# Patient Record
Sex: MALE | Race: WHITE | Employment: OTHER | URBAN - METROPOLITAN AREA
[De-identification: names, ages, dates, MRNs, and addresses within clinical notes are randomized per-mention and may not be internally consistent; named-entity substitution may affect disease eponyms.]

---

## 2017-01-09 ENCOUNTER — ANESTHESIA (OUTPATIENT)
Dept: SURGERY | Age: 82
DRG: 470 | End: 2017-01-09
Payer: MEDICARE

## 2017-01-09 ENCOUNTER — ANESTHESIA EVENT (OUTPATIENT)
Dept: SURGERY | Age: 82
DRG: 470 | End: 2017-01-09
Payer: MEDICARE

## 2017-01-09 PROBLEM — M17.9 DJD (DEGENERATIVE JOINT DISEASE) OF KNEE: Status: ACTIVE | Noted: 2017-01-09

## 2017-01-09 PROCEDURE — 77030008684 HC TU ET CUF COVD -B: Performed by: ANESTHESIOLOGY

## 2017-01-09 PROCEDURE — 74011250636 HC RX REV CODE- 250/636

## 2017-01-09 PROCEDURE — 77030020061 HC IV BLD WRMR ADMIN SET 3M -B: Performed by: ANESTHESIOLOGY

## 2017-01-09 PROCEDURE — 77030019908 HC STETH ESOPH SIMS -A: Performed by: ANESTHESIOLOGY

## 2017-01-09 PROCEDURE — 74011000250 HC RX REV CODE- 250

## 2017-01-09 PROCEDURE — 74011250636 HC RX REV CODE- 250/636: Performed by: ORTHOPAEDIC SURGERY

## 2017-01-09 PROCEDURE — 74011250636 HC RX REV CODE- 250/636: Performed by: ANESTHESIOLOGY

## 2017-01-09 RX ORDER — ROCURONIUM BROMIDE 10 MG/ML
INJECTION, SOLUTION INTRAVENOUS AS NEEDED
Status: DISCONTINUED | OUTPATIENT
Start: 2017-01-09 | End: 2017-01-09 | Stop reason: HOSPADM

## 2017-01-09 RX ORDER — NEOSTIGMINE METHYLSULFATE 1 MG/ML
INJECTION INTRAVENOUS AS NEEDED
Status: DISCONTINUED | OUTPATIENT
Start: 2017-01-09 | End: 2017-01-09 | Stop reason: HOSPADM

## 2017-01-09 RX ORDER — MIDAZOLAM HYDROCHLORIDE 1 MG/ML
INJECTION, SOLUTION INTRAMUSCULAR; INTRAVENOUS AS NEEDED
Status: DISCONTINUED | OUTPATIENT
Start: 2017-01-09 | End: 2017-01-09 | Stop reason: HOSPADM

## 2017-01-09 RX ORDER — PHENYLEPHRINE HCL IN 0.9% NACL 0.4MG/10ML
SYRINGE (ML) INTRAVENOUS AS NEEDED
Status: DISCONTINUED | OUTPATIENT
Start: 2017-01-09 | End: 2017-01-09 | Stop reason: HOSPADM

## 2017-01-09 RX ORDER — SUCCINYLCHOLINE CHLORIDE 20 MG/ML
INJECTION INTRAMUSCULAR; INTRAVENOUS AS NEEDED
Status: DISCONTINUED | OUTPATIENT
Start: 2017-01-09 | End: 2017-01-09 | Stop reason: HOSPADM

## 2017-01-09 RX ORDER — GLYCOPYRROLATE 0.2 MG/ML
INJECTION INTRAMUSCULAR; INTRAVENOUS AS NEEDED
Status: DISCONTINUED | OUTPATIENT
Start: 2017-01-09 | End: 2017-01-09 | Stop reason: HOSPADM

## 2017-01-09 RX ORDER — LIDOCAINE HYDROCHLORIDE 20 MG/ML
INJECTION, SOLUTION EPIDURAL; INFILTRATION; INTRACAUDAL; PERINEURAL AS NEEDED
Status: DISCONTINUED | OUTPATIENT
Start: 2017-01-09 | End: 2017-01-09 | Stop reason: HOSPADM

## 2017-01-09 RX ORDER — PROPOFOL 10 MG/ML
INJECTION, EMULSION INTRAVENOUS AS NEEDED
Status: DISCONTINUED | OUTPATIENT
Start: 2017-01-09 | End: 2017-01-09 | Stop reason: HOSPADM

## 2017-01-09 RX ORDER — ONDANSETRON 2 MG/ML
INJECTION INTRAMUSCULAR; INTRAVENOUS AS NEEDED
Status: DISCONTINUED | OUTPATIENT
Start: 2017-01-09 | End: 2017-01-09 | Stop reason: HOSPADM

## 2017-01-09 RX ORDER — FENTANYL CITRATE 50 UG/ML
INJECTION, SOLUTION INTRAMUSCULAR; INTRAVENOUS AS NEEDED
Status: DISCONTINUED | OUTPATIENT
Start: 2017-01-09 | End: 2017-01-09 | Stop reason: HOSPADM

## 2017-01-09 RX ORDER — DEXAMETHASONE SODIUM PHOSPHATE 4 MG/ML
INJECTION, SOLUTION INTRA-ARTICULAR; INTRALESIONAL; INTRAMUSCULAR; INTRAVENOUS; SOFT TISSUE AS NEEDED
Status: DISCONTINUED | OUTPATIENT
Start: 2017-01-09 | End: 2017-01-09 | Stop reason: HOSPADM

## 2017-01-09 RX ADMIN — LIDOCAINE HYDROCHLORIDE 50 MG: 20 INJECTION, SOLUTION EPIDURAL; INFILTRATION; INTRACAUDAL; PERINEURAL at 10:47

## 2017-01-09 RX ADMIN — SODIUM CHLORIDE, SODIUM LACTATE, POTASSIUM CHLORIDE, AND CALCIUM CHLORIDE: 600; 310; 30; 20 INJECTION, SOLUTION INTRAVENOUS at 11:02

## 2017-01-09 RX ADMIN — MIDAZOLAM HYDROCHLORIDE 2 MG: 1 INJECTION, SOLUTION INTRAMUSCULAR; INTRAVENOUS at 10:41

## 2017-01-09 RX ADMIN — ROCURONIUM BROMIDE 5 MG: 10 INJECTION, SOLUTION INTRAVENOUS at 10:47

## 2017-01-09 RX ADMIN — FENTANYL CITRATE 100 MCG: 50 INJECTION, SOLUTION INTRAMUSCULAR; INTRAVENOUS at 10:47

## 2017-01-09 RX ADMIN — CEFAZOLIN 2 G: 10 INJECTION, POWDER, FOR SOLUTION INTRAVENOUS; PARENTERAL at 10:43

## 2017-01-09 RX ADMIN — SUCCINYLCHOLINE CHLORIDE 140 MG: 20 INJECTION INTRAMUSCULAR; INTRAVENOUS at 10:47

## 2017-01-09 RX ADMIN — Medication 100 MCG: at 10:54

## 2017-01-09 RX ADMIN — DEXAMETHASONE SODIUM PHOSPHATE 8 MG: 4 INJECTION, SOLUTION INTRA-ARTICULAR; INTRALESIONAL; INTRAMUSCULAR; INTRAVENOUS; SOFT TISSUE at 11:06

## 2017-01-09 RX ADMIN — ROCURONIUM BROMIDE 30 MG: 10 INJECTION, SOLUTION INTRAVENOUS at 10:55

## 2017-01-09 RX ADMIN — NEOSTIGMINE METHYLSULFATE 2 MG: 1 INJECTION INTRAVENOUS at 11:37

## 2017-01-09 RX ADMIN — ONDANSETRON 4 MG: 2 INJECTION INTRAMUSCULAR; INTRAVENOUS at 11:34

## 2017-01-09 RX ADMIN — GLYCOPYRROLATE 0.4 MG: 0.2 INJECTION INTRAMUSCULAR; INTRAVENOUS at 11:37

## 2017-01-09 RX ADMIN — PROPOFOL 140 MG: 10 INJECTION, EMULSION INTRAVENOUS at 10:47

## 2017-01-09 NOTE — ANESTHESIA PREPROCEDURE EVALUATION
Anesthetic History   No history of anesthetic complications            Review of Systems / Medical History  Patient summary reviewed, nursing notes reviewed and pertinent labs reviewed    Pulmonary  Within defined limits                 Neuro/Psych   Within defined limits           Cardiovascular    Hypertension        Dysrhythmias : atrial fibrillation and atrial flutter      Exercise tolerance: >4 METS     GI/Hepatic/Renal  Within defined limits              Endo/Other        Obesity and arthritis     Other Findings   Comments: DJD  Enlarged prostate          Physical Exam    Airway  Mallampati: I    Neck ROM: normal range of motion   Mouth opening: Normal     Cardiovascular  Regular rate and rhythm,  S1 and S2 normal,  no murmur, click, rub, or gallop             Dental  No notable dental hx       Pulmonary  Breath sounds clear to auscultation               Abdominal  GI exam deferred       Other Findings            Anesthetic Plan    ASA: 3  Anesthesia type: general          Induction: Intravenous  Anesthetic plan and risks discussed with: Patient

## 2017-01-09 NOTE — ANESTHESIA POSTPROCEDURE EVALUATION
Post-Anesthesia Evaluation and Assessment    Patient: Keyshawn Bailey MRN: [id number]  SSN: [id number]    YOB: 1935  Age: 80 y.o. Sex: male       Cardiovascular Function/Vital Signs  Visit Vitals    /62    Pulse 72    Temp 36.9 °C (98.4 °F)    Resp 18    Wt 96.1 kg (211 lb 13.8 oz)    SpO2 95%    BMI 32.21 kg/m2       Patient is status post general anesthesia for Procedure(s):  RIGHT UNICONDYLAR KNEE ARTHROPLASTY. Nausea/Vomiting: None    Postoperative hydration reviewed and adequate. Pain:  Pain Scale 1: Numeric (0 - 10) (01/09/17 0757)  Pain Intensity 1: 0 (01/09/17 0757)   Managed    Neurological Status:   Neuro (WDL): Within Defined Limits (01/09/17 0801)  Neuro  Neurologic State: Alert;Eyes open spontaneously (01/09/17 0801)  Orientation Level: Oriented X4 (01/09/17 0801)  Cognition: Follows commands (01/09/17 0801)  Speech: Clear (01/09/17 0801)  LUE Motor Response: Purposeful (01/09/17 0801)  LLE Motor Response: Purposeful (01/09/17 0801)  RUE Motor Response: Purposeful (01/09/17 0801)  RLE Motor Response: Purposeful (01/09/17 0801)   At baseline    Mental Status and Level of Consciousness: Arousable    Pulmonary Status:   O2 Device: Nasal cannula (01/09/17 1208)   Adequate oxygenation and airway patent    Complications related to anesthesia: None    Post-anesthesia assessment completed.  No concerns    Signed By: Evy Cid MD     January 9, 2017

## 2017-02-06 ENCOUNTER — OFFICE VISIT (OUTPATIENT)
Dept: INTERNAL MEDICINE CLINIC | Age: 82
End: 2017-02-06

## 2017-02-06 VITALS
RESPIRATION RATE: 18 BRPM | WEIGHT: 214.6 LBS | SYSTOLIC BLOOD PRESSURE: 131 MMHG | DIASTOLIC BLOOD PRESSURE: 72 MMHG | HEART RATE: 74 BPM | OXYGEN SATURATION: 96 % | BODY MASS INDEX: 32.52 KG/M2 | TEMPERATURE: 97.4 F | HEIGHT: 68 IN

## 2017-02-06 DIAGNOSIS — E78.00 PURE HYPERCHOLESTEROLEMIA: ICD-10-CM

## 2017-02-06 DIAGNOSIS — N40.1 BENIGN NON-NODULAR PROSTATIC HYPERPLASIA WITH LOWER URINARY TRACT SYMPTOMS: ICD-10-CM

## 2017-02-06 DIAGNOSIS — R73.09 ELEVATED GLUCOSE: ICD-10-CM

## 2017-02-06 DIAGNOSIS — R73.03 PRE-DIABETES: ICD-10-CM

## 2017-02-06 DIAGNOSIS — H42 GLAUCOMA OF BOTH EYES ASSOCIATED WITH UNDERLYING DISEASE: ICD-10-CM

## 2017-02-06 DIAGNOSIS — I48.0 PAROXYSMAL ATRIAL FIBRILLATION (HCC): ICD-10-CM

## 2017-02-06 DIAGNOSIS — R60.0 LOCALIZED EDEMA: Primary | ICD-10-CM

## 2017-02-06 DIAGNOSIS — I10 ESSENTIAL HYPERTENSION: ICD-10-CM

## 2017-02-06 RX ORDER — TRAMADOL HYDROCHLORIDE 50 MG/1
TABLET ORAL
Refills: 0 | COMMUNITY
Start: 2017-01-24 | End: 2017-02-27

## 2017-02-06 RX ORDER — CEPHALEXIN 500 MG/1
CAPSULE ORAL
Refills: 0 | COMMUNITY
Start: 2017-01-31 | End: 2017-02-17 | Stop reason: ALTCHOICE

## 2017-02-06 RX ORDER — FUROSEMIDE 20 MG/1
TABLET ORAL
Refills: 0 | COMMUNITY
Start: 2017-01-24 | End: 2017-02-06 | Stop reason: ALTCHOICE

## 2017-02-06 RX ORDER — FUROSEMIDE 40 MG/1
40 TABLET ORAL DAILY
Qty: 30 TAB | Refills: 1 | Status: SHIPPED | OUTPATIENT
Start: 2017-02-06 | End: 2017-03-06 | Stop reason: SDUPTHER

## 2017-02-06 NOTE — PROGRESS NOTES
Chief Complaint   Patient presents with   [facility] Establish Care    Leg Swelling     right leg and both feet  x1 month     1. Have you been to the ER, urgent care clinic since your last visit? Hospitalized since your last visit? No    2. Have you seen or consulted any other health care providers outside of the [address] [place] [address] since your last visit? Include any pap smears or colon screening. Yes When: Jan 2016 and Feb 2016 [facility] , Swelling in the legs.

## 2017-02-06 NOTE — MR AVS SNAPSHOT
Visit Information Date & Time Provider Department Dept. Phone Encounter #  
 2/6/2017  1:15 PM Evelyn French, [address] [phone] [id number] Follow-up Instructions Return in about 4 weeks (around 3/6/2017). Upcoming Health Maintenance Date Due DTaP/Tdap/Td series (1 - Tdap) 10/10/1958 ZOSTER VACCINE AGE 60> 10/10/1997 GLAUCOMA SCREENING Q2Y 10/10/2002 Pneumococcal 65+ Low/Medium Risk (1 of 2 - PCV13) 10/10/2002 MEDICARE YEARLY EXAM 10/10/2002 Allergies as of 2/6/2017  Review Complete On: 2/6/2017 By: Melvina Santana Severity Noted Reaction Type Reactions Oxycodone Low 01/09/2017    Other (comments) Patient states, \"It made me feel unwell. \" Current Immunizations  Never Reviewed Name Date Influenza Vaccine 10/1/2016 Not reviewed this visit You Were Diagnosed With   
  
 Codes Comments Localized edema    -  Primary ICD-10-CM: R60.0 ICD-9-CM: 782.3 Essential hypertension     ICD-10-CM: I10 
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 
ICD-9-CM: 365.89 Benign non-nodular prostatic hyperplasia with lower urinary tract symptoms     ICD-10-CM: N40.1 ICD-9-CM: 600.91 Vitals BP Pulse Temp Resp Height(growth percentile) Weight(growth percentile) 131/72 (BP 1 Location: Left arm, BP Patient Position: Sitting) 74 97.4 °F (36.3 °C) (Oral) 18 5' 8\" (1.727 m) 214 lb 9.6 oz (97.3 kg) SpO2 BMI Smoking Status 96% 32.63 kg/m2 Never Smoker BMI and BSA Data Body Mass Index Body Surface Area  
 32.63 kg/m 2 2.16 m 2 Preferred Pharmacy Pharmacy Name Phone [phone] [facility] [phone] Your Updated Medication List  
  
   
 This list is accurate as of: 2/6/17  2:02 PM.  Always use your most recent med list.  
  
  
  
  
 ALPHAGAN P 0.1 % ophthalmic solution Generic drug:  brimonidine Administer 1 Drop to both eyes daily. Both eyes PM and lt eye in am also  
  
 amLODIPine 2.5 mg tablet Commonly known as:  Wana Jessica Take 2.5 mg by mouth daily. cephALEXin 500 mg capsule Commonly known as:  David Ty TAKE 1 CAPSULE BY MOUTH 3 TIMES DAILY UNTIL GONE. doxazosin 4 mg tablet Commonly known as:  CARDURA Take 4 mg by mouth daily. ELIQUIS 5 mg tablet Generic drug:  apixaban Take 5 mg by mouth two (2) times a day. famotidine 20 mg tablet Commonly known as:  PEPCID Take 1 Tab by mouth two (2) times a day for 30 days. furosemide 40 mg tablet Commonly known as:  LASIX Take 1 Tab by mouth daily. lisinopril 20 mg tablet Commonly known as:  Melanie Haworth Take 1 Tab by mouth daily. May resume medication when b/p greater than 120/80. metoprolol tartrate 25 mg tablet Commonly known as:  LOPRESSOR Take 25 mg by mouth two (2) times a day. traMADol 50 mg tablet Commonly known as:  ULTRAM  
TAKE 1 TABLET BY MOUTH EVERY 6 HOURS AS NEEDED FOR PAIN Prescriptions Sent to Pharmacy Refills  
 furosemide (LASIX) 40 mg tablet 1 Sig: Take 1 Tab by mouth daily. Class: Normal  
 Pharmacy: [address] #: [phone] Route: Oral  
  
We Performed the Following REFERRAL TO CARDIOLOGY [ENT79 Custom] REFERRAL TO OPHTHALMOLOGY [REF57 Custom] Comments:  
 glaucoma REFERRAL TO UROLOGY [YNQ347 Custom] Follow-up Instructions Return in about 4 weeks (around 3/6/2017). Referral Information Referral ID Referred By Referred To  
  
 [id number] Deon García [address] Phone: [phone] Fax: [phone] Visits Status Start Date End Date 1 New Request 2/6/17 2/6/18 If your referral has a status of pending review or denied, additional information will be sent to support the outcome of this decision. Referral ID Referred By Referred To  
 [id number] [address] [facility], [address] Urology [address] [place], [address] [facility] Visits Status Start Date End Date 1 New Request 2/6/17 2/6/18 If your referral has a status of pending review or denied, additional information will be sent to support the outcome of this decision. Referral ID Referred By Referred To  
 [id number] YULIANA Chavis [place] [facility] [address] [facility], [facility] Visits Status Start Date End Date 1 New Request 2/6/17 2/6/18 If your referral has a status of pending review or denied, additional information will be sent to support the outcome of this decision. Patient Instructions Leg and Ankle Edema: Care Instructions Your Care Instructions Swelling in the legs, ankles, and feet is called edema. It is common after you sit or stand for a while. Long plane flights or car rides often cause swelling in the legs and feet. You may also have swelling if you have to stand for long periods of time at your job. Problems with the veins in the legs (varicose veins) and changes in hormones can also cause swelling. Sometimes the swelling in the ankles and feet is caused by a more serious problem, such as heart failure, infection, blood clots, or liver or kidney disease. Follow-up care is a key part of your treatment and safety. Be sure to make and go to all appointments, and call your doctor if you are having problems. Its also a good idea to know your test results and keep a list of the medicines you take. How can you care for yourself at home? · If your doctor gave you medicine, take it as prescribed. Call your doctor if you think you are having a problem with your medicine. · Whenever you are resting, raise your legs up. Try to keep the swollen area higher than the level of your heart. · Take breaks from standing or sitting in one position. ¨ Walk around to increase the blood flow in your lower legs. ¨ Move your feet and ankles often while you stand, or tighten and relax your leg muscles. · Wear support stockings. Put them on in the morning, before swelling gets worse. · Eat a balanced diet. Lose weight if you need to. · Limit the amount of salt (sodium) in your diet. Salt holds fluid in the body and may increase swelling. When should you call for help? Call 911 anytime you think you may need emergency care. For example, call if: 
· You have symptoms of a blood clot in your lung (called a pulmonary embolism). These may include: 
¨ Sudden chest pain. ¨ Trouble breathing. ¨ Coughing up blood. Call your doctor now or seek immediate medical care if: 
· You have signs of a blood clot, such as: 
¨ Pain in your calf, back of the knee, thigh, or groin. ¨ Redness and swelling in your leg or groin. · You have symptoms of infection, such as: 
¨ Increased pain, swelling, warmth, or redness. ¨ Red streaks or pus. ¨ A fever. Watch closely for changes in your health, and be sure to contact your doctor if: 
· Your swelling is getting worse. · You have new or worsening pain in your legs. · You do not get better as expected. Where can you learn more? Go to http://cammie-derik.info/. Enter L769 in the search box to learn more about \"Leg and Ankle Edema: Care Instructions. \" Current as of: May 27, 2016 Content Version: 11.1 © 4794-9575 [employer].  Care instructions adapted under license by [employer] (which disclaims liability or warranty for this information). If you have questions about a medical condition or this instruction, always ask your healthcare professional. [address] any warranty or liability for your use of this information. 
 
-----------lasix- furosemide ( fluid pill) increased to 40mg daily. Introducing [facility] SERVICES! Chito Burger introduces GuidePal patient portal. Now you can access parts of your medical record, email your doctor's office, and request medication refills online. 1. In your internet browser, go to [email or web address] 2. Click on the First Time User? Click Here link in the Sign In box. You will see the New Member Sign Up page. 3. Enter your GuidePal Access Code exactly as it appears below. You will not need to use this code after youve completed the sign-up process. If you do not sign up before the expiration date, you must request a new code. · GuidePal Access Code: [email or web address] Expires: 3/15/2017 10:31 AM 
 
4. Enter the last four digits of your Social Security Number (xxxx) and Date of Birth (mm/dd/yyyy) as indicated and click Submit. You will be taken to the next sign-up page. 5. Create a GuidePal ID. This will be your GuidePal login ID and cannot be changed, so think of one that is secure and easy to remember. 6. Create a GuidePal password. You can change your password at any time. 7. Enter your Password Reset Question and Answer. This can be used at a later time if you forget your password. 8. Enter your e-mail address. You will receive e-mail notification when new information is available in [address] 9. Click Sign Up. You can now view and download portions of your medical record. 10. Click the Download Summary menu link to download a portable copy of your medical information. If you have questions, please visit the Frequently Asked Questions section of the GuidePal website.  Remember, GuidePal is NOT to be used for urgent needs. For medical emergencies, dial 911. Now available from your iPhone and Android! Please provide this summary of care documentation to your next provider. Your primary care clinician is listed as ANGELA Whalen. If you have any questions after today's visit, please call [phone].

## 2017-02-06 NOTE — PATIENT INSTRUCTIONS
Leg and Ankle Edema: Care Instructions  Your Care Instructions  Swelling in the legs, ankles, and feet is called edema. It is common after you sit or stand for a while. Long plane flights or car rides often cause swelling in the legs and feet. You may also have swelling if you have to stand for long periods of time at your job. Problems with the veins in the legs (varicose veins) and changes in hormones can also cause swelling. Sometimes the swelling in the ankles and feet is caused by a more serious problem, such as heart failure, infection, blood clots, or liver or kidney disease. Follow-up care is a key part of your treatment and safety. Be sure to make and go to all appointments, and call your doctor if you are having problems. Its also a good idea to know your test results and keep a list of the medicines you take. How can you care for yourself at home? · If your doctor gave you medicine, take it as prescribed. Call your doctor if you think you are having a problem with your medicine. · Whenever you are resting, raise your legs up. Try to keep the swollen area higher than the level of your heart. · Take breaks from standing or sitting in one position. ¨ Walk around to increase the blood flow in your lower legs. ¨ Move your feet and ankles often while you stand, or tighten and relax your leg muscles. · Wear support stockings. Put them on in the morning, before swelling gets worse. · Eat a balanced diet. Lose weight if you need to. · Limit the amount of salt (sodium) in your diet. Salt holds fluid in the body and may increase swelling. When should you call for help? Call 911 anytime you think you may need emergency care. For example, call if:  · You have symptoms of a blood clot in your lung (called a pulmonary embolism). These may include:  ¨ Sudden chest pain. ¨ Trouble breathing. ¨ Coughing up blood.   Call your doctor now or seek immediate medical care if:  · You have signs of a blood clot, such as:  ¨ Pain in your calf, back of the knee, thigh, or groin. ¨ Redness and swelling in your leg or groin. · You have symptoms of infection, such as:  ¨ Increased pain, swelling, warmth, or redness. ¨ Red streaks or pus. ¨ A fever. Watch closely for changes in your health, and be sure to contact your doctor if:  · Your swelling is getting worse. · You have new or worsening pain in your legs. · You do not get better as expected. Where can you learn more? Go to http://cammie-derik.info/. Enter S787 in the search box to learn more about \"Leg and Ankle Edema: Care Instructions. \"  Current as of: May 27, 2016  Content Version: 11.1  © 1337-9491 [employer]. Care instructions adapted under license by [employer] (which disclaims liability or warranty for this information). If you have questions about a medical condition or this instruction, always ask your healthcare professional. [place] [address] any warranty or liability for your use of this information.    -----------lasix- furosemide ( fluid pill) increased to 40mg daily.

## 2017-02-06 NOTE — PROGRESS NOTES
Mr. Harris Both is a new patient who is here to establish care. Establish Care and Leg Swelling (right leg and both feet  x1 month)     RIght knee arthroplasty done on Jan 10th     Recently moved from [place] and here to establish care. Patient had right knee arthroplasty done on Jan 10th and since then has experienced right right leg swelling and feet swelling for 1 month. He was recently seen by his  cardiologist in [place] and had TTE, doppler and a CAT scan done results are pending. Also had lab work done at same time   Patient is taking lasix 20mg daily prescribed by ortho MD due to swelling noted. Reports dyspnea with lying flat after sx which has improved. Also had PND  which has improved, orthopnea 1 pillow. Reports increased sitting and not as mobile since sx  On apixan daily   Patient has follow up with ortho tomorrow    Hx of atrial fibrillation since 2015 on metoprolol and apixaban. Had TTE on last Friday. Evaluated by Clyde Shields in [place] - needs cardiologist here to follow care. Hx of colonoscopy: when patient was 78 and was normal   Reports PNA shot cannot recall when    Glaucoma: uses brimonidine and requesting referral to local eye MD     Large prostate: followed by urologist and requesting to have a urologist  here.  No hx of biopsy     Review of systems:  Constitutional: negative for fever, chills, weight loss, night sweats   Eyes : negative for vision changes, eye pain and discharge  Nose and Throat: negative for tinnitus, sore throat   Cardiovascular: negative for chest pain, palpitations and shortness of breath  Respiratory: negative for shortness of breath, cough and wheezing   Gastroinstestinal: negative for abdominal pain, nausea, vomiting, diarrhea, constipation, and blood in the stool  Musculoskeletal: negative for back ache and joint ache   Genitourinary: negative for dysuria, nocturia, polyuria and hematuria   Neurologic: Negative for focal weakness, numbness or incoordination  Skin: negative for rash, pruritus  Hematologic: negative for easy bruising      Past Medical History   Diagnosis Date    Arrhythmia      A. fib. A. Flutter    Arthritis      DJD    Enlarged prostate     Hyperlipidemia     Hypertension     Pre-diabetes      HA1C 6.0 Dec 2016        Past Surgical History   Procedure Laterality Date    Hx shoulder arthroscopy       rt    Hx colonoscopy      Hx tonsillectomy      Hx heent       wisdom teeth extraction x2       Allergies   Allergen Reactions    Oxycodone Other (comments)     Patient states, \"It made me feel unwell. \"       Current Outpatient Prescriptions on File Prior to Visit   Medication Sig Dispense Refill    lisinopril (PRINIVIL, ZESTRIL) 20 mg tablet Take 1 Tab by mouth daily. May resume medication when b/p greater than 120/80.  famotidine (PEPCID) 20 mg tablet Take 1 Tab by mouth two (2) times a day for 30 days. 60 Tab 0    brimonidine (ALPHAGAN P) 0.1 % ophthalmic solution Administer 1 Drop to both eyes daily. Both eyes PM and lt eye in am also      metoprolol tartrate (LOPRESSOR) 25 mg tablet Take 25 mg by mouth two (2) times a day.  doxazosin (CARDURA) 4 mg tablet Take 4 mg by mouth daily.  amLODIPine (NORVASC) 2.5 mg tablet Take 2.5 mg by mouth daily.  apixaban (ELIQUIS) 5 mg tablet Take 5 mg by mouth two (2) times a day. No current facility-administered medications on file prior to visit. No family hx of CAD cannot recall specifics    Social History     Social History    Marital status:      Spouse name: N/A    Number of children: N/A    Years of education: N/A     Occupational History    Not on file.      Social History Main Topics    Smoking status: Never Smoker    Smokeless tobacco: Not on file    Alcohol use No    Drug use: Not on file    Sexual activity: Not on file     Other Topics Concern    Not on file     Social History Narrative       Visit Vitals    /72 (BP 1 Location: Left arm, BP Patient Position: Sitting)    Pulse 74    Temp 97.4 °F (36.3 °C) (Oral)    Resp 18    Ht 5' 8\" (1.727 m)    Wt 214 lb 9.6 oz (97.3 kg)    SpO2 96%    BMI 32.63 kg/m2     General:  Well appearing male no acute distress  HEENT:   PERRL,normal conjunctiva. External ear and canals normal, TMs normal.  Hearing normal to voice. Nose without edema or discharge, normal septum. Lips, teeth, gums normal.  Oropharynx: no erythema, no exudates, no lesions, normal tongue. Neck:  Supple. Thyroid normal size, nontender, without nodules. No carotid bruit. No masses or lymphadenopathy  Respiratory: no respiratory distress,  no wheezing, no rhonchi, no rales. No chest wall tenderness. Cardiovascular:  RRR, normal S1S2, no murmur. Gastrointestinal: normal bowel sounds, soft, nontender, without masses. No hepatosplenomegaly. Extremities +2 pulses, 2+ pitting edema on right leg to thigh level extending from feet - minimal erythema and left leg with edema 1+, normal sensation   Patient is able to bear weight on right leg    Musculoskeletal:  Normal gait. Normal digits and nails. Normal strength and tone, no atrophy, and no abnormal movement. Skin:  No rash, no lesions, no ulcers. Skin warm, normal turgor, without induration or nodules. Neuro:  A and OX4, fluent speech, cranial nerves normal 2-12. Sensation normal to light touch.   Psych:  Normal affect      Lab Results   Component Value Date/Time    WBC 13.8 01/10/2017 04:19 AM    HGB 12.1 01/10/2017 04:19 AM    HCT 36.5 01/10/2017 04:19 AM    PLATELET 909 23/98/1264 04:19 AM    MCV 82.0 01/10/2017 04:19 AM     Lab Results   Component Value Date/Time    Sodium 138 01/10/2017 04:19 AM    Potassium 4.8 01/10/2017 04:19 AM    Chloride 108 01/10/2017 04:19 AM    CO2 19 01/10/2017 04:19 AM    Anion gap 11 01/10/2017 04:19 AM    Glucose 139 01/10/2017 04:19 AM    BUN 23 01/10/2017 04:19 AM    Creatinine 1.25 01/10/2017 04:19 AM BUN/Creatinine ratio 18 01/10/2017 04:19 AM    GFR est AA >60 01/10/2017 04:19 AM    GFR est non-AA 56 01/10/2017 04:19 AM    Calcium 7.9 01/10/2017 04:19 AM       Lab Results   Component Value Date/Time    Hemoglobin A1c 6.0 12/20/2016 01:23 PM             Assessment and Plan:     1. Lower extremity edema: Suspect multifactorial - patient with hx of afib and TTE done at cardiologist and pending. Also had duplex of lower extremity which is pending - I doubt PE and LE DVT patient reports compliance on eliquis BID. I have requested the records for these tests- patient also had lab work including BMP, and BNP and CBC done and hepatic panel   - in the meanwhile I have advised patient to increase lasix to 40mg daily ( patient was on 20mg daily) and to increase leg movement. - furosemide (LASIX) 40 mg tablet; Take 1 Tab by mouth daily. Dispense: 30 Tab; Refill: 1  -has follow up for right knee sx tomorrow - does not appear infected - patient is on keflex. 2. Essential hypertension  -BP at goal  Continue lisinopril, metoprolol and norvasc  - doubt Norvasc causing swelling on very low dose     3. Paroxysmal atrial fibrillation (HCC)  - rate controlled and sinus today - patient is on eliquis and metoprolol  -had recent TTE and I requested records   - REFERRAL TO CARDIOLOGY    4. 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 Benign non-nodular prostatic hyperplasia with lower urinary tract symptoms  - REFERRAL TO UROLOGY  - will defer to urology for PSA     6. Pre-diabetes  - mild   -discuss this with patient next visit did not address today       HM: discuss next visit   Need records of vaccines and last colonoscopy   Discuss pre diabetes next visit   Also needs cholesterol checked     Follow-up Disposition:  Return in about 4 weeks (around 3/6/2017).      Bradley Byrne MD

## 2017-02-09 ENCOUNTER — HOSPITAL ENCOUNTER (OUTPATIENT)
Dept: LAB | Age: 82
Discharge: HOME | End: 2017-02-09
Payer: MEDICARE

## 2017-02-09 PROCEDURE — 83036 HEMOGLOBIN GLYCOSYLATED A1C: CPT

## 2017-02-09 PROCEDURE — 36415 COLL VENOUS BLD VENIPUNCTURE: CPT

## 2017-02-09 PROCEDURE — 84153 ASSAY OF PSA TOTAL: CPT

## 2017-02-09 PROCEDURE — 80076 HEPATIC FUNCTION PANEL: CPT

## 2017-02-09 PROCEDURE — 80061 LIPID PANEL: CPT

## 2017-02-09 PROCEDURE — 80048 BASIC METABOLIC PNL TOTAL CA: CPT

## 2017-02-09 PROCEDURE — 85025 COMPLETE CBC W/AUTO DIFF WBC: CPT

## 2017-02-09 PROCEDURE — 84154 ASSAY OF PSA FREE: CPT

## 2017-02-10 LAB
ALBUMIN SERPL-MCNC: 4.1 G/DL (ref 3.5–4.8)
ALP SERPL-CCNC: 68 IU/L (ref 39–117)
ALT SERPL-CCNC: 8 IU/L (ref 0–44)
AST SERPL-CCNC: 12 IU/L (ref 0–40)
BASOPHILS # BLD AUTO: 0 X10E3/UL (ref 0–0.2)
BASOPHILS NFR BLD AUTO: 1 %
BILIRUB DIRECT SERPL-MCNC: 0.11 MG/DL (ref 0–0.4)
BILIRUB SERPL-MCNC: 0.3 MG/DL (ref 0–1.2)
BUN SERPL-MCNC: 20 MG/DL (ref 8–27)
BUN/CREAT SERPL: 19 (ref 10–22)
CALCIUM SERPL-MCNC: 8.9 MG/DL (ref 8.6–10.2)
CHLORIDE SERPL-SCNC: 101 MMOL/L (ref 96–106)
CHOLEST SERPL-MCNC: 182 MG/DL (ref 100–199)
CO2 SERPL-SCNC: 27 MMOL/L (ref 18–29)
CREAT SERPL-MCNC: 1.04 MG/DL (ref 0.76–1.27)
EOSINOPHIL # BLD AUTO: 0.2 X10E3/UL (ref 0–0.4)
EOSINOPHIL NFR BLD AUTO: 3 %
ERYTHROCYTE [DISTWIDTH] IN BLOOD BY AUTOMATED COUNT: 14.3 % (ref 12.3–15.4)
EST. AVERAGE GLUCOSE BLD GHB EST-MCNC: 123 MG/DL
GLUCOSE SERPL-MCNC: 103 MG/DL (ref 65–99)
HBA1C MFR BLD: 5.9 % (ref 4.8–5.6)
HCT VFR BLD AUTO: 37.2 % (ref 37.5–51)
HDLC SERPL-MCNC: 28 MG/DL
HGB BLD-MCNC: 12.1 G/DL (ref 12.6–17.7)
IMM GRANULOCYTES # BLD: 0 X10E3/UL (ref 0–0.1)
IMM GRANULOCYTES NFR BLD: 0 %
LDLC SERPL CALC-MCNC: 113 MG/DL (ref 0–99)
LYMPHOCYTES # BLD AUTO: 2.2 X10E3/UL (ref 0.7–3.1)
LYMPHOCYTES NFR BLD AUTO: 30 %
MCH RBC QN AUTO: 26.1 PG (ref 26.6–33)
MCHC RBC AUTO-ENTMCNC: 32.5 G/DL (ref 31.5–35.7)
MCV RBC AUTO: 80 FL (ref 79–97)
MONOCYTES # BLD AUTO: 0.6 X10E3/UL (ref 0.1–0.9)
MONOCYTES NFR BLD AUTO: 8 %
NEUTROPHILS # BLD AUTO: 4.3 X10E3/UL (ref 1.4–7)
NEUTROPHILS NFR BLD AUTO: 58 %
PLATELET # BLD AUTO: 184 X10E3/UL (ref 150–379)
POTASSIUM SERPL-SCNC: 4.5 MMOL/L (ref 3.5–5.2)
PROT SERPL-MCNC: 6.2 G/DL (ref 6–8.5)
PSA FREE MFR SERPL: 29.8 %
PSA FREE SERPL-MCNC: 1.25 NG/ML
PSA SERPL-MCNC: 4.2 NG/ML (ref 0–4)
RBC # BLD AUTO: 4.63 X10E6/UL (ref 4.14–5.8)
REFLEX CRITERIA: ABNORMAL
SODIUM SERPL-SCNC: 142 MMOL/L (ref 134–144)
TRIGL SERPL-MCNC: 206 MG/DL (ref 0–149)
VLDLC SERPL CALC-MCNC: 41 MG/DL (ref 5–40)
WBC # BLD AUTO: 7.4 X10E3/UL (ref 3.4–10.8)

## 2017-02-10 NOTE — PROGRESS NOTES
Please notify patient of labs   Kidney function is normal  Blood count is normal   Liver function is normal   Cholesterol is mildly elevated we will discuss this at your follow up visit   PSA - prostate antigen is mildly elevate - I have already given you a referral to see urologist   Prediabetes: stable -we will discuss this at your follow up visit

## 2017-02-17 ENCOUNTER — HOSPITAL ENCOUNTER (OUTPATIENT)
Dept: LAB | Age: 82
Discharge: HOME | End: 2017-02-17
Payer: MEDICARE

## 2017-02-17 ENCOUNTER — OFFICE VISIT (OUTPATIENT)
Dept: INTERNAL MEDICINE CLINIC | Age: 82
End: 2017-02-17

## 2017-02-17 VITALS
OXYGEN SATURATION: 95 % | RESPIRATION RATE: 18 BRPM | SYSTOLIC BLOOD PRESSURE: 126 MMHG | BODY MASS INDEX: 32.34 KG/M2 | TEMPERATURE: 97.9 F | WEIGHT: 213.4 LBS | HEIGHT: 68 IN | HEART RATE: 78 BPM | DIASTOLIC BLOOD PRESSURE: 68 MMHG

## 2017-02-17 DIAGNOSIS — I10 ESSENTIAL HYPERTENSION: ICD-10-CM

## 2017-02-17 DIAGNOSIS — R73.03 PRE-DIABETES: ICD-10-CM

## 2017-02-17 DIAGNOSIS — R60.0 BILATERAL EDEMA OF LOWER EXTREMITY: ICD-10-CM

## 2017-02-17 DIAGNOSIS — R53.82 CHRONIC FATIGUE: Primary | ICD-10-CM

## 2017-02-17 DIAGNOSIS — R06.83 SNORING: ICD-10-CM

## 2017-02-17 PROCEDURE — 36415 COLL VENOUS BLD VENIPUNCTURE: CPT

## 2017-02-17 PROCEDURE — 80048 BASIC METABOLIC PNL TOTAL CA: CPT

## 2017-02-17 RX ORDER — HYDROCODONE BITARTRATE AND ACETAMINOPHEN 5; 325 MG/1; MG/1
TABLET ORAL
COMMUNITY
Start: 2017-01-10 | End: 2017-02-27

## 2017-02-17 NOTE — PROGRESS NOTES
Mr. Alissa Contreras is a 81 yo male with a hx of afib on apixapan, large prostate, and glaucoma presenting to follow up for swelling in legs      Follow-up (leg swelling)     Leg swelling B/L: RIght more then left - patient had RIght knee arthroplasty done on Jan 10th and since procedure has had increased leg swelling. Had duplex that was negative and reports normal TTE done  by his  cardiologist in [place] ( Dr Starlin Opitz) . Pt is doing physical therapy and followed with ortho MD ( ortho [address] [place] [address]) and had follow up x rays which showed no abnormality. Patient was started on lasix 20mg then increased to 40mg. Today the swelling has improved some but still present. Patient denies fever and chills. Has started using compression stocking yesterday. Able to bear weight and walk. On apixan daily   Patient completed keflex per ortho MD   Reviewed labs with patient     Pre diabetes/ mildly elevated cholesterol: discussed lifestyle changes. Review of systems:  Constitutional: negative for fever, chills, weight loss, night sweats - patient feels more tired/ admits to snoring  Eyes : negative for vision changes, eye pain and discharge  Nose and Throat: negative for tinnitus, sore throat   Cardiovascular: negative for chest pain, palpitations and shortness of breath  Respiratory: negative for shortness of breath, cough and wheezing   Gastroinstestinal: negative for abdominal pain, nausea, vomiting, diarrhea, constipation, and blood in the stool  Musculoskeletal: positive for right knee recent surgery   Genitourinary: increased urination with lasix   Neurologic: Negative for focal weakness, numbness or incoordination  Skin: negative for rash, pruritus  Hematologic: negative for easy bruising      Past Medical History   Diagnosis Date    Arrhythmia      A. fib.  A. Flutter    Arthritis      DJD    Enlarged prostate     Hyperlipidemia     Hypertension     Pre-diabetes      HA1C 6.0 Dec 2016        Past Surgical History   Procedure Laterality Date    Hx shoulder arthroscopy       rt    Hx colonoscopy      Hx tonsillectomy      Hx heent       wisdom teeth extraction x2       Allergies   Allergen Reactions    Oxycodone Other (comments)     Patient states, \"It made me feel unwell. \"       Current Outpatient Prescriptions on File Prior to Visit   Medication Sig Dispense Refill    traMADol (ULTRAM) 50 mg tablet TAKE 1 TABLET BY MOUTH EVERY 6 HOURS AS NEEDED FOR PAIN  0    furosemide (LASIX) 40 mg tablet Take 1 Tab by mouth daily. 30 Tab 1    lisinopril (PRINIVIL, ZESTRIL) 20 mg tablet Take 1 Tab by mouth daily. May resume medication when b/p greater than 120/80.  brimonidine (ALPHAGAN P) 0.1 % ophthalmic solution Administer 1 Drop to both eyes daily. Both eyes PM and lt eye in am also      metoprolol tartrate (LOPRESSOR) 25 mg tablet Take 25 mg by mouth two (2) times a day.  doxazosin (CARDURA) 4 mg tablet Take 4 mg by mouth daily.  amLODIPine (NORVASC) 2.5 mg tablet Take 2.5 mg by mouth daily.  apixaban (ELIQUIS) 5 mg tablet Take 5 mg by mouth two (2) times a day. No current facility-administered medications on file prior to visit. No family hx of CAD cannot recall specifics    Social History     Social History    Marital status:      Spouse name: N/A    Number of children: N/A    Years of education: N/A     Occupational History    Not on file.      Social History Main Topics    Smoking status: Never Smoker    Smokeless tobacco: Not on file    Alcohol use No    Drug use: Not on file    Sexual activity: Not on file     Other Topics Concern    Not on file     Social History Narrative       Visit Vitals    /68 (BP 1 Location: Left arm, BP Patient Position: Sitting)    Pulse 78    Temp 97.9 °F (36.6 °C) (Oral)    Resp 18    Ht 5' 8\" (1.727 m)    Wt 213 lb 6.4 oz (96.8 kg)    SpO2 95%    BMI 32.45 kg/m2     General:  Well appearing male no acute distress  HEENT:   PERRL,normal conjunctiva. External ear and canals normal, TMs normal.  Hearing normal to voice. Nose without edema or discharge, normal septum. Lips, teeth, gums normal.  Neck:  Supple. Thyroid normal size, nontender, without nodules. No carotid bruit. No masses or lymphadenopathy  Respiratory: no respiratory distress,  no wheezing, no rhonchi, no rales. No chest wall tenderness. Cardiovascular:  RRR, normal S1S2, no murmur. Gastrointestinal: normal bowel sounds, soft, nontender, without masses. No hepatosplenomegaly. Extremities1+ pitting edema on right leg to thigh level extending from foot, mild warmth, no eryhtema   and left leg with edema trace - improved, normal sensation   Patient is able to bear weight on right leg  Scar of knee sx healing     Musculoskeletal:  Normal gait. Skin:  No rash, no lesions, no ulcers. Skin warm, normal turgor, without induration or nodules. Neuro:  A and OX4, fluent speech, cranial nerves normal 2-12. Psych:  Normal affect      Lab Results   Component Value Date/Time    WBC 7.4 02/09/2017 12:07 PM    HGB 12.1 02/09/2017 12:07 PM    HCT 37.2 02/09/2017 12:07 PM    PLATELET 501 65/83/6184 12:07 PM    MCV 80 02/09/2017 12:07 PM     Lab Results   Component Value Date/Time    Sodium 142 02/09/2017 12:07 PM    Potassium 4.5 02/09/2017 12:07 PM    Chloride 101 02/09/2017 12:07 PM    CO2 27 02/09/2017 12:07 PM    Anion gap 11 01/10/2017 04:19 AM    Glucose 103 02/09/2017 12:07 PM    BUN 20 02/09/2017 12:07 PM    Creatinine 1.04 02/09/2017 12:07 PM    BUN/Creatinine ratio 19 02/09/2017 12:07 PM    GFR est AA 79 02/09/2017 12:07 PM    GFR est non-AA 68 02/09/2017 12:07 PM    Calcium 8.9 02/09/2017 12:07 PM       Lab Results   Component Value Date/Time    Hemoglobin A1c 5.9 02/09/2017 12:07 PM             Assessment and Plan:     1.  Lower extremity edema: Suspect multifactorial - patient with hx of afib and TTE done reported normal ( I have not received records)   - negative duplex for clot ( unlikely as pt is on apixaban)   - I have requested the records for these tests again  - continue lasix for now   - low dose of Norvasc unlikely to cause swelling ( patient previously on this medication)  - advised to walk more   -checking BMP and depending on results will determine if patient needs lasix     2. Essential hypertension  -BP at goal  Continue lisinopril, metoprolol and norvasc  - doubt Norvasc causing swelling on very low dose     3. Paroxysmal atrial fibrillation (HCC)  - rate controlled, sinus rhythm- patient is on eliquis and metoprolol  -had recent TTE and I requested records again  - hs apt with Dr Alexis Valentin    4. 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 Benign non-nodular prostatic hyperplasia with lower urinary tract symptoms  - REFERRAL TO UROLOGY  - will defer to urology for PSA     6. Pre-diabetes- HA1C 5.9  -discussed with patient importance of diet and exercise. Will repeat in 6 months and if no improvement will discuss starting metformin.      7. Snoring: referral for sleep study  Orders Placed This Encounter    METABOLIC PANEL, BASIC    SLEEP MEDICINE REFERRAL     Referral Priority:   Routine     Referral Type:   Consultation     Referral Reason:   Specialty Services Required     Referred to Provider:   Eder Pelaez MD    HYDROcodone-acetaminophen [facility]) 5-325 mg per tablet       HM:  Need records of vaccines and last colonoscopy requested again today    colonoscopy:done in 2009 and hemorrhoids  Up to date on PNA shots (X2) and Tdap   Needs shingles shot - has hx of shingles      Follow-up Disposition:  Return in about 4 weeks     Steven Rosales MD

## 2017-02-17 NOTE — PROGRESS NOTES
Chief Complaint   Patient presents with    Follow-up     leg swelling     1. Have you been to the ER, urgent care clinic since your last visit? Hospitalized since your last visit? No    2. Have you seen or consulted any other health care providers outside of the [address] [place] [address] since your last visit? Include any pap smears or colon screening.  No

## 2017-02-17 NOTE — MR AVS SNAPSHOT
Visit Information Date & Time Provider Department Dept. Phone Encounter #  
 2/17/2017  2:30 PM Pe[facility]a, [address] [phone] [id number] Follow-up Instructions Return in about 4 weeks (around 3/17/2017) for leg swelling . Your Appointments 2/27/2017 10:00 AM  
New Patient with Marielena Moraes MD  
[facility] ([address]) Appt Note: Ref by Dr Nicci Cerda dx afib edema cc holds records [address]  [address] [place] [address]  
[address] [address]  
  
    
 3/6/2017  1:45 PM  
PROCEDURE with Pe[facility]a [address]) Appt Note: 4 week follow up  
 [facility] [address] [place] [address]  
[address] [facility] [address] [place] [address] Upcoming Health Maintenance Date Due DTaP/Tdap/Td series (1 - Tdap) 10/10/1956 ZOSTER VACCINE AGE 60> 10/10/1997 GLAUCOMA SCREENING Q2Y 10/10/2002 Pneumococcal 65+ Low/Medium Risk (1 of 2 - PCV13) 10/10/2002 MEDICARE YEARLY EXAM 10/10/2002 Allergies as of 2/17/2017  Review Complete On: 2/17/2017 By: Deneen Minaya Severity Noted Reaction Type Reactions Oxycodone Low 01/09/2017    Other (comments) Patient states, \"It made me feel unwell. \" Current Immunizations  Never Reviewed Name Date Influenza Vaccine 10/1/2016 Not reviewed this visit You Were Diagnosed With   
  
 Codes Comments Chronic fatigue    -  Primary ICD-10-CM: R53.82 
ICD-9-CM: 780.79 Snoring     ICD-10-CM: R06.83 
ICD-9-CM: 786.09 Essential hypertension     ICD-10-CM: I10 
ICD-9-CM: 401.9 Bilateral edema of lower extremity     ICD-10-CM: R60.0 ICD-9-CM: 782.3 Pre-diabetes     ICD-10-CM: R73.03 
ICD-9-CM: 790.29 Vitals BP Pulse Temp Resp Height(growth percentile) Weight(growth percentile) 126/68 (BP 1 Location: Left arm, BP Patient Position: Sitting) 78 97.9 °F (36.6 °C) (Oral) 18 5' 8\" (1.727 m) 213 lb 6.4 oz (96.8 kg) SpO2 BMI Smoking Status 95% 32.45 kg/m2 Never Smoker BMI and BSA Data Body Mass Index Body Surface Area  
 32.45 kg/m 2 2.16 m 2 Preferred Pharmacy Pharmacy Name Phone [id number] [phone] [facility] [phone] Your Updated Medication List  
  
   
This list is accurate as of: 2/17/17  2:57 PM.  Always use your most recent med list.  
  
  
  
  
 ALPHAGAN P 0.1 % ophthalmic solution Generic drug:  brimonidine Administer 1 Drop to both eyes daily. Both eyes PM and lt eye in am also  
  
 amLODIPine 2.5 mg tablet Commonly known as:  Elspeth Bakes Take 2.5 mg by mouth daily. doxazosin 4 mg tablet Commonly known as:  CARDURA Take 4 mg by mouth daily. ELIQUIS 5 mg tablet Generic drug:  apixaban Take 5 mg by mouth two (2) times a day. furosemide 40 mg tablet Commonly known as:  LASIX Take 1 Tab by mouth daily. HYDROcodone-acetaminophen 5-325 mg per tablet Commonly known as:  NORCO  
  
 lisinopril 20 mg tablet Commonly known as:  Rosa Primmer Take 1 Tab by mouth daily. May resume medication when b/p greater than 120/80. metoprolol tartrate 25 mg tablet Commonly known as:  LOPRESSOR Take 25 mg by mouth two (2) times a day. traMADol 50 mg tablet Commonly known as:  ULTRAM  
TAKE 1 TABLET BY MOUTH EVERY 6 HOURS AS NEEDED FOR PAIN We Performed the Following METABOLIC PANEL, BASIC [19024 CPT(R)] SLEEP MEDICINE REFERRAL [EUF342 Custom] Comments:  
 Snoring, concern for LINDA Follow-up Instructions Return in about 4 weeks (around 3/17/2017) for leg swelling . Referral Information Referral ID Referred By Referred To  
  
 [id number] [facility] Renata Denise, [address] [facility] Precious [place] Phone: [phone] Fax: [phone] Visits Status Start Date End Date 1 New Request 2/17/17 2/17/18 If your referral has a status of pending review or denied, additional information will be sent to support the outcome of this decision. Patient Instructions DASH Diet: Care Instructions Your Care Instructions The DASH diet is an eating plan that can help lower your blood pressure. DASH stands for Dietary Approaches to Stop Hypertension. Hypertension is high blood pressure. The DASH diet focuses on eating foods that are high in calcium, potassium, and magnesium. These nutrients can lower blood pressure. The foods that are highest in these nutrients are fruits, vegetables, low-fat dairy products, nuts, seeds, and legumes. But taking calcium, potassium, and magnesium supplements instead of eating foods that are high in those nutrients does not have the same effect. The DASH diet also includes whole grains, fish, and poultry. The DASH diet is one of several lifestyle changes your doctor may recommend to lower your high blood pressure. Your doctor may also want you to decrease the amount of sodium in your diet. Lowering sodium while following the DASH diet can lower blood pressure even further than just the DASH diet alone. Follow-up care is a key part of your treatment and safety. Be sure to make and go to all appointments, and call your doctor if you are having problems. It's also a good idea to know your test results and keep a list of the medicines you take. How can you care for yourself at home? Following the DASH diet · Eat 4 to 5 servings of fruit each day.  A serving is 1 medium-sized piece of fruit, ½ cup chopped or canned fruit, 1/4 cup dried fruit, or 4 ounces (½ cup) of fruit juice. Choose fruit more often than fruit juice. · Eat 4 to 5 servings of vegetables each day. A serving is 1 cup of lettuce or raw leafy vegetables, ½ cup of chopped or cooked vegetables, or 4 ounces (½ cup) of vegetable juice. Choose vegetables more often than vegetable juice. · Get 2 to 3 servings of low-fat and fat-free dairy each day. A serving is 8 ounces of milk, 1 cup of yogurt, or 1 ½ ounces of cheese. · Eat 6 to 8 servings of grains each day. A serving is 1 slice of bread, 1 ounce of dry cereal, or ½ cup of cooked rice, pasta, or cooked cereal. Try to choose whole-grain products as much as possible. · Limit lean meat, poultry, and fish to 2 servings each day. A serving is 3 ounces, about the size of a deck of cards. · Eat 4 to 5 servings of nuts, seeds, and legumes (cooked dried beans, lentils, and split peas) each week. A serving is 1/3 cup of nuts, 2 tablespoons of seeds, or ½ cup of cooked beans or peas. · Limit fats and oils to 2 to 3 servings each day. A serving is 1 teaspoon of vegetable oil or 2 tablespoons of salad dressing. · Limit sweets and added sugars to 5 servings or less a week. A serving is 1 tablespoon jelly or jam, ½ cup sorbet, or 1 cup of lemonade. · Eat less than 2,300 milligrams (mg) of sodium a day. If you limit your sodium to 1,500 mg a day, you can lower your blood pressure even more. Tips for success · Start small. Do not try to make dramatic changes to your diet all at once. You might feel that you are missing out on your favorite foods and then be more likely to not follow the plan. Make small changes, and stick with them. Once those changes become habit, add a few more changes. · Try some of the following: ¨ Make it a goal to eat a fruit or vegetable at every meal and at snacks. This will make it easy to get the recommended amount of fruits and vegetables each day. ¨ Try yogurt topped with fruit and nuts for a snack or healthy dessert. ¨ Add lettuce, tomato, cucumber, and onion to sandwiches. ¨ Combine a ready-made pizza crust with low-fat mozzarella cheese and lots of vegetable toppings. Try using tomatoes, squash, spinach, broccoli, carrots, cauliflower, and onions. ¨ Have a variety of cut-up vegetables with a low-fat dip as an appetizer instead of chips and dip. ¨ Sprinkle sunflower seeds or chopped almonds over salads. Or try adding chopped walnuts or almonds to cooked vegetables. ¨ Try some vegetarian meals using beans and peas. Add garbanzo or kidney beans to salads. Make burritos and tacos with mashed wagner beans or black beans. Where can you learn more? Go to http://cammie-derik.info/. Enter L519 in the search box to learn more about \"DASH Diet: Care Instructions. \" Current as of: March 23, 2016 Content Version: 11.1 © 9557-5170 [employer]. Care instructions adapted under license by [employer] (which disclaims liability or warranty for this information). If you have questions about a medical condition or this instruction, always ask your healthcare professional. [place] [address] any warranty or liability for your use of this information. Introducing [facility]! [facility] introduces Meiyou patient portal. Now you can access parts of your medical record, email your doctor's office, and request medication refills online. 1. In your internet browser, go to [email or web address] 2. Click on the First Time User? Click Here link in the Sign In box. You will see the New Member Sign Up page. 3. Enter your Meiyou Access Code exactly as it appears below. You will not need to use this code after youve completed the sign-up process. If you do not sign up before the expiration date, you must request a new code. · Meiyou Access Code: [email or web address] Expires: 3/15/2017 10:31 AM 
 
 4. Enter the last four digits of your Social Security Number (xxxx) and Date of Birth (mm/dd/yyyy) as indicated and click Submit. You will be taken to the next sign-up page. 5. Create a CommunityForce ID. This will be your Community[employer] login ID and cannot be changed, so think of one that is secure and easy to remember. 6. Create a Community[employer] password. You can change your password at any time. 7. Enter your Password Reset Question and Answer. This can be used at a later time if you forget your password. 8. Enter your e-mail address. You will receive e-mail notification when new information is available in [address] 9. Click Sign Up. You can now view and download portions of your medical record. 10. Click the Download Summary menu link to download a portable copy of your medical information. If you have questions, please visit the Frequently Asked Questions section of the [employer] website. Remember, CommunityForce is NOT to be used for urgent needs. For medical emergencies, dial 911. Now available from your iPhone and Android! Please provide this summary of care documentation to your next provider. Your primary care clinician is listed as ANGELA BRIDGES [address] [place], [place] [address]. If you have any questions after today's visit, please call [phone].

## 2017-02-17 NOTE — PATIENT INSTRUCTIONS

## 2017-02-18 LAB
BUN SERPL-MCNC: 22 MG/DL (ref 8–27)
BUN/CREAT SERPL: 20 (ref 10–22)
CALCIUM SERPL-MCNC: 9.2 MG/DL (ref 8.6–10.2)
CHLORIDE SERPL-SCNC: 100 MMOL/L (ref 96–106)
CO2 SERPL-SCNC: 24 MMOL/L (ref 18–29)
CREAT SERPL-MCNC: 1.12 MG/DL (ref 0.76–1.27)
GLUCOSE SERPL-MCNC: 140 MG/DL (ref 65–99)
POTASSIUM SERPL-SCNC: 4.2 MMOL/L (ref 3.5–5.2)
SODIUM SERPL-SCNC: 143 MMOL/L (ref 134–144)

## 2017-02-18 NOTE — PROGRESS NOTES
Please notify Mr Laura Patten that his kidney function is stable and he should continue taking lasix 40mg once a day - to work on decreasing the swelling. We will discuss stopping this medication at his follow up appointment - but advise to continue for now to improve swelling in legs.

## 2017-02-20 NOTE — PROGRESS NOTES
Called and spoke to pt let pt know that his kidney function is stable and he should continue taking lasix 40mg once a day - to work on decreasing the swelling. We will discuss stopping this medication at his follow up appointment - but advise to continue for now to improve swelling in legs.

## 2017-02-27 ENCOUNTER — OFFICE VISIT (OUTPATIENT)
Dept: CARDIOLOGY CLINIC | Age: 82
End: 2017-02-27

## 2017-02-27 VITALS
HEART RATE: 61 BPM | WEIGHT: 210.8 LBS | DIASTOLIC BLOOD PRESSURE: 60 MMHG | SYSTOLIC BLOOD PRESSURE: 100 MMHG | OXYGEN SATURATION: 98 % | BODY MASS INDEX: 31.95 KG/M2 | HEIGHT: 68 IN | RESPIRATION RATE: 16 BRPM

## 2017-02-27 DIAGNOSIS — M79.89 RIGHT LEG SWELLING: ICD-10-CM

## 2017-02-27 DIAGNOSIS — R60.0 BILATERAL EDEMA OF LOWER EXTREMITY: ICD-10-CM

## 2017-02-27 DIAGNOSIS — I48.0 PAF (PAROXYSMAL ATRIAL FIBRILLATION) (HCC): Primary | ICD-10-CM

## 2017-02-27 DIAGNOSIS — E78.00 PURE HYPERCHOLESTEROLEMIA: ICD-10-CM

## 2017-02-27 DIAGNOSIS — I10 ESSENTIAL HYPERTENSION: ICD-10-CM

## 2017-02-27 NOTE — PATIENT INSTRUCTIONS
If your leg is not improving within 2 weeks, call us back and we will repeat a venous Duplex doppler study  Take your blood pressure few times a week, if it is <110 consistently then stop the Norvasc. Otherwise continue. See me in 6 months, thanks it was a pleasure to meet you.    Kiana Hawkins MD    [phone]  [facility] [address]   [address] on [address]

## 2017-02-27 NOTE — MR AVS SNAPSHOT
Visit Information Date & Time Provider Department Dept. Phone Encounter #  
 2/27/2017 10:00 AM Singh Perkins MD [facility] [phone] [id number] Your Appointments 3/6/2017  1:45 PM  
PROCEDURE with Ann Colvin MD  
[facility]) Appt Note: 4 week follow up  
 [address] [address]  
[address] [place] [address] [address] [place] [address] Upcoming Health Maintenance Date Due ZOSTER VACCINE AGE 60> 10/10/1997 GLAUCOMA SCREENING Q2Y 10/10/2002 MEDICARE YEARLY EXAM 10/10/2002 DTaP/Tdap/Td series (2 - Td) 12/5/2021 Allergies as of 2/27/2017  Review Complete On: 2/27/2017 By: Singh Perkins MD  
  
 Severity Noted Reaction Type Reactions Oxycodone Low 01/09/2017    Other (comments) Patient states, \"It made me feel unwell. \" Current Immunizations  Reviewed on 2/17/2017 Name Date Influenza Vaccine 10/1/2016 Pneumococcal Conjugate (PCV-13) 12/18/2015 Tdap 12/5/2011 Not reviewed this visit You Were Diagnosed With   
  
 Codes Comments PAF (paroxysmal atrial fibrillation) ([facility]    -  Primary ICD-10-CM: I48.0 ICD-9-CM: 427.31 Essential hypertension     ICD-10-CM: I10 
ICD-9-CM: 401.9 Pure hypercholesterolemia     ICD-10-CM: E78.00 ICD-9-CM: 272.0 Right leg swelling     ICD-10-CM: M79.89 ICD-9-CM: 729.81 Bilateral edema of lower extremity     ICD-10-CM: R60.0 ICD-9-CM: 387. 3 Vitals BP  
  
  
  
  
  
 100/60 (BP 1 Location: Left arm, BP Patient Position: Sitting) Vitals History BMI and BSA Data Body Mass Index Body Surface Area 32.05 kg/m 2 2.14 m 2 Preferred Pharmacy Pharmacy Name Phone [phone] [phone] Your Updated Medication List  
  
   
 This list is accurate as of: 2/27/17 10:21 AM.  Always use your most recent med list.  
  
  
  
  
 ALPHAGAN P 0.1 % ophthalmic solution Generic drug:  brimonidine Administer 1 Drop to both eyes daily. Both eyes PM and lt eye in am also  
  
 amLODIPine 2.5 mg tablet Commonly known as:  Siennajudi Baig Take 2.5 mg by mouth daily. doxazosin 4 mg tablet Commonly known as:  CARDURA Take 4 mg by mouth daily. ELIQUIS 5 mg tablet Generic drug:  apixaban Take 5 mg by mouth two (2) times a day. furosemide 40 mg tablet Commonly known as:  LASIX Take 1 Tab by mouth daily. lisinopril 20 mg tablet Commonly known as:  Aye Shames Take 1 Tab by mouth daily. May resume medication when b/p greater than 120/80. metoprolol tartrate 25 mg tablet Commonly known as:  LOPRESSOR Take 25 mg by mouth two (2) times a day. We Performed the Following AMB POC EKG ROUTINE W/ 12 LEADS, INTER & REP [67889 CPT(R)] Patient Instructions If your leg is not improving within 2 weeks, call us back and we will repeat a venous Duplex doppler study Take your blood pressure few times a week, if it is <110 consistently then stop the Norvasc. Otherwise continue. See me in 6 months, thanks it was a pleasure to meet you. Horace Gitelman, MD 
  [phone] [address] [facility] [address] on Second Floor Introducing [facility]! [employer] introduces [employer]d patient portal. Now you can access parts of your medical record, email your doctor's office, and request medication refills online. 1. In your internet browser, go to [email or web address] 2. Click on the First Time User? Click Here link in the Sign In box. You will see the New Member Sign Up page. 3. Enter your Snipd Access Code exactly as it appears below. You will not need to use this code after youve completed the sign-up process.  If you do not sign up before the expiration date, you must request a new code. · [employer] Thunder Access Code: [email or web address] Expires: 3/15/2017 10:31 AM 
 
4. Enter the last four digits of your Social Security Number (xxxx) and Date of Birth (mm/dd/yyyy) as indicated and click Submit. You will be taken to the next sign-up page. 5. Create a [employer] Thunder ID. This will be your [employer] login ID and cannot be changed, so think of one that is secure and easy to remember. 6. Create a [employer] password. You can change your password at any time. 7. Enter your Password Reset Question and Answer. This can be used at a later time if you forget your password. 8. Enter your e-mail address. You will receive e-mail notification when new information is available in [address] 9. Click Sign Up. You can now view and download portions of your medical record. 10. Click the Download Summary menu link to download a portable copy of your medical information. If you have questions, please visit the Frequently Asked Questions section of the [employer] website. Remember, [employer] is NOT to be used for urgent needs. For medical emergencies, dial 911. Now available from your iPhone and Android! Please provide this summary of care documentation to your next provider. Your primary care clinician is listed as ANGELA BRIDGES [address] [place] [address] Av. If you have any questions after today's visit, please call [phone].

## 2017-02-27 NOTE — PROGRESS NOTES
Brandon Campbell     1935       Magaly Romero MD, [facility]  Date of Visit-2/27/2017   PCP is Stephane Maloney MD   [facility] [place]  [facility]  HPI:  Brandon Campbell is a 80 y.o. male   Subjective:  Mr. Babak Toribio comes to see us today as a new patient referred by Dr. Savage Lerner for cardiac evaluation. He apparently had right knee surgery at [facility] in January. Prior to that the year before in [place] he'd been feeling poorly, weak, went to the emergency room and was found to be in atrial fibrillation, admitted for a few days and has now been on Eliquis and Metoprolol. He says he had a stress test prior to atrial fibrillation a few years ago that was normal.  He's had no chest pain, chest pressure. As part of his workup he got an echo a few weeks ago by Dr. Tarah Duke in [place] and was told it was normal.  He is now moving to [place] and wants to be establish with a cardiologist locally. His blood pressure is a little low at 100, but he says usually it is 130. His main complaint is edema of the right leg, which has been there since the right knee surgery. He had some in both legs that was new, which is decreased now, but he still has persistent swelling and edema at the knee and below the knee without ulceration. He denies chest pain, shortness of breath, PND, orthopnea. He had his renal function followed by his primary care physician and the plans are stop that eventually. He's been continuing on physical therapy and is now on 40 mg of Lasix a day. He is chronically on Lisinopril and Amlodipine for blood pressure. Assessment/Plan:       1. Leg swelling. Would agree this is mainly related to the surgery. This is starting to improve. He had a negative clot. He is on Eliquis already.   Unless this does not improve I don't see a need to repeat the venous doppler, though I told him if it does not he should call me and we can arrange that. 2. Atrial fibrillation, paroxysmal.  Is on appropriate meds with Eliquis for stroke prevention and Metoprolol to try to maintain rhythm, seems to be doing well. EKG shows sinus bradycardia, within normal limits. 3. Hypertension, on Amlodipine, the beta blocker, which is all for the atrial fibrillation, and ACE inhibitor. If his blood pressure stays low he can stop Amlodipine, but he says normally it's at 130. He is going to keep a blood pressure diary and is instructed to stop the Amlodipine if it is consistently below 110.  4. Follow up with me in six months. Patient will call if leg does not improve. Will get records of prior echo from Dr. Dhruv Walker. His primary care has already requested. It was a pleasure to meet this pleasant gentleman. PLAN:   If your leg is not improving within 2 weeks, call us back and we will repeat a venous Duplex doppler study  Take your blood pressure few times a week, if it is <110 consistently then stop the Norvasc. Otherwise continue. See me in 6 months, thanks it was a pleasure to meet you. Impression:   1. PAF (paroxysmal atrial fibrillation) ([facility]    2. Essential hypertension    3. Pure hypercholesterolemia    4. Right leg swelling    5. Bilateral edema of lower extremity       Social and Family History: non smoker, father  at [de-identified] of CAD, had MI at 47  Medical History:  Knee replacement, [facility]. No prior cath, blood transfusions or GI bleeding. Social History:  Current nonsmoker. Rare alcohol. . Likes woodworking, fishing, golfing. Has two children. Family History: Mother  of cancer at 80. Father  of heart attack at [de-identified] with previous coronary artery disease at 47. Brother 80, in good health. Other half brother with Parkinson's disease,  at 80.         Past Medical History:   Diagnosis Date    Arthritis     DJD    Enlarged prostate     Hyperlipidemia     Hypertension  PAF [facility]oxysmal [facility] fibrillation) ([facility])     Pre-diabetes     HA1C 6.0 Dec 2016    Shingles     Review of Systems   Constitutional: Negative for diaphoresis, fever and malaise/fatigue. HENT: Positive for hearing loss and tinnitus. Negative for ear pain and nosebleeds. Eyes: Negative for blurred vision, double vision and pain. Respiratory: Positive for wheezing. Negative for cough, hemoptysis, sputum production, shortness of breath and stridor. Cardiovascular: Positive for leg swelling. Negative for chest pain, palpitations, orthopnea and claudication. Gastrointestinal: Negative for abdominal pain, blood in stool, constipation, diarrhea, heartburn, melena, nausea and vomiting. Genitourinary: Positive for frequency. Negative for dysuria and urgency. Pos + for  Erectile dysfx, prostate d/o   Musculoskeletal: Positive for back pain. Negative for falls, joint pain, myalgias and neck pain. Skin: Negative for rash. Neurological: Positive for dizziness and tremors. Negative for sensory change, seizures, loss of consciousness, weakness and headaches. Endo/Heme/Allergies: Does not bruise/bleed easily. Psychiatric/Behavioral: Negative for depression, hallucinations and memory loss. The patient is not nervous/anxious and does not have insomnia. Exam and Labs:    Visit Vitals    /60 (BP 1 Location: Left arm, BP Patient Position: Sitting)    Pulse 61    Resp 16    Ht 5' 8\" (1.727 m)    Wt 210 lb 12.8 oz (95.6 kg)    SpO2 98%    BMI 32.05 kg/m2      Constitutional:  NAD, comfortable  Head: NC,AT. Eyes: No scleral icterus. Neck:  Neck supple. No JVD present. Throat: moist mucous membranes. Chest: Effort normal & normal respiratory excursion . Neurological: alert, conversant and oriented . Skin: Skin is not cold. No obvious systemic rash noted. Not diaphoretic. No erythema. Psychiatric:  Grossly normal mood and affect.   Behavior appears normal. Extremities:  no clubbing or cyanosis. Abdomen: non distended    Lungs:breath sounds normal. No stridor. distress, wheezes or  Rales. Heart:    normal rate, regular rhythm, normal S1, S2, no murmurs, rubs, clicks or gallops , PMI non displaced. Edema: Edema is 2+ below knee pitting on right and 1 + mid shin, barely pitting on left        Lab Results   Component Value Date/Time    Cholesterol, total 182 02/09/2017 12:07 PM    HDL Cholesterol 28 02/09/2017 12:07 PM    LDL, calculated 113 02/09/2017 12:07 PM    Triglyceride 206 02/09/2017 12:07 PM     Lab Results   Component Value Date/Time    Sodium 143 02/17/2017 03:06 PM    Potassium 4.2 02/17/2017 03:06 PM    Chloride 100 02/17/2017 03:06 PM    CO2 24 02/17/2017 03:06 PM    Anion gap 11 01/10/2017 04:19 AM    Glucose 140 02/17/2017 03:06 PM    BUN 22 02/17/2017 03:06 PM    Creatinine 1.12 02/17/2017 03:06 PM    BUN/Creatinine ratio 20 02/17/2017 03:06 PM    GFR est AA 72 02/17/2017 03:06 PM    GFR est non-AA 62 02/17/2017 03:06 PM    Calcium 9.2 02/17/2017 03:06 PM      Wt Readings from Last 3 Encounters:   02/27/17 210 lb 12.8 oz (95.6 kg)   02/17/17 213 lb 6.4 oz (96.8 kg)   02/06/17 214 lb 9.6 oz (97.3 kg)      BP Readings from Last 3 Encounters:   02/27/17 100/60   02/17/17 126/68   02/06/17 131/72      Current Outpatient Prescriptions   Medication Sig    furosemide (LASIX) 40 mg tablet Take 1 Tab by mouth daily.  lisinopril (PRINIVIL, ZESTRIL) 20 mg tablet Take 1 Tab by mouth daily. May resume medication when b/p greater than 120/80.  brimonidine (ALPHAGAN P) 0.1 % ophthalmic solution Administer 1 Drop to both eyes daily. Both eyes PM and lt eye in am also    metoprolol tartrate (LOPRESSOR) 25 mg tablet Take 25 mg by mouth two (2) times a day.  doxazosin (CARDURA) 4 mg tablet Take 4 mg by mouth daily.  apixaban (ELIQUIS) 5 mg tablet Take 5 mg by mouth two (2) times a day.  amLODIPine (NORVASC) 2.5 mg tablet Take 2.5 mg by mouth daily.      No current facility-administered medications for this visit. Impression see above.

## 2017-03-06 ENCOUNTER — OFFICE VISIT (OUTPATIENT)
Dept: INTERNAL MEDICINE CLINIC | Age: 82
End: 2017-03-06

## 2017-03-06 VITALS
OXYGEN SATURATION: 95 % | DIASTOLIC BLOOD PRESSURE: 65 MMHG | SYSTOLIC BLOOD PRESSURE: 100 MMHG | WEIGHT: 209 LBS | HEIGHT: 68 IN | RESPIRATION RATE: 18 BRPM | BODY MASS INDEX: 31.67 KG/M2 | HEART RATE: 75 BPM | TEMPERATURE: 97.8 F

## 2017-03-06 DIAGNOSIS — I10 ESSENTIAL HYPERTENSION: ICD-10-CM

## 2017-03-06 DIAGNOSIS — L03.115 CELLULITIS OF RIGHT LOWER EXTREMITY: Primary | ICD-10-CM

## 2017-03-06 DIAGNOSIS — R60.0 EDEMA OF RIGHT LOWER EXTREMITY: ICD-10-CM

## 2017-03-06 RX ORDER — HYDROCODONE BITARTRATE AND ACETAMINOPHEN 5; 325 MG/1; MG/1
TABLET ORAL
COMMUNITY
Start: 2017-01-10 | End: 2017-06-09 | Stop reason: ALTCHOICE

## 2017-03-06 RX ORDER — FUROSEMIDE 20 MG/1
TABLET ORAL
Refills: 0 | COMMUNITY
Start: 2017-01-24 | End: 2017-03-06 | Stop reason: DRUGHIGH

## 2017-03-06 RX ORDER — CEPHALEXIN 500 MG/1
CAPSULE ORAL
Refills: 0 | COMMUNITY
Start: 2017-01-31 | End: 2017-04-03 | Stop reason: ALTCHOICE

## 2017-03-06 RX ORDER — TRAMADOL HYDROCHLORIDE 50 MG/1
TABLET ORAL
Refills: 0 | COMMUNITY
Start: 2017-01-24 | End: 2017-04-03 | Stop reason: ALTCHOICE

## 2017-03-06 RX ORDER — CEPHALEXIN 500 MG/1
500 CAPSULE ORAL 4 TIMES DAILY
Qty: 20 CAP | Refills: 0 | Status: SHIPPED | OUTPATIENT
Start: 2017-03-06 | End: 2017-04-03 | Stop reason: ALTCHOICE

## 2017-03-06 NOTE — MR AVS SNAPSHOT
Visit Information Date & Time Provider Department Dept. Phone Encounter #  
 3/6/2017  1:45 PM Luis Clay, [address] [phone] [id number] Follow-up Instructions Return in about 4 weeks (around 4/3/2017) for blood pressure and leg swelling . Your Appointments 8/28/2017 10:20 AM  
ESTABLISHED PATIENT with Smooth Acosta MD  
[facility] Appt Note: 6 month follow up  
 [address]  
[address] [facility] [address] [facility] [address] Upcoming Health Maintenance Date Due  
 GLAUCOMA SCREENING Q2Y 10/10/2002 MEDICARE YEARLY EXAM 10/10/2002 DTaP/Tdap/Td series (2 - Td) 12/5/2021 Allergies as of 3/6/2017  Review Complete On: 3/6/2017 By: Margarito Bone Severity Noted Reaction Type Reactions Oxycodone Low 01/09/2017    Other (comments) Patient states, \"It made me feel unwell. \" Current Immunizations  Reviewed on 2/17/2017 Name Date Influenza Vaccine 10/1/2016 Pneumococcal Conjugate (PCV-13) 12/18/2015 Tdap 12/5/2011 Not reviewed this visit You Were Diagnosed With   
  
 Codes Comments Cellulitis of right lower extremity    -  Primary ICD-10-CM: M05.253 ICD-9-CM: 630. 6 Vitals BP Pulse Temp Resp Height(growth percentile) Weight(growth percentile) 100/65 (BP 1 Location: Left arm, BP Patient Position: Sitting) 75 97.8 °F (36.6 °C) (Oral) 18 5' 8\" (1.727 m) 209 lb (94.8 kg) SpO2 BMI Smoking Status 95% 31.78 kg/m2 Never Smoker BMI and BSA Data Body Mass Index Body Surface Area 31.78 kg/m 2 2.13 m 2 Preferred Pharmacy Pharmacy Name Phone [phone] [phone] Your Updated Medication List  
  
   
 This list is accurate as of: 3/6/17  2:24 PM.  Always use your most recent med list.  
  
  
  
  
 ALPHAGAN P 0.1 % ophthalmic solution Generic drug:  brimonidine Administer 1 Drop to both eyes daily. Both eyes PM and lt eye in am also  
  
 amLODIPine 2.5 mg tablet Commonly known as:  Janine Colon Take 2.5 mg by mouth daily. * cephALEXin 500 mg capsule Commonly known as:  Darcy Ly TAKE 1 CAPSULE BY MOUTH 3 TIMES DAILY UNTIL GONE. * cephALEXin 500 mg capsule Commonly known as:  Darcy Ly Take 1 Cap by mouth four (4) times daily. doxazosin 4 mg tablet Commonly known as:  CARDURA Take 4 mg by mouth daily. ELIQUIS 5 mg tablet Generic drug:  apixaban Take 5 mg by mouth two (2) times a day. HYDROcodone-acetaminophen 5-325 mg per tablet Commonly known as:  NORCO  
  
 lisinopril 20 mg tablet Commonly known as:  Jessie Pinch Take 1 Tab by mouth daily. May resume medication when b/p greater than 120/80. metoprolol tartrate 25 mg tablet Commonly known as:  LOPRESSOR Take 25 mg by mouth two (2) times a day. traMADol 50 mg tablet Commonly known as:  ULTRAM  
TAKE 1 TABLET BY MOUTH EVERY 6 HOURS AS NEEDED FOR PAIN  
  
 * Notice: This list has 2 medication(s) that are the same as other medications prescribed for you. Read the directions carefully, and ask your doctor or other care provider to review them with you. Prescriptions Sent to Pharmacy Refills  
 cephALEXin (KEFLEX) 500 mg capsule 0 Sig: Take 1 Cap by mouth four (4) times daily. Class: Normal  
 Pharmacy: [address] #: [phone] Route: Oral  
  
Follow-up Instructions Return in about 4 weeks (around 4/3/2017) for blood pressure and leg swelling . Patient Instructions Cellulitis: Care Instructions Your Care Instructions Cellulitis is a skin infection. It often occurs after a break in the skin from a scrape, cut, bite, or puncture, or after a rash. The doctor has checked you carefully, but problems can develop later. If you notice any problems or new symptoms, get medical treatment right away. Follow-up care is a key part of your treatment and safety. Be sure to make and go to all appointments, and call your doctor if you are having problems. It's also a good idea to know your test results and keep a list of the medicines you take. How can you care for yourself at home? · Take your antibiotics as directed. Do not stop taking them just because you feel better. You need to take the full course of antibiotics. · Prop up the infected area on pillows to reduce pain and swelling. Try to keep the area above the level of your heart as often as you can. · If your doctor told you how to care for your wound, follow your doctor's instructions. If you did not get instructions, follow this general advice: ¨ Wash the wound with clean water 2 times a day. Don't use hydrogen peroxide or alcohol, which can slow healing. ¨ You may cover the wound with a thin layer of petroleum jelly, such as Vaseline, and a nonstick bandage. ¨ Apply more petroleum jelly and replace the bandage as needed. · Be safe with medicines. Take pain medicines exactly as directed. ¨ If the doctor gave you a prescription medicine for pain, take it as prescribed. ¨ If you are not taking a prescription pain medicine, ask your doctor if you can take an over-the-counter medicine. To prevent cellulitis in the future · Try to prevent cuts, scrapes, or other injuries to your skin. Cellulitis most often occurs where there is a break in the skin. · If you get a scrape, cut, mild burn, or bite, wash the wound with clean water as soon as you can to help avoid infection. Don't use hydrogen peroxide or alcohol, which can slow healing. · If you have swelling in your legs (edema), support stockings and good skin care may help prevent leg sores and cellulitis. · Take care of your feet, especially if you have diabetes or other conditions that increase the risk of infection. Wear shoes and socks. Do not go barefoot. If you have athlete's foot or other skin problems on your feet, talk to your doctor about how to treat them. When should you call for help? Call your doctor now or seek immediate medical care if: 
· You have signs that your infection is getting worse, such as: 
¨ Increased pain, swelling, warmth, or redness. ¨ Red streaks leading from the area. ¨ Pus draining from the area. ¨ A fever. · You get a rash. Watch closely for changes in your health, and be sure to contact your doctor if: 
· You are not getting better after 1 day (24 hours). · You do not get better as expected. Where can you learn more? Go to http://cammieInternational Youth Organizationderik.info/. Jose Antonio Acosta in the search box to learn more about \"Cellulitis: Care Instructions. \" Current as of: February 5, 2016 Content Version: 11.1 © 5976-0139 [employer]. Care instructions adapted under license by [employer] (which disclaims liability or warranty for this information). If you have questions about a medical condition or this instruction, always ask your healthcare professional. [address] any warranty or liability for your use of this information. 
------------------ Lasix was stopped Introducing [facility] & [facility]! [employer] introduces Health eVillages patient portal. Now you can access parts of your medical record, email your doctor's office, and request medication refills online. 1. In your internet browser, go to [email or web address] 2. Click on the First Time User? Click Here link in the Sign In box. You will see the New Member Sign Up page. 3. Enter your [employer] Access Code exactly as it appears below. You will not need to use this code after youve completed the sign-up process. If you do not sign up before the expiration date, you must request a new code. · [employer] Access Code: [email or web address] Expires: 3/15/2017 10:31 AM 
 
4. Enter the last four digits of your Social Security Number (xxxx) and Date of Birth (mm/dd/yyyy) as indicated and click Submit. You will be taken to the next sign-up page. 5. Create a [employer] ID. This will be your [employer] login ID and cannot be changed, so think of one that is secure and easy to remember. 6. Create a [employer] password. You can change your password at any time. 7. Enter your Password Reset Question and Answer. This can be used at a later time if you forget your password. 8. Enter your e-mail address. You will receive e-mail notification when new information is available in [address] [place] [address] Ave. 9. Click Sign Up. You can now view and download portions of your medical record. 10. Click the Download Summary menu link to download a portable copy of your medical information. If you have questions, please visit the Frequently Asked Questions section of the [employer] website. Remember, [employer] is NOT to be used for urgent needs. For medical emergencies, dial 911. Now available from your iPhone and Android! Please provide this summary of care documentation to your next provider. Your primary care clinician is listed as ANGELA BRIDGES [address] [place] [address]. If you have any questions after today's visit, please call [phone].

## 2017-03-06 NOTE — PROGRESS NOTES
Mr. Laina Terrazas is a 81 yo male with a hx of afib on apixapan, large prostate, and glaucoma presenting to follow up for swelling in legs      Follow-up (swelling in legs & feet)     Leg swelling B/L: Right more then left - patient had rIght knee arthroplasty done on Jan 10th and since procedure has had increased leg swelling. Had duplex that was negative and reports normal TTE done  by his  cardiologist in [place] ( Dr Anna Marie French) . Pt is doing physical therapy and followed with ortho MD ( [facility] [place]) and had follow up x rays which showed no abnormality. Patient was started on lasix 20mg then increased to 40mg. Kidney function checked and stable. Today patient presents to clinic to follow and noted to have decreased swelling. Complaining of going to bathroom too frequently on lasix and BP noted to be on low range - denies dizziness. Prior to lasix BP was normal on previous regimen of amlodipine and lisinopril. Patient denies fever and chills.  Walking more  On apixan daily    noted redness on knee close to surgical site since yesterday with mild tenderness / has a fluid blister that popped per patient   Reviewed labs with patient     Reviewed note from cardiologist .      Review of systems:  Constitutional: negative for fever, chills, weight loss, night sweats - patient feels more tired/ admits to snoring  Eyes : negative for vision changes, eye pain and discharge  Nose and Throat: negative for tinnitus, sore throat   Cardiovascular: negative for chest pain, palpitations and shortness of breath  Respiratory: negative for shortness of breath, cough and wheezing   Gastroinstestinal: negative for abdominal pain, nausea, vomiting, diarrhea, constipation, and blood in the stool  Musculoskeletal: positive for right knee pain recent surgery   Genitourinary: increased urination with lasix   Neurologic: Negative for focal weakness, numbness or incoordination  Skin: see HPI - redness on skin knee on right side   Hematologic: negative for easy bruising      Past Medical History:   Diagnosis Date    Arthritis     DJD    Enlarged prostate     Hyperlipidemia     Hypertension     PAF (paroxysmal atrial fibrillation) ([facility] [address]     Pre-diabetes     HA1C 6.0 Dec 2016    Shingles         Past Surgical History:   Procedure Laterality Date    HX COLONOSCOPY  2009    normal except for hemorrhoids    HX HEENT      wisdom teeth extraction x2    HX SHOULDER ARTHROSCOPY      rt    HX TONSILLECTOMY         Allergies   Allergen Reactions    Oxycodone Other (comments)     Patient states, \"It made me feel unwell. \"       Current Outpatient Prescriptions on File Prior to Visit   Medication Sig Dispense Refill    furosemide (LASIX) 40 mg tablet Take 1 Tab by mouth daily. 30 Tab 1    lisinopril (PRINIVIL, ZESTRIL) 20 mg tablet Take 1 Tab by mouth daily. May resume medication when b/p greater than 120/80.  brimonidine (ALPHAGAN P) 0.1 % ophthalmic solution Administer 1 Drop to both eyes daily. Both eyes PM and lt eye in am also      metoprolol tartrate (LOPRESSOR) 25 mg tablet Take 25 mg by mouth two (2) times a day.  doxazosin (CARDURA) 4 mg tablet Take 4 mg by mouth daily.  amLODIPine (NORVASC) 2.5 mg tablet Take 2.5 mg by mouth daily.  apixaban (ELIQUIS) 5 mg tablet Take 5 mg by mouth two (2) times a day. No current facility-administered medications on file prior to visit. No family hx of CAD cannot recall specifics    Social History     Social History    Marital status:      Spouse name: N/A    Number of children: N/A    Years of education: N/A     Occupational History    Not on file.      Social History Main Topics    Smoking status: Never Smoker    Smokeless tobacco: Not on file    Alcohol use No    Drug use: Not on file    Sexual activity: Not on file     Other Topics Concern    Not on file     Social History Narrative       Visit Vitals    /65 (BP 1 Location: Left arm, BP Patient Position: Sitting)    Pulse 75    Temp 97.8 °F (36.6 °C) (Oral)    Resp 18    Ht 5' 8\" (1.727 m)    Wt 209 lb (94.8 kg)    SpO2 95%    BMI 31.78 kg/m2     General:  Well appearing male no acute distress  Respiratory: no respiratory distress,  no wheezing, no rhonchi, no rales. No chest wall tenderness. Cardiovascular:  RRR, normal S1S2, no murmur. Gastrointestinal: normal bowel sounds, soft, nontender, without masses. No hepatosplenomegaly. Extremities1+ pitting edema on right leg to thigh level extending from foot, mild warmth, no eryhtema   and left leg with trace - improved, normal sensation   Patient is able to bear weight on right leg  Scar of knee sx healing   Right knee with circular area with redness and mild tenderness/ no pus and no induration / mild warmth  Musculoskeletal:  Normal gait. Psych:  Normal affect      Lab Results   Component Value Date/Time    WBC 7.4 02/09/2017 12:07 PM    HGB 12.1 02/09/2017 12:07 PM    HCT 37.2 02/09/2017 12:07 PM    PLATELET 763 49/53/8473 12:07 PM    MCV 80 02/09/2017 12:07 PM     Lab Results   Component Value Date/Time    Sodium 143 02/17/2017 03:06 PM    Potassium 4.2 02/17/2017 03:06 PM    Chloride 100 02/17/2017 03:06 PM    CO2 24 02/17/2017 03:06 PM    Anion gap 11 01/10/2017 04:19 AM    Glucose 140 02/17/2017 03:06 PM    BUN 22 02/17/2017 03:06 PM    Creatinine 1.12 02/17/2017 03:06 PM    BUN/Creatinine ratio 20 02/17/2017 03:06 PM    GFR est AA 72 02/17/2017 03:06 PM    GFR est non-AA 62 02/17/2017 03:06 PM    Calcium 9.2 02/17/2017 03:06 PM       Lab Results   Component Value Date/Time    Hemoglobin A1c 5.9 02/09/2017 12:07 PM             Assessment and Plan:     1. Cellulitis of right lower extremity/ mild  - cephALEXin (KEFLEX) 500 mg capsule; Take 1 Cap by mouth four (4) times daily. Dispense: 20 Cap; Refill: 0    2.  Lower extremity edema: IMPROVING extensive work up negative including TTE and duplex of LE ( pt already on apixaban) likely related to knee sx.   - advised to stop lasix this was started after knee sx for swelling of legs - BP is lower    - encouraged to walk    3. Essential hypertension  -BP is lower today and was lower in cardiology visit - [place] top lasix which was started after sx   Continue lisinopril, metoprolol and norvasc  - if BP continues to be low patient advised to stop norvasc as instructed by cardiologist -  - pt advised to check BP at home     4. Paroxysmal atrial fibrillation (HCC)  - rate controlled, sinus rhythm- patient is on eliquis and metoprolol  - followed by Dr Barry Tavera    5. 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 Benign non-nodular prostatic hyperplasia with lower urinary tract symptoms  -  Has REFERRAL TO UROLOGY  - will defer to urology for PSA     7 Pre-diabetes- HA1C 5.9  -discussed with patient importance of diet and exercise. Will repeat in 6 months and if no improvement will discuss starting metformin.      8. Snoring:  Has referral for sleep study      HM:     colonoscopy:done in 2009 and hemorrhoids  Up to date on PNA shots (X2) and Tdap and shingles shot       Follow-up Disposition:  Return in about 4 weeks to follow up on swelling and blood pressure    Kaitlin Parker MD    Reviewed TTE of heart reconrds sent   Normal EF,with mild LVH, grade 1 diastolic dysfunction,   Estimated pulmonary pressure is elevated at 43   Report to be scanned    Patient was advised to have sleeping study already - that could be causing elevated pulmonary pressure

## 2017-03-06 NOTE — PATIENT INSTRUCTIONS

## 2017-03-06 NOTE — PROGRESS NOTES
Chief Complaint   Patient presents with    Follow-up     swelling in legs & feet     1. Have you been to the ER, urgent care clinic since your last visit? Hospitalized since your last visit? No    2. Have you seen or consulted any other health care providers outside of the [facility] since your last visit? Include any pap smears or colon screening.  No

## 2017-03-21 RX ORDER — LISINOPRIL 20 MG/1
20 TABLET ORAL DAILY
Qty: 30 TAB | Refills: 5 | Status: SHIPPED | OUTPATIENT
Start: 2017-03-21 | End: 2017-10-06 | Stop reason: SDUPTHER

## 2017-04-03 ENCOUNTER — OFFICE VISIT (OUTPATIENT)
Dept: INTERNAL MEDICINE CLINIC | Age: 82
End: 2017-04-03

## 2017-04-03 VITALS
BODY MASS INDEX: 33.19 KG/M2 | HEIGHT: 68 IN | HEART RATE: 60 BPM | TEMPERATURE: 97.9 F | WEIGHT: 219 LBS | SYSTOLIC BLOOD PRESSURE: 128 MMHG | OXYGEN SATURATION: 95 % | DIASTOLIC BLOOD PRESSURE: 72 MMHG | RESPIRATION RATE: 18 BRPM

## 2017-04-03 DIAGNOSIS — I10 ESSENTIAL HYPERTENSION: ICD-10-CM

## 2017-04-03 DIAGNOSIS — H11.32 SUBCONJUNCTIVAL HEMORRHAGE, LEFT: Primary | ICD-10-CM

## 2017-04-03 DIAGNOSIS — R60.0 BILATERAL EDEMA OF LOWER EXTREMITY: ICD-10-CM

## 2017-04-03 DIAGNOSIS — R73.03 PRE-DIABETES: ICD-10-CM

## 2017-04-03 RX ORDER — FUROSEMIDE 40 MG/1
40 TABLET ORAL EVERY OTHER DAY
Qty: 15 TAB | Refills: 2 | Status: SHIPPED | OUTPATIENT
Start: 2017-04-03 | End: 2017-06-09 | Stop reason: ALTCHOICE

## 2017-04-03 RX ORDER — FUROSEMIDE 40 MG/1
TABLET ORAL
Refills: 1 | COMMUNITY
Start: 2017-02-06 | End: 2017-04-03 | Stop reason: ALTCHOICE

## 2017-04-03 RX ORDER — FUROSEMIDE 20 MG/1
TABLET ORAL
COMMUNITY
Start: 2017-01-24 | End: 2017-04-03 | Stop reason: ALTCHOICE

## 2017-04-03 NOTE — PROGRESS NOTES
Mr. Peter Perez is a 79 yo male with a hx of afib on apixapan, large prostate, and glaucoma presenting to follow up    Follow-up (cellulitis of right lower extremity) and Eye Problem (x3 days rubbed eye and now has blood in it. )     Cellulitis of right lower extremity: resolved with keflex. Swelling is better but notes B/L swelling in legs persists. Lasix was stopped on march 6th due to dizziness and low blood pressure. Eye problem: eye turned red on Saturday after scratching eye. Denies vision changes, denies double vision denies eye pain, flashes of light and floaters. Continued apixaban and did not note worsening of redness in eye. Has a hx of glaucoma    Shortness of breath: most pronounced when lying down and exerting oneself -\" which is not often\". Had less shortness of breath while on lasix 40mg daily. TTE done recently with normal EF and grade 1 diastolic dysfunction. Denies chest pain. Review of systems:  12 systems reviewed and negative other than  HPI       Past Medical History:   Diagnosis Date    Arthritis     DJD    Enlarged prostate     Hyperlipidemia     Hypertension     PAF (paroxysmal atrial fibrillation) ([facility] [address]     Pre-diabetes     HA1C 6.0 Dec 2016    Shingles         Past Surgical History:   Procedure Laterality Date    HX COLONOSCOPY  2009    normal except for hemorrhoids    HX HEENT      wisdom teeth extraction x2    HX SHOULDER ARTHROSCOPY      rt    HX TONSILLECTOMY         Allergies   Allergen Reactions    Oxycodone Other (comments)     Patient states, \"It made me feel unwell. \"       Current Outpatient Prescriptions on File Prior to Visit   Medication Sig Dispense Refill    lisinopril (PRINIVIL, ZESTRIL) 20 mg tablet Take 1 Tab by mouth daily. May resume medication when b/p greater than 120/80. 30 Tab 5    HYDROcodone-acetaminophen (NORCO) 5-325 mg per tablet       cephALEXin (KEFLEX) 500 mg capsule Take 1 Cap by mouth four (4) times daily.  20 Cap 0    brimonidine (ALPHAGAN P) 0.1 % ophthalmic solution Administer 1 Drop to both eyes daily. Both eyes PM and lt eye in am also      metoprolol tartrate (LOPRESSOR) 25 mg tablet Take 25 mg by mouth two (2) times a day.  doxazosin (CARDURA) 4 mg tablet Take 4 mg by mouth daily.  amLODIPine (NORVASC) 2.5 mg tablet Take 2.5 mg by mouth daily.  apixaban (ELIQUIS) 5 mg tablet Take 5 mg by mouth two (2) times a day. No current facility-administered medications on file prior to visit. No family hx of CAD cannot recall specifics    Social History     Social History    Marital status:      Spouse name: N/A    Number of children: N/A    Years of education: N/A     Occupational History    Not on file. Social History Main Topics    Smoking status: Never Smoker    Smokeless tobacco: Not on file    Alcohol use No    Drug use: Not on file    Sexual activity: Not on file     Other Topics Concern    Not on file     Social History Narrative       Visit Vitals    /72 (BP 1 Location: Right arm, BP Patient Position: Sitting)    Pulse 60    Temp 97.9 °F (36.6 °C)    Resp 18    Ht 5' 8\" (1.727 m)    Wt 219 lb (99.3 kg)    SpO2 95%    BMI 33.3 kg/m2     General:  Well appearing male no acute distress  HEENT: subconjunctival hemorrhage noted on left eye - vision is 20/20 with glasses in both eyes. EOMI. Pupils equal and reactive. Attempte fundoscopic exam but unable to visualize eye fundus. Respiratory: no respiratory distress,  no wheezing, no rhonchi, no rales. No chest wall tenderness. Cardiovascular:  RRR, normal S1S2, no murmur. Gastrointestinal: normal bowel sounds, soft, nontender, without masses. No hepatosplenomegaly. Extremities1+ pitting edema on both legs to the knee  Cellulitis resolved  Musculoskeletal:  Normal gait.    Psych:  Normal affect      Lab Results   Component Value Date/Time    WBC 7.4 02/09/2017 12:07 PM    HGB 12.1 02/09/2017 12:07 PM    HCT 37.2 02/09/2017 12:07 PM    PLATELET 119 51/69/0290 12:07 PM    MCV 80 02/09/2017 12:07 PM     Lab Results   Component Value Date/Time    Sodium 143 02/17/2017 03:06 PM    Potassium 4.2 02/17/2017 03:06 PM    Chloride 100 02/17/2017 03:06 PM    CO2 24 02/17/2017 03:06 PM    Anion gap 11 01/10/2017 04:19 AM    Glucose 140 02/17/2017 03:06 PM    BUN 22 02/17/2017 03:06 PM    Creatinine 1.12 02/17/2017 03:06 PM    BUN/Creatinine ratio 20 02/17/2017 03:06 PM    GFR est AA 72 02/17/2017 03:06 PM    GFR est non-AA 62 02/17/2017 03:06 PM    Calcium 9.2 02/17/2017 03:06 PM       Lab Results   Component Value Date/Time    Hemoglobin A1c 5.9 02/09/2017 12:07 PM           Recent TTE   Normal EF,with mild LVH, grade 1 diastolic dysfunction,   Estimated pulmonary pressure is elevated at 43     Assessment and Plan:       Subconjunctival hemorrhage of left eye: patient is on apixaban. Vision is unchanged/ no pain  - Heather Gunter ( nurse) called and pt will be [facility] at [facility].  - will discuss if apixaban needs to be held - typically would not hold as subconjunctival hemorrhage is benign and improves in 2-4 weeks. On apixaban for stroke prevention     Lower extremity edema:  Initially right more than left after surgery in knee, extensive work up negative including TTE and duplex of LE ( pt already on apixaban) likely related to knee sx. Dyspnea  - today noted worsening on LE after stopping lasix and complains of dyspnea with lying flat - possibly diastolic dysfunction/ pulmonary HTN   - advised to use lasix 40 mg every other day   - sleep study    Essential hypertension: hypotensive on previous visit.  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 Will repeat in July and if no improvement will discuss starting metformin.       Snoring:  Has referral for sleep study- we made appointment for patient today in clinic       HM:     colonoscopy:done in 2009 and hemorrhoids  Up to date on PNA shots (X2) and Tdap and shingles shot       Follow-up Disposition:  Return in about 4 weeks to follow up on swelling and blood pressure    Pauly Shine MD

## 2017-04-03 NOTE — PATIENT INSTRUCTIONS
Subconjunctival Hemorrhage: Care Instructions  Your Care Instructions    Sometimes small blood vessels in the white of the eye can break, causing a red spot or speck. This is called a subconjunctival hemorrhage. The blood vessels may break when you sneeze, cough, vomit, strain, or bend over. Sometimes there is no clear cause. The blood may look alarming, especially if the spot is large. If there is no pain or vision change, there is usually no reason to worry, and the blood slowly will go away on its own in 2 to 3 weeks. Follow-up care is a key part of your treatment and safety. Be sure to make and go to all appointments, and call your doctor if you are having problems. Its also a good idea to know your test results and keep a list of the medicines you take. How can you care for yourself at home? · Watch for changes in your eye. It is normal for the red spot on your eyeball to change color as it heals. Just like a bruise on your skin, it may change from red to brown to purple to yellow. · Do not take aspirin or products that contain aspirin, which can increase bleeding. Use acetaminophen (Tylenol) if you need pain relief for another problem. · Do not take two or more pain medicines at the same time unless the doctor told you to. Many pain medicines have acetaminophen, which is Tylenol. Too much acetaminophen (Tylenol) can be harmful. When should you call for help? Call your doctor now or seek immediate medical care if:  · You see blood over the black part of your eye (pupil). · You have any changes or problems in your vision. · You have any pain in your eye. · You have any discharge from your eye. Watch closely for changes in your health, and be sure to contact your doctor if:  · Your eye color is not steadily returning to normal.  · The blood has not gone away after 2 to 3 weeks. · You develop bruising or bleeding elsewhere, such as the gums or the skin, or you have nosebleeds.   Where can you learn more? Go to http://cammie-derik.info/. Enter I644 in the search box to learn more about \"Subconjunctival Hemorrhage: Care Instructions. \"  Current as of: May 23, 2016  Content Version: 11.2  © 7118-2636 [employer]. Care instructions adapted under license by [employer] (which disclaims liability or warranty for this information).  If you have questions about a medical condition or this instruction, always ask your healthcare professional. [address] any warranty or liability for your use of this information.    ------------------------  medication change is lasix 40mg every other day  NO MORE EATING AFTER 7 PM   Exercise program - daily   Loose 2 lbs per month

## 2017-04-03 NOTE — MR AVS SNAPSHOT
Visit Information Date & Time Provider Department Dept. Phone Encounter #  
 4/3/2017 11:30 AM Emeli Kilpatrick [facility] [address] [phone] [id number] Follow-up Instructions Return in about 3 months (around 7/3/2017) for fasting for labs, HTN, edema, pre diabetes . Your Appointments 8/28/2017 10:20 AM  
ESTABLISHED PATIENT with Lexii Michaels MD  
[facility] Appt Note: 6 month follow up  
 [address]  
[address] [place] [phone] Upcoming Health Maintenance Date Due  
 GLAUCOMA SCREENING Q2Y 10/10/2002 MEDICARE YEARLY EXAM 10/10/2002 DTaP/Tdap/Td series (2 - Td) 12/5/2021 Allergies as of 4/3/2017  Review Complete On: 4/3/2017 By: Vinicio Weaver Severity Noted Reaction Type Reactions Oxycodone Low 01/09/2017    Other (comments) Patient states, \"It made me feel unwell. \" Current Immunizations  Reviewed on 2/17/2017 Name Date Influenza Vaccine 10/1/2016 Pneumococcal Conjugate (PCV-13) 12/18/2015 Tdap 12/5/2011 Not reviewed this visit You Were Diagnosed With   
  
 Codes Comments Subconjunctival hemorrhage, left    -  Primary ICD-10-CM: H11.32 
ICD-9-CM: 372.72 Essential hypertension     ICD-10-CM: I10 
ICD-9-CM: 401.9 Bilateral edema of lower extremity     ICD-10-CM: R60.0 ICD-9-CM: 040. 3 Vitals BP Pulse Temp Resp Height(growth percentile) Weight(growth percentile) 128/72 (BP 1 Location: Right arm, BP Patient Position: Sitting) 60 97.9 °F (36.6 °C) 18 5' 8\" (1.727 m) 219 lb (99.3 kg) SpO2 BMI Smoking Status 95% 33.3 kg/m2 Never Smoker Vitals History BMI and BSA Data Body Mass Index Body Surface Area  
 33.3 kg/m 2 2.18 m 2 Preferred Pharmacy Pharmacy Name Phone [id number] [phone] Your Updated Medication List  
  
   
This list is accurate as of: 4/3/17 12:24 PM.  Always use your most recent med list.  
  
  
  
  
 ALPHAGAN P 0.1 % ophthalmic solution Generic drug:  brimonidine Administer 1 Drop to both eyes daily. Both eyes PM and lt eye in am also  
  
 amLODIPine 2.5 mg tablet Commonly known as:  Shirley Million Take 2.5 mg by mouth daily. doxazosin 4 mg tablet Commonly known as:  CARDURA Take 4 mg by mouth daily. ELIQUIS 5 mg tablet Generic drug:  apixaban Take 5 mg by mouth two (2) times a day. furosemide 40 mg tablet Commonly known as:  LASIX Take 1 Tab by mouth every other day. HYDROcodone-acetaminophen 5-325 mg per tablet Commonly known as:  NORCO  
  
 lisinopril 20 mg tablet Commonly known as:  Dorette Mauro Take 1 Tab by mouth daily. May resume medication when b/p greater than 120/80. metoprolol tartrate 25 mg tablet Commonly known as:  LOPRESSOR Take 25 mg by mouth two (2) times a day. Prescriptions Sent to Pharmacy Refills  
 furosemide (LASIX) 40 mg tablet 2 Sig: Take 1 Tab by mouth every other day. Class: Normal  
 Pharmacy: [address] #: [phone] Route: Oral  
  
Follow-up Instructions Return in about 3 months (around 7/3/2017) for fasting for labs, HTN, edema, pre diabetes . Patient Instructions Subconjunctival Hemorrhage: Care Instructions Your Care Instructions Sometimes small blood vessels in the white of the eye can break, causing a red spot or speck. This is called a subconjunctival hemorrhage. The blood vessels may break when you sneeze, cough, vomit, strain, or bend over. Sometimes there is no clear cause. The blood may look alarming, especially if the spot is large. If there is no pain or vision change, there is usually no reason to worry, and the blood slowly will go away on its own in 2 to 3 weeks. Follow-up care is a key part of your treatment and safety. Be sure to make and go to all appointments, and call your doctor if you are having problems. Its also a good idea to know your test results and keep a list of the medicines you take. How can you care for yourself at home? · Watch for changes in your eye. It is normal for the red spot on your eyeball to change color as it heals. Just like a bruise on your skin, it may change from red to brown to purple to yellow. · Do not take aspirin or products that contain aspirin, which can increase bleeding. Use acetaminophen (Tylenol) if you need pain relief for another problem. · Do not take two or more pain medicines at the same time unless the doctor told you to. Many pain medicines have acetaminophen, which is Tylenol. Too much acetaminophen (Tylenol) can be harmful. When should you call for help? Call your doctor now or seek immediate medical care if: 
· You see blood over the black part of your eye (pupil). · You have any changes or problems in your vision. · You have any pain in your eye. · You have any discharge from your eye. Watch closely for changes in your health, and be sure to contact your doctor if: 
· Your eye color is not steadily returning to normal. 
· The blood has not gone away after 2 to 3 weeks. · You develop bruising or bleeding elsewhere, such as the gums or the skin, or you have nosebleeds. Where can you learn more? Go to http://cammie-derik.info/. Enter E280 in the search box to learn more about \"Subconjunctival Hemorrhage: Care Instructions. \" Current as of: May 23, 2016 Content Version: 11.2 © 9618-6216 [employer].  Care instructions adapted under license by Mariann Whalen (which disclaims liability or warranty for this information). If you have questions about a medical condition or this instruction, always ask your healthcare professional. [address] any warranty or liability for your use of this information. 
 
------------------------ 
medication change is lasix 40mg every other day NO MORE EATING AFTER 7 PM  
Exercise program - daily Loose 2 lbs per month Introducing [facility]! Dear Fariba Martinez: Thank you for requesting a [employer] account. Our records indicate that you already have an active Bi[employer] account. You can access your account anytime at [email or web address] Did you know that you can access your hospital and ER discharge instructions at any time in Bi[employer]? You can also review all of your test results from your hospital stay or ER visit. Additional Information If you have questions, please visit the Frequently Asked Questions section of the Bi[employer] website at [email or web address] Remember, Bi[employer]cle is NOT to be used for urgent needs. For medical emergencies, dial 911. Now available from your iPhone and Android! Please provide this summary of care documentation to your next provider. Your primary care clinician is listed as ANGELA Whalen. If you have any questions after today's visit, please call [phone].

## 2017-05-02 ENCOUNTER — OFFICE VISIT (OUTPATIENT)
Dept: SLEEP MEDICINE | Age: 82
End: 2017-05-02

## 2017-05-02 VITALS
OXYGEN SATURATION: 95 % | HEIGHT: 68 IN | BODY MASS INDEX: 32.28 KG/M2 | WEIGHT: 213 LBS | SYSTOLIC BLOOD PRESSURE: 116 MMHG | HEART RATE: 68 BPM | DIASTOLIC BLOOD PRESSURE: 70 MMHG

## 2017-05-02 DIAGNOSIS — G47.33 OSA (OBSTRUCTIVE SLEEP APNEA): Primary | ICD-10-CM

## 2017-05-02 DIAGNOSIS — E66.9 OBESITY (BMI 30-39.9): ICD-10-CM

## 2017-05-02 RX ORDER — METHYLPREDNISOLONE 4 MG/1
TABLET ORAL
COMMUNITY
Start: 2017-04-28 | End: 2017-05-19 | Stop reason: ALTCHOICE

## 2017-05-02 RX ORDER — CELECOXIB 200 MG/1
CAPSULE ORAL
Refills: 3 | COMMUNITY
Start: 2017-04-17 | End: 2017-06-09 | Stop reason: ALTCHOICE

## 2017-05-02 NOTE — PATIENT INSTRUCTIONS
[address] [facility] [place]. [place], [address]  Tel.  [phone]  Fax. 100 [facility] 60  [place], [address] [facility]  Tel.  [phone]  Fax. [phone] [address]  Tel.  [phone]  Fax. [phone]     Sleep Apnea: After Your Visit  Your Care Instructions  Sleep apnea occurs when you frequently stop breathing for 10 seconds or longer during sleep. It can be mild to severe, based on the number of times per hour that you stop breathing or have slowed breathing. Blocked or narrowed airways in your nose, mouth, or throat can cause sleep apnea. Your airway can become blocked when your throat muscles and tongue relax during sleep. Sleep apnea is common, occurring in 1 out of 20 individuals. Individuals having any of the following characteristics should be evaluated and treated right away due to high risk and detrimental consequences from untreated sleep apnea:  1. Obesity  2. Congestive Heart failure  3. Atrial Fibrillation  4. Uncontrolled Hypertension  5. Type II Diabetes  6. Night-time Arrhythmias  7. Stroke  8. Pulmonary Hypertension  9. High-risk Driving Populations (pilots, truck drivers, etc.)  10. Patients Considering Weight-loss Surgery    How do you know you have sleep apnea? You probably have sleep apnea if you answer 'yes' to 3 or more of the following questions:  S - Have you been told that you Snore? T - Are you often Tired during the day? O - Has anyone Observed you stop breathing while sleeping? P- Do you have (or are being treated for) high blood Pressure? B - Are you obese (Body Mass Index > 35)? A - Is your Age 48years old or older? N - Is your Neck size greater than 16 inches? G - Are you male Gender? A sleep physician can prescribe a breathing device that prevents tissues in the throat from blocking your airway.  Or your doctor may recommend using a dental device (oral breathing device) to help keep your airway open. In some cases, surgery may be needed to remove enlarged tissues in the throat. Follow-up care is a key part of your treatment and safety. Be sure to make and go to all appointments, and call your doctor if you are having problems. It's also a good idea to know your test results and keep a list of the medicines you take. How can you care for yourself at home? · Lose weight, if needed. It may reduce the number of times you stop breathing or have slowed breathing. · Go to bed at the same time every night. · Sleep on your side. It may stop mild apnea. If you tend to roll onto your back, sew a pocket in the back of your pajama top. Put a tennis ball into the pocket, and stitch the pocket shut. This will help keep you from sleeping on your back. · Avoid alcohol and medicines such as sleeping pills and sedatives before bed. · Do not smoke. Smoking can make sleep apnea worse. If you need help quitting, talk to your doctor about stop-smoking programs and medicines. These can increase your chances of quitting for good. · Prop up the head of your bed 4 to 6 inches by putting bricks under the legs of the bed. · Treat breathing problems, such as a stuffy nose, caused by a cold or allergies. · Use a continuous positive airway pressure (CPAP) breathing machine if lifestyle changes do not help your apnea and your doctor recommends it. The machine keeps your airway from closing when you sleep. · If CPAP does not help you, ask your doctor whether you should try other breathing machines. A bilevel positive airway pressure machine has two types of air pressureâone for breathing in and one for breathing out. Another device raises or lowers air pressure as needed while you breathe. · If your nose feels dry or bleeds when using one of these machines, talk with your doctor about increasing moisture in the air. A humidifier may help.   · If your nose is runny or stuffy from using a breathing machine, talk with your doctor about using decongestants or a corticosteroid nasal spray. When should you call for help? Watch closely for changes in your health, and be sure to contact your doctor if:  · You still have sleep apnea even though you have made lifestyle changes. · You are thinking of trying a device such as CPAP. · You are having problems using a CPAP or similar machine. Where can you learn more? Go to [employer]. Enter U343 in the search box to learn more about \"Sleep Apnea: After Your Visit. \"   © 8236-7667 Healthwise, Incorporated. Care instructions adapted under license by [employer] (which disclaims liability or warranty for this information). This care instruction is for use with your licensed healthcare professional. If you have questions about a medical condition or this instruction, always ask your healthcare professional. Katerina [place]oke any warranty or liability for your use of this information. PROPER SLEEP HYGIENE    What to avoid  · Do not have drinks with caffeine, such as coffee or black tea, for 8 hours before bed. · Do not smoke or use other types of tobacco near bedtime. Nicotine is a stimulant and can keep you awake. · Avoid drinking alcohol late in the evening, because it can cause you to wake in the middle of the night. · Do not eat a big meal close to bedtime. If you are hungry, eat a light snack. · Do not drink a lot of water close to bedtime, because the need to urinate may wake you up during the night. · Do not read or watch TV in bed. Use the bed only for sleeping and sexual activity. What to try  · Go to bed at the same time every night, and wake up at the same time every morning. Do not take naps during the day. · Keep your bedroom quiet, dark, and cool. · Get regular exercise, but not within 3 to 4 hours of your bedtime. .  · Sleep on a comfortable pillow and mattress.   · If watching the clock makes you anxious, turn it facing away from you so you cannot see the time. · If you worry when you lie down, start a worry book. Well before bedtime, write down your worries, and then set the book and your concerns aside. · Try meditation or other relaxation techniques before you go to bed. · If you cannot fall asleep, get up and go to another room until you feel sleepy. Do something relaxing. Repeat your bedtime routine before you go to bed again. · Make your house quiet and calm about an hour before bedtime. Turn down the lights, turn off the TV, log off the computer, and turn down the volume on music. This can help you relax after a busy day. Drowsy Driving  The [address] [place] [address] Road Traffic Safety Administration cites drowsiness as a causing factor in more than 777,102 police reported crashes annually, resulting in 76,000 injuries and 1,500 deaths. Other surveys suggest 55% of people polled have driven while drowsy in the past year, 23% had fallen asleep but not crashed, 3% crashed, and 2% had and accident due to drowsy driving. Who is at risk? Young Drivers: One study of drowsy driving accidents states that 55% of the drivers were under 25 years. Of those, 75% were male. Shift Workers and Travelers: People who work overnight or travel across time zones frequently are at higher risk of experiencing Circadian Rhythm Disorders. They are trying to work and function when their body is programed to sleep. Sleep Deprived: Lack of sleep has a serious impact on your ability to pay attention or focus on a task. Consistently getting less than the average of 8 hours your body needs creates partial or cumulative sleep deprivation. Untreated Sleep Disorders: Sleep Apnea, Narcolepsy, R.L.S., and other sleep disorders (untreated) prevent a person from getting enough restful sleep. This leads to excessive daytime sleepiness and increases the risk for drowsy driving accidents by up to 7 times.   Medications / Alcohol: Even over the counter medications can cause drowsiness. Medications that impair a drivers attention should have a warning label. Alcohol naturally makes you sleepy and on its own can cause accidents. Combined with excessive drowsiness its effects are amplified. Signs of Drowsy Driving:   * You don't remember driving the last few miles   * You may drift out of your kieran   * You are unable to focus and your thoughts wander   * You may yawn more often than normal   * You have difficulty keeping your eyes open / nodding off   * Missing traffic signs, speeding, or tailgating  Prevention-   Good sleep hygiene, lifestyle and behavioral choices have the most impact on drowsy driving. There is no substitute for sleep and the average person requires 8 hours nightly. If you find yourself driving drowsy, stop and sleep. Consider the sleep hygiene tips provided during your visit as well. Medication Refill Policy: Refills for all medications require 1 week advance notice. Please have your pharmacy fax a refill request. We are unable to fax, or call in \"controled substance\" medications and you will need to pick these prescriptions up from our office. Trada Activation    Thank you for requesting access to [employer]ada. Please follow the instructions below to securely access and download your online medical record. Trada allows you to send messages to your doctor, view your test results, renew your prescriptions, schedule appointments, and more. How Do I Sign Up? 1. In your internet browser, go to [email or web address]. 2. Click on the First Time User? Click Here link in the Sign In box. You will see the New Member Sign Up page. 3. Enter your Trada Access Code exactly as it appears below. You will not need to use this code after youve completed the sign-up process. If you do not sign up before the expiration date, you must request a new code. Trada Access Code:  Activation code not generated  Current Trada Status: Active (This is the date your [employer] access code will )    4. Enter the last four digits of your Social Security Number (xxxx) and Date of Birth (mm/dd/yyyy) as indicated and click Submit. You will be taken to the next sign-up page. 5. Create a [employer] ID. This will be your [employer] login ID and cannot be changed, so think of one that is secure and easy to remember. 6. Create a [employer] password. You can change your password at any time. 7. Enter your Password Reset Question and Answer. This can be used at a later time if you forget your password. 8. Enter your e-mail address. You will receive e-mail notification when new information is available in [address] 9. Click Sign Up. You can now view and download portions of your medical record. 10. Click the Download Summary menu link to download a portable copy of your medical information. Additional Information    If you have questions, please call [phone]. Remember, [employer] is NOT to be used for urgent needs. For medical emergencies, dial 911. Starting a Weight Loss Plan: After Your Visit  Your Care Instructions  If you are thinking about losing weight, it can be hard to know where to start. Your doctor can help you set up a weight loss plan that best meets your needs. You may want to take a class on nutrition or exercise, or join a weight loss support group. If you have questions about how to make changes to your eating or exercise habits, ask your doctor about seeing a registered dietitian or an exercise specialist.  It can be a big challenge to lose weight. But you do not have to make huge changes at once. Make small changes, and stick with them. When those changes become habit, add a few more changes. If you do not think you are ready to make changes right now, try to pick a date in the future. Make an appointment to see your doctor to discuss whether the time is right for you to start a plan.   Follow-up care is a key part of your treatment and safety. Be sure to make and go to all appointments, and call your doctor if you are having problems. It's also a good idea to know your test results and keep a list of the medicines you take. How can you care for yourself at home? · Set realistic goals. Many people expect to lose much more weight than is likely. A weight loss of 5% to 10% of your body weight may be enough to improve your health. · Get family and friends involved to provide support. Talk to them about why you are trying to lose weight, and ask them to help. They can help by participating in exercise and having meals with you, even if they may be eating something different. · Find what works best for you. If you do not have time or do not like to cook, a program that offers meal replacement bars or shakes may be better for you. Or if you like to prepare meals, finding a plan that includes daily menus and recipes may be best.  · Ask your doctor about other health professionals who can help you achieve your weight loss goals. ¨ A dietitian can help you make healthy changes in your diet. ¨ An exercise specialist or  can help you develop a safe and effective exercise program.  ¨ A counselor or psychiatrist can help you cope with issues such as depression, anxiety, or family problems that can make it hard to focus on weight loss. · Consider joining a support group for people who are trying to lose weight. Your doctor can suggest groups in your area. Where can you learn more? Go to [employer]Coll[employer].be  Enter U357 in the search box to learn more about \"Starting a Weight Loss Plan: After Your Visit. \"   © 7694-6368 Healthwise, Incorporated. Care instructions adapted under license by Cynthia Corey (which disclaims liability or warranty for this information).  This care instruction is for use with your licensed healthcare professional. If you have questions about a medical condition or this instruction, always ask your healthcare professional. [place] [address] any warranty or liability for your use of this information.   Content Version: 5.0.29451; Last Revised: September 1, 2009

## 2017-05-02 NOTE — MR AVS SNAPSHOT
Visit Information Date & Time Provider Department Dept. Phone Encounter #  
 5/2/2017  3:00 PM Lupe Oconnell, Lan [facility] [place] [id number] Follow-up Instructions Return for call with results. Follow-up and Disposition History Your Appointments 5/2/2017  3:00 PM  
New Patient with Lupe Oconnell MD  
[address] [place] ([facility]) Appt Note: NP; referred by Dr. Luisito Ross; snoring [address] [facility], [address] [address] [address] [place] [address]., [address] [address] 5/16/2017 10:00 AM  
Any with TECH_[facility] [address] [place] ([facility]) Appt Note: HSAT - needs education [address] [facility], [address] [address] [address] [place] [address]., [address] [address]  
  
    
 7/6/2017 10:30 AM  
ROUTINE CARE with Maria Luisa Christian MD  
[facility]) Appt Note: 3 month follow up for fasting labs, HTN, edema, pre diabetes/COMPLETE MEDICARE WELLNESS VISIT AT APPT  
 [address] [facility] [address]  
  
   
 [address] [facility] [address] [facility] [address] [facility]  
  
    
 8/28/2017 10:20 AM  
ESTABLISHED PATIENT with Karissa Delgado MD  
[facility] ([facility]) Appt Note: 6 month follow up  
 [address]  
[address] [facility] [address] Upcoming Health Maintenance Date Due  
 GLAUCOMA SCREENING Q2Y 10/10/2002 MEDICARE YEARLY EXAM 10/10/2002 INFLUENZA AGE 9 TO ADULT 8/1/2017 DTaP/Tdap/Td series (2 - Td) 12/5/2021 Allergies as of 5/2/2017  Review Complete On: 5/2/2017 By: Lupe Oconnell MD  
  
 Severity Noted Reaction Type Reactions Oxycodone Low 01/09/2017    Other (comments) Patient states, \"It made me feel unwell. \" Current Immunizations  Reviewed on 2/17/2017 Name Date Influenza Vaccine 10/1/2016 Pneumococcal Conjugate (PCV-13) 12/18/2015 Tdap 12/5/2011 Not reviewed this visit You Were Diagnosed With   
  
 Codes Comments LINDA (obstructive sleep apnea)    -  Primary ICD-10-CM: G47.33 
ICD-9-CM: 327.23 Obesity (BMI 30-39. 9)     ICD-10-CM: E66.9 ICD-9-CM: 278.00 Vitals BP Pulse Height(growth percentile) Weight(growth percentile) SpO2 BMI  
 116/70 68 5' 8\" (1.727 m) 213 lb (96.6 kg) 95% 32.39 kg/m2 Smoking Status Never Smoker Vitals History BMI and BSA Data Body Mass Index Body Surface Area  
 32.39 kg/m 2 2.15 m 2 Preferred Pharmacy Pharmacy Name Phone [id number] [facility] [phone] Your Updated Medication List  
  
   
This list is accurate as of: 5/2/17  2:58 PM.  Always use your most recent med list.  
  
  
  
  
 ALPHAGAN P 0.1 % ophthalmic solution Generic drug:  brimonidine Administer 1 Drop to both eyes daily. Both eyes PM and lt eye in am also  
  
 amLODIPine 2.5 mg tablet Commonly known as:  Erin Luis Take 2.5 mg by mouth daily. celecoxib 200 mg capsule Commonly known as:  CELEBREX  
TAKE 1 TABLET BY MOUTH ONCE OR TWICE DAILY doxazosin 4 mg tablet Commonly known as:  CARDURA Take 4 mg by mouth daily. ELIQUIS 5 mg tablet Generic drug:  apixaban Take 5 mg by mouth two (2) times a day. furosemide 40 mg tablet Commonly known as:  LASIX Take 1 Tab by mouth every other day. HYDROcodone-acetaminophen 5-325 mg per tablet Commonly known as:  NORCO  
  
 lisinopril 20 mg tablet Commonly known as:  Marga Barrier Take 1 Tab by mouth daily. May resume medication when b/p greater than 120/80. methylPREDNISolone 4 mg tablet Commonly known as:  MEDROL DOSEPACK  
  
 metoprolol tartrate 25 mg tablet Commonly known as:  LOPRESSOR Take 25 mg by mouth two (2) times a day. We Performed the Following SLEEP STUDY UNATTENDED, RESPIRATORY EFFORT  [36978 CPT(R)] Follow-up Instructions Return for call with results. Patient Instructions [address] [facility] [place]. [address] [facility], [address] Tel.  [phone] Fax. 100 [facility] [address] [place], 200 [facility] Tel.  [phone] Fax. [phone] [address] [facility] [place] [address] Tel.  [phone] Fax. [phone] Sleep Apnea: After Your Visit Your Care Instructions Sleep apnea occurs when you frequently stop breathing for 10 seconds or longer during sleep. It can be mild to severe, based on the number of times per hour that you stop breathing or have slowed breathing. Blocked or narrowed airways in your nose, mouth, or throat can cause sleep apnea. Your airway can become blocked when your throat muscles and tongue relax during sleep. Sleep apnea is common, occurring in 1 out of 20 individuals. Individuals having any of the following characteristics should be evaluated and treated right away due to high risk and detrimental consequences from untreated sleep apnea: 
1. Obesity 2. Congestive Heart failure 3. Atrial Fibrillation 4. Uncontrolled Hypertension 5. Type II Diabetes 6. Night-time Arrhythmias 7. Stroke 8. Pulmonary Hypertension 9. High-risk Driving Populations (pilots, truck drivers, etc.) 10. Patients Considering Weight-loss Surgery How do you know you have sleep apnea? You probably have sleep apnea if you answer 'yes' to 3 or more of the following questions: S - Have you been told that you Snore? T - Are you often Tired during the day? O - Has anyone Observed you stop breathing while sleeping? P- Do you have (or are being treated for) high blood Pressure? B - Are you obese (Body Mass Index > 35)? A - Is your Age 48years old or older? N - Is your Neck size greater than 16 inches? G - Are you male Gender? A sleep physician can prescribe a breathing device that prevents tissues in the throat from blocking your airway. Or your doctor may recommend using a dental device (oral breathing device) to help keep your airway open. In some cases, surgery may be needed to remove enlarged tissues in the throat. Follow-up care is a key part of your treatment and safety. Be sure to make and go to all appointments, and call your doctor if you are having problems. It's also a good idea to know your test results and keep a list of the medicines you take. How can you care for yourself at home? · Lose weight, if needed. It may reduce the number of times you stop breathing or have slowed breathing. · Go to bed at the same time every night. · Sleep on your side. It may stop mild apnea. If you tend to roll onto your back, sew a pocket in the back of your [employer] top. Put a tennis ball into the pocket, and stitch the pocket shut. This will help keep you from sleeping on your back. · Avoid alcohol and medicines such as sleeping pills and sedatives before bed. · Do not smoke. Smoking can make sleep apnea worse. If you need help quitting, talk to your doctor about stop-smoking programs and medicines. These can increase your chances of quitting for good. · Prop up the head of your bed 4 to 6 inches by putting bricks under the legs of the bed. · Treat breathing problems, such as a stuffy nose, caused by a cold or allergies. · Use a continuous positive airway pressure (CPAP) breathing machine if lifestyle changes do not help your apnea and your doctor recommends it. The machine keeps your airway from closing when you sleep.  
· If CPAP does not help you, ask your doctor whether you should try other breathing machines. A bilevel positive airway pressure machine has two types of air pressureâone for breathing in and one for breathing out. Another device raises or lowers air pressure as needed while you breathe. · If your nose feels dry or bleeds when using one of these machines, talk with your doctor about increasing moisture in the air. A humidifier may help. · If your nose is runny or stuffy from using a breathing machine, talk with your doctor about using decongestants or a corticosteroid nasal spray. When should you call for help? Watch closely for changes in your health, and be sure to contact your doctor if: 
· You still have sleep apnea even though you have made lifestyle changes. · You are thinking of trying a device such as CPAP. · You are having problems using a CPAP or similar machine. Where can you learn more? Go to [employer]. Enter P060 in the search box to learn more about \"Sleep Apnea: After Your Visit. \"  
© 1323-0202 Healthwise, [employer]. Care instructions adapted under license by Tonny Avalos (which disclaims liability or warranty for this information). This care instruction is for use with your licensed healthcare professional. If you have questions about a medical condition or this instruction, always ask your healthcare professional. Katerina Watson any warranty or liability for your use of this information. PROPER SLEEP HYGIENE What to avoid · Do not have drinks with caffeine, such as coffee or black tea, for 8 hours before bed. · Do not smoke or use other types of tobacco near bedtime. Nicotine is a stimulant and can keep you awake. · Avoid drinking alcohol late in the evening, because it can cause you to wake in the middle of the night. · Do not eat a big meal close to bedtime. If you are hungry, eat a light snack.  
· Do not drink a lot of water close to bedtime, because the need to urinate may wake you up during the night. · Do not read or watch TV in bed. Use the bed only for sleeping and sexual activity. What to try · Go to bed at the same time every night, and wake up at the same time every morning. Do not take naps during the day. · Keep your bedroom quiet, dark, and cool. · Get regular exercise, but not within 3 to 4 hours of your bedtime. Lorenso Frankel · Sleep on a comfortable pillow and mattress. · If watching the clock makes you anxious, turn it facing away from you so you cannot see the time. · If you worry when you lie down, start a worry book. Well before bedtime, write down your worries, and then set the book and your concerns aside. · Try meditation or other relaxation techniques before you go to bed. · If you cannot fall asleep, get up and go to another room until you feel sleepy. Do something relaxing. Repeat your bedtime routine before you go to bed again. · Make your house quiet and calm about an hour before bedtime. Turn down the lights, turn off the TV, log off the computer, and turn down the volume on music. This can help you relax after a busy day. Drowsy Driving The [employer] cites drowsiness as a causing factor in more than 277,852 police reported crashes annually, resulting in 76,000 injuries and 1,500 deaths. Other surveys suggest 55% of people polled have driven while drowsy in the past year, 23% had fallen asleep but not crashed, 3% crashed, and 2% had and accident due to drowsy driving. Who is at risk? Young Drivers: One study of drowsy driving accidents states that 55% of the drivers were under 25 years. Of those, 75% were male. Shift Workers and Travelers: People who work overnight or travel across time zones frequently are at higher risk of experiencing Circadian Rhythm Disorders. They are trying to work and function when their body is programed to sleep. Sleep Deprived: Lack of sleep has a serious impact on your ability to pay attention or focus on a task. Consistently getting less than the average of 8 hours your body needs creates partial or cumulative sleep deprivation. Untreated Sleep Disorders: Sleep Apnea, Narcolepsy, R.L.S., and other sleep disorders (untreated) prevent a person from getting enough restful sleep. This leads to excessive daytime sleepiness and increases the risk for drowsy driving accidents by up to 7 times. Medications / Alcohol: Even over the counter medications can cause drowsiness. Medications that impair a drivers attention should have a warning label. Alcohol naturally makes you sleepy and on its own can cause accidents. Combined with excessive drowsiness its effects are amplified. Signs of Drowsy Driving: * You don't remember driving the last few miles * You may drift out of your kieran * You are unable to focus and your thoughts wander * You may yawn more often than normal 
 * You have difficulty keeping your eyes open / nodding off * Missing traffic signs, speeding, or tailgating Prevention-  
Good sleep hygiene, lifestyle and behavioral choices have the most impact on drowsy driving. There is no substitute for sleep and the average person requires 8 hours nightly. If you find yourself driving drowsy, stop and sleep. Consider the sleep hygiene tips provided during your visit as well. Medication Refill Policy: Refills for all medications require 1 week advance notice. Please have your pharmacy fax a refill request. We are unable to fax, or call in \"controled substance\" medications and you will need to pick these prescriptions up from our office. [employer] Activation Thank you for requesting access to [employer]. Please follow the instructions below to securely access and download your online medical record.  [employer] allows you to send messages to your doctor, view your test results, renew your prescriptions, schedule appointments, and more. How Do I Sign Up? 1. In your internet browser, go to [email or web address]. 2. Click on the First Time User? Click Here link in the Sign In box. You will see the New Member Sign Up page. 3. Enter your [employer] Access Code exactly as it appears below. You will not need to use this code after youve completed the sign-up process. If you do not sign up before the expiration date, you must request a new code. MyChart Access Code: Activation code not generated Current [facility] Status: Active (This is the date your My[employer] access code will ) 4. Enter the last four digits of your Social Security Number (xxxx) and Date of Birth (mm/dd/yyyy) as indicated and click Submit. You will be taken to the next sign-up page. 5. Create a [facility] ID. This will be your [facility] login ID and cannot be changed, so think of one that is secure and easy to remember. 6. Create a [facility] password. You can change your password at any time. 7. Enter your Password Reset Question and Answer. This can be used at a later time if you forget your password. 8. Enter your e-mail address. You will receive e-mail notification when new information is available in [address] 9. Click Sign Up. You can now view and download portions of your medical record. 10. Click the Download Summary menu link to download a portable copy of your medical information. Additional Information If you have questions, please call [phone]. Remember, [facility] is NOT to be used for urgent needs. For medical emergencies, dial 911. Starting a Weight Loss Plan: After Your Visit Your Care Instructions If you are thinking about losing weight, it can be hard to know where to start. Your doctor can help you set up a weight loss plan that best meets your needs.  You may want to take a class on nutrition or exercise, or join a weight loss support group. If you have questions about how to make changes to your eating or exercise habits, ask your doctor about seeing a registered dietitian or an exercise specialist. 
It can be a big challenge to lose weight. But you do not have to make huge changes at once. Make small changes, and stick with them. When those changes become habit, add a few more changes. If you do not think you are ready to make changes right now, try to pick a date in the future. Make an appointment to see your doctor to discuss whether the time is right for you to start a plan. Follow-up care is a key part of your treatment and safety. Be sure to make and go to all appointments, and call your doctor if you are having problems. It's also a good idea to know your test results and keep a list of the medicines you take. How can you care for yourself at home? · Set realistic goals. Many people expect to lose much more weight than is likely. A weight loss of 5% to 10% of your body weight may be enough to improve your health. · Get family and friends involved to provide support. Talk to them about why you are trying to lose weight, and ask them to help. They can help by participating in exercise and having meals with you, even if they may be eating something different. · Find what works best for you. If you do not have time or do not like to cook, a program that offers meal replacement bars or shakes may be better for you. Or if you like to prepare meals, finding a plan that includes daily menus and recipes may be best. 
· Ask your doctor about other health professionals who can help you achieve your weight loss goals. ¨ A dietitian can help you make healthy changes in your diet.  
¨ An exercise specialist or  can help you develop a safe and effective exercise program. 
¨ A counselor or psychiatrist can help you cope with issues such as depression, anxiety, or family problems that can make it hard to focus on weight loss. · Consider joining a support group for people who are trying to lose weight. Your doctor can suggest groups in your area. Where can you learn more? Go to [employer].be Enter B700 in the search box to learn more about \"Starting a Weight Loss Plan: After Your Visit. \"  
© 5026-1566 Healthwise, Incorporated. Care instructions adapted under license by Marc Freeman (which disclaims liability or warranty for this information). This care instruction is for use with your licensed healthcare professional. If you have questions about a medical condition or this instruction, always ask your healthcare professional. [address] any warranty or liability for your use of this information. Content Version: 1.0.43079; Last Revised: September 1, 2009 Introducing [facility]! Dear Cecil Cancer: Thank you for requesting a [employer] account. Our records indicate that you already have an active [employer] account. You can access your account anytime at [email or web address] Did you know that you can access your hospital and ER discharge instructions at any time in [employer]? You can also review all of your test results from your hospital stay or ER visit. Additional Information If you have questions, please visit the Frequently Asked Questions section of the [employer] website at [email or web address] Remember, [employer] is NOT to be used for urgent needs. For medical emergencies, dial 911. Now available from your iPhone and Android! Please provide this summary of care documentation to your next provider. Your primary care clinician is listed as ANGELA Whalen. If you have any questions after today's visit, please call [phone].

## 2017-05-02 NOTE — PROGRESS NOTES
[address] [facility] [place]. [place], [address]  Tel.  [phone]  Fax. 100 [facility] 60  [place], [address] [facility]  Tel.  [phone]  Fax. [phone] [address]   Tel.  [phone]  Fax. [phone]         Subjective:      Jayleen Peguero is an 80 y.o. male referred for evaluation for a sleep disorder. He complains of excessive daytime sleepiness associated with awakening in the middle of the night because of restlessness. Symptoms began several years ago, gradually worsening since that time. He usually can fall asleep in 5 minutes. Family or house members note snoring. He denies completely or partially paralyzed while falling asleep or waking up. Jayleen Peguero does wake up frequently at night. He is not bothered by waking up too early and left unable to get back to sleep. He actually sleeps about 7 hours at night and wakes up about 6 times during the night. He   work shifts: Amanda Solares indicates he does not get too little sleep at night. His bedtime is 0000. He awakens at 0800. He does take naps. He takes 5 naps a week lasting 1, Hour(s). He has the following observed behaviors: Loud snoring, Light snoring;  . Other remarks:      [place] Sleepiness Score: 12   which reflect moderate daytime drowsiness. Allergies   Allergen Reactions    Oxycodone Other (comments)     Patient states, \"It made me feel unwell. \"         Current Outpatient Prescriptions:     celecoxib (CELEBREX) 200 mg capsule, TAKE 1 TABLET BY MOUTH ONCE OR TWICE DAILY, Disp: , Rfl: 3    methylPREDNISolone (MEDROL DOSEPACK) 4 mg tablet, , Disp: , Rfl:     lisinopril (PRINIVIL, ZESTRIL) 20 mg tablet, Take 1 Tab by mouth daily. May resume medication when b/p greater than 120/80., Disp: 30 Tab, Rfl: 5    brimonidine (ALPHAGAN P) 0.1 % ophthalmic solution, Administer 1 Drop to both eyes daily.  Both eyes PM and lt eye in am also, Disp: , Rfl:     metoprolol tartrate (LOPRESSOR) 25 mg tablet, Take 25 mg by mouth two (2) times a day., Disp: , Rfl:     doxazosin (CARDURA) 4 mg tablet, Take 4 mg by mouth daily. , Disp: , Rfl:     amLODIPine (NORVASC) 2.5 mg tablet, Take 2.5 mg by mouth daily. , Disp: , Rfl:     apixaban (ELIQUIS) 5 mg tablet, Take 5 mg by mouth two (2) times a day., Disp: , Rfl:     furosemide (LASIX) 40 mg tablet, Take 1 Tab by mouth every other day., Disp: 15 Tab, Rfl: 2    HYDROcodone-acetaminophen (NORCO) 5-325 mg per tablet, , Disp: , Rfl:      He  has a past medical history of Arthritis; Enlarged prostate; Hyperlipidemia; Hypertension; PAF (paroxysmal atrial fibrillation) ([facility] [place] [address] Pre-diabetes; and Shingles. He  has a past surgical history that includes shoulder arthroscopy; colonoscopy (2009); tonsillectomy; and heent. He family history includes Coronary Artery Disease (age of onset: 47) in his father. He  reports that he has never smoked. He does not have any smokeless tobacco history on file. He reports that he does not drink alcohol. Review of Systems:  Constitutional:  No significant weight loss or weight gain. Eyes:  No blurred vision. CVS:  No significant chest pain  Pulm:  No significant shortness of breath  GI:  No significant nausea or vomiting  :  No significant nocturia  Musculoskeletal:  No significant joint pain at night  Skin:  No significant rashes  Neuro:  No significant dizziness   Psych:  No active mood issues    Sleep Review of Systems: notable for no difficulty falling asleep; frequent awakenings at night;  irregular dreaming noted; no nightmares ; no early morning headaches; no memory problems; no concentration issues; no history of any automobile or occupational accidents due to daytime drowsiness.       Objective:     Visit Vitals    /70    Pulse 68    Ht 5' 8\" (1.727 m)    Wt 213 lb (96.6 kg)    SpO2 95%    BMI 32.39 kg/m2         General:   Not in acute distress   Eyes:  Anicteric sclerae, no obvious strabismus   Nose:  No obvious nasal septum deviation    Oropharynx:   Class 3 oropharyngeal outlet, thick tongue base, low-lying soft palate, narrow tonsilo-pharyngeal pilars   Tonsils:   tonsils are unappreciated   Neck:   Neck circ. in \"inches\": 17; midline trachea   Chest/Lungs:  Equal lung expansion, clear on auscultation    CVS:  Normal rate, regular rhythm; no JVD   Skin:  Warm to touch; no obvious rashes   Neuro:  No focal deficits ; no obvious tremor    Psych:  Normal affect,  normal countenance;          Assessment:       ICD-10-CM ICD-9-CM    1. LINDA (obstructive sleep apnea) G47.33 327.23 SLEEP STUDY UNATTENDED, RESPIRATORY EFFORT    2. Obesity (BMI 30-39. 9) E66.9 278.00          Plan:     * The patient currently has a Moderate Risk for having sleep apnea. STOP-BANG score 5.  * HSAT was ordered for initial evaluation. * He was provided information on sleep apnea including coresponding risk factors and the importance of proper treatment. * Counseling was provided regarding proper sleep hygiene and safe driving. * We'll call him with test results. * If positive for sleep apnea, we'll request F/U appointment to discuss treatment options. I have reviewed/discussed the above normal BMI with the patient. I have recommended the following interventions: dietary management education, guidance, and counseling . Jadon Vasquez Thank you for allowing us to participate in your patient's medical care. We'll keep you updated on these investigations.     Minal Marin M.D.  (electronically signed)  Diplomate in Sleep Medicine, [facility]

## 2017-05-04 ENCOUNTER — TELEPHONE (OUTPATIENT)
Dept: INTERNAL MEDICINE CLINIC | Age: 82
End: 2017-05-04

## 2017-05-04 NOTE — TELEPHONE ENCOUNTER
Spoke with patient. States his pulse has been running below 50 all day. Also has a dull headache and feels weak/tired. 5/1/17 /83, P-65  5/2/17 /72, P-68  5/3/17 /79, P-63  5/4/17 /72, P-49    Reviewed with Dr. Scarlett Brown. Advised patient to monitor Pulse and BP. Scheduled appointment for tomorrow morning at 1100 with Dr. Scarlett Brown. Advised patient if pulse is below 50, he experiences any SOB dizziness or other symtoms he is to call our office if during business hours. Otherwise should be evaluated at the ED.  Patient verbalized understanding

## 2017-05-05 ENCOUNTER — OFFICE VISIT (OUTPATIENT)
Dept: INTERNAL MEDICINE CLINIC | Age: 82
End: 2017-05-05

## 2017-05-05 ENCOUNTER — APPOINTMENT (OUTPATIENT)
Dept: GENERAL RADIOLOGY | Age: 82
End: 2017-05-05
Attending: EMERGENCY MEDICINE
Payer: MEDICARE

## 2017-05-05 ENCOUNTER — HOSPITAL ENCOUNTER (EMERGENCY)
Age: 82
Discharge: HOME | End: 2017-05-05
Attending: EMERGENCY MEDICINE
Payer: MEDICARE

## 2017-05-05 VITALS
RESPIRATION RATE: 21 BRPM | OXYGEN SATURATION: 95 % | HEART RATE: 46 BPM | SYSTOLIC BLOOD PRESSURE: 120 MMHG | DIASTOLIC BLOOD PRESSURE: 71 MMHG

## 2017-05-05 VITALS
TEMPERATURE: 98.1 F | DIASTOLIC BLOOD PRESSURE: 69 MMHG | HEIGHT: 68 IN | WEIGHT: 214 LBS | HEART RATE: 45 BPM | RESPIRATION RATE: 18 BRPM | BODY MASS INDEX: 32.43 KG/M2 | OXYGEN SATURATION: 99 % | SYSTOLIC BLOOD PRESSURE: 121 MMHG

## 2017-05-05 DIAGNOSIS — R00.1 BRADYCARDIA: Primary | ICD-10-CM

## 2017-05-05 LAB
ALBUMIN SERPL BCP-MCNC: 3.7 G/DL (ref 3.5–5)
ALBUMIN/GLOB SERPL: 1.2 {RATIO} (ref 1.1–2.2)
ALP SERPL-CCNC: 58 U/L (ref 45–117)
ALT SERPL-CCNC: 30 U/L (ref 12–78)
ANION GAP BLD CALC-SCNC: 5 MMOL/L (ref 5–15)
AST SERPL W P-5'-P-CCNC: 11 U/L (ref 15–37)
ATRIAL RATE: 44 BPM
BASOPHILS # BLD AUTO: 0 K/UL (ref 0–0.1)
BASOPHILS # BLD: 0 % (ref 0–1)
BILIRUB SERPL-MCNC: 0.3 MG/DL (ref 0.2–1)
BNP SERPL-MCNC: 873 PG/ML (ref 0–450)
BUN SERPL-MCNC: 23 MG/DL (ref 6–20)
BUN/CREAT SERPL: 21 (ref 12–20)
CALCIUM SERPL-MCNC: 8.5 MG/DL (ref 8.5–10.1)
CALCULATED P AXIS, ECG09: 32 DEGREES
CALCULATED R AXIS, ECG10: 68 DEGREES
CALCULATED T AXIS, ECG11: 62 DEGREES
CHLORIDE SERPL-SCNC: 104 MMOL/L (ref 97–108)
CO2 SERPL-SCNC: 30 MMOL/L (ref 21–32)
CREAT SERPL-MCNC: 1.12 MG/DL (ref 0.7–1.3)
DIAGNOSIS, 93000: NORMAL
EOSINOPHIL # BLD: 0 K/UL (ref 0–0.4)
EOSINOPHIL NFR BLD: 0 % (ref 0–7)
ERYTHROCYTE [DISTWIDTH] IN BLOOD BY AUTOMATED COUNT: 14.5 % (ref 11.5–14.5)
GLOBULIN SER CALC-MCNC: 3.2 G/DL (ref 2–4)
GLUCOSE SERPL-MCNC: 95 MG/DL (ref 65–100)
HCT VFR BLD AUTO: 40.6 % (ref 36.6–50.3)
HGB BLD-MCNC: 13.3 G/DL (ref 12.1–17)
LYMPHOCYTES # BLD AUTO: 20 % (ref 12–49)
LYMPHOCYTES # BLD: 2.2 K/UL (ref 0.8–3.5)
MCH RBC QN AUTO: 26.1 PG (ref 26–34)
MCHC RBC AUTO-ENTMCNC: 32.8 G/DL (ref 30–36.5)
MCV RBC AUTO: 79.6 FL (ref 80–99)
MONOCYTES # BLD: 0.6 K/UL (ref 0–1)
MONOCYTES NFR BLD AUTO: 5 % (ref 5–13)
NEUTS SEG # BLD: 8.3 K/UL (ref 1.8–8)
NEUTS SEG NFR BLD AUTO: 75 % (ref 32–75)
P-R INTERVAL, ECG05: 158 MS
PLATELET # BLD AUTO: 141 K/UL (ref 150–400)
POTASSIUM SERPL-SCNC: 4.3 MMOL/L (ref 3.5–5.1)
PROT SERPL-MCNC: 6.9 G/DL (ref 6.4–8.2)
Q-T INTERVAL, ECG07: 454 MS
QRS DURATION, ECG06: 90 MS
QTC CALCULATION (BEZET), ECG08: 388 MS
RBC # BLD AUTO: 5.1 M/UL (ref 4.1–5.7)
SODIUM SERPL-SCNC: 139 MMOL/L (ref 136–145)
TROPONIN I SERPL-MCNC: <0.04 NG/ML
VENTRICULAR RATE, ECG03: 44 BPM
WBC # BLD AUTO: 11.2 K/UL (ref 4.1–11.1)

## 2017-05-05 PROCEDURE — 84484 ASSAY OF TROPONIN QUANT: CPT | Performed by: EMERGENCY MEDICINE

## 2017-05-05 PROCEDURE — 93005 ELECTROCARDIOGRAM TRACING: CPT

## 2017-05-05 PROCEDURE — 99284 EMERGENCY DEPT VISIT MOD MDM: CPT

## 2017-05-05 PROCEDURE — 85025 COMPLETE CBC W/AUTO DIFF WBC: CPT | Performed by: EMERGENCY MEDICINE

## 2017-05-05 PROCEDURE — 83880 ASSAY OF NATRIURETIC PEPTIDE: CPT | Performed by: EMERGENCY MEDICINE

## 2017-05-05 PROCEDURE — 71020 XR CHEST PA LAT: CPT

## 2017-05-05 PROCEDURE — 36415 COLL VENOUS BLD VENIPUNCTURE: CPT | Performed by: EMERGENCY MEDICINE

## 2017-05-05 PROCEDURE — 80053 COMPREHEN METABOLIC PANEL: CPT | Performed by: EMERGENCY MEDICINE

## 2017-05-05 RX ORDER — FLUOROURACIL 50 MG/G
CREAM TOPICAL
Refills: 0 | COMMUNITY
Start: 2017-04-26 | End: 2019-03-07

## 2017-05-05 RX ORDER — DICLOFENAC SODIUM 10 MG/G
GEL TOPICAL
Refills: 3 | COMMUNITY
Start: 2017-04-28 | End: 2017-10-06 | Stop reason: ALTCHOICE

## 2017-05-05 RX ORDER — AMLODIPINE BESYLATE 2.5 MG/1
5 TABLET ORAL DAILY
Qty: 30 TAB | Refills: 0 | Status: SHIPPED | OUTPATIENT
Start: 2017-05-05 | End: 2017-06-08 | Stop reason: SDUPTHER

## 2017-05-05 NOTE — ED PROVIDER NOTES
HPI Comments: Kayla Scott is a 80 y.o. male with PMHx significant for HTN, Hyperlipidemia, and Pre-DM, who presents via EMS to ED [facility] ED with cc of sudden onset slowed heart rate and weakness that began yesterday. Pt had checked his BP which was under 50 and was referred to the ED by his PCP. Pt states he is currently taking prednisolone, which he took this morning, and celebrex 200 mg for 3 weeks, which he had stopped taking about a week ago because he felt he \"didn't need it\". Pt denies any nausea, diarrhea, fever, chills, coughing, chest pain, or SOB. Cristina Martinez MD    Social Hx: - smoking; - EtOH; - illicit drug use    There are no other complains, changes, or physical findings at this time. The history is provided by the patient. No  was used. Past Medical History:   Diagnosis Date    Arthritis     DJD    Enlarged prostate     Hyperlipidemia     Hypertension     [facility] fibrillation) ([facility] [address]     Pre-diabetes     HA1C 6.0 Dec 2016    Shingles        Past Surgical History:   Procedure Laterality Date    HX COLONOSCOPY      normal except for hemorrhoids    HX HEENT      wisdom teeth extraction x2    HX KNEE REPLACEMENT      right; 2017    HX SHOULDER ARTHROSCOPY      rt    HX TONSILLECTOMY           Family History:   Problem Relation Age of Onset    Coronary Artery Disease Father 47      at [de-identified] of MI       Social History     Social History    Marital status:      Spouse name: N/A    Number of children: N/A    Years of education: N/A     Occupational History    Not on file. Social History Main Topics    Smoking status: Never Smoker    Smokeless tobacco: Not on file    Alcohol use No    Drug use: Not on file    Sexual activity: Not on file     Other Topics Concern    Not on file     Social History Narrative         ALLERGIES: Oxycodone    Review of Systems   Constitutional: Negative for chills and fever. Respiratory: Negative for cough and shortness of breath. Cardiovascular: Negative for chest pain.        + Slowed heart rate   Gastrointestinal: Negative for diarrhea and nausea. Neurological: Positive for dizziness and weakness. Patient Vitals for the past 12 hrs:   Pulse Resp BP SpO2   05/05/17 1436 (!) 46 21 120/71 95 %         Physical Exam   Constitutional: He is oriented to person, place, and time. He appears well-developed and well-nourished. HENT:   Head: Normocephalic and atraumatic. Mouth/Throat: Oropharynx is clear and moist.   Eyes: Conjunctivae and EOM are normal. Pupils are equal, round, and reactive to light. Right eye exhibits no discharge. Left eye exhibits no discharge. Neck: Normal range of motion. Neck supple. Cardiovascular: Bradycardia present. No murmur heard. Pulmonary/Chest: Effort normal and breath sounds normal. No respiratory distress. He has no wheezes. He has no rales. Abdominal: Soft. Bowel sounds are normal. He exhibits no distension. There is no tenderness. Musculoskeletal: Normal range of motion. He exhibits no edema. Neurological: He is alert and oriented to person, place, and time. No cranial nerve deficit. He exhibits normal muscle tone. Skin: Skin is warm and dry. No rash noted. He is not diaphoretic. Psychiatric: He has a normal mood and affect. Nursing note and vitals reviewed. MDM  Number of Diagnoses or Management Options  Bradycardia:   Diagnosis management comments: DDx: Possible beta blocker overdose. Currently hemodynamically stable without evidence of organ disfunction.        Amount and/or Complexity of Data Reviewed  Clinical lab tests: ordered and reviewed  Tests in the radiology section of CPT®: ordered and reviewed  Tests in the medicine section of CPT®: ordered and reviewed  Review and summarize past medical records: yes  Discuss the patient with other providers: yes (Cardiology)  Independent visualization of images, tracings, or specimens: yes    Patient Progress  Patient progress: stable      Procedures    EKG interpretation: (Preliminary)  12:12 PM  Rhythm: sinus bradycardia; and regular . Rate (approx.): 44 bpm; Axis: normal; GA interval: normal; QRS interval: normal ; ST/T wave: normal    This note is prepared by Shawanda Teixeira, acting as Scribe for Katerin Olvera MD.    PROGRESS NOTE:  2:16 PM  Cardio has been called and recalled 3 times since 1:13pm, still waiting on response from [facility]. CONSULT NOTE:  2:34 PM  Katerin Olvera MD spoke with Juvencio Braswell MD.  Specialty: Cardiology  Discussed patient's hx, disposition, and available diagnostic and imaging results. Reviewed care plans. Consultant agrees with plans as outlined. Pt doesn't need to stay, he should stop the Medrol, follow up with his cardiologist and call on Monday. If he experiences trouble over the weekend, pt should return to ED    LABORATORY TESTS:  Recent Results (from the past 12 hour(s))   EKG, 12 LEAD, INITIAL    Collection Time: 05/05/17 12:12 PM   Result Value Ref Range    Ventricular Rate 44 BPM    Atrial Rate 44 BPM    P-R Interval 158 ms    QRS Duration 90 ms    Q-T Interval 454 ms    QTC Calculation (Bezet) 388 ms    Calculated P Axis 32 degrees    Calculated R Axis 68 degrees    Calculated T Axis 62 degrees    Diagnosis       Marked sinus bradycardia  Abnormal ECG  No previous ECGs available     CBC WITH AUTOMATED DIFF    Collection Time: 05/05/17 12:25 PM   Result Value Ref Range    WBC 11.2 (H) 4.1 - 11.1 K/uL    RBC 5.10 4. 10 - 5.70 M/uL    HGB 13.3 12.1 - 17.0 g/dL    HCT 40.6 36.6 - 50.3 %    MCV 79.6 (L) 80.0 - 99.0 FL    MCH 26.1 26.0 - 34.0 PG    MCHC 32.8 30.0 - 36.5 g/dL    RDW 14.5 11.5 - 14.5 %    PLATELET 841 (L) 890 - 400 K/uL    NEUTROPHILS 75 32 - 75 %    LYMPHOCYTES 20 12 - 49 %    MONOCYTES 5 5 - 13 %    EOSINOPHILS 0 0 - 7 %    BASOPHILS 0 0 - 1 %    ABS. NEUTROPHILS 8.3 (H) 1.8 - 8.0 K/UL    ABS.  LYMPHOCYTES 2.2 0.8 - 3.5 K/UL    ABS. MONOCYTES 0.6 0.0 - 1.0 K/UL    ABS. EOSINOPHILS 0.0 0.0 - 0.4 K/UL    ABS. BASOPHILS 0.0 0.0 - 0.1 K/UL   METABOLIC PANEL, COMPREHENSIVE    Collection Time: 05/05/17 12:25 PM   Result Value Ref Range    Sodium 139 136 - 145 mmol/L    Potassium 4.3 3.5 - 5.1 mmol/L    Chloride 104 97 - 108 mmol/L    CO2 30 21 - 32 mmol/L    Anion gap 5 5 - 15 mmol/L    Glucose 95 65 - 100 mg/dL    BUN 23 (H) 6 - 20 MG/DL    Creatinine 1.12 0.70 - 1.30 MG/DL    BUN/Creatinine ratio 21 (H) 12 - 20      GFR est AA >60 >60 ml/min/1.73m2    GFR est non-AA >60 >60 ml/min/1.73m2    Calcium 8.5 8.5 - 10.1 MG/DL    Bilirubin, total 0.3 0.2 - 1.0 MG/DL    ALT (SGPT) 30 12 - 78 U/L    AST (SGOT) 11 (L) 15 - 37 U/L    Alk. phosphatase 58 45 - 117 U/L    Protein, total 6.9 6.4 - 8.2 g/dL    Albumin 3.7 3.5 - 5.0 g/dL    Globulin 3.2 2.0 - 4.0 g/dL    A-G Ratio 1.2 1.1 - 2.2     TROPONIN I    Collection Time: 05/05/17 12:25 PM   Result Value Ref Range    Troponin-I, Qt. <0.04 <0.05 ng/mL   PRO-BNP    Collection Time: 05/05/17 12:25 PM   Result Value Ref Range    NT pro- (H) 0 - 450 PG/ML       IMAGING RESULTS:    CXR Results  (Last 48 hours)               05/05/17 1256  XR CHEST PA LAT Final result    Impression:  IMPRESSION: Elevation of the left hemidiaphragm. Narrative:  Clinical indication: Preop, diabetes, shingles, hypertension. Frontal and lateral views of the chest obtained. No prior. There is elevation of   the left hemidiaphragm. The heart size is normal. There is no acute infiltrate. MEDICATIONS GIVEN:  Medications - No data to display    IMPRESSION:  1. Bradycardia        PLAN:  1. Current Discharge Medication List      CONTINUE these medications which have CHANGED    Details   amLODIPine (NORVASC) 2.5 mg tablet Take 2 Tabs by mouth daily. Qty: 30 Tab, Refills: 0           2.    Follow-up Information     Follow up With Details Comments Contact Info    [facility] If symptoms worsen [address] [place] [address]  [phone]        Return to ED if worse     DISCHARGE NOTE  2:38 PM  The patient has been re-evaluated and is ready for discharge. Reviewed available results with patient. Counseled patient on diagnosis and care plan. Patient has expressed understanding, and all questions have been answered. Patient agrees with plan and agrees to follow up as recommended, or return to the ED if their symptoms worsen. Discharge instructions have been provided and explained to the patient, along with reasons to return to the ED. ATTESTATION:  This note is prepared by Rosalino Rojas, acting as Scribe for David Finch MD.    David Finch MD: The scribe's documentation has been prepared under my direction and personally reviewed by me in its entirety. I confirm that the note above accurately reflects all work, treatment, procedures, and medical decision making performed by me.

## 2017-05-05 NOTE — DISCHARGE INSTRUCTIONS
Bradycardia: Care Instructions  Your Care Instructions    Bradycardia is a slow heart rate. If your heart beats too slowly, it can't supply your body with enough blood. This can make you weak or dizzy. Or it may make you pass out. Sometimes medicine can cause this problem. If this happens, your doctor may have you adjust one of your medicines. If a medicine is not the problem, your doctor may recommend a pacemaker. It is important to treat bradycardia so that you don't get more serious health problems. Your doctor will want to see you on a routine schedule to make sure that your heartbeat is normal.  Follow-up care is a key part of your treatment and safety. Be sure to make and go to all appointments, and call your doctor if you are having problems. It's also a good idea to know your test results and keep a list of the medicines you take. How can you care for yourself at home? · Take your medicines exactly as prescribed. Call your doctor if you think you are having a problem with your medicine. If your bradycardia is caused by another disease, your doctor will try to treat the disease. If it is caused by heart medicines, he or she will adjust your medicines. · Make lifestyle changes to improve your heart health. ¨ To control your cholesterol, avoid foods with a lot of fat, saturated fat, or sodium. Try to eat more fiber. And if your doctor says it's okay, get some exercise on most days. ¨ Do not smoke. Smoking can make your heart condition worse. If you need help quitting, talk to your doctor about stop-smoking programs and medicines. These can increase your chances of quitting for good. ¨ Limit alcohol to 2 drinks a day for men and 1 drink a day for women. Too much alcohol can cause health problems. Pacemaker  If you have a pacemaker, you will get more specific information about it. Be sure to:  · Check your pulse as your doctor tells you.   · Have your pacemaker checked as often as your doctor recommends. You may be able to do this over the phone or computer. · Avoid strong magnetic or electrical fields. These include wand metal detectors used in airports, MRIs, welding equipment, and generators. · You will be checked several times right after you get your pacemaker and when it is time to have the battery changed. Batteries last for 5 to 15 years. · You can talk on a cell phone. But keep it 6 inches away from your pacemaker. · Microwaves, TVs, radios, and kitchen and bathroom appliances won't harm you. When should you call for help? Call 911 anytime you think you may need emergency care. For example, call if:  · You passed out (lost consciousness). · You have symptoms of a heart attack. These may include:  ¨ Chest pain or pressure, or a strange feeling in the chest.  ¨ Sweating. ¨ Shortness of breath. ¨ Nausea or vomiting. ¨ Pain, pressure, or a strange feeling in the back, neck, jaw, or upper belly or in one or both shoulders or arms. ¨ Lightheadedness or sudden weakness. ¨ A fast or irregular heartbeat. After you call 911, the  may tell you to chew 1 adult-strength or 2 to 4 low-dose aspirin. Wait for an ambulance. Do not try to drive yourself. · You have symptoms of a stroke. These may include:  ¨ Sudden numbness, tingling, weakness, or loss of movement in your face, arm, or leg, especially on only one side of your body. ¨ Sudden vision changes. ¨ Sudden trouble speaking. ¨ Sudden confusion or trouble understanding simple statements. ¨ Sudden problems with walking or balance. ¨ A sudden, severe headache that is different from past headaches. Call your doctor now or seek immediate medical care if:  · You are dizzy or lightheaded, or you feel like you may faint. · You have new or increased shortness of breath. · You had a pacemaker implanted and you have signs of infection, such as:  ¨ Increased pain, swelling, warmth, or redness.   ¨ Red streaks leading from the cut (incision). ¨ Pus draining from the incision. ¨ A fever. Watch closely for changes in your health, and be sure to contact your doctor if you have any problems. Where can you learn more? Go to http://cammie-derik.info/. Enter M919 in the search box to learn more about \"Bradycardia: Care Instructions. \"  Current as of: January 27, 2016  Content Version: 11.2  © 6959-3044 [employer]. Care instructions adapted under license by [employer] (which disclaims liability or warranty for this information). If you have questions about a medical condition or this instruction, always ask your healthcare professional. [address] any warranty or liability for your use of this information.

## 2017-05-05 NOTE — PROGRESS NOTES
CC: Slow Heart Rate (heart rate has been low)      HPI:    He is a 80 y.o. male  With a hx of paroxysmal atrial fibrillation on metoprolol and eliquis who presents for evaluation of bradycardia. Patient noted low heart rate yesterday when monitoring blood pressure - \" less than 50\"  And called advised to come in for appointment. Drove himself to appointment. When getting up to exam table patient became dizzy and had to be held by me and nurse. Reports dizziness. No chest pain or shortness of breath  EKG showed bradycardia heart rate of 43  Patient is on metoprolol and 25mg and norvasc 2.5  BP is normal       Recently completed prednisone for leg swelling of right knee after knee surgery - swelling resolved. ( Negative doppler )     ROS:  Constitutional: negative for fevers, chills, anorexia and weight loss  Eyes:   negative for visual disturbance,  irritation  ENT:   negative for tinnitus,sore throat,nasal congestion,ear pain, sinus pain.    Respiratory:  negative for cough, hemoptysis, dyspnea,wheezing  CV:   negative for chest pain, palpitations, lower extremity edema  GI:   negative for nausea, vomiting, diarrhea, abdominal pain,melena  Genitourinary: negative for frequency, dysuria, hematuria  Musculoskel: negative for myalgias, arthralgias, back pain, muscle weakness, joint pain  Neurological:  negative for headaches, dizziness, focal weakness, numbness  Psych:             Negative for depression and anxiety    Past Medical History:   Diagnosis Date    Arthritis     DJD    Enlarged prostate     Hyperlipidemia     Hypertension     PAF (paroxysmal atrial fibrillation) ([facility] [address]     Pre-diabetes     HA1C 6.0 Dec 2016    Shingles        Current Outpatient Prescriptions on File Prior to Visit   Medication Sig Dispense Refill    celecoxib (CELEBREX) 200 mg capsule TAKE 1 TABLET BY MOUTH ONCE OR TWICE DAILY  3    methylPREDNISolone (MEDROL DOSEPACK) 4 mg tablet       furosemide (LASIX) 40 mg tablet Take 1 Tab by mouth every other day. 15 Tab 2    lisinopril (PRINIVIL, ZESTRIL) 20 mg tablet Take 1 Tab by mouth daily. May resume medication when b/p greater than 120/80. 30 Tab 5    HYDROcodone-acetaminophen (NORCO) 5-325 mg per tablet       brimonidine (ALPHAGAN P) 0.1 % ophthalmic solution Administer 1 Drop to both eyes daily. Both eyes PM and lt eye in am also      metoprolol tartrate (LOPRESSOR) 25 mg tablet Take 25 mg by mouth two (2) times a day.  doxazosin (CARDURA) 4 mg tablet Take 4 mg by mouth daily.  amLODIPine (NORVASC) 2.5 mg tablet Take 2.5 mg by mouth daily.  apixaban (ELIQUIS) 5 mg tablet Take 5 mg by mouth two (2) times a day. No current facility-administered medications on file prior to visit. Past Surgical History:   Procedure Laterality Date    HX COLONOSCOPY      normal except for hemorrhoids    HX HEENT      wisdom teeth extraction x2    HX SHOULDER ARTHROSCOPY      rt    HX TONSILLECTOMY         Family History   Problem Relation Age of Onset    Coronary Artery Disease Father 47      at [de-identified] of MI     Reviewed and no changes     Social History     Social History    Marital status:      Spouse name: N/A    Number of children: N/A    Years of education: N/A     Occupational History    Not on file.      Social History Main Topics    Smoking status: Never Smoker    Smokeless tobacco: Not on file    Alcohol use No    Drug use: Not on file    Sexual activity: Not on file     Other Topics Concern    Not on file     Social History Narrative            Visit Vitals    /69 (BP 1 Location: Right arm, BP Patient Position: Sitting)    Pulse (!) 45    Temp 98.1 °F (36.7 °C) (Oral)    Resp 18    Ht 5' 8\" (1.727 m)    Wt 214 lb (97.1 kg)    SpO2 99%    BMI 32.54 kg/m2       Physical Examination:   General - Well appearing male  HEENT - PERRL, TM no erythema/opacification, normal nasal turbinates, oropharynx no erythema or exudate, MMM  Neck - supple, no bruits, no TMG, no LAD  Pulm - clear to auscultation bilaterally  Cardio -marked bradycardia, normal S1 S2, no murmur gallops or rubs  Abd - soft, nontender, no masses, no HSM  Edema of legs resolved - right knee replacement scar is healing   Extrem - no edema, +2 distal pulses  Psych - normal affect, appropriate mood    Lab Results   Component Value Date/Time    WBC 7.4 02/09/2017 12:07 PM    HGB 12.1 02/09/2017 12:07 PM    HCT 37.2 02/09/2017 12:07 PM    PLATELET 101 50/21/7446 12:07 PM    MCV 80 02/09/2017 12:07 PM     Lab Results   Component Value Date/Time    Sodium 143 02/17/2017 03:06 PM    Potassium 4.2 02/17/2017 03:06 PM    Chloride 100 02/17/2017 03:06 PM    CO2 24 02/17/2017 03:06 PM    Anion gap 11 01/10/2017 04:19 AM    Glucose 140 02/17/2017 03:06 PM    BUN 22 02/17/2017 03:06 PM    Creatinine 1.12 02/17/2017 03:06 PM    BUN/Creatinine ratio 20 02/17/2017 03:06 PM    GFR est AA 72 02/17/2017 03:06 PM    GFR est non-AA 62 02/17/2017 03:06 PM    Calcium 9.2 02/17/2017 03:06 PM     Lab Results   Component Value Date/Time    Cholesterol, total 182 02/09/2017 12:07 PM    HDL Cholesterol 28 02/09/2017 12:07 PM    LDL, calculated 113 02/09/2017 12:07 PM    VLDL, calculated 41 02/09/2017 12:07 PM    Triglyceride 206 02/09/2017 12:07 PM     No results found for: TSH, TSH2, TSH3, TSHP, TSHEXT  Lab Results   Component Value Date/Time    Prostate Specific Ag 4.2 02/09/2017 12:07 PM    % Free PSA 29.8 02/09/2017 12:07 PM     Lab Results   Component Value Date/Time    Hemoglobin A1c 5.9 02/09/2017 12:07 PM     No results found for: RIVER Fall    Lab Results   Component Value Date/Time    ALT (SGPT) 8 02/09/2017 12:07 PM    AST (SGOT) 12 02/09/2017 12:07 PM    Alk.  phosphatase 68 02/09/2017 12:07 PM    Bilirubin, direct 0.11 02/09/2017 12:07 PM    Bilirubin, total 0.3 02/09/2017 12:07 PM           Assessment/Plan:        He is a 80 y.o. male  with a hx of paroxysmal atrial fibrillation on metoprolol and eliquis who presents for evaluation of bradycardia.     1. Symptomatic Bradycardia  - EKG showed marked sinus bradycardia heart rate of 43  - patient felt lightheaded in clinic and had difficulty getting up on exam table - almost fell.   - suspect metoprolol contributing - metoprolol 25mg BID   - ambulance called in to take patient to ER   - cardiologist is Dr Jose Ramon Garcia - will notify him   - AMB POC EKG ROUTINE W/ 12 LEADS, INTER & REP  - advised to stop metoprolol until further instructions       Follow-up Disposition: Not on Lucia Stacy MD

## 2017-05-05 NOTE — PATIENT INSTRUCTIONS
Bradycardia: Care Instructions  Your Care Instructions    Bradycardia is a slow heart rate. If your heart beats too slowly, it can't supply your body with enough blood. This can make you weak or dizzy. Or it may make you pass out. Sometimes medicine can cause this problem. If this happens, your doctor may have you adjust one of your medicines. If a medicine is not the problem, your doctor may recommend a pacemaker. It is important to treat bradycardia so that you don't get more serious health problems. Your doctor will want to see you on a routine schedule to make sure that your heartbeat is normal.  Follow-up care is a key part of your treatment and safety. Be sure to make and go to all appointments, and call your doctor if you are having problems. It's also a good idea to know your test results and keep a list of the medicines you take. How can you care for yourself at home? · Take your medicines exactly as prescribed. Call your doctor if you think you are having a problem with your medicine. If your bradycardia is caused by another disease, your doctor will try to treat the disease. If it is caused by heart medicines, he or she will adjust your medicines. · Make lifestyle changes to improve your heart health. ¨ To control your cholesterol, avoid foods with a lot of fat, saturated fat, or sodium. Try to eat more fiber. And if your doctor says it's okay, get some exercise on most days. ¨ Do not smoke. Smoking can make your heart condition worse. If you need help quitting, talk to your doctor about stop-smoking programs and medicines. These can increase your chances of quitting for good. ¨ Limit alcohol to 2 drinks a day for men and 1 drink a day for women. Too much alcohol can cause health problems. Pacemaker  If you have a pacemaker, you will get more specific information about it. Be sure to:  · Check your pulse as your doctor tells you.   · Have your pacemaker checked as often as your doctor recommends. You may be able to do this over the phone or computer. · Avoid strong magnetic or electrical fields. These include wand metal detectors used in airports, MRIs, welding equipment, and generators. · You will be checked several times right after you get your pacemaker and when it is time to have the battery changed. Batteries last for 5 to 15 years. · You can talk on a cell phone. But keep it 6 inches away from your pacemaker. · Microwaves, TVs, radios, and kitchen and bathroom appliances won't harm you. When should you call for help? Call 911 anytime you think you may need emergency care. For example, call if:  · You passed out (lost consciousness). · You have symptoms of a heart attack. These may include:  ¨ Chest pain or pressure, or a strange feeling in the chest.  ¨ Sweating. ¨ Shortness of breath. ¨ Nausea or vomiting. ¨ Pain, pressure, or a strange feeling in the back, neck, jaw, or upper belly or in one or both shoulders or arms. ¨ Lightheadedness or sudden weakness. ¨ A fast or irregular heartbeat. After you call 911, the  may tell you to chew 1 adult-strength or 2 to 4 low-dose aspirin. Wait for an ambulance. Do not try to drive yourself. · You have symptoms of a stroke. These may include:  ¨ Sudden numbness, tingling, weakness, or loss of movement in your face, arm, or leg, especially on only one side of your body. ¨ Sudden vision changes. ¨ Sudden trouble speaking. ¨ Sudden confusion or trouble understanding simple statements. ¨ Sudden problems with walking or balance. ¨ A sudden, severe headache that is different from past headaches. Call your doctor now or seek immediate medical care if:  · You are dizzy or lightheaded, or you feel like you may faint. · You have new or increased shortness of breath. · You had a pacemaker implanted and you have signs of infection, such as:  ¨ Increased pain, swelling, warmth, or redness.   ¨ Red streaks leading from the cut (incision). ¨ Pus draining from the incision. ¨ A fever. Watch closely for changes in your health, and be sure to contact your doctor if you have any problems. Where can you learn more? Go to http://cammie-derik.info/. Enter U807 in the search box to learn more about \"Bradycardia: Care Instructions. \"  Current as of: January 27, 2016  Content Version: 11.2  © 2831-2211 [employer]. Care instructions adapted under license by [employer] (which disclaims liability or warranty for this information). If you have questions about a medical condition or this instruction, always ask your healthcare professional. [address] any warranty or liability for your use of this information. Do not take metoprolol 25mg   Going to emergecy room due to dizziness with slow heart rate.

## 2017-05-05 NOTE — PROGRESS NOTES
Chief Complaint   Patient presents with    Slow Heart Rate     heart rate has been low     1. Have you been to the ER, urgent care clinic since your last visit? Hospitalized since your last visit? No    2. Have you seen or consulted any other health care providers outside of the [address] [place] [address] since your last visit? Include any pap smears or colon screening.  No

## 2017-05-05 NOTE — MR AVS SNAPSHOT
Visit Information Date & Time Provider Department Dept. Phone Encounter #  
 5/5/2017 11:00 AM Lucy Whitley, [address] [phone] [id number] Follow-up Instructions Return in about 1 week (around 5/12/2017) for pulse check . Your Appointments 5/16/2017 10:00 AM  
Any with [facility] [address] [facility] ([address]) Appt Note: HSAT - needs education [address] [facility] [phone] [address] [facility] [address]  
  
    
 7/6/2017 10:30 AM  
ROUTINE CARE with Maria Luisa Quezada MD  
[facility] [address]) Appt Note: 3 month follow up for fasting labs, HTN, edema, pre diabetes/COMPLETE MEDICARE WELLNESS VISIT AT APPT  
 [facility] [address]  
  
   
 [facility] [address] [facility] [address] [facility]  
  
    
 8/28/2017 10:20 AM  
ESTABLISHED PATIENT with Carolina Mena MD  
[facility] Appt Note: 6 month follow up  
 [address]  
[address] [facility] [id number] Upcoming Health Maintenance Date Due  
 GLAUCOMA SCREENING Q2Y 10/10/2002 MEDICARE YEARLY EXAM 10/10/2002 INFLUENZA AGE 9 TO ADULT 8/1/2017 DTaP/Tdap/Td series (2 - Td) 12/5/2021 Allergies as of 5/5/2017  Review Complete On: 5/5/2017 By: Verona Mendiola Severity Noted Reaction Type Reactions Oxycodone Low 01/09/2017    Other (comments) Patient states, \"It made me feel unwell. \" Current Immunizations  Reviewed on 2/17/2017 Name Date Influenza Vaccine 10/1/2016 Pneumococcal Conjugate (PCV-13) 12/18/2015 Tdap 12/5/2011 Not reviewed this visit You Were Diagnosed With   
  
 Codes Comments Bradycardia    -  Primary ICD-10-CM: R00.1 ICD-9-CM: 427.89 Vitals BP Pulse Temp Resp Height(growth percentile) Weight(growth percentile) 121/69 (BP 1 Location: Right arm, BP Patient Position: Sitting) (!) 45 98.1 °F (36.7 °C) (Oral) 18 5' 8\" (1.727 m) 214 lb (97.1 kg) SpO2 BMI Smoking Status 99% 32.54 kg/m2 Never Smoker BMI and BSA Data Body Mass Index Body Surface Area 32.54 kg/m 2 2.16 m 2 Preferred Pharmacy Pharmacy Name Phone [id number] [facility] [phone] Your Updated Medication List  
  
   
This list is accurate as of: 5/5/17 11:41 AM.  Always use your most recent med list.  
  
  
  
  
 ALPHAGAN P 0.1 % ophthalmic solution Generic drug:  brimonidine Administer 1 Drop to both eyes daily. Both eyes PM and lt eye in am also  
  
 amLODIPine 2.5 mg tablet Commonly known as:  Kayla Arnt Take 2.5 mg by mouth daily. celecoxib 200 mg capsule Commonly known as:  CELEBREX  
TAKE 1 TABLET BY MOUTH ONCE OR TWICE DAILY  
  
 diclofenac 1 % Gel Commonly known as:  VOLTAREN  
APPLY 3-4 TIMES DAILY TO AFFECTED AREAS  
  
 doxazosin 4 mg tablet Commonly known as:  CARDURA Take 4 mg by mouth daily. ELIQUIS 5 mg tablet Generic drug:  apixaban Take 5 mg by mouth two (2) times a day. fluorouracil 5 % chemo cream  
Commonly known as:  EFUDEX  
1 APPLICATION APPLY ON THE SKIN TWICE A DAY APPLY TWICE DAILY FOR 14 DAYS  
  
 furosemide 40 mg tablet Commonly known as:  LASIX Take 1 Tab by mouth every other day. HYDROcodone-acetaminophen 5-325 mg per tablet Commonly known as:  NORCO  
  
 lisinopril 20 mg tablet Commonly known as:  Dorean Peeks Take 1 Tab by mouth daily. May resume medication when b/p greater than 120/80. methylPREDNISolone 4 mg tablet Commonly known as:  Balbina Bee  
  
  
  
  
 We Performed the Following AMB POC EKG ROUTINE W/ 12 LEADS, INTER & REP [21903 CPT(R)] Follow-up Instructions Return in about 1 week (around 5/12/2017) for pulse check . Patient Instructions Bradycardia: Care Instructions Your Care Instructions Bradycardia is a slow heart rate. If your heart beats too slowly, it can't supply your body with enough blood. This can make you weak or dizzy. Or it may make you pass out. Sometimes medicine can cause this problem. If this happens, your doctor may have you adjust one of your medicines. If a medicine is not the problem, your doctor may recommend a pacemaker. It is important to treat bradycardia so that you don't get more serious health problems. Your doctor will want to see you on a routine schedule to make sure that your heartbeat is normal. 
Follow-up care is a key part of your treatment and safety. Be sure to make and go to all appointments, and call your doctor if you are having problems. It's also a good idea to know your test results and keep a list of the medicines you take. How can you care for yourself at home? · Take your medicines exactly as prescribed. Call your doctor if you think you are having a problem with your medicine. If your bradycardia is caused by another disease, your doctor will try to treat the disease. If it is caused by heart medicines, he or she will adjust your medicines. · Make lifestyle changes to improve your heart health. ¨ To control your cholesterol, avoid foods with a lot of fat, saturated fat, or sodium. Try to eat more fiber. And if your doctor says it's okay, get some exercise on most days. ¨ Do not smoke. Smoking can make your heart condition worse. If you need help quitting, talk to your doctor about stop-smoking programs and medicines. These can increase your chances of quitting for good. ¨ Limit alcohol to 2 drinks a day for men and 1 drink a day for women.  Too much alcohol can cause health problems. Pacemaker If you have a pacemaker, you will get more specific information about it. Be sure to: · Check your pulse as your doctor tells you. · Have your pacemaker checked as often as your doctor recommends. You may be able to do this over the phone or computer. · Avoid strong magnetic or electrical fields. These include wand metal detectors used in airports, MRIs, welding equipment, and generators. · You will be checked several times right after you get your pacemaker and when it is time to have the battery changed. Batteries last for 5 to 15 years. · You can talk on a cell phone. But keep it 6 inches away from your pacemaker. · Microwaves, TVs, radios, and kitchen and bathroom appliances won't harm you. When should you call for help? Call 911 anytime you think you may need emergency care. For example, call if: 
· You passed out (lost consciousness). · You have symptoms of a heart attack. These may include: ¨ Chest pain or pressure, or a strange feeling in the chest. 
¨ Sweating. ¨ Shortness of breath. ¨ Nausea or vomiting. ¨ Pain, pressure, or a strange feeling in the back, neck, jaw, or upper belly or in one or both shoulders or arms. ¨ Lightheadedness or sudden weakness. ¨ A fast or irregular heartbeat. After you call 911, the  may tell you to chew 1 adult-strength or 2 to 4 low-dose aspirin. Wait for an ambulance. Do not try to drive yourself. · You have symptoms of a stroke. These may include: 
¨ Sudden numbness, tingling, weakness, or loss of movement in your face, arm, or leg, especially on only one side of your body. ¨ Sudden vision changes. ¨ Sudden trouble speaking. ¨ Sudden confusion or trouble understanding simple statements. ¨ Sudden problems with walking or balance. ¨ A sudden, severe headache that is different from past headaches. Call your doctor now or seek immediate medical care if: · You are dizzy or lightheaded, or you feel like you may faint. · You have new or increased shortness of breath. · You had a pacemaker implanted and you have signs of infection, such as: 
¨ Increased pain, swelling, warmth, or redness. ¨ Red streaks leading from the cut (incision). ¨ Pus draining from the incision. ¨ A fever. Watch closely for changes in your health, and be sure to contact your doctor if you have any problems. Where can you learn more? Go to http://cammie-derik.info/. Enter A397 in the search box to learn more about \"Bradycardia: Care Instructions. \" Current as of: January 27, 2016 Content Version: 11.2 © 6691-6340 [employer]. Care instructions adapted under license by [employer] (which disclaims liability or warranty for this information). If you have questions about a medical condition or this instruction, always ask your healthcare professional. [place] [address] any warranty or liability for your use of this information. Do not take metoprolol 25mg Going to emergecy room due to dizziness with slow heart rate. Introducing [facility]! Dear Irene Uribe: Thank you for requesting a [employer] account. Our records indicate that you already have an active [employer] account. You can access your account anytime at [email or web address] [email or web address] Did you know that you can access your hospital and ER discharge instructions at any time in [employer]? You can also review all of your test results from your hospital stay or ER visit. Additional Information If you have questions, please visit the Frequently Asked Questions section of the [employer] website at [email or web address] [email or web address]/. Remember, [employer] is NOT to be used for urgent needs. For medical emergencies, dial 911. Now available from your iPhone and Android! Please provide this summary of care documentation to your next provider. Your primary care clinician is listed as ANGELA Whalen. If you have any questions after today's visit, please call [phone].

## 2017-05-07 ENCOUNTER — HOSPITAL ENCOUNTER (EMERGENCY)
Age: 82
Discharge: HOME | End: 2017-05-07
Attending: FAMILY MEDICINE

## 2017-05-07 ENCOUNTER — HOSPITAL ENCOUNTER (OUTPATIENT)
Dept: LAB | Age: 82
Discharge: HOME | End: 2017-05-07

## 2017-05-07 ENCOUNTER — TELEPHONE (OUTPATIENT)
Dept: INTERNAL MEDICINE CLINIC | Age: 82
End: 2017-05-07

## 2017-05-07 VITALS
RESPIRATION RATE: 18 BRPM | BODY MASS INDEX: 31.37 KG/M2 | HEART RATE: 95 BPM | WEIGHT: 207 LBS | OXYGEN SATURATION: 98 % | SYSTOLIC BLOOD PRESSURE: 131 MMHG | HEIGHT: 68 IN | DIASTOLIC BLOOD PRESSURE: 69 MMHG | TEMPERATURE: 97.9 F

## 2017-05-07 DIAGNOSIS — N13.8 BPH W URINARY OBS/LUTS: Primary | ICD-10-CM

## 2017-05-07 DIAGNOSIS — N40.1 BPH W URINARY OBS/LUTS: Primary | ICD-10-CM

## 2017-05-07 LAB
BILIRUB UR QL: NEGATIVE
GLUCOSE UR QL STRIP.AUTO: NEGATIVE MG/DL
KETONES UR-MCNC: NEGATIVE MG/DL
LEUKOCYTE ESTERASE UR QL STRIP: NEGATIVE
NITRITE UR QL: NEGATIVE
PH UR: 6 [PH] (ref 5–8)
PROT UR QL: NEGATIVE MG/DL
RBC # UR STRIP: ABNORMAL /UL
SP GR UR: 1.02 (ref 1–1.03)
UROBILINOGEN UR QL: 0.2 EU/DL (ref 0.2–1)

## 2017-05-07 PROCEDURE — 87086 URINE CULTURE/COLONY COUNT: CPT | Performed by: FAMILY MEDICINE

## 2017-05-07 RX ORDER — CIPROFLOXACIN 500 MG/1
500 TABLET ORAL 2 TIMES DAILY
Qty: 28 TAB | Refills: 0 | Status: SHIPPED | OUTPATIENT
Start: 2017-05-07 | End: 2017-05-07

## 2017-05-07 RX ORDER — TAMSULOSIN HYDROCHLORIDE 0.4 MG/1
0.4 CAPSULE ORAL DAILY
Qty: 15 CAP | Refills: 0 | Status: SHIPPED | OUTPATIENT
Start: 2017-05-07 | End: 2017-05-07

## 2017-05-07 RX ORDER — CIPROFLOXACIN 500 MG/1
500 TABLET ORAL 2 TIMES DAILY
Qty: 28 TAB | Refills: 0 | Status: SHIPPED | OUTPATIENT
Start: 2017-05-07 | End: 2017-05-19 | Stop reason: ALTCHOICE

## 2017-05-07 RX ORDER — TAMSULOSIN HYDROCHLORIDE 0.4 MG/1
0.4 CAPSULE ORAL DAILY
Qty: 15 CAP | Refills: 0 | Status: SHIPPED | OUTPATIENT
Start: 2017-05-07 | End: 2017-05-22

## 2017-05-07 NOTE — UC PROVIDER NOTE
Patient is a 80 y.o. male presenting with urinary tract infection. The history is provided by the patient. Bladder Infection    This is a new problem. The current episode started more than 2 days ago. The problem has not changed since onset. The quality of the pain is described as burning. The pain is at a severity of 3/10. The pain is mild. There has been no fever. Associated symptoms include frequency, hesitancy and urgency. Pertinent negatives include no chills, no sweats, no discharge and no hematuria. He has tried nothing for the symptoms. Past medical history comments: h/o BPH and on cardura 4 mg daily . Past Medical History:   Diagnosis Date    Arthritis     DJD    Enlarged prostate     Hyperlipidemia     Hypertension     PAF (paroxysmal atrial fibrillation) ([facility] [address]     Pre-diabetes     HA1C 6.0 Dec 2016    Shingles         Past Surgical History:   Procedure Laterality Date    HX COLONOSCOPY      normal except for hemorrhoids    HX HEENT      wisdom teeth extraction x2    HX KNEE REPLACEMENT      right; 2017    HX SHOULDER ARTHROSCOPY      rt    HX TONSILLECTOMY           Family History   Problem Relation Age of Onset    Coronary Artery Disease Father 47      at [de-identified] of MI        Social History     Social History    Marital status:      Spouse name: N/A    Number of children: N/A    Years of education: N/A     Occupational History    Not on file. Social History Main Topics    Smoking status: Never Smoker    Smokeless tobacco: Not on file    Alcohol use No    Drug use: Not on file    Sexual activity: Not on file     Other Topics Concern    Not on file     Social History Narrative                ALLERGIES: Oxycodone    Review of Systems   Constitutional: Negative for chills. Genitourinary: Positive for frequency, hesitancy and urgency. Negative for hematuria.        Vitals:    17 1640   BP: 131/69   Pulse: 95   Resp: 18   Temp: 97.9 °F (36.6 °C)   SpO2: 98%   Weight: 93.9 kg (207 lb)   Height: 5' 8\" (1.727 m)       Physical Exam   Constitutional: No distress. HENT:   Mouth/Throat: No oropharyngeal exudate. Eyes: No scleral icterus. Abdominal: Soft. Bowel sounds are normal. He exhibits no distension and no mass. There is no tenderness. There is no rebound and no guarding. Genitourinary:   Genitourinary Comments: defered   Skin: No rash noted. Nursing note and vitals reviewed. MDM     Differential Diagnosis; Clinical Impression; Plan:     CLINICAL IMPRESSION:  BPH w urinary obs/LUTS  (primary encounter diagnosis)      DDX    Plan:    UA- no LE- small blood. Cipro 500 mg bid and flomax . 4 mg daily     Amount and/or Complexity of Data Reviewed:   Clinical lab tests:  Ordered and reviewed   Review and summarize past medical records:  Yes  Risk of Significant Complications, Morbidity, and/or Mortality:   Presenting problems: Moderate  Management options:   Moderate  Progress:   Patient progress:  Stable      Procedures

## 2017-05-07 NOTE — TELEPHONE ENCOUNTER
Returned page Sunday 15:00. Pt complained of urinary hesitancy. No hx of utis. Advised to go to urgent care for further evaluation.

## 2017-05-07 NOTE — DISCHARGE INSTRUCTIONS

## 2017-05-08 ENCOUNTER — TELEPHONE (OUTPATIENT)
Dept: INTERNAL MEDICINE CLINIC | Age: 82
End: 2017-05-08

## 2017-05-08 NOTE — TELEPHONE ENCOUNTER
Patient states he needs a call back in reference to his office visit last week & medical concerns. Please call.  Thank you

## 2017-05-08 NOTE — TELEPHONE ENCOUNTER
Spoke with Alvin Caity. He states he was seen at urgent care yesterday for frequent urination. UA showed some blood and culture was sent. Rx given for Cipro and tamsulosin. Advised patient to continue antibiotic and await culture results. Patient also states his HR is doing much better and has been running between 70-90. Advised patient please return call if status changes. He verbalized understanding.

## 2017-05-09 LAB
BACTERIA SPEC CULT: NORMAL
CC UR VC: NORMAL
SERVICE CMNT-IMP: NORMAL

## 2017-05-11 ENCOUNTER — OFFICE VISIT (OUTPATIENT)
Dept: CARDIOLOGY CLINIC | Age: 82
End: 2017-05-11

## 2017-05-11 VITALS
SYSTOLIC BLOOD PRESSURE: 118 MMHG | HEART RATE: 67 BPM | OXYGEN SATURATION: 98 % | WEIGHT: 208.8 LBS | HEIGHT: 68 IN | BODY MASS INDEX: 31.64 KG/M2 | DIASTOLIC BLOOD PRESSURE: 62 MMHG

## 2017-05-11 DIAGNOSIS — I10 ESSENTIAL HYPERTENSION: ICD-10-CM

## 2017-05-11 DIAGNOSIS — R00.1 BRADYCARDIA: ICD-10-CM

## 2017-05-11 DIAGNOSIS — I48.0 PAF (PAROXYSMAL ATRIAL FIBRILLATION) (HCC): Primary | ICD-10-CM

## 2017-05-11 NOTE — PROGRESS NOTES
[facility], a Division of [address] [place] [address] and [facility]. S: Sherwin Curiel is a 80 y.o. male who presents for HTN follow up. Leg swelling s/p right knee replacement in Jan 2017. Ortho did work up for DVT and was negative; put pt on steroids which resolved leg swelling  5/5/17 - went to PCP office for visit and upon taking vitals pulse = 43. At time, pt denied SOB, HA, chest pain/palpitations but did have dizziness in office. Sent to ED. ED stopped metoprolol, increased Norvasc from 2.5mg to 5 mg a day. Dx with UTI 5/7 and given abx. Today pt denies SOB, HA, chest pain/palpitations, dizziness, fatigue. States he \"feels good\" on current med therapy. Denies blood in urine or stool. Taking all medication appropriately    Sherwin Curiel has lost 1 lbs since 5/7/17    Review of Systems:  - Constitutional symptoms: no fevers, chills or fatigue  - Eyes: no blurry vision or double vision  - Cardiovascular: no chest pain or discomfort, elevated heart rate,  palpitations or edema in extremities  - Respiratory: no SOB, difficulty or pain with breathing, wheezes, or cough. - Neurological: headache, vision changes, numbness and tingling or weakness in  extremities. ROS is negative otherwise. Past Medical History:   Diagnosis Date    Arthritis     DJD    Enlarged prostate     Hyperlipidemia     Hypertension     PAF (paroxysmal atrial fibrillation) (HCC)     Pre-diabetes     HA1C 6.0 Dec 2016    Shingles        Current Outpatient Prescriptions   Medication Sig Dispense Refill    ciprofloxacin HCl (CIPRO) 500 mg tablet Take 1 Tab by mouth two (2) times a day for 14 days. 28 Tab 0    diclofenac (VOLTAREN) 1 % gel APPLY 3-4 TIMES DAILY TO AFFECTED AREAS  3    amLODIPine (NORVASC) 2.5 mg tablet Take 2 Tabs by mouth daily.  30 Tab 0    celecoxib (CELEBREX) 200 mg capsule TAKE 1 TABLET BY MOUTH ONCE OR TWICE DAILY  3    lisinopril (PRINIVIL, ZESTRIL) 20 mg tablet Take 1 Tab by mouth daily. May resume medication when b/p greater than 120/80. 30 Tab 5    brimonidine (ALPHAGAN P) 0.1 % ophthalmic solution Administer 1 Drop to both eyes daily. Both eyes PM and lt eye in am also      doxazosin (CARDURA) 4 mg tablet Take 4 mg by mouth daily.  apixaban (ELIQUIS) 5 mg tablet Take 5 mg by mouth two (2) times a day.  tamsulosin (FLOMAX) 0.4 mg capsule Take 1 Cap by mouth daily for 15 days. 15 Cap 0    fluorouracil (EFUDEX) 5 % chemo cream 1 APPLICATION APPLY ON THE SKIN TWICE A DAY APPLY TWICE DAILY FOR 14 DAYS  0    methylPREDNISolone (MEDROL DOSEPACK) 4 mg tablet       furosemide (LASIX) 40 mg tablet Take 1 Tab by mouth every other day. 15 Tab 2    HYDROcodone-acetaminophen (NORCO) 5-325 mg per tablet        Pt is taking all medications as prescribed without any side effects or difficulty. Allergies   Allergen Reactions    Oxycodone Other (comments)     Patient states, \"It made me feel unwell. \"         O: VS:   Visit Vitals    /62 (BP 1 Location: Left arm, BP Patient Position: Sitting)    Pulse 67    Ht 5' 8\" (1.727 m)    Wt 208 lb 12.8 oz (94.7 kg)    SpO2 98%    BMI 31.75 kg/m2     Data Reviewed:   Component      Latest Ref Rng & Units 5/5/2017          12:25 PM   Troponin-I, Qt.      <0.05 ng/mL <0.04       PAIN: No complaints of pain today. GENERAL: Luis Anival is sitting on exam table in no acute distress. EYE: PERRLA. EOMs intact. Sclera anicteric without injection. RESP: Unlabored without SOB. Speaking in full sentences. Breath sounds are symmetrical bilaterally. Clear to auscultation to all fields. No wheezes. No rales or rhonchi. CV: normal rate. Regular rhythm. S1, S2 audible. No murmur noted. No rubs, clicks or gallops noted. NEURO:  awake, alert and oriented to person, place, and time and event. Clear speech. Steady gait. HEME/LYMPH: peripheral pulses palpable 2+ x 4 extremities.   No peripheral edema is noted. SKIN: Skin is warm and dry. Turgor is normal. No petechiae, no purpura, no rash. No cyanosis. No mottling, jaundice or pallor. Dictation on: 05/31/2017  1:15 AM by: Ema Gomez [[id number]]       ICD-10-CM ICD-9-CM    1. PAF (paroxysmal atrial fibrillation) (HCC) I48.0 427.31    2. Essential hypertension I10 401.9    3. Bradycardia R00.1 427.89       Assessment/Plan:    1. Atrial fibrillation, paroxysmal. Is on appropriate meds with Eliquis for stroke prevention. Metoprolol stopped 5/5/17 during ED visit and denies sx of Afib, so will stop metoprolol as he seems to be doing well without it. 2. Hypertension, on Amlodipine, (beta blocker) which was increased to 5mg daily in ED 5/5/17,  for the atrial fibrillation, and ACE inhibitor. Discussed diet and adding exercise to daily activities  3. Follow up with me in four months. Reviewed medications and side effects. Reviewed warning signs of hypertension, stroke and heart attack. Advised on nutrition, physical activity, weight management, tobacco, and alcohol.     Patient verbalized understanding and agreed to plan of care. Follow up in 4 months.

## 2017-05-16 ENCOUNTER — OFFICE VISIT (OUTPATIENT)
Dept: SLEEP MEDICINE | Age: 82
End: 2017-05-16

## 2017-05-16 DIAGNOSIS — E66.9 OBESITY (BMI 30-39.9): ICD-10-CM

## 2017-05-16 DIAGNOSIS — G47.33 OSA (OBSTRUCTIVE SLEEP APNEA): Primary | ICD-10-CM

## 2017-05-16 NOTE — PROGRESS NOTES
[address] [facility] [place]. [place], [address]  Tel.  [phone]  Fax. 100 [facility] 60  [place], [address] [facility]  Tel.  [phone]  Fax. [phone] [address] [address] Precious Camargo   Tel.  [phone]  Fax. [phone]       S>Juan Álvarez is a 80 y.o. male seen today to receive a home sleep testing unit (HST). · Patient was educated on proper hookup and operation of the HST. · Instruction forms and documentation were reviewed and signed. · The patient demonstrated good understanding of the HST.    O>    There were no vitals taken for this visit. A>  1. LINDA (obstructive sleep apnea)    2. Obesity (BMI 30-39. 9)          P>  · General information regarding operations and maintenance of the device was provided. · He was provided information on sleep apnea including coresponding risk factors and the importance of proper treatment. · Follow-up appointment was made to return the HST. He will be contacted once the results have been reviewed. · He was asked to contact our office for any problems regarding his home sleep test study.

## 2017-05-17 ENCOUNTER — HOSPITAL ENCOUNTER (OUTPATIENT)
Dept: SLEEP MEDICINE | Age: 82
Discharge: HOME | End: 2017-05-17
Payer: MEDICARE

## 2017-05-17 PROCEDURE — 95806 SLEEP STUDY UNATT&RESP EFFT: CPT

## 2017-05-18 ENCOUNTER — TELEPHONE (OUTPATIENT)
Dept: INTERNAL MEDICINE CLINIC | Age: 82
End: 2017-05-18

## 2017-05-18 NOTE — TELEPHONE ENCOUNTER
Spoke with patient. States he has rash on both legs that is raised and course. Itchy. No pain, no draingage. Advised to try hydrocortisone cream, benadryl, and I will send to Dr. Vanessa Azul for recommendations.

## 2017-05-19 ENCOUNTER — OFFICE VISIT (OUTPATIENT)
Dept: INTERNAL MEDICINE CLINIC | Age: 82
End: 2017-05-19

## 2017-05-19 VITALS
DIASTOLIC BLOOD PRESSURE: 69 MMHG | HEART RATE: 87 BPM | WEIGHT: 211.4 LBS | RESPIRATION RATE: 18 BRPM | SYSTOLIC BLOOD PRESSURE: 147 MMHG | BODY MASS INDEX: 32.04 KG/M2 | OXYGEN SATURATION: 99 % | TEMPERATURE: 98.1 F | HEIGHT: 68 IN

## 2017-05-19 DIAGNOSIS — L20.84 INTRINSIC ECZEMA: ICD-10-CM

## 2017-05-19 DIAGNOSIS — R31.9 HEMATURIA: Primary | ICD-10-CM

## 2017-05-19 RX ORDER — CEPHALEXIN 500 MG/1
CAPSULE ORAL
Refills: 0 | COMMUNITY
Start: 2017-03-06 | End: 2017-06-09 | Stop reason: ALTCHOICE

## 2017-05-19 RX ORDER — CLOBETASOL PROPIONATE 0.5 MG/G
CREAM TOPICAL 2 TIMES DAILY
Qty: 15 G | Refills: 0 | Status: SHIPPED | OUTPATIENT
Start: 2017-05-19 | End: 2021-10-07

## 2017-05-19 RX ORDER — METOPROLOL TARTRATE 25 MG/1
TABLET, FILM COATED ORAL
COMMUNITY
Start: 2017-04-02 | End: 2017-05-19 | Stop reason: SINTOL

## 2017-05-19 NOTE — MR AVS SNAPSHOT
Visit Information Date & Time Provider Department Dept. Phone Encounter #  
 5/19/2017  2:15 PM Bia Kilpatrick [address] [place] [address],[address] [phone] [id number] Follow-up Instructions Return if symptoms worsen or fail to improve. Your Appointments 7/6/2017 10:30 AM  
ROUTINE CARE with Maria Luisa Osei MD  
[facility] [address] [facility]) Appt Note: 3 month follow up for fasting labs, HTN, edema, pre diabetes/COMPLETE MEDICARE WELLNESS VISIT AT APPT  
 [facility] [address]  
  
   
 [facility] [address]  [address] [facility]  
  
    
 8/28/2017 10:20 AM  
ESTABLISHED PATIENT with Lexii Michaels MD  
[facility] ([facility]) Appt Note: 6 month follow up  
 [address]  
[address] [facility] 7 [address] Upcoming Health Maintenance Date Due  
 GLAUCOMA SCREENING Q2Y 10/10/2002 MEDICARE YEARLY EXAM 10/10/2002 INFLUENZA AGE 9 TO ADULT 8/1/2017 DTaP/Tdap/Td series (2 - Td) 12/5/2021 Allergies as of 5/19/2017  Review Complete On: 5/11/2017 By: Jacob Gamble LPN Severity Noted Reaction Type Reactions Oxycodone Low 01/09/2017    Other (comments) Patient states, \"It made me feel unwell. \" Current Immunizations  Reviewed on 2/17/2017 Name Date Influenza Vaccine 10/1/2016 Pneumococcal Conjugate (PCV-13) 12/18/2015 Tdap 12/5/2011 Not reviewed this visit You Were Diagnosed With   
  
 Codes Comments Hematuria    -  Primary ICD-10-CM: R31.9 ICD-9-CM: 599.70 Intrinsic eczema     ICD-10-CM: L20.84 ICD-9-CM: 691.8 Vitals BP Pulse Temp Resp Height(growth percentile) Weight(growth percentile)  147/69 (BP 1 Location: Right arm, BP Patient Position: Sitting) 87 98.1 °F (36.7 °C) (Oral) 18 5' 8\" (1.727 m) 211 lb 6.4 oz (95.9 kg) SpO2 BMI Smoking Status 99% 32.14 kg/m2 Never Smoker BMI and BSA Data Body Mass Index Body Surface Area  
 32.14 kg/m 2 2.15 m 2 Preferred Pharmacy Pharmacy Name Phone [facility][id number] [facility] [phone] Your Updated Medication List  
  
   
This list is accurate as of: 5/19/17  2:53 PM.  Always use your most recent med list.  
  
  
  
  
 ALPHAGAN P 0.1 % ophthalmic solution Generic drug:  brimonidine Administer 1 Drop to both eyes daily. Both eyes PM and lt eye in am also  
  
 amLODIPine 2.5 mg tablet Commonly known as:  [place] Take 2 Tabs by mouth daily. celecoxib 200 mg capsule Commonly known as:  CELEBREX  
TAKE 1 TABLET BY MOUTH ONCE OR TWICE DAILY  
  
 cephALEXin 500 mg capsule Commonly known as:  Zonia Greener TAKE 1 CAP BY MOUTH FOUR (4) TIMES DAILY. clobetasol 0.05 % topical cream  
Commonly known as:  Axtell Postin Apply  to affected area two (2) times a day. diclofenac 1 % Gel Commonly known as:  VOLTAREN  
APPLY 3-4 TIMES DAILY TO AFFECTED AREAS  
  
 doxazosin 4 mg tablet Commonly known as:  CARDURA Take 4 mg by mouth daily. ELIQUIS 5 mg tablet Generic drug:  apixaban Take 5 mg by mouth two (2) times a day. fluorouracil 5 % chemo cream  
Commonly known as:  EFUDEX  
1 APPLICATION APPLY ON THE SKIN TWICE A DAY APPLY TWICE DAILY FOR 14 DAYS  
  
 furosemide 40 mg tablet Commonly known as:  LASIX Take 1 Tab by mouth every other day. HYDROcodone-acetaminophen 5-325 mg per tablet Commonly known as:  NORCO  
  
 lisinopril 20 mg tablet Commonly known as:  Anuradha Gravel Take 1 Tab by mouth daily. May resume medication when b/p greater than 120/80. tamsulosin 0.4 mg capsule Commonly known as:  FLOMAX Take 1 Cap by mouth daily for 15 days. Prescriptions Sent to Pharmacy Refills  
 clobetasol (TEMOVATE) 0.05 % topical cream 0 Sig: Apply  to affected area two (2) times a day. Class: Normal  
 Pharmacy: [id number] #: [phone] Route: Topical  
  
We Performed the Following URINALYSIS W/ RFLX MICROSCOPIC [47563 CPT(R)] Follow-up Instructions Return if symptoms worsen or fail to improve. Patient Instructions Stop the ciprofloxacin Atopic Dermatitis: Care Instructions Your Care Instructions Atopic dermatitis (also called eczema) is a skin problem that causes intense itching and a red, raised rash. In severe cases, the rash develops clear fluidfilled blisters. The rash is not contagious. People with this condition seem to have very sensitive immune systems that are likely to react to things that cause allergies. The immune system is the body's way of fighting infection. There is no cure for atopic dermatitis, but you may be able to control it with care at home. Follow-up care is a key part of your treatment and safety. Be sure to make and go to all appointments, and call your doctor if you are having problems. It's also a good idea to know your test results and keep a list of the medicines you take. How can you care for yourself at home? · Use moisturizer at least twice a day. · If your doctor prescribes a cream, use it as directed. If your doctor prescribes other medicine, take it exactly as directed. · Wash the affected area with water only. Soap can make dryness and itching worse. Pat dry. · Apply a moisturizer after bathing. Use a cream such as Lubriderm, Moisturel, or Cetaphil that does not irritate the skin or cause a rash. Apply the cream while your skin is still damp after lightly drying with a towel. · Use cold, wet cloths to reduce itching. · Keep cool, and stay out of the sun. · If itching affects your normal activities, an over-the-counter antihistamine, such as diphenhydramine (Benadryl) or loratadine (Claritin) may help. Read and follow all instructions on the label. When should you call for help? Call your doctor now or seek immediate medical care if: 
· Your rash gets worse and you have a fever. · You have new blisters or bruises, or the rash spreads and looks like a sunburn. · You have signs of infection, such as: 
¨ Increased pain, swelling, warmth, or redness. ¨ Red streaks leading from the rash. ¨ Pus draining from the rash. ¨ A fever. · You have crusting or oozing sores. · You have joint aches or body aches along with your rash. Watch closely for changes in your health, and be sure to contact your doctor if: 
· Your rash does not clear up after 2 to 3 weeks of home treatment. · Itching interferes with your sleep or daily activities. Where can you learn more? Go to http://cammie-derik.info/. Enter D861 in the search box to learn more about \"Atopic Dermatitis: Care Instructions. \" Current as of: October 13, 2016 Content Version: 11.2 © 9189-3612 durchblicker.at. Care instructions adapted under license by [employer] (which disclaims liability or warranty for this information). If you have questions about a medical condition or this instruction, always ask your healthcare professional. [place] [address] any warranty or liability for your use of this information. 
------------- Use Eucerin cream twice a day on legs and arms. Use steroid cream BID for 2 weeks if no improvement then call. Stop the ciprofloxacin Introducing [facility]! Dear Kaushal Berry: Thank you for requesting a Step-In account. Our records indicate that you already have an active Step-In account. You can access your account anytime at [email or web address] Did you know that you can access your hospital and ER discharge instructions at any time in [employer]? You can also review all of your test results from your hospital stay or ER visit. Additional Information If you have questions, please visit the Frequently Asked Questions section of the [employer] website at [email or web address] Remember, [employer] is NOT to be used for urgent needs. For medical emergencies, dial 911. Now available from your iPhone and Android! Please provide this summary of care documentation to your next provider. Your primary care clinician is listed as ANGELA Whalen. If you have any questions after today's visit, please call [phone].

## 2017-05-19 NOTE — PROGRESS NOTES
Chief Complaint   Patient presents with    Rash     both legs, ankles, knee     1. Have you been to the ER, urgent care clinic since your last visit? Hospitalized since your last visit? Yes When: 5/7/17 Where: [facility] Reason for visit: Bladder infection    2. Have you seen or consulted any other health care providers outside of the [address] [place] [address] since your last visit? Include any pap smears or colon screening.  No

## 2017-05-19 NOTE — PATIENT INSTRUCTIONS
Stop the ciprofloxacin      Atopic Dermatitis: Care Instructions  Your Care Instructions  Atopic dermatitis (also called eczema) is a skin problem that causes intense itching and a red, raised rash. In severe cases, the rash develops clear fluidfilled blisters. The rash is not contagious. People with this condition seem to have very sensitive immune systems that are likely to react to things that cause allergies. The immune system is the body's way of fighting infection. There is no cure for atopic dermatitis, but you may be able to control it with care at home. Follow-up care is a key part of your treatment and safety. Be sure to make and go to all appointments, and call your doctor if you are having problems. It's also a good idea to know your test results and keep a list of the medicines you take. How can you care for yourself at home? · Use moisturizer at least twice a day. · If your doctor prescribes a cream, use it as directed. If your doctor prescribes other medicine, take it exactly as directed. · Wash the affected area with water only. Soap can make dryness and itching worse. Pat dry. · Apply a moisturizer after bathing. Use a cream such as Lubriderm, Moisturel, or Cetaphil that does not irritate the skin or cause a rash. Apply the cream while your skin is still damp after lightly drying with a towel. · Use cold, wet cloths to reduce itching. · Keep cool, and stay out of the sun. · If itching affects your normal activities, an over-the-counter antihistamine, such as diphenhydramine (Benadryl) or loratadine (Claritin) may help. Read and follow all instructions on the label. When should you call for help? Call your doctor now or seek immediate medical care if:  · Your rash gets worse and you have a fever. · You have new blisters or bruises, or the rash spreads and looks like a sunburn. · You have signs of infection, such as:  ¨ Increased pain, swelling, warmth, or redness.   ¨ Red streaks leading from the rash. ¨ Pus draining from the rash. ¨ A fever. · You have crusting or oozing sores. · You have joint aches or body aches along with your rash. Watch closely for changes in your health, and be sure to contact your doctor if:  · Your rash does not clear up after 2 to 3 weeks of home treatment. · Itching interferes with your sleep or daily activities. Where can you learn more? Go to http://cammie-derik.info/. Enter N019 in the search box to learn more about \"Atopic Dermatitis: Care Instructions. \"  Current as of: October 13, 2016  Content Version: 11.2  © 5830-9899 [employer]. Care instructions adapted under license by [employer] (which disclaims liability or warranty for this information). If you have questions about a medical condition or this instruction, always ask your healthcare professional. [address] any warranty or liability for your use of this information.  -------------  Use Eucerin cream twice a day on legs and arms. Use steroid cream BID for 2 weeks if no improvement then call.      Stop the ciprofloxacin

## 2017-05-22 ENCOUNTER — TELEPHONE (OUTPATIENT)
Dept: SLEEP MEDICINE | Age: 82
End: 2017-05-22

## 2017-05-22 DIAGNOSIS — G47.33 OSA (OBSTRUCTIVE SLEEP APNEA): Primary | ICD-10-CM

## 2017-05-22 NOTE — TELEPHONE ENCOUNTER
[address] [facility] [place]. [place], [facility]  Tel.  [phone]  Fax. 100 [facility] 60  [place], [address] [facility]  Tel.  [phone]  Fax. [phone] [address] [address] Prec[address]argo   Tel.  [phone]  Fax. [phone]   Polysomnogram was performed and the results of the study were explained to the patient. Please refer to interpretation report for further details. Apnea/Hypopnea index of 31 which indicates severe apnea. He continues to have snoring. * Treatment options were offered. He has elected to proceed with a positive airway pressure trial (CPAP). * We have written his PAP order  * PAP card download in 4 weeks. PAP clinic if adherence remains poor  * Counseling was provided regarding the importance of regular PAP use and on proper sleep hygiene and safe driving. Thank you for allowing to participate in your patient's medical care. We'll keep you updated on these investigations.     Pastor Davis M.D.  (electronically signed)  Diplomate in Sleep Medicine, [facility]

## 2017-05-23 ENCOUNTER — DOCUMENTATION ONLY (OUTPATIENT)
Dept: SLEEP MEDICINE | Age: 82
End: 2017-05-23

## 2017-05-26 ENCOUNTER — HOSPITAL ENCOUNTER (OUTPATIENT)
Dept: LAB | Age: 82
Discharge: HOME | End: 2017-05-26
Payer: MEDICARE

## 2017-05-26 ENCOUNTER — APPOINTMENT (OUTPATIENT)
Dept: INTERNAL MEDICINE CLINIC | Age: 82
End: 2017-05-26

## 2017-05-26 PROCEDURE — 81001 URINALYSIS AUTO W/SCOPE: CPT

## 2017-05-27 LAB
APPEARANCE UR: CLEAR
BACTERIA #/AREA URNS HPF: NORMAL /[HPF]
BILIRUB UR QL STRIP: NEGATIVE
CASTS URNS QL MICRO: NORMAL /LPF
COLOR UR: YELLOW
EPI CELLS #/AREA URNS HPF: NORMAL /HPF
GLUCOSE UR QL: NEGATIVE
HGB UR QL STRIP: ABNORMAL
KETONES UR QL STRIP: NEGATIVE
LEUKOCYTE ESTERASE UR QL STRIP: NEGATIVE
MICRO URNS: ABNORMAL
MUCOUS THREADS URNS QL MICRO: PRESENT
NITRITE UR QL STRIP: NEGATIVE
PH UR STRIP: 6.5 [PH] (ref 5–7.5)
PROT UR QL STRIP: NEGATIVE
RBC #/AREA URNS HPF: NORMAL /HPF
SP GR UR: 1.02 (ref 1–1.03)
UROBILINOGEN UR STRIP-MCNC: 0.2 MG/DL (ref 0.2–1)
WBC #/AREA URNS HPF: NORMAL /HPF

## 2017-05-31 NOTE — PROGRESS NOTES
[facility], a Division of [address] [place] [address] and [facility]. S: Alberto Hutson is a 80 y.o. male who presents for HTN follow up. Leg swelling s/p right knee replacement in Jan 2017. Ortho did work up for DVT and was negative; put pt on steroids which resolved leg swelling  5/5/17 - went to PCP office for visit and upon taking vitals pulse = 43. At time, pt denied SOB, HA, chest pain/palpitations but did have dizziness in office. Sent to ED. ED stopped metoprolol, increased Norvasc from 2.5mg to 5 mg a day. Dx with UTI 5/7 and given abx. Today pt denies SOB, HA, chest pain/palpitations, dizziness, fatigue. States he \"feels good\" on current med therapy. Denies blood in urine or stool. Taking all medication appropriately    Alberto Hutson has lost 1 lbs since 5/7/17    Review of Systems:  - Constitutional symptoms: no fevers, chills or fatigue  - Eyes: no blurry vision or double vision  - Cardiovascular: no chest pain or discomfort, elevated heart rate,  palpitations or edema in extremities  - Respiratory: no SOB, difficulty or pain with breathing, wheezes, or cough. - Neurological: headache, vision changes, numbness and tingling or weakness in  extremities. ROS is negative otherwise. Past Medical History:   Diagnosis Date    Arthritis     DJD    Enlarged prostate     Hyperlipidemia     Hypertension     PAF (paroxysmal atrial fibrillation) (HCC)     Pre-diabetes     HA1C 6.0 Dec 2016    Shingles        Current Outpatient Prescriptions   Medication Sig Dispense Refill    ciprofloxacin HCl (CIPRO) 500 mg tablet Take 1 Tab by mouth two (2) times a day for 14 days. 28 Tab 0    diclofenac (VOLTAREN) 1 % gel APPLY 3-4 TIMES DAILY TO AFFECTED AREAS  3    amLODIPine (NORVASC) 2.5 mg tablet Take 2 Tabs by mouth daily.  30 Tab 0    celecoxib (CELEBREX) 200 mg capsule TAKE 1 TABLET BY MOUTH ONCE OR TWICE DAILY  3    lisinopril (PRINIVIL, ZESTRIL) 20 mg tablet Take 1 Tab by mouth daily. May resume medication when b/p greater than 120/80. 30 Tab 5    brimonidine (ALPHAGAN P) 0.1 % ophthalmic solution Administer 1 Drop to both eyes daily. Both eyes PM and lt eye in am also      doxazosin (CARDURA) 4 mg tablet Take 4 mg by mouth daily.  apixaban (ELIQUIS) 5 mg tablet Take 5 mg by mouth two (2) times a day.  tamsulosin (FLOMAX) 0.4 mg capsule Take 1 Cap by mouth daily for 15 days. 15 Cap 0    fluorouracil (EFUDEX) 5 % chemo cream 1 APPLICATION APPLY ON THE SKIN TWICE A DAY APPLY TWICE DAILY FOR 14 DAYS  0    methylPREDNISolone (MEDROL DOSEPACK) 4 mg tablet       furosemide (LASIX) 40 mg tablet Take 1 Tab by mouth every other day. 15 Tab 2    HYDROcodone-acetaminophen (NORCO) 5-325 mg per tablet        Pt is taking all medications as prescribed without any side effects or difficulty. Allergies   Allergen Reactions    Oxycodone Other (comments)     Patient states, \"It made me feel unwell. \"         O: VS:   Visit Vitals    /62 (BP 1 Location: Left arm, BP Patient Position: Sitting)    Pulse 67    Ht 5' 8\" (1.727 m)    Wt 208 lb 12.8 oz (94.7 kg)    SpO2 98%    BMI 31.75 kg/m2     Data Reviewed:   Component      Latest Ref Rng & Units 5/5/2017          12:25 PM   Troponin-I, Qt.      <0.05 ng/mL <0.04       PAIN: No complaints of pain today. GENERAL: Evertt  is sitting on exam table in no acute distress. EYE: PERRLA. EOMs intact. Sclera anicteric without injection. RESP: Unlabored without SOB. Speaking in full sentences. Breath sounds are symmetrical bilaterally. Clear to auscultation to all fields. No wheezes. No rales or rhonchi. CV: normal rate. Regular rhythm. S1, S2 audible. No murmur noted. No rubs, clicks or gallops noted. NEURO:  awake, alert and oriented to person, place, and time and event. Clear speech. Steady gait. HEME/LYMPH: peripheral pulses palpable 2+ x 4 extremities.   No peripheral edema is noted. SKIN: Skin is warm and dry. Turgor is normal. No petechiae, no purpura, no rash. No cyanosis. No mottling, jaundice or pallor. Dictation on: 05/31/2017  1:15 AM by: Russell Boast [[id number]]       ICD-10-CM ICD-9-CM    1. PAF (paroxysmal atrial fibrillation) ([facility]) I48.0 427.31    2. Essential hypertension I10 401.9    3. Bradycardia R00.1 427.89       Assessment/Plan:    1. Atrial fibrillation, paroxysmal. Is on appropriate meds with Eliquis for stroke prevention. Metoprolol stopped 5/5/17 during ED visit and denies sx of Afib, so will stop metoprolol as he seems to be doing well without it. 2. Hypertension, on Amlodipine, (beta blocker) which was increased to 5mg daily in ED 5/5/17,  for the atrial fibrillation, and ACE inhibitor. Discussed diet and adding exercise to daily activities  3. Follow up with me in four months. Reviewed medications and side effects. Reviewed warning signs of hypertension, stroke and heart attack. Advised on nutrition, physical activity, weight management, tobacco, and alcohol.     Patient verbalized understanding and agreed to plan of care. Seen with NP fellow in training I saw and evaluated and fully examined  the patient, performing the key elements of the service. I discussed the findings, assessment and plan  findings and plan as documented .

## 2017-05-31 NOTE — PROGRESS NOTES
Spoke with patient , let patient know that there was Trace blood noted on urinalysis - follow up with urologist is advised. Asked patient if any questions or concerns at the time. Pt stated no.

## 2017-06-06 ENCOUNTER — TELEPHONE (OUTPATIENT)
Dept: INTERNAL MEDICINE CLINIC | Age: 82
End: 2017-06-06

## 2017-06-06 NOTE — TELEPHONE ENCOUNTER
Pt stated he need prior authorization on Rx(\"Amlodipine\" 2.5 mg) called to [facility] on [address] and [address]. Kelli contact number [phone].      Copy/paste from [facility] 6/6/17 [address] [place] [address]

## 2017-06-07 ENCOUNTER — OFFICE VISIT (OUTPATIENT)
Dept: INTERNAL MEDICINE CLINIC | Age: 82
End: 2017-06-07

## 2017-06-07 VITALS
SYSTOLIC BLOOD PRESSURE: 125 MMHG | WEIGHT: 210 LBS | HEIGHT: 68 IN | TEMPERATURE: 98.4 F | DIASTOLIC BLOOD PRESSURE: 69 MMHG | HEART RATE: 79 BPM | OXYGEN SATURATION: 95 % | BODY MASS INDEX: 31.83 KG/M2 | RESPIRATION RATE: 16 BRPM

## 2017-06-07 DIAGNOSIS — J06.9 ACUTE URI: Primary | ICD-10-CM

## 2017-06-07 RX ORDER — CODEINE PHOSPHATE AND GUAIFENESIN 10; 100 MG/5ML; MG/5ML
5 SOLUTION ORAL
Qty: 120 ML | Refills: 0 | Status: SHIPPED | OUTPATIENT
Start: 2017-06-07 | End: 2017-07-06 | Stop reason: ALTCHOICE

## 2017-06-07 NOTE — PATIENT INSTRUCTIONS
Viral Respiratory Infection: Care Instructions  Your Care Instructions  Viruses are very small organisms. They grow in number after they enter your body. There are many types that cause different illnesses, such as colds and the mumps. The symptoms of a viral respiratory infection often start quickly. They include a fever, sore throat, and runny nose. You may also just not feel well. Or you may not want to eat much. Most viral respiratory infections are not serious. They usually get better with time and self-care. Antibiotics are not used to treat a viral infection. That's because antibiotics will not help cure a viral illness. In some cases, antiviral medicine can help your body fight a serious viral infection. Follow-up care is a key part of your treatment and safety. Be sure to make and go to all appointments, and call your doctor if you are having problems. It's also a good idea to know your test results and keep a list of the medicines you take. How can you care for yourself at home? · Rest as much as possible until you feel better. · Be safe with medicines. Take your medicine exactly as prescribed. Call your doctor if you think you are having a problem with your medicine. You will get more details on the specific medicine your doctor prescribes. · Take an over-the-counter pain medicine, such as acetaminophen (Tylenol), ibuprofen (Advil, Motrin), or naproxen (Aleve), as needed for pain and fever. Read and follow all instructions on the label. Do not give aspirin to anyone younger than 20. It has been linked to Reye syndrome, a serious illness. · Drink plenty of fluids, enough so that your urine is light yellow or clear like water. Hot fluids, such as tea or soup, may help relieve congestion in your nose and throat. If you have kidney, heart, or liver disease and have to limit fluids, talk with your doctor before you increase the amount of fluids you drink.   · Try to clear mucus from your lungs by breathing deeply and coughing. · Gargle with warm salt water once an hour. This can help reduce swelling and throat pain. Use 1 teaspoon of salt mixed in 1 cup of warm water. · Do not smoke or allow others to smoke around you. If you need help quitting, talk to your doctor about stop-smoking programs and medicines. These can increase your chances of quitting for good. To avoid spreading the virus  · Cough or sneeze into a tissue. Then throw the tissue away. · If you don't have a tissue, use your hand to cover your cough or sneeze. Then clean your hand. You can also cough into your sleeve. · Wash your hands often. Use soap and warm water. Wash for 15 to 20 seconds each time. · If you don't have soap and water near you, you can clean your hands with alcohol wipes or gel. When should you call for help? Call your doctor now or seek immediate medical care if:  · You have a new or higher fever. · Your fever lasts more than 48 hours. · You have trouble breathing. · You have a fever with a stiff neck or a severe headache. · You are sensitive to light. · You feel very sleepy or confused. Watch closely for changes in your health, and be sure to contact your doctor if:  · You do not get better as expected. Where can you learn more? Go to http://cammie-derik.info/. Enter W994 in the search box to learn more about \"Viral Respiratory Infection: Care Instructions. \"  Current as of: June 30, 2016  Content Version: 11.2  © 6976-7298 [employer]. Care instructions adapted under license by [employer] (which disclaims liability or warranty for this information). If you have questions about a medical condition or this instruction, always ask your healthcare professional. [address] any warranty or liability for your use of this information.

## 2017-06-07 NOTE — MR AVS SNAPSHOT
Visit Information Date & Time Provider Department Dept. Phone Encounter #  
 6/7/2017 11:30 AM Bev Van, [address] [place] [address],[address] [phone] [id number] Follow-up Instructions Return if symptoms worsen or fail to improve. Follow-up and Disposition History Your Appointments 7/6/2017 10:30 AM  
ROUTINE CARE with Maria Luisa Pizarro MD  
[facility] [address] Appt Note: 3 month follow up for fasting labs, HTN, edema, pre diabetes/COMPLETE MEDICARE WELLNESS VISIT AT APPT  
 [facility] [address] [facility] [address]  
  
   
 [facility] [address] [facility] [address] [place] [address]  
  
    
 7/24/2017 10:00 AM  
Any with Nanette Pham MD  
[address] [place] [address] ([address]) Appt Note: 1st adherence visit [address] [facility], [address] [phone] [address] [facility], [address] [phone]  
  
    
 8/28/2017 10:20 AM  
ESTABLISHED PATIENT with Dottie Cueva MD  
[facility] ([facility] SCHEDUL[facility]) Appt Note: 6 month follow up  
 [address]  
[address] [facility] [id number] Upcoming Health Maintenance Date Due  
 GLAUCOMA SCREENING Q2Y 10/10/2002 MEDICARE YEARLY EXAM 10/10/2002 INFLUENZA AGE 9 TO ADULT 8/1/2017 DTaP/Tdap/Td series (2 - Td) 12/5/2021 Allergies as of 6/7/2017  Review Complete On: 6/7/2017 By: Bev Van MD  
  
 Severity Noted Reaction Type Reactions Oxycodone Low 01/09/2017    Other (comments) Patient states, \"It made me feel unwell. \" Current Immunizations  Reviewed on 2/17/2017 Name Date Influenza Vaccine 10/1/2016 Pneumococcal Conjugate (PCV-13) 12/18/2015 Tdap 12/5/2011 Not reviewed this visit You Were Diagnosed With   
  
 Codes Comments Acute URI    -  Primary ICD-10-CM: J06.9 ICD-9-CM: 465.9 Vitals BP Pulse Temp Resp Height(growth percentile) Weight(growth percentile) 125/69 (BP 1 Location: Left arm, BP Patient Position: Sitting) 79 98.4 °F (36.9 °C) (Oral) 16 5' 8\" (1.727 m) 210 lb (95.3 kg) SpO2 BMI Smoking Status 95% 31.93 kg/m2 Never Smoker Vitals History BMI and BSA Data Body Mass Index Body Surface Area  
 31.93 kg/m 2 2.14 m 2 Preferred Pharmacy Pharmacy Name Phone [id number] [facility] [phone] Your Updated Medication List  
  
   
This list is accurate as of: 6/7/17 11:55 AM.  Always use your most recent med list.  
  
  
  
  
 ALPHAGAN P 0.1 % ophthalmic solution Generic drug:  brimonidine Administer 1 Drop to both eyes daily. Both eyes PM and lt eye in am also  
  
 amLODIPine 2.5 mg tablet Commonly known as:  Madhavi Joseph Take 2 Tabs by mouth daily. apixaban 5 mg tablet Commonly known as:  Dorota Lukes Take 1 Tab by mouth two (2) times a day. celecoxib 200 mg capsule Commonly known as:  CELEBREX  
TAKE 1 TABLET BY MOUTH ONCE OR TWICE DAILY  
  
 cephALEXin 500 mg capsule Commonly known as:  Junction Furlough TAKE 1 CAP BY MOUTH FOUR (4) TIMES DAILY. clobetasol 0.05 % topical cream  
Commonly known as:  De Rias Apply  to affected area two (2) times a day. diclofenac 1 % Gel Commonly known as:  VOLTAREN  
APPLY 3-4 TIMES DAILY TO AFFECTED AREAS  
  
 doxazosin 4 mg tablet Commonly known as:  CARDURA Take 4 mg by mouth daily. fluorouracil 5 % chemo cream  
Commonly known as:  EFUDEX  
1 APPLICATION APPLY ON THE SKIN TWICE A DAY APPLY TWICE DAILY FOR 14 DAYS  
  
 furosemide 40 mg tablet Commonly known as:  LASIX Take 1 Tab by mouth every other day. guaiFENesin-codeine 100-10 mg/5 mL solution Commonly known as:  ROBITUSSIN AC  
 Take 5 mL by mouth three (3) times daily as needed for Cough. Max Daily Amount: 15 mL. HYDROcodone-acetaminophen 5-325 mg per tablet Commonly known as:  NORCO  
  
 lisinopril 20 mg tablet Commonly known as:  Donovan Wangt Take 1 Tab by mouth daily. May resume medication when b/p greater than 120/80. Prescriptions Printed Refills  
 guaiFENesin-codeine (ROBITUSSIN AC) 100-10 mg/5 mL solution 0 Sig: Take 5 mL by mouth three (3) times daily as needed for Cough. Max Daily Amount: 15 mL. Class: Print Route: Oral  
  
Follow-up Instructions Return if symptoms worsen or fail to improve. Patient Instructions Viral Respiratory Infection: Care Instructions Your Care Instructions Viruses are very small organisms. They grow in number after they enter your body. There are many types that cause different illnesses, such as colds and the mumps. The symptoms of a viral respiratory infection often start quickly. They include a fever, sore throat, and runny nose. You may also just not feel well. Or you may not want to eat much. Most viral respiratory infections are not serious. They usually get better with time and self-care. Antibiotics are not used to treat a viral infection. That's because antibiotics will not help cure a viral illness. In some cases, antiviral medicine can help your body fight a serious viral infection. Follow-up care is a key part of your treatment and safety. Be sure to make and go to all appointments, and call your doctor if you are having problems. It's also a good idea to know your test results and keep a list of the medicines you take. How can you care for yourself at home? · Rest as much as possible until you feel better. · Be safe with medicines. Take your medicine exactly as prescribed. Call your doctor if you think you are having a problem with your medicine.  You will get more details on the specific medicine your doctor prescribes. · Take an over-the-counter pain medicine, such as acetaminophen (Tylenol), ibuprofen (Advil, Motrin), or naproxen (Aleve), as needed for pain and fever. Read and follow all instructions on the label. Do not give aspirin to anyone younger than 20. It has been linked to Reye syndrome, a serious illness. · Drink plenty of fluids, enough so that your urine is light yellow or clear like water. Hot fluids, such as tea or soup, may help relieve congestion in your nose and throat. If you have kidney, heart, or liver disease and have to limit fluids, talk with your doctor before you increase the amount of fluids you drink. · Try to clear mucus from your lungs by breathing deeply and coughing. · Gargle with warm salt water once an hour. This can help reduce swelling and throat pain. Use 1 teaspoon of salt mixed in 1 cup of warm water. · Do not smoke or allow others to smoke around you. If you need help quitting, talk to your doctor about stop-smoking programs and medicines. These can increase your chances of quitting for good. To avoid spreading the virus · Cough or sneeze into a tissue. Then throw the tissue away. · If you don't have a tissue, use your hand to cover your cough or sneeze. Then clean your hand. You can also cough into your sleeve. · Wash your hands often. Use soap and warm water. Wash for 15 to 20 seconds each time. · If you don't have soap and water near you, you can clean your hands with alcohol wipes or gel. When should you call for help? Call your doctor now or seek immediate medical care if: 
· You have a new or higher fever. · Your fever lasts more than 48 hours. · You have trouble breathing. · You have a fever with a stiff neck or a severe headache. · You are sensitive to light. · You feel very sleepy or confused. Watch closely for changes in your health, and be sure to contact your doctor if: · You do not get better as expected. Where can you learn more? Go to http://cammie-derik.info/. Enter B775 in the search box to learn more about \"Viral Respiratory Infection: Care Instructions. \" Current as of: June 30, 2016 Content Version: 11.2 © 6571-5244 [employer]. Care instructions adapted under license by [employer] (which disclaims liability or warranty for this information). If you have questions about a medical condition or this instruction, always ask your healthcare professional. [address] any warranty or liability for your use of this information. Introducing [facility]! Dear Bharath Schrader: Thank you for requesting a [employer]ack[employer] account. Our records indicate that you already have an active Rackwise account. You can access your account anytime at [email or web address] Did you know that you can access your hospital and ER discharge instructions at any time in Rackwise? You can also review all of your test results from your hospital stay or ER visit. Additional Information If you have questions, please visit the Frequently Asked Questions section of the Rackwise website at [email or web address] Remember, Rackwise is NOT to be used for urgent needs. For medical emergencies, dial 911. Now available from your iPhone and Android! Please provide this summary of care documentation to your next provider. Your primary care clinician is listed as ANGELA Whalen. If you have any questions after today's visit, please call [phone].

## 2017-06-07 NOTE — PROGRESS NOTES
SUBJECTIVE  Mr. Noemy Roy is a patient of Kerri Child MD who presents today acutely for     Chief Complaint   Patient presents with    URI     pt here today c/o sore throat, cough, and nasal drainage x 2 days       Taking OTC Coricidan HBP. Cough is dry. No subjective fevers/chills. No Wheezing, SOB. No N/V, diarhea. OBJECTIVE  Visit Vitals    /69 (BP 1 Location: Left arm, BP Patient Position: Sitting)    Pulse 79    Temp 98.4 °F (36.9 °C) (Oral)    Resp 16    Ht 5' 8\" (1.727 m)    Wt 210 lb (95.3 kg)    SpO2 95%    BMI 31.93 kg/m2     Gen: Pleasant 80 y.o.  male in NAD.   HEENT: PERRLA. EOMI. OP moist with mild posterior pharyngeal erythema. No sinus tenderness.  Neck: Supple.  No LAD.  HEART: RRR, No M/G/R.   LUNGS: CTAB No W/R.   ABDOMEN: S, NT, ND, BS+.   EXTREMITIES: Warm. No C/C/E. SKIN: Warm. Dry. No rashes or other lesions noted. ASSESSMENT     ICD-10-CM ICD-9-CM    1. Acute URI J06.9 465.9        PLAN:   Symptomatic care (rest, fluids, OTC analgesics, etc). Antibiotic therapy not indicated. Orders Placed This Encounter    guaiFENesin-codeine (ROBITUSSIN AC) 100-10 mg/5 mL solution     Sig: Take 5 mL by mouth three (3) times daily as needed for Cough. Max Daily Amount: 15 mL. Dispense:  120 mL     Refill:  0       I have reviewed with the patient details of the assessment and plan and all questions were answered. Relevant patient education was performed. Follow-up Disposition:  Return if symptoms worsen or fail to improve.

## 2017-06-08 DIAGNOSIS — I10 ESSENTIAL HYPERTENSION: Primary | ICD-10-CM

## 2017-06-08 RX ORDER — AMLODIPINE BESYLATE 2.5 MG/1
TABLET ORAL
Qty: 60 TAB | Refills: 5 | Status: SHIPPED | OUTPATIENT
Start: 2017-06-08 | End: 2017-08-28

## 2017-06-09 ENCOUNTER — OFFICE VISIT (OUTPATIENT)
Dept: INTERNAL MEDICINE CLINIC | Age: 82
End: 2017-06-09

## 2017-06-09 ENCOUNTER — HOSPITAL ENCOUNTER (OUTPATIENT)
Dept: GENERAL RADIOLOGY | Age: 82
Discharge: HOME | End: 2017-06-09
Payer: MEDICARE

## 2017-06-09 VITALS
HEART RATE: 81 BPM | TEMPERATURE: 98.1 F | SYSTOLIC BLOOD PRESSURE: 127 MMHG | RESPIRATION RATE: 18 BRPM | HEIGHT: 68 IN | OXYGEN SATURATION: 94 % | DIASTOLIC BLOOD PRESSURE: 81 MMHG

## 2017-06-09 DIAGNOSIS — J40 BRONCHITIS: ICD-10-CM

## 2017-06-09 DIAGNOSIS — I10 ESSENTIAL HYPERTENSION: ICD-10-CM

## 2017-06-09 DIAGNOSIS — J40 BRONCHITIS: Primary | ICD-10-CM

## 2017-06-09 PROCEDURE — 71020 XR CHEST PA LAT: CPT

## 2017-06-09 RX ORDER — AZITHROMYCIN 250 MG/1
250 TABLET, FILM COATED ORAL SEE ADMIN INSTRUCTIONS
Qty: 6 TAB | Refills: 0 | Status: SHIPPED | OUTPATIENT
Start: 2017-06-09 | End: 2017-07-06 | Stop reason: ALTCHOICE

## 2017-06-09 NOTE — PATIENT INSTRUCTIONS
Bronchitis: Care Instructions  Your Care Instructions    Bronchitis is inflammation of the bronchial tubes, which carry air to the lungs. The tubes swell and produce mucus, or phlegm. The mucus and inflamed bronchial tubes make you cough. You may have trouble breathing. Most cases of bronchitis are caused by viruses like those that cause colds. Antibiotics usually do not help and they may be harmful. Bronchitis usually develops rapidly and lasts about 2 to 3 weeks in otherwise healthy people. Follow-up care is a key part of your treatment and safety. Be sure to make and go to all appointments, and call your doctor if you are having problems. It's also a good idea to know your test results and keep a list of the medicines you take. How can you care for yourself at home? · Take all medicines exactly as prescribed. Call your doctor if you think you are having a problem with your medicine. · Get some extra rest.  · Take an over-the-counter pain medicine, such as acetaminophen (Tylenol), ibuprofen (Advil, Motrin), or naproxen (Aleve) to reduce fever and relieve body aches. Read and follow all instructions on the label. · Do not take two or more pain medicines at the same time unless the doctor told you to. Many pain medicines have acetaminophen, which is Tylenol. Too much acetaminophen (Tylenol) can be harmful. · Take an over-the-counter cough medicine that contains dextromethorphan to help quiet a dry, hacking cough so that you can sleep. Avoid cough medicines that have more than one active ingredient. Read and follow all instructions on the label. · Breathe moist air from a humidifier, hot shower, or sink filled with hot water. The heat and moisture will thin mucus so you can cough it out. · Do not smoke. Smoking can make bronchitis worse. If you need help quitting, talk to your doctor about stop-smoking programs and medicines. These can increase your chances of quitting for good.   When should you call for help? Call 911 anytime you think you may need emergency care. For example, call if:  · You have severe trouble breathing. Call your doctor now or seek immediate medical care if:  · You have new or worse trouble breathing. · You cough up dark brown or bloody mucus (sputum). · You have a new or higher fever. · You have a new rash. Watch closely for changes in your health, and be sure to contact your doctor if:  · You cough more deeply or more often, especially if you notice more mucus or a change in the color of your mucus. · You are not getting better as expected. Where can you learn more? Go to http://cammie-derik.info/. Enter H333 in the search box to learn more about \"Bronchitis: Care Instructions. \"  Current as of: May 23, 2016  Content Version: 11.2  © 5506-4568 AT Internet. Care instructions adapted under license by [employer] (which disclaims liability or warranty for this information). If you have questions about a medical condition or this instruction, always ask your healthcare professional. [address] any warranty or liability for your use of this information.

## 2017-06-09 NOTE — PROGRESS NOTES
CC:    acute visit  Cough and URI    HPI:    Lindalee Sandhoff is a 80 y.o. male who complains of URI symptoms for 4 days. The patient reports productive cough - having soreness in stomach from cough. Bringing up grey mucus. No sore throat  Appetite is fine   Cough is most bothersome and worsening with now more productive patient was seen by Dr. Virgie Luna for same 2 days ago and has noted worsening of symptoms at that time he was prescribed cough medication      Past Medical History:   Diagnosis Date    Arthritis     DJD    Enlarged prostate     Hyperlipidemia     Hypertension     PAF (paroxysmal atrial fibrillation) ([facility] [address]     Pre-diabetes     HA1C 6.0 Dec 2016    Shingles      shoulder      Review of Systems    Constitutional: negative for fevers, chills, anorexia and weight loss  Eyes:   negative for visual disturbance and irritation  ENT:   negative for tinnitus,has nasal congestion no sorethroat   Negative for ear pain   Respiratory:  Positive  for cough, negative for hemoptysis, dyspnea,wheezing  CV:   negative for chest pain, palpitations, lower extremity edema  GI:   negative for nausea, vomiting, diarrhea, abdominal pain,melena  Genitourinary: negative for frequency, dysuria and hematuria, vaginal discharge, lesions  Musculoskel: negative for myalgias, arthralgias, back pain, muscle weakness, joint pain  Neurological:  negative for headaches, dizziness, focal weakness, numbness  Psych:         : negative for depression, anxiety     Objective:     Visit Vitals    /81 (BP 1 Location: Left arm, BP Patient Position: Sitting)    Pulse 81    Temp 98.1 °F (36.7 °C) (Oral)    Resp 18    Ht 5' 8\" (1.727 m)    SpO2 94%     Gen: Mildly ill appearing male  HEENT:   PERRL,normal conjunctiva. External ear and canals normal, TMs no opacification or erythema,  Swollen nasal turbinates, no sinus pain on palpation,  OP no erythema, no exudates, MMM  Neck:  Supple.  Thyroid normal size, nontender, without nodules. No masses or LAD  Resp: Not in respiratory distress, mild expiratory rhonchi   On right middle lobe  CV:  RRR, normal S1S2, no murmur. GI: soft, nontender, without masses. No hepatosplenomegaly. Extrem:  +2 pulses, no edema, warm distally  Skin:  No rash, no lesions, no ulcers. Skin warm, normal turgor, without induration or nodules. Lab Results   Component Value Date/Time    WBC 11.2 05/05/2017 12:25 PM    HGB 13.3 05/05/2017 12:25 PM    HCT 40.6 05/05/2017 12:25 PM    PLATELET 574 84/36/7621 12:25 PM    MCV 79.6 05/05/2017 12:25 PM     Lab Results   Component Value Date/Time    Sodium 139 05/05/2017 12:25 PM    Potassium 4.3 05/05/2017 12:25 PM    Chloride 104 05/05/2017 12:25 PM    CO2 30 05/05/2017 12:25 PM    Anion gap 5 05/05/2017 12:25 PM    Glucose 95 05/05/2017 12:25 PM    BUN 23 05/05/2017 12:25 PM    Creatinine 1.12 05/05/2017 12:25 PM    BUN/Creatinine ratio 21 05/05/2017 12:25 PM    GFR est AA >60 05/05/2017 12:25 PM    GFR est non-AA >60 05/05/2017 12:25 PM    Calcium 8.5 05/05/2017 12:25 PM         Assessment/Plan:     1. Bronchitis  - XR CHEST PA LAT; Future  - azithromycin (ZITHROMAX) 250 mg tablet; Take 1 Tab by mouth See Admin Instructions for 6 doses. Take 2 tabs on day one followed by 1 tab daily for a total of 5 days. Dispense: 6 Tab; Refill: 0        Follow-up Disposition:  Return if symptoms worsen or fail to improve.     Marium Holbrook MD

## 2017-06-09 NOTE — MR AVS SNAPSHOT
Visit Information Date & Time Provider Department Dept. Phone Encounter #  
 6/9/2017 11:00 AM Lucy Whitley, [address] [phone] [id number] Follow-up Instructions Return if symptoms worsen or fail to improve. Your Appointments 7/6/2017 10:30 AM  
ROUTINE CARE with Maria Luisa Marin MD  
[facility] [address]) Appt Note: 3 month follow up for fasting labs, HTN, edema, pre diabetes/COMPLETE MEDICARE WELLNESS VISIT AT [facility] [place]  
 [facility] [address] [facility] [address]  
  
   
 [facility] [address]  [address] [facility]  
  
    
 7/24/2017 10:00 AM  
Any with Linus Mcnamara MD  
[facility] ([address]) Appt Note: 1st adherence visit [address] [facility], [address] [phone] [address] [facility], [address] [phone]  
  
    
 8/28/2017 10:20 AM  
ESTABLISHED PATIENT with Carolina Mena MD  
[facility] ([facility] SCHEDULING) Appt Note: 6 month follow up  
 [address]  
[address] [facility] [address] [facility] [phone] Upcoming Health Maintenance Date Due  
 GLAUCOMA SCREENING Q2Y 10/10/2002 MEDICARE YEARLY EXAM 10/10/2002 INFLUENZA AGE 9 TO ADULT 8/1/2017 DTaP/Tdap/Td series (2 - Td) 12/5/2021 Allergies as of 6/9/2017  Review Complete On: 6/9/2017 By: Lexi Naik LPN Severity Noted Reaction Type Reactions Oxycodone Low 01/09/2017    Other (comments) Patient states, \"It made me feel unwell. \" Current Immunizations  Reviewed on 2/17/2017 Name Date Influenza Vaccine 10/1/2016 Pneumococcal Conjugate (PCV-13) 12/18/2015 Tdap 12/5/2011 Not reviewed this visit You Were Diagnosed With   
  
 Codes Comments Bronchitis    -  Primary ICD-10-CM: I07 ICD-9-CM: 696 Essential hypertension     ICD-10-CM: I10 
ICD-9-CM: 401.9 Vitals BP Pulse Temp Resp Height(growth percentile) SpO2  
 127/81 (BP 1 Location: Left arm, BP Patient Position: Sitting) 81 98.1 °F (36.7 °C) (Oral) 18 5' 8\" (1.727 m) 94% Smoking Status Never Smoker Preferred Pharmacy Pharmacy Name Phone [facility][id number] [facility] [phone] Your Updated Medication List  
  
   
This list is accurate as of: 6/9/17 12:30 PM.  Always use your most recent med list.  
  
  
  
  
 ALPHAGAN P 0.1 % ophthalmic solution Generic drug:  brimonidine Administer 1 Drop to both eyes daily. Both eyes PM and lt eye in am also  
  
 amLODIPine 2.5 mg tablet Commonly known as:  Angeline Staggers TAKE 2 TABLETS BY MOUTH DAILY. apixaban 5 mg tablet Commonly known as:  Kavya Antu Take 1 Tab by mouth two (2) times a day. azithromycin 250 mg tablet Commonly known as:  Ancefe Slsacha Take 1 Tab by mouth See Admin Instructions for 6 doses. Take 2 tabs on day one followed by 1 tab daily for a total of 5 days. clobetasol 0.05 % topical cream  
Commonly known as:  Rosea Rosalba Apply  to affected area two (2) times a day. diclofenac 1 % Gel Commonly known as:  VOLTAREN  
APPLY 3-4 TIMES DAILY TO AFFECTED AREAS  
  
 doxazosin 4 mg tablet Commonly known as:  CARDURA Take 4 mg by mouth daily. fluorouracil 5 % chemo cream  
Commonly known as:  EFUDEX  
1 APPLICATION APPLY ON THE SKIN TWICE A DAY APPLY TWICE DAILY FOR 14 DAYS  
  
 guaiFENesin-codeine 100-10 mg/5 mL solution Commonly known as:  ROBITUSSIN AC Take 5 mL by mouth three (3) times daily as needed for Cough. Max Daily Amount: 15 mL. lisinopril 20 mg tablet Commonly known as:  Randell Edward Take 1 Tab by mouth daily. May resume medication when b/p greater than 120/80. Prescriptions Sent to Pharmacy Refills  
 azithromycin (ZITHROMAX) 250 mg tablet 0 Sig: Take 1 Tab by mouth See Admin Instructions for 6 doses. Take 2 tabs on day one followed by 1 tab daily for a total of 5 days. Class: Normal  
 Pharmacy: [facility], [address] [place] [address] #: [phone] Route: Oral  
  
Follow-up Instructions Return if symptoms worsen or fail to improve. To-Do List   
 06/09/2017 Imaging:  XR CHEST PA LAT Patient Instructions Bronchitis: Care Instructions Your Care Instructions Bronchitis is inflammation of the bronchial tubes, which carry air to the lungs. The tubes swell and produce mucus, or phlegm. The mucus and inflamed bronchial tubes make you cough. You may have trouble breathing. Most cases of bronchitis are caused by viruses like those that cause colds. Antibiotics usually do not help and they may be harmful. Bronchitis usually develops rapidly and lasts about 2 to 3 weeks in otherwise healthy people. Follow-up care is a key part of your treatment and safety. Be sure to make and go to all appointments, and call your doctor if you are having problems. It's also a good idea to know your test results and keep a list of the medicines you take. How can you care for yourself at home? · Take all medicines exactly as prescribed. Call your doctor if you think you are having a problem with your medicine. · Get some extra rest. 
· Take an over-the-counter pain medicine, such as acetaminophen (Tylenol), ibuprofen (Advil, Motrin), or naproxen (Aleve) to reduce fever and relieve body aches. Read and follow all instructions on the label. · Do not take two or more pain medicines at the same time unless the doctor told you to. Many pain medicines have acetaminophen, which is Tylenol. Too much acetaminophen (Tylenol) can be harmful. · Take an over-the-counter cough medicine that contains dextromethorphan to help quiet a dry, hacking cough so that you can sleep. Avoid cough medicines that have more than one active ingredient. Read and follow all instructions on the label. · Breathe moist air from a humidifier, hot shower, or sink filled with hot water. The heat and moisture will thin mucus so you can cough it out. · Do not smoke. Smoking can make bronchitis worse. If you need help quitting, talk to your doctor about stop-smoking programs and medicines. These can increase your chances of quitting for good. When should you call for help? Call 911 anytime you think you may need emergency care. For example, call if: 
· You have severe trouble breathing. Call your doctor now or seek immediate medical care if: 
· You have new or worse trouble breathing. · You cough up dark brown or bloody mucus (sputum). · You have a new or higher fever. · You have a new rash. Watch closely for changes in your health, and be sure to contact your doctor if: 
· You cough more deeply or more often, especially if you notice more mucus or a change in the color of your mucus. · You are not getting better as expected. Where can you learn more? Go to http://cammie-derik.info/. Enter H333 in the search box to learn more about \"Bronchitis: Care Instructions. \" Current as of: May 23, 2016 Content Version: 11.2 © 7090-8862 [employer]. Care instructions adapted under license by [employer] (which disclaims liability or warranty for this information). If you have questions about a medical condition or this instruction, always ask your healthcare professional. [address] any warranty or liability for your use of this information. Introducing [facility]! Dear Irene Uribe: Thank you for requesting a [employer] account.   Our records indicate that you already have an active [employer] account. You can access your account anytime at [email or web address] Did you know that you can access your hospital and ER discharge instructions at any time in [employer]? You can also review all of your test results from your hospital stay or ER visit. Additional Information If you have questions, please visit the Frequently Asked Questions section of the [employer] website at [email or web address] Remember, [employer] is NOT to be used for urgent needs. For medical emergencies, dial 911. Now available from your iPhone and Android! Please provide this summary of care documentation to your next provider. Your primary care clinician is listed as ANGELA Whalen. If you have any questions after today's visit, please call [phone].

## 2017-06-12 ENCOUNTER — TELEPHONE (OUTPATIENT)
Dept: INTERNAL MEDICINE CLINIC | Age: 82
End: 2017-06-12

## 2017-06-12 NOTE — TELEPHONE ENCOUNTER
----- Message from Derald Bank sent at 6/12/2017  9:29 AM EDT -----  Regarding: Dr. José Luis Chacon   Pt is returning a call from the practice. Best contact number [phone].        Copied/pasted from [facility]

## 2017-06-12 NOTE — PROGRESS NOTES
Spoke with patient and advised of negative chest xray per Dr. Lonzo Holter. He states he only has one day left and is feeling a little better. Still coughing up green mucous, but able to use his CPAP again. Advised him to continue to monitor and if symptoms do no resolve, please call the office. He verbalized understanding.

## 2017-06-12 NOTE — TELEPHONE ENCOUNTER
Spoke with patient and advised of negative chest xray per Dr. Park Estevez. He states he only has one day left and is feeling a little better. Still coughing up green mucous, but able to use his CPAP again. Advised him to continue to monitor and if symptoms do no resolve, please call the office. He verbalized understanding.

## 2017-07-06 ENCOUNTER — OFFICE VISIT (OUTPATIENT)
Dept: INTERNAL MEDICINE CLINIC | Age: 82
End: 2017-07-06

## 2017-07-06 ENCOUNTER — HOSPITAL ENCOUNTER (OUTPATIENT)
Dept: LAB | Age: 82
Discharge: HOME | End: 2017-07-06
Payer: MEDICARE

## 2017-07-06 VITALS
OXYGEN SATURATION: 99 % | WEIGHT: 208.1 LBS | RESPIRATION RATE: 18 BRPM | HEIGHT: 68 IN | SYSTOLIC BLOOD PRESSURE: 128 MMHG | HEART RATE: 74 BPM | DIASTOLIC BLOOD PRESSURE: 81 MMHG | BODY MASS INDEX: 31.54 KG/M2 | TEMPERATURE: 98 F

## 2017-07-06 DIAGNOSIS — R60.0 LEG EDEMA, RIGHT: ICD-10-CM

## 2017-07-06 DIAGNOSIS — R73.09 ELEVATED GLUCOSE: ICD-10-CM

## 2017-07-06 DIAGNOSIS — I10 ESSENTIAL HYPERTENSION: ICD-10-CM

## 2017-07-06 DIAGNOSIS — I48.0 PAF (PAROXYSMAL ATRIAL FIBRILLATION) (HCC): Primary | ICD-10-CM

## 2017-07-06 DIAGNOSIS — Z13.39 SCREENING FOR ALCOHOLISM: ICD-10-CM

## 2017-07-06 DIAGNOSIS — E78.1 HIGH TRIGLYCERIDES: ICD-10-CM

## 2017-07-06 DIAGNOSIS — E78.2 ELEVATED TRIGLYCERIDES WITH HIGH CHOLESTEROL: ICD-10-CM

## 2017-07-06 DIAGNOSIS — R73.03 PRE-DIABETES: ICD-10-CM

## 2017-07-06 DIAGNOSIS — Z00.00 ROUTINE GENERAL MEDICAL EXAMINATION AT A HEALTH CARE FACILITY: ICD-10-CM

## 2017-07-06 DIAGNOSIS — G47.33 OSA TREATED WITH BIPAP: ICD-10-CM

## 2017-07-06 PROCEDURE — 80061 LIPID PANEL: CPT

## 2017-07-06 PROCEDURE — 83036 HEMOGLOBIN GLYCOSYLATED A1C: CPT

## 2017-07-06 PROCEDURE — 80053 COMPREHEN METABOLIC PANEL: CPT

## 2017-07-06 PROCEDURE — 36415 COLL VENOUS BLD VENIPUNCTURE: CPT

## 2017-07-06 RX ORDER — FUROSEMIDE 40 MG/1
40 TABLET ORAL
Qty: 20 TAB | Refills: 4 | Status: SHIPPED | OUTPATIENT
Start: 2017-07-06 | End: 2018-03-01

## 2017-07-06 RX ORDER — FUROSEMIDE 40 MG/1
TABLET ORAL
COMMUNITY
Start: 2017-05-02 | End: 2017-07-06 | Stop reason: ALTCHOICE

## 2017-07-06 RX ORDER — BIMATOPROST 0.1 MG/ML
SOLUTION/ DROPS OPHTHALMIC
Refills: 5 | COMMUNITY
Start: 2017-06-11 | End: 2018-03-01

## 2017-07-06 RX ORDER — METHYLPREDNISOLONE 4 MG/1
TABLET ORAL
Refills: 0 | COMMUNITY
Start: 2017-04-28 | End: 2017-07-06 | Stop reason: ALTCHOICE

## 2017-07-06 RX ORDER — CIPROFLOXACIN 500 MG/1
TABLET ORAL
Refills: 0 | COMMUNITY
Start: 2017-05-07 | End: 2017-07-06 | Stop reason: ALTCHOICE

## 2017-07-06 RX ORDER — METOPROLOL TARTRATE 25 MG/1
TABLET, FILM COATED ORAL
COMMUNITY
Start: 2017-07-01 | End: 2017-07-06 | Stop reason: ALTCHOICE

## 2017-07-06 RX ORDER — CELECOXIB 200 MG/1
CAPSULE ORAL
Refills: 3 | COMMUNITY
Start: 2017-04-17 | End: 2017-10-06 | Stop reason: ALTCHOICE

## 2017-07-06 NOTE — PROGRESS NOTES
CC: Hypertension (follow up) and Annual Wellness Visit      HPI:      He is a 80 y.o. male  With a hx of paroxysmal atrial fibrillation on  eliquis, HTN, LINDA on CPAP and pre diabetes     Leg swelling since  right knee replacement in Jan 2017: intermittent had improved after medrol pack and diuretics/ currently reports swelling in right lower leg. Reports pain posterior to the knee. Denies any recent car trips or plane trips -taking Eliquis daily. Denies redness    Paroxysmal atrial fibrillation: Patient is on Eliquis. He is followed by Dr. Hylton  /Metoprolol caused bradycardia and was stopped. LINDA: recent diagnosis and using CPAP /evaluated by Dr. Chivo Roche. Patient still feels sleepy during the day has follow-up with Dr. Chivo Roche      Hypertension: Patient is on Norvasc and lisinopril :  blood pressure has been well controlled denies chest pain shortness of breath      ROS:  Constitutional: negative for fevers, chills, anorexia and weight loss  Eyes:   negative for visual disturbance,  irritation  ENT:   negative for tinnitus,sore throat,nasal congestion,ear pain, sinus pain. Respiratory:  negative for cough, hemoptysis, dyspnea,wheezing  CV:   negative for chest pain, palpitations, lower extremity edema  GI:   negative for nausea, vomiting, diarrhea, abdominal pain,melena  Genitourinary: negative for frequency, dysuria, hematuria  Musculoskel: See HPI  Neurological:  negative for headaches, dizziness, focal weakness, numbness  Psych:             Negative for depression and anxiety    Past Medical History:   Diagnosis Date    Arthritis     DJD    Enlarged prostate     Hyperlipidemia     Hypertension     LINDA treated with BiPAP     [facility]ox[facility] fibrillation) ([facility] [address]     Pre-diabetes     HA1C 6.0 Dec 2016    Shingles        Current Outpatient Prescriptions on File Prior to Visit   Medication Sig Dispense Refill    amLODIPine (NORVASC) 2.5 mg tablet TAKE 2 TABLETS BY MOUTH DAILY.  60 Tab 5    apixaban (ELIQUIS) 5 mg tablet Take 1 Tab by mouth two (2) times a day. 60 Tab 5    clobetasol (TEMOVATE) 0.05 % topical cream Apply  to affected area two (2) times a day. 15 g 0    diclofenac (VOLTAREN) 1 % gel APPLY 3-4 TIMES DAILY TO AFFECTED AREAS  3    fluorouracil (EFUDEX) 5 % chemo cream 1 APPLICATION APPLY ON THE SKIN TWICE A DAY APPLY TWICE DAILY FOR 14 DAYS  0    lisinopril (PRINIVIL, ZESTRIL) 20 mg tablet Take 1 Tab by mouth daily. May resume medication when b/p greater than 120/80. 30 Tab 5    brimonidine (ALPHAGAN P) 0.1 % ophthalmic solution Administer 1 Drop to both eyes daily. Both eyes PM and lt eye in am also      doxazosin (CARDURA) 4 mg tablet Take 4 mg by mouth daily. No current facility-administered medications on file prior to visit. Past Surgical History:   Procedure Laterality Date    HX COLONOSCOPY      normal except for hemorrhoids    HX HEENT      wisdom teeth extraction x2    HX KNEE REPLACEMENT      right; 2017    HX SHOULDER ARTHROSCOPY      rt    HX TONSILLECTOMY         Family History   Problem Relation Age of Onset    Coronary Artery Disease Father 47      at [de-identified] of MI     Reviewed and no changes     Social History     Social History    Marital status:      Spouse name: N/A    Number of children: N/A    Years of education: N/A     Occupational History    Not on file.      Social History Main Topics    Smoking status: Never Smoker    Smokeless tobacco: Not on file    Alcohol use No    Drug use: Not on file    Sexual activity: Not on file     Other Topics Concern    Not on file     Social History Narrative            Visit Vitals    /81 (BP 1 Location: Right arm, BP Patient Position: Sitting)    Pulse 74    Temp 98 °F (36.7 °C) (Oral)    Resp 18    Ht 5' 8\" (1.727 m)    Wt 208 lb 1.6 oz (94.4 kg)    SpO2 99%    BMI 31.64 kg/m2       Physical Examination:   General - Well appearing male  PALAK - PERRL, TM no erythema/opacification, normal nasal turbinates, oropharynx no erythema or exudate, MMM  Neck - supple, no bruits, no TMG, no LAD  Pulm - clear to auscultation bilaterally  Cardio - RRR, normal S1 S2, no murmur gallops or rubs  Abd - soft, nontender, no masses, no HSM  Extrem - right leg with 1+ pitting edema to above the knee no redness or swelling, +2 distal pulses  Surgical scar over the right knee well-healed  Pain to palpation in popliteal area and calf  Left leg exam is normal  Psych - normal affect, appropriate mood    Lab Results   Component Value Date/Time    WBC 11.2 05/05/2017 12:25 PM    HGB 13.3 05/05/2017 12:25 PM    HCT 40.6 05/05/2017 12:25 PM    PLATELET 817 72/67/9240 12:25 PM    MCV 79.6 05/05/2017 12:25 PM     Lab Results   Component Value Date/Time    Sodium 139 05/05/2017 12:25 PM    Potassium 4.3 05/05/2017 12:25 PM    Chloride 104 05/05/2017 12:25 PM    CO2 30 05/05/2017 12:25 PM    Anion gap 5 05/05/2017 12:25 PM    Glucose 95 05/05/2017 12:25 PM    BUN 23 05/05/2017 12:25 PM    Creatinine 1.12 05/05/2017 12:25 PM    BUN/Creatinine ratio 21 05/05/2017 12:25 PM    GFR est AA >60 05/05/2017 12:25 PM    GFR est non-AA >60 05/05/2017 12:25 PM    Calcium 8.5 05/05/2017 12:25 PM     Lab Results   Component Value Date/Time    Cholesterol, total 182 02/09/2017 12:07 PM    HDL Cholesterol 28 02/09/2017 12:07 PM    LDL, calculated 113 02/09/2017 12:07 PM    VLDL, calculated 41 02/09/2017 12:07 PM    Triglyceride 206 02/09/2017 12:07 PM     No results found for: TSH, TSH2, TSH3, TSHP, TSHEXT, TSHEXT  Lab Results   Component Value Date/Time    Prostate Specific Ag 4.2 02/09/2017 12:07 PM    % Free PSA 29.8 02/09/2017 12:07 PM     Lab Results   Component Value Date/Time    Hemoglobin A1c 5.9 02/09/2017 12:07 PM     No results found for: RIVER David    Lab Results   Component Value Date/Time    ALT (SGPT) 30 05/05/2017 12:25 PM    AST (SGOT) 11 05/05/2017 12:25 PM    Alk. phosphatase 58 05/05/2017 12:25 PM    Bilirubin, direct 0.11 02/09/2017 12:07 PM    Bilirubin, total 0.3 05/05/2017 12:25 PM           Assessment/Plan:    He is a 80 y.o. male  With a hx of paroxysmal atrial fibrillation on  eliquis, HTN, LINDA on CPAP and pre-diabetes   With intermittent swelling in right leg since knee replacement in January 1. PAF (paroxysmal atrial fibrillation) (HCC)  -Metoprolol caused bradycardia  -Patient is sinus rhythm   -Continue Eliquis  -Followed by Dr. Destiny Guadarrama    2. Essential hypertension  -Well-controlled continue Norvasc and lisinopril     3. LINDA treated with BiPAP  -New diagnosis followed by Dr. Vanessa Hudson    4. Leg edema, right  Intermittent since knee replacement on the right January 2017  -Medrol  pack in intermittent Lasix that helped in the past  -We will obtain ultrasound to rule out DVT although unlikely as patient is on anticoagulation  -Advised to take Lasix 40 mg as needed for swelling daily    5. Prediabetes: will check HA1C today   - counseled on diet     Orders Placed This Encounter    DUPLEX LOWER EXT VENOUS RIGHT     Standing Status:   Future     Standing Expiration Date:   8/6/2018    HEMOGLOBIN A1C WITH EAG    LIPID PANEL    METABOLIC PANEL, COMPREHENSIVE    LUMIGAN 0.01 % ophthalmic drops     Sig: INSTILL 1 DROP INTO EACH EYE EVERY NIGHT AT BEDTIME     Refill:  5    celecoxib (CELEBREX) 200 mg capsule     Sig: TAKE 1 TABLET BY MOUTH ONCE OR TWICE DAILY     Refill:  3    DISCONTD: ciprofloxacin HCl (CIPRO) 500 mg tablet     Sig: TAKE 1 TAB BY MOUTH TWO (2) TIMES A DAY FOR 14 DAYS. Refill:  0    DISCONTD: furosemide (LASIX) 40 mg tablet    DISCONTD: methylPREDNISolone (MEDROL DOSEPACK) 4 mg tablet     Sig: TAKE AS DIRECTED UNTIL COMPLETE. Refill:  0    DISCONTD: metoprolol tartrate (LOPRESSOR) 25 mg tablet    furosemide (LASIX) 40 mg tablet     Sig: Take 1 Tab by mouth daily as needed.  As needed for swelling     Dispense:  20 Tab     Refill:  4 Follow-up Disposition:  Return in about 3 months (around 10/6/2017) for HTN and leg swelling . MD Shonna    This is a Subsequent Medicare Annual Wellness Visit providing Personalized Prevention Plan Services (PPPS) (Performed 12 months after initial AWV and PPPS )    I have reviewed the patient's medical history in detail and updated the computerized patient record. History     Past Medical History:   Diagnosis Date    Arthritis     DJD    Enlarged prostate     Hyperlipidemia     Hypertension     LINDA treated with BiPAP     PAF (paroxysmal atrial fibrillation) (HCC)     Pre-diabetes     HA1C 6.0 Dec 2016    Shingles       Past Surgical History:   Procedure Laterality Date    HX COLONOSCOPY  2009    normal except for hemorrhoids    HX HEENT      wisdom teeth extraction x2    HX KNEE REPLACEMENT      right; Jan. 2017    HX SHOULDER ARTHROSCOPY      rt    HX TONSILLECTOMY       Current Outpatient Prescriptions   Medication Sig Dispense Refill    LUMIGAN 0.01 % ophthalmic drops INSTILL 1 DROP INTO EACH EYE EVERY NIGHT AT BEDTIME  5    celecoxib (CELEBREX) 200 mg capsule TAKE 1 TABLET BY MOUTH ONCE OR TWICE DAILY  3    furosemide (LASIX) 40 mg tablet Take 1 Tab by mouth daily as needed. As needed for swelling 20 Tab 4    amLODIPine (NORVASC) 2.5 mg tablet TAKE 2 TABLETS BY MOUTH DAILY. 60 Tab 5    apixaban (ELIQUIS) 5 mg tablet Take 1 Tab by mouth two (2) times a day. 60 Tab 5    clobetasol (TEMOVATE) 0.05 % topical cream Apply  to affected area two (2) times a day. 15 g 0    diclofenac (VOLTAREN) 1 % gel APPLY 3-4 TIMES DAILY TO AFFECTED AREAS  3    fluorouracil (EFUDEX) 5 % chemo cream 1 APPLICATION APPLY ON THE SKIN TWICE A DAY APPLY TWICE DAILY FOR 14 DAYS  0    lisinopril (PRINIVIL, ZESTRIL) 20 mg tablet Take 1 Tab by mouth daily.  May resume medication when b/p greater than 120/80. 30 Tab 5    brimonidine (ALPHAGAN P) 0.1 % ophthalmic solution Administer 1 Drop to both eyes daily. Both eyes PM and lt eye in am also      doxazosin (CARDURA) 4 mg tablet Take 4 mg by mouth daily. Allergies   Allergen Reactions    Oxycodone Other (comments)     Patient states, \"It made me feel unwell. \"     Family History   Problem Relation Age of Onset    Coronary Artery Disease Father 47      at [de-identified] of MI     Social History   Substance Use Topics    Smoking status: Never Smoker    Smokeless tobacco: Not on file    Alcohol use No     Patient Active Problem List   Diagnosis Code    DJD (degenerative joint disease) of knee M17.10    PAF (paroxysmal atrial fibrillation) ([facility]) I48.0    Hypertension I10    Enlarged prostate N40.0    Hyperlipidemia E78.5    Right leg swelling M79.89    Bilateral edema of lower extremity R60.0    LINDA treated with BiPAP G47.33       Depression Risk Factor Screening:     PHQ over the last two weeks 2017   Little interest or pleasure in doing things Not at all   Feeling down, depressed or hopeless Not at all   Total Score PHQ 2 0     Alcohol Risk Factor Screening: On any occasion during the past 3 months, have you had more than 4 drinks containing alcohol? No    Do you average more than 14 drinks per week? No        Functional Ability and Level of Safety:     Hearing Loss   none    Activities of Daily Living   Self-care. Requires assistance with: no ADLs    Fall Risk   Fall Risk Assessment, last 12 mths 2017   Able to walk? Yes   Fall in past 12 months? No     Abuse Screen   Patient is not abused    Review of Systems   A comprehensive review of systems was negative except for that written in the HPI.     Physical Examination     Evaluation of Cognitive Function:  Mood/affect:  happy  Appearance: age appropriate  Family member/caregiver input: wife present      Patient Care Team:  Iva Coombs MD as PCP - General (Internal Medicine)  Ole Deras MD (Cardiology)    Advice/Referrals/Counseling   Education and counseling provided:  Are appropriate based on today's review and evaluation      Assessment/Plan   lab results and schedule of future lab studies reviewed with patient  reviewed diet, exercise and weight control  reviewed medications and side effects in detail     Given forms to fill out advance directives and bring it in.

## 2017-07-06 NOTE — PATIENT INSTRUCTIONS
Advance Directives: Care Instructions  Your Care Instructions  An advance directive is a legal way to state your wishes at the end of your life. It tells your family and your doctor what to do if you can no longer say what you want. There are two main types of advance directives. You can change them any time that your wishes change. · A living will tells your family and your doctor your wishes about life support and other treatment. · A durable power of  for health care lets you name a person to make treatment decisions for you when you can't speak for yourself. This person is called a health care agent. If you do not have an advance directive, decisions about your medical care may be made by a doctor or a  who doesn't know you. It may help to think of an advance directive as a gift to the people who care for you. If you have one, they won't have to make tough decisions by themselves. Follow-up care is a key part of your treatment and safety. Be sure to make and go to all appointments, and call your doctor if you are having problems. It's also a good idea to know your test results and keep a list of the medicines you take. How can you care for yourself at home? · Discuss your wishes with your loved ones and your doctor. This way, there are no surprises. · Many states have a unique form. Or you might use a universal form that has been approved by many states. This kind of form can sometimes be completed and stored online. Your electronic copy will then be available wherever you have a connection to the Internet. In most cases, doctors will respect your wishes even if you have a form from a different state. · You don't need a  to do an advance directive. But you may want to get legal advice. · Think about these questions when you prepare an advance directive:  ¨ Who do you want to make decisions about your medical care if you are not able to?  Many people choose a family member or close friend. ¨ Do you know enough about life support methods that might be used? If not, talk to your doctor so you understand. ¨ What are you most afraid of that might happen? You might be afraid of having pain, losing your independence, or being kept alive by machines. ¨ Where would you prefer to die? Choices include your home, a hospital, or a nursing home. ¨ Would you like to have information about hospice care to support you and your family? ¨ Do you want to donate organs when you die? ¨ Do you want certain Moravian practices performed before you die? If so, put your wishes in the advance directive. · Read your advance directive every year, and make changes as needed. When should you call for help? Be sure to contact your doctor if you have any questions. Where can you learn more? Go to http://c[employer] Bioinformaticsderik.info/. Enter R264 in the search box to learn more about \"Advance Directives: Care Instructions. \"  Current as of: November 17, 2016  Content Version: 11.3  © 8387-9247 [employer]. Care instructions adapted under license by [employer] (which disclaims liability or warranty for this information). If you have questions about a medical condition or this instruction, always ask your healthcare professional. [place] [address] any warranty or liability for your use of this information. Advance Directives: Care Instructions  Your Care Instructions  An advance directive is a legal way to state your wishes at the end of your life. It tells your family and your doctor what to do if you can no longer say what you want. There are two main types of advance directives. You can change them any time that your wishes change. · A living will tells your family and your doctor your wishes about life support and other treatment.   · A durable power of  for health care lets you name a person to make treatment decisions for you when you can't speak for yourself. This person is called a health care agent. If you do not have an advance directive, decisions about your medical care may be made by a doctor or a  who doesn't know you. It may help to think of an advance directive as a gift to the people who care for you. If you have one, they won't have to make tough decisions by themselves. Follow-up care is a key part of your treatment and safety. Be sure to make and go to all appointments, and call your doctor if you are having problems. It's also a good idea to know your test results and keep a list of the medicines you take. How can you care for yourself at home? · Discuss your wishes with your loved ones and your doctor. This way, there are no surprises. · Many states have a unique form. Or you might use a universal form that has been approved by many states. This kind of form can sometimes be completed and stored online. Your electronic copy will then be available wherever you have a connection to the Internet. In most cases, doctors will respect your wishes even if you have a form from a different state. · You don't need a  to do an advance directive. But you may want to get legal advice. · Think about these questions when you prepare an advance directive:  ¨ Who do you want to make decisions about your medical care if you are not able to? Many people choose a family member or close friend. ¨ Do you know enough about life support methods that might be used? If not, talk to your doctor so you understand. ¨ What are you most afraid of that might happen? You might be afraid of having pain, losing your independence, or being kept alive by machines. ¨ Where would you prefer to die? Choices include your home, a hospital, or a nursing home. ¨ Would you like to have information about hospice care to support you and your family? ¨ Do you want to donate organs when you die?   ¨ Do you want certain Islam practices performed before you die? If so, put your wishes in the advance directive. · Read your advance directive every year, and make changes as needed. When should you call for help? Be sure to contact your doctor if you have any questions. Where can you learn more? Go to http://cammie-derik.info/. Enter R264 in the search box to learn more about \"Advance Directives: Care Instructions. \"  Current as of: November 17, 2016  Content Version: 11.3  © 9430-5614 [employer]. Care instructions adapted under license by [employer] (which disclaims liability or warranty for this information). If you have questions about a medical condition or this instruction, always ask your healthcare professional. [address] any warranty or liability for your use of this information. Advance Directives: Care Instructions  Your Care Instructions  An advance directive is a legal way to state your wishes at the end of your life. It tells your family and your doctor what to do if you can no longer say what you want. There are two main types of advance directives. You can change them any time that your wishes change. · A living will tells your family and your doctor your wishes about life support and other treatment. · A durable power of  for health care lets you name a person to make treatment decisions for you when you can't speak for yourself. This person is called a health care agent. If you do not have an advance directive, decisions about your medical care may be made by a doctor or a  who doesn't know you. It may help to think of an advance directive as a gift to the people who care for you. If you have one, they won't have to make tough decisions by themselves. Follow-up care is a key part of your treatment and safety. Be sure to make and go to all appointments, and call your doctor if you are having problems.  It's also a good idea to know your test results and keep a list of the medicines you take. How can you care for yourself at home? · Discuss your wishes with your loved ones and your doctor. This way, there are no surprises. · Many states have a unique form. Or you might use a universal form that has been approved by many states. This kind of form can sometimes be completed and stored online. Your electronic copy will then be available wherever you have a connection to the Internet. In most cases, doctors will respect your wishes even if you have a form from a different state. · You don't need a  to do an advance directive. But you may want to get legal advice. · Think about these questions when you prepare an advance directive:  ¨ Who do you want to make decisions about your medical care if you are not able to? Many people choose a family member or close friend. ¨ Do you know enough about life support methods that might be used? If not, talk to your doctor so you understand. ¨ What are you most afraid of that might happen? You might be afraid of having pain, losing your independence, or being kept alive by machines. ¨ Where would you prefer to die? Choices include your home, a hospital, or a nursing home. ¨ Would you like to have information about hospice care to support you and your family? ¨ Do you want to donate organs when you die? ¨ Do you want certain Jewish practices performed before you die? If so, put your wishes in the advance directive. · Read your advance directive every year, and make changes as needed. When should you call for help? Be sure to contact your doctor if you have any questions. Where can you learn more? Go to http://cammie-derik.info/. Enter R264 in the search box to learn more about \"Advance Directives: Care Instructions. \"  Current as of: November 17, 2016  Content Version: 11.3  © 0274-5251 [employer].  Care instructions adapted under license by [employer] (which disclaims liability or warranty for this information). If you have questions about a medical condition or this instruction, always ask your healthcare professional. [place] Sheehan any warranty or liability for your use of this information. Romy Funez Date  07/06/2017  Medicare Part B Preventive Services Limitations Recommendation/Date completed if known Scheduled/ Next Due   Bone Mass Measurement  (age 72 & older, biennial) Requires diagnosis related to osteoporosis or estrogen deficiency. Biennial benefit unless patient has history of long-term glucocorticoid tx or baseline is needed because initial test was by other method Completed:      Recommended every 2 years DUE: not indicated   Cardiovascular Screening Blood Tests (every 5 years)  Total cholesterol, HDL, Triglycerides Order as a panel if possible Completed:      Recommended annually DUE: today    Colorectal Cancer Screening  -Fecal occult blood test (annual)  -Flexible sigmoidoscopy (5y)  -Screening colonoscopy (10y)  -Barium Enema  Completed:2009         Recommended every 10 years DUE: no longer needed   Counseling to Prevent Tobacco Use (up to 8 sessions per year)  - Counseling greater than 3 and up to 10 minutes  - Counseling greater than 10 minutes Patients must be asymptomatic of tobacco-related conditions to receive as preventive service     Diabetes Screening Tests (at least every 3 years, Medicare covers annually or at 6-month intervals for prediabetic patients)    Fasting blood sugar (FBS) or glucose tolerance test (GTT)       Patient must be diagnosed with one of the following:  -Hypertension, Dyslipidemia, obesity, previous impaired FBS or GTT  Or any two of the following: overweight, FH of diabetes, age ? 72, history of gestational diabetes, birth of baby weighing more than 9 pounds Completed:  Pre diabetes in Feb      Recommended annually DUE: today has prediabetes    Diabetes Self-Management Training (DSMT) (no USPSTF recommendation) Requires referral by treating physician for patient with diabetes or renal disease. 10 hours of initial DSMT session of no less than 30 minutes each in a continuous 12-month period. 2 hours of follow-up DSMT in subsequent years. Glaucoma Screening (no USPSTF recommendation) Diabetes mellitus, family history, , age 48 or over,  American, age 72 or over Completed: June 2017        Recommended annually DUE: 2018    Human Immunodeficiency Virus (HIV) Screening (annually for increased risk patients)  HIV-1 and HIV-2 by EIA, RAÚL, rapid antibody test, or oral mucosa transudate Patient must be at increased risk for HIV infection per USPSTF guidelines or pregnant. Tests covered annually for patients at increased risk. Pregnant patients may receive up to 3 test during pregnancy. Medical Nutrition Therapy (MNT) (for diabetes or renal disease not recommended schedule) Requires referral by treating physician for patient with diabetes or renal disease. Can be provided in same year as diabetes self-management training (DSMT), and CMS recommends medical nutrition therapy take place after DSMT. Up to 3 hours for initial year and 2 hours in subsequent years. Prostate Cancer Screening (annually up to age 76)  - Digital rectal exam (SUSAN)  - Prostate specific antigen (PSA) Annually (age 48 or over), SUSAN not paid separately when covered E/M service is provided on same date Completed: PSA done in Feb 2017        Recommended annually DUE: Feb 2018   Seasonal Influenza Vaccination (annually)  Completed:       Recommended annually    DUE every Fall   Pneumococcal Vaccination (once after 65)  Completed: done   A Shingles Vaccine is also recommended once in a lifetime after age 61.    Done    Hepatitis B Vaccinations (if medium/high risk) Medium/high risk factors:  End-stage renal disease,  Hemophiliacs who received Factor VIII or IX concentrates, Clients of institutions for the mentally retarded, Persons who live in the same house as a HepB virus carrier, Homosexual men, Illicit injectable drug abusers. Screening Mammography (biennial age 54-69)? Annually (age 36 or over) Completed:     Recommended annually DUE: not indicated   Screening Pap Tests and Pelvic Examination (up to age 79 and after 79 if unknown history or abnormal study last 10 years) Every 25 months except high risk Completed:        Recommended every 2 years DUE: not indicated   Ultrasound Screening for Abdominal Aortic Aneurysm (AAA) (once) Patient must be referred through IPPE and not have had a screening for abdominal aortic aneurysm before under Medicare. Limited to patients who meet one of the following criteria:  - Men who are 73-68 years old and have smoked more than 100 cigarettes in their lifetime.  -Anyone with a FH of AAA  -Anyone recommended for screening by USPSTF Not covered by Medicare as preventive. Non smoker not indicated     Thanks for coming in today. It was nice to spend some time with you. If you have any questions about your visit today, please call [phone] and ask for [facility].

## 2017-07-06 NOTE — MR AVS SNAPSHOT
Visit Information Date & Time Provider Department Dept. Phone Encounter #  
 7/6/2017 10:30 AM Valentina Chaparro, [address] [place] [address],[address] [phone] [id number] Follow-up Instructions Return in about 3 months (around 10/6/2017) for HTN and leg swelling . Your Appointments 7/24/2017 10:00 AM  
Any with Tigist Castro MD  
[address] ([facility]) Appt Note: 1st adherence visit [address] [facility] [address] [phone] [address] [facility] [address] [address]  
  
    
 8/28/2017 10:20 AM  
ESTABLISHED PATIENT with Kera Raygoza MD  
[facility] ([facility]) Appt Note: 6 month follow up  
 [address] [facility] 7 [address] Upcoming Health Maintenance Date Due  
 MEDICARE YEARLY EXAM 10/10/2002 INFLUENZA AGE 9 TO ADULT 8/1/2017 GLAUCOMA SCREENING Q2Y 6/10/2019 DTaP/Tdap/Td series (2 - Td) 12/5/2021 Allergies as of 7/6/2017  Review Complete On: 7/6/2017 By: Blake Roman Severity Noted Reaction Type Reactions Oxycodone Low 01/09/2017    Other (comments) Patient states, \"It made me feel unwell. \" Current Immunizations  Reviewed on 2/17/2017 Name Date Influenza Vaccine 10/1/2016 Pneumococcal Conjugate (PCV-13) 12/18/2015 Tdap 12/5/2011 Not reviewed this visit You Were Diagnosed With   
  
 Codes Comments PAF (paroxysmal [facility] fibrillation) ([facility]    -  Primary ICD-10-CM: I48.0 ICD-9-CM: 427.31 Essential hypertension     ICD-10-CM: I10 
ICD-9-CM: 401.9 LINDA treated with BiPAP     ICD-10-CM: G47.33 
ICD-9-CM: 327.23 Leg edema, right     ICD-10-CM: R60.0 ICD-9-CM: 008. 3 Elevated triglycerides with high cholesterol     ICD-10-CM: E78.2 ICD-9-CM: 272.2 Pre-diabetes     ICD-10-CM: R73.03 
ICD-9-CM: 790.29 Elevated glucose     ICD-10-CM: R73.09 
ICD-9-CM: 790.29 Vitals BP Pulse Temp Resp Height(growth percentile) Weight(growth percentile) 128/81 (BP 1 Location: Right arm, BP Patient Position: Sitting) 74 98 °F (36.7 °C) (Oral) 18 5' 8\" (1.727 m) 208 lb 1.6 oz (94.4 kg) SpO2 BMI Smoking Status 99% 31.64 kg/m2 Never Smoker BMI and BSA Data Body Mass Index Body Surface Area  
 31.64 kg/m 2 2.13 m 2 Preferred Pharmacy Pharmacy Name Phone [phone] [phone] Your Updated Medication List  
  
   
This list is accurate as of: 7/6/17 11:12 AM.  Always use your most recent med list.  
  
  
  
  
 ALPHAGAN P 0.1 % ophthalmic solution Generic drug:  brimonidine Administer 1 Drop to both eyes daily. Both eyes PM and lt eye in am also  
  
 amLODIPine 2.5 mg tablet Commonly known as:  Yamil Hubbardon TAKE 2 TABLETS BY MOUTH DAILY. apixaban 5 mg tablet Commonly known as:  Magdiel Regalado Take 1 Tab by mouth two (2) times a day. azithromycin 250 mg tablet Commonly known as:  Merline Gillin Take 1 Tab by mouth See Admin Instructions for 6 doses. Take 2 tabs on day one followed by 1 tab daily for a total of 5 days. celecoxib 200 mg capsule Commonly known as:  CELEBREX  
TAKE 1 TABLET BY MOUTH ONCE OR TWICE DAILY  
  
 clobetasol 0.05 % topical cream  
Commonly known as:  Lisabeth Puls Apply  to affected area two (2) times a day. diclofenac 1 % Gel Commonly known as:  VOLTAREN  
APPLY 3-4 TIMES DAILY TO AFFECTED AREAS  
  
 doxazosin 4 mg tablet Commonly known as:  CARDURA Take 4 mg by mouth daily. fluorouracil 5 % chemo cream  
Commonly known as:  EFUDEX  
1 APPLICATION APPLY ON THE SKIN TWICE A DAY APPLY TWICE DAILY FOR 14 DAYS  
  
 furosemide 40 mg tablet Commonly known as:  LASIX Take 1 Tab by mouth daily as needed. As needed for swelling  
  
 lisinopril 20 mg tablet Commonly known as:  Compa Wang Take 1 Tab by mouth daily. May resume medication when b/p greater than 120/80. LUMIGAN 0.01 % ophthalmic drops Generic drug:  bimatoprost  
INSTILL 1 DROP INTO EACH EYE EVERY NIGHT AT BEDTIME Prescriptions Sent to Pharmacy Refills  
 furosemide (LASIX) 40 mg tablet 4 Sig: Take 1 Tab by mouth daily as needed. As needed for swelling Class: Normal  
 Pharmacy: [place] [address] [facility] [address] [place] [address] #: [phone] Route: Oral  
  
We Performed the Following HEMOGLOBIN A1C WITH EAG [48644 CPT(R)] LIPID PANEL [81204 CPT(R)] METABOLIC PANEL, COMPREHENSIVE [91483 CPT(R)] Follow-up Instructions Return in about 3 months (around 10/6/2017) for HTN and leg swelling . To-Do List   
 07/06/2017 Imaging:  DUPLEX LOWER EXT VENOUS RIGHT Patient Instructions Advance Directives: Care Instructions Your Care Instructions An advance directive is a legal way to state your wishes at the end of your life. It tells your family and your doctor what to do if you can no longer say what you want. There are two main types of advance directives. You can change them any time that your wishes change. · A living will tells your family and your doctor your wishes about life support and other treatment. · A durable power of  for health care lets you name a person to make treatment decisions for you when you can't speak for yourself. This person is called a health care agent. If you do not have an advance directive, decisions about your medical care may be made by a doctor or a  who doesn't know you. It may help to think of an advance directive as a gift to the people who care for you. If you have one, they won't have to make tough decisions by themselves. Follow-up care is a key part of your treatment and safety. Be sure to make and go to all appointments, and call your doctor if you are having problems. It's also a good idea to know your test results and keep a list of the medicines you take. How can you care for yourself at home? · Discuss your wishes with your loved ones and your doctor. This way, there are no surprises. · Many states have a unique form. Or you might use a universal form that has been approved by many states. This kind of form can sometimes be completed and stored online. Your electronic copy will then be available wherever you have a connection to the Internet. In most cases, doctors will respect your wishes even if you have a form from a different state. · You don't need a  to do an advance directive. But you may want to get legal advice. · Think about these questions when you prepare an advance directive: ¨ Who do you want to make decisions about your medical care if you are not able to? Many people choose a family member or close friend. ¨ Do you know enough about life support methods that might be used? If not, talk to your doctor so you understand. ¨ What are you most afraid of that might happen? You might be afraid of having pain, losing your independence, or being kept alive by machines. ¨ Where would you prefer to die? Choices include your home, a hospital, or a nursing home. ¨ Would you like to have information about hospice care to support you and your family? ¨ Do you want to donate organs when you die? ¨ Do you want certain Restorationist practices performed before you die? If so, put your wishes in the advance directive. · Read your advance directive every year, and make changes as needed. When should you call for help? Be sure to contact your doctor if you have any questions. Where can you learn more? Go to http://cammie-derik.info/. Enter R264 in the search box to learn more about \"Advance Directives: Care Instructions. \" Current as of: November 17, 2016 Content Version: 11.3 © 3946-2000 [employer]. Care instructions adapted under license by [employer] (which disclaims liability or warranty for this information). If you have questions about a medical condition or this instruction, always ask your healthcare professional. [address] any warranty or liability for your use of this information. Advance Directives: Care Instructions Your Care Instructions An advance directive is a legal way to state your wishes at the end of your life. It tells your family and your doctor what to do if you can no longer say what you want. There are two main types of advance directives. You can change them any time that your wishes change. · A living will tells your family and your doctor your wishes about life support and other treatment. · A durable power of  for health care lets you name a person to make treatment decisions for you when you can't speak for yourself. This person is called a health care agent. If you do not have an advance directive, decisions about your medical care may be made by a doctor or a  who doesn't know you. It may help to think of an advance directive as a gift to the people who care for you. If you have one, they won't have to make tough decisions by themselves. Follow-up care is a key part of your treatment and safety. Be sure to make and go to all appointments, and call your doctor if you are having problems. It's also a good idea to know your test results and keep a list of the medicines you take. How can you care for yourself at home? · Discuss your wishes with your loved ones and your doctor. This way, there are no surprises. · Many states have a unique form.  Or you might use a universal form that has been approved by many states. This kind of form can sometimes be completed and stored online. Your electronic copy will then be available wherever you have a connection to the Internet. In most cases, doctors will respect your wishes even if you have a form from a different state. · You don't need a  to do an advance directive. But you may want to get legal advice. · Think about these questions when you prepare an advance directive: ¨ Who do you want to make decisions about your medical care if you are not able to? Many people choose a family member or close friend. ¨ Do you know enough about life support methods that might be used? If not, talk to your doctor so you understand. ¨ What are you most afraid of that might happen? You might be afraid of having pain, losing your independence, or being kept alive by machines. ¨ Where would you prefer to die? Choices include your home, a hospital, or a nursing home. ¨ Would you like to have information about hospice care to support you and your family? ¨ Do you want to donate organs when you die? ¨ Do you want certain Restorationism practices performed before you die? If so, put your wishes in the advance directive. · Read your advance directive every year, and make changes as needed. When should you call for help? Be sure to contact your doctor if you have any questions. Where can you learn more? Go to http://cammie-derik.info/. Enter R264 in the search box to learn more about \"Advance Directives: Care Instructions. \" Current as of: November 17, 2016 Content Version: 11.3 © 1438-6710 [employer]. Care instructions adapted under license by [employer] (which disclaims liability or warranty for this information).  If you have questions about a medical condition or this instruction, always ask your healthcare professional. [address] any warranty or liability for your use of this information. Advance Directives: Care Instructions Your Care Instructions An advance directive is a legal way to state your wishes at the end of your life. It tells your family and your doctor what to do if you can no longer say what you want. There are two main types of advance directives. You can change them any time that your wishes change. · A living will tells your family and your doctor your wishes about life support and other treatment. · A durable power of  for health care lets you name a person to make treatment decisions for you when you can't speak for yourself. This person is called a health care agent. If you do not have an advance directive, decisions about your medical care may be made by a doctor or a  who doesn't know you. It may help to think of an advance directive as a gift to the people who care for you. If you have one, they won't have to make tough decisions by themselves. Follow-up care is a key part of your treatment and safety. Be sure to make and go to all appointments, and call your doctor if you are having problems. It's also a good idea to know your test results and keep a list of the medicines you take. How can you care for yourself at home? · Discuss your wishes with your loved ones and your doctor. This way, there are no surprises. · Many states have a unique form. Or you might use a universal form that has been approved by many states. This kind of form can sometimes be completed and stored online. Your electronic copy will then be available wherever you have a connection to the Internet. In most cases, doctors will respect your wishes even if you have a form from a different state. · You don't need a  to do an advance directive. But you may want to get legal advice. · Think about these questions when you prepare an advance directive: ¨ Who do you want to make decisions about your medical care if you are not able to? Many people choose a family member or close friend. ¨ Do you know enough about life support methods that might be used? If not, talk to your doctor so you understand. ¨ What are you most afraid of that might happen? You might be afraid of having pain, losing your independence, or being kept alive by machines. ¨ Where would you prefer to die? Choices include your home, a hospital, or a nursing home. ¨ Would you like to have information about hospice care to support you and your family? ¨ Do you want to donate organs when you die? ¨ Do you want certain Presybeterian practices performed before you die? If so, put your wishes in the advance directive. · Read your advance directive every year, and make changes as needed. When should you call for help? Be sure to contact your doctor if you have any questions. Where can you learn more? Go to http://cammiemotionID technologiesderik.info/. Enter R264 in the search box to learn more about \"Advance Directives: Care Instructions. \" Current as of: November 17, 2016 Content Version: 11.3 © 4746-4653 [employer]. Care instructions adapted under license by [employer] (which disclaims liability or warranty for this information). If you have questions about a medical condition or this instruction, always ask your healthcare professional. [address] any warranty or liability for your use of this information. Vianney Flores Date  07/06/2017 Medicare Part B Preventive Services Limitations Recommendation/Date completed if known Scheduled/ Next Due Bone Mass Measurement 
(age 72 & older, biennial) Requires diagnosis related to osteoporosis or estrogen deficiency. Biennial benefit unless patient has history of long-term glucocorticoid tx or baseline is needed because initial test was by other method Completed: 
 
 
Recommended every 2 years DUE: not indicated Cardiovascular Screening Blood Tests (every 5 years) Total cholesterol, HDL, Triglycerides Order as a panel if possible Completed: 
 
 
Recommended annually DUE: today Colorectal Cancer Screening 
-Fecal occult blood test (annual) -Flexible sigmoidoscopy (5y) 
-Screening colonoscopy (10y) -Barium Enema  Completed:2009 Recommended every 10 years DUE: no longer needed Counseling to Prevent Tobacco Use (up to 8 sessions per year) - Counseling greater than 3 and up to 10 minutes - Counseling greater than 10 minutes Patients must be asymptomatic of tobacco-related conditions to receive as preventive service Diabetes Screening Tests (at least every 3 years, Medicare covers annually or at 6-month intervals for prediabetic patients) Fasting blood sugar (FBS) or glucose tolerance test (GTT) Patient must be diagnosed with one of the following: 
-Hypertension, Dyslipidemia, obesity, previous impaired FBS or GTT 
Or any two of the following: overweight, FH of diabetes, age ? 72, history of gestational diabetes, birth of baby weighing more than 9 pounds Completed: 
Pre diabetes in Feb 
 
 
Recommended annually DUE: today has prediabetes Diabetes Self-Management Training (DSMT) (no USPSTF recommendation) Requires referral by treating physician for patient with diabetes or renal disease. 10 hours of initial DSMT session of no less than 30 minutes each in a continuous 12-month period. 2 hours of follow-up DSMT in subsequent years. Glaucoma Screening (no USPSTF recommendation) Diabetes mellitus, family history, , age 48 or over,  American, age 72 or over Completed: June 2017 Recommended annually DUE: 2018 Human Immunodeficiency Virus (HIV) Screening (annually for increased risk patients) HIV-1 and HIV-2 by EIA, RAÚL, rapid antibody test, or oral mucosa transudate Patient must be at increased risk for HIV infection per USPSTF guidelines or pregnant.   Tests covered annually for patients at increased risk.  Pregnant patients may receive up to 3 test during pregnancy. Medical Nutrition Therapy (MNT) (for diabetes or renal disease not recommended schedule) Requires referral by treating physician for patient with diabetes or renal disease. Can be provided in same year as diabetes self-management training (DSMT), and CMS recommends medical nutrition therapy take place after DSMT. Up to 3 hours for initial year and 2 hours in subsequent years. Prostate Cancer Screening (annually up to age 76) - Digital rectal exam (SUSAN) - Prostate specific antigen (PSA) Annually (age 48 or over), SUSAN not paid separately when covered E/M service is provided on same date Completed: PSA done in Feb 2017 Recommended annually DUE: Feb 2018 Seasonal Influenza Vaccination (annually)  Completed: 
 
 
 Recommended annually DUE every Fall Pneumococcal Vaccination (once after 65)  Completed: done A Shingles Vaccine is also recommended once in a lifetime after age 61. Done Hepatitis B Vaccinations (if medium/high risk) Medium/high risk factors:  End-stage renal disease, Hemophiliacs who received Factor VIII or IX concentrates, Clients of institutions for the mentally retarded, Persons who live in the same house as a HepB virus carrier, Homosexual men, Illicit injectable drug abusers. Screening Mammography (biennial age 54-69)? Annually (age 36 or over) Completed:  
 
Recommended annually DUE: not indicated Screening Pap Tests and Pelvic Examination (up to age 79 and after 79 if unknown history or abnormal study last 10 years) Every 24 months except high risk Completed: 
 
 
 
Recommended every 2 years DUE: not indicated Ultrasound Screening for Abdominal Aortic Aneurysm (AAA) (once) Patient must be referred through IPPE and not have had a screening for abdominal aortic aneurysm before under Medicare.   Limited to patients who meet one of the following criteria: 
 - Men who are 73-68 years old and have smoked more than 100 cigarettes in their lifetime. 
-Anyone with a FH of AAA 
-Anyone recommended for screening by USPSTF Not covered by Medicare as preventive. Non smoker not indicated Thanks for coming in today. It was nice to spend some time with you. If you have any questions about your visit today, please call [phone] and ask for [facility]. Introducing [facility]! Dear Alma Harry: Thank you for requesting a Passbox account. Our records indicate that you already have an active Passbox account. You can access your account anytime at [email or web address] Did you know that you can access your hospital and ER discharge instructions at any time in Passbox? You can also review all of your test results from your hospital stay or ER visit. Additional Information If you have questions, please visit the Frequently Asked Questions section of the Passbox website at [email or web address] Remember, Passbox is NOT to be used for urgent needs. For medical emergencies, dial 911. Now available from your iPhone and Android! Please provide this summary of care documentation to your next provider. Your primary care clinician is listed as ANGELA Whalen. If you have any questions after today's visit, please call [phone].

## 2017-07-07 LAB
ALBUMIN SERPL-MCNC: 4.2 G/DL (ref 3.5–4.8)
ALBUMIN/GLOB SERPL: 2 {RATIO} (ref 1.2–2.2)
ALP SERPL-CCNC: 66 IU/L (ref 39–117)
ALT SERPL-CCNC: 13 IU/L (ref 0–44)
AST SERPL-CCNC: 16 IU/L (ref 0–40)
BILIRUB SERPL-MCNC: 0.5 MG/DL (ref 0–1.2)
BUN SERPL-MCNC: 15 MG/DL (ref 8–27)
BUN/CREAT SERPL: 14 (ref 10–24)
CALCIUM SERPL-MCNC: 9.1 MG/DL (ref 8.6–10.2)
CHLORIDE SERPL-SCNC: 101 MMOL/L (ref 96–106)
CHOLEST SERPL-MCNC: 197 MG/DL (ref 100–199)
CO2 SERPL-SCNC: 24 MMOL/L (ref 18–29)
CREAT SERPL-MCNC: 1.06 MG/DL (ref 0.76–1.27)
EST. AVERAGE GLUCOSE BLD GHB EST-MCNC: 126 MG/DL
GLOBULIN SER CALC-MCNC: 2.1 G/DL (ref 1.5–4.5)
GLUCOSE SERPL-MCNC: 99 MG/DL (ref 65–99)
HBA1C MFR BLD: 6 % (ref 4.8–5.6)
HDLC SERPL-MCNC: 31 MG/DL
LDLC SERPL CALC-MCNC: 122 MG/DL (ref 0–99)
POTASSIUM SERPL-SCNC: 4.6 MMOL/L (ref 3.5–5.2)
PROT SERPL-MCNC: 6.3 G/DL (ref 6–8.5)
SODIUM SERPL-SCNC: 143 MMOL/L (ref 134–144)
TRIGL SERPL-MCNC: 220 MG/DL (ref 0–149)
VLDLC SERPL CALC-MCNC: 44 MG/DL (ref 5–40)

## 2017-07-09 RX ORDER — OMEGA-3-ACID ETHYL ESTERS 1 G/1
2 CAPSULE, LIQUID FILLED ORAL 2 TIMES DAILY WITH MEALS
Qty: 30 CAP | Refills: 5 | Status: SHIPPED | OUTPATIENT
Start: 2017-07-09 | End: 2019-01-03 | Stop reason: ALTCHOICE

## 2017-07-09 NOTE — PROGRESS NOTES
Prediabetes is stable - hemoglobin A1C is 6.0 Continue avoiding sugar and carbohydrates. We will repeat level in 6 months  Cholesterol is more elevated than previously - both triglycerides and LDL ( bad cholesterol) - recommend starting fish oil - lovaza. Prescription sent to pharmacy.   Can cause mild nausea/ dyspepsia ( 3%) of patients   Kidney and liver function are normal

## 2017-07-10 ENCOUNTER — TELEPHONE (OUTPATIENT)
Dept: SLEEP MEDICINE | Age: 82
End: 2017-07-10

## 2017-07-10 NOTE — TELEPHONE ENCOUNTER
Adherence improvement call placed by Sleep Educator per  request for assessment. Patient is 76.7%down from 80.0%  over past (30 days) 6/10/2017 - 7/9/2017 . Average % of Night in Large Leak: 25.2% was reviewed with Mr. Carolina. Patient has a follow up appointment with Dr. Kath Reddy this month bring pap to review mask fit   Patient was asked to contact our office for any problems regarding there PAP therapy.

## 2017-07-11 NOTE — PROGRESS NOTES
Spoke with patient and reviewed labs and recommendations per Dr. Clement Hawkins. He verbalized understanding and states no questions at this time.

## 2017-07-13 ENCOUNTER — HOSPITAL ENCOUNTER (OUTPATIENT)
Dept: ULTRASOUND IMAGING | Age: 82
Discharge: HOME | End: 2017-07-13
Attending: INTERNAL MEDICINE
Payer: MEDICARE

## 2017-07-13 DIAGNOSIS — R60.0 LEG EDEMA, RIGHT: ICD-10-CM

## 2017-07-13 PROCEDURE — 93971 EXTREMITY STUDY: CPT

## 2017-07-14 NOTE — PROGRESS NOTES
Called, spoke to pt. Two pt identifiers confirmed. Pt informed per Dr. Ruth Rm no DVT was noted on right leg. Pt verbalized understanding of information discussed w/ no further questions at this time.

## 2017-07-18 ENCOUNTER — TELEPHONE (OUTPATIENT)
Dept: INTERNAL MEDICINE CLINIC | Age: 82
End: 2017-07-18

## 2017-07-19 NOTE — TELEPHONE ENCOUNTER
Spoke with Jese Randhawa at [address] [place] [address]. Patient needs PA for Alexus Crain we have not received request.     She will fax to [phone] now.

## 2017-07-20 ENCOUNTER — TELEPHONE (OUTPATIENT)
Dept: INTERNAL MEDICINE CLINIC | Age: 82
End: 2017-07-20

## 2017-07-20 NOTE — TELEPHONE ENCOUNTER
7/20/17 @15:17 Called  transferred to PA department. PA initiated with PA rep Terri Dowlinga,take 2 caps by mouth two(2) times daily(with meals). Case [id number]. Turn around time for decision 24 to 72 hours,decision will be faxed to [phone] and a letter mailed to patient.

## 2017-07-24 ENCOUNTER — OFFICE VISIT (OUTPATIENT)
Dept: SLEEP MEDICINE | Age: 82
End: 2017-07-24

## 2017-07-24 ENCOUNTER — DOCUMENTATION ONLY (OUTPATIENT)
Dept: SLEEP MEDICINE | Age: 82
End: 2017-07-24

## 2017-07-24 VITALS
TEMPERATURE: 98.1 F | BODY MASS INDEX: 32.58 KG/M2 | OXYGEN SATURATION: 97 % | WEIGHT: 215 LBS | SYSTOLIC BLOOD PRESSURE: 138 MMHG | HEIGHT: 68 IN | DIASTOLIC BLOOD PRESSURE: 75 MMHG | HEART RATE: 74 BPM

## 2017-07-24 DIAGNOSIS — E66.9 OBESITY (BMI 30-39.9): ICD-10-CM

## 2017-07-24 DIAGNOSIS — G47.33 OSA (OBSTRUCTIVE SLEEP APNEA): Primary | ICD-10-CM

## 2017-07-24 NOTE — PROGRESS NOTES
Per Drs order, changed pressure to [id number]. Also, patient was properly educated on \"how to\" adjust his mask if leaks occur. He will call us if he has any questions or concerns.

## 2017-07-24 NOTE — PATIENT INSTRUCTIONS
[facility] [place]. [place], [address]  Tel.  [phone]  Fax. [address] [facility] 60  [place], [address] [facility]  Tel.  [phone]  Fax. [phone] [address]  Tel.  [phone]  Fax. [phone]     Learning About CPAP for Sleep Apnea  What is CPAP? CPAP is a small machine that you use at home every night while you sleep. It increases air pressure in your throat to keep your airway open. When you have sleep apnea, this can help you sleep better so you feel much better. CPAP stands for \"continuous positive airway pressure. \"  The CPAP machine will have one of the following:  · A mask that covers your nose and mouth  · Prongs that fit into your nose  · A mask that covers your nose only, the most common type. This type is called NCPAP. The N stands for \"nasal.\"  Why is it done? CPAP is usually the best treatment for obstructive sleep apnea. It is the first treatment choice and the most widely used. Your doctor may suggest CPAP if you have:  · Moderate to severe sleep apnea. · Sleep apnea and coronary artery disease (CAD) or heart failure. How does it help? · CPAP can help you have more normal sleep, so you feel less sleepy and more alert during the daytime. · CPAP may help keep heart failure or other heart problems from getting worse. · NCPAP may help lower your blood pressure. · If you use CPAP, your bed partner may also sleep better because you are not snoring or restless. What are the side effects? Some people who use CPAP have:  · A dry or stuffy nose and a sore throat. · Irritated skin on the face. · Sore eyes. · Bloating. If you have any of these problems, work with your doctor to fix them. Here are some things you can try:  · Be sure the mask or nasal prongs fit well. · See if your doctor can adjust the pressure of your CPAP. · If your nose is dry, try a humidifier.   · If your nose is runny or stuffy, try decongestant medicine or a steroid nasal spray. If these things do not help, you might try a different type of machine. Some machines have air pressure that adjusts on its own. Others have air pressures that are different when you breathe in than when you breathe out. This may reduce discomfort caused by too much pressure in your nose. Where can you learn more? Go to Digital Luxury.be  Enter Laurent Christina in the search box to learn more about \"Learning About CPAP for Sleep Apnea. \"   © 5448-8405 Healthwise, Incorporated. Care instructions adapted under license by [employer] (which disclaims liability or warranty for this information). This care instruction is for use with your licensed healthcare professional. If you have questions about a medical condition or this instruction, always ask your healthcare professional. [place] [address] any warranty or liability for your use of this information. Content Version: 2.1.46126; Last Revised: January 11, 2010  PROPER SLEEP HYGIENE    What to avoid  · Do not have drinks with caffeine, such as coffee or black tea, for 8 hours before bed. · Do not smoke or use other types of tobacco near bedtime. Nicotine is a stimulant and can keep you awake. · Avoid drinking alcohol late in the evening, because it can cause you to wake in the middle of the night. · Do not eat a big meal close to bedtime. If you are hungry, eat a light snack. · Do not drink a lot of water close to bedtime, because the need to urinate may wake you up during the night. · Do not read or watch TV in bed. Use the bed only for sleeping and sexual activity. What to try  · Go to bed at the same time every night, and wake up at the same time every morning. Do not take naps during the day. · Keep your bedroom quiet, dark, and cool. · Get regular exercise, but not within 3 to 4 hours of your bedtime. .  · Sleep on a comfortable pillow and mattress.   · If watching the clock makes you anxious, turn it facing away from you so you cannot see the time. · If you worry when you lie down, start a worry book. Well before bedtime, write down your worries, and then set the book and your concerns aside. · Try meditation or other relaxation techniques before you go to bed. · If you cannot fall asleep, get up and go to another room until you feel sleepy. Do something relaxing. Repeat your bedtime routine before you go to bed again. · Make your house quiet and calm about an hour before bedtime. Turn down the lights, turn off the TV, log off the computer, and turn down the volume on music. This can help you relax after a busy day. Drowsy Driving: The [employer] cites drowsiness as a causing factor in more than 638,519 police reported crashes annually, resulting in 76,000 injuries and 1,500 deaths. Other surveys suggest 55% of people polled have driven while drowsy in the past year, 23% had fallen asleep but not crashed, 3% crashed, and 2% had and accident due to drowsy driving. Who is at risk? Young Drivers: One study of drowsy driving accidents states that 55% of the drivers were under 25 years. Of those, 75% were male. Shift Workers and Travelers: People who work overnight or travel across time zones frequently are at higher risk of experiencing Circadian Rhythm Disorders. They are trying to work and function when their body is programed to sleep. Sleep Deprived: Lack of sleep has a serious impact on your ability to pay attention or focus on a task. Consistently getting less than the average of 8 hours your body needs creates partial or cumulative sleep deprivation. Untreated Sleep Disorders: Sleep Apnea, Narcolepsy, R.L.S., and other sleep disorders (untreated) prevent a person from getting enough restful sleep. This leads to excessive daytime sleepiness and increases the risk for drowsy driving accidents by up to 7 times.   Medications / Alcohol: Even over the counter medications can cause drowsiness. Medications that impair a drivers attention should have a warning label. Alcohol naturally makes you sleepy and on its own can cause accidents. Combined with excessive drowsiness its effects are amplified. Signs of Drowsy Driving:   * You don't remember driving the last few miles   * You may drift out of your kieran   * You are unable to focus and your thoughts wander   * You may yawn more often than normal   * You have difficulty keeping your eyes open / nodding off   * Missing traffic signs, speeding, or tailgating  Prevention-   Good sleep hygiene, lifestyle and behavioral choices have the most impact on drowsy driving. There is no substitute for sleep and the average person requires 8 hours nightly. If you find yourself driving drowsy, stop and sleep. Consider the sleep hygiene tips provided during your visit as well. Medication Refill Policy: Refills for all medications require 1 week advance notice. Please have your pharmacy fax a refill request. We are unable to fax, or call in \"controled substance\" medications and you will need to pick these prescriptions up from our office. ScaleMP Activation    Thank you for requesting access to ScaleMP. Please follow the instructions below to securely access and download your online medical record. ScaleMP allows you to send messages to your doctor, view your test results, renew your prescriptions, schedule appointments, and more. How Do I Sign Up? 1. In your internet browser, go to [email or web address]. 2. Click on the First Time User? Click Here link in the Sign In box. You will see the New Member Sign Up page. 3. Enter your ScaleMP Access Code exactly as it appears below. You will not need to use this code after youve completed the sign-up process. If you do not sign up before the expiration date, you must request a new code. ScaleMP Access Code:  Activation code not generated  Current Sendio Status: Active (This is the date your Sendio access code will )    4. Enter the last four digits of your Social Security Number (xxxx) and Date of Birth (mm/dd/yyyy) as indicated and click Submit. You will be taken to the next sign-up page. 5. Create a Sendio ID. This will be your Sendio login ID and cannot be changed, so think of one that is secure and easy to remember. 6. Create a Sendio password. You can change your password at any time. 7. Enter your Password Reset Question and Answer. This can be used at a later time if you forget your password. 8. Enter your e-mail address. You will receive e-mail notification when new information is available in [address] 9. Click Sign Up. You can now view and download portions of your medical record. 10. Click the Download Summary menu link to download a portable copy of your medical information. Additional Information    If you have questions, please call [phone]. Remember, Sendio is NOT to be used for urgent needs. For medical emergencies, dial 911. Starting a Weight Loss Plan: After Your Visit  Your Care Instructions  If you are thinking about losing weight, it can be hard to know where to start. Your doctor can help you set up a weight loss plan that best meets your needs. You may want to take a class on nutrition or exercise, or join a weight loss support group. If you have questions about how to make changes to your eating or exercise habits, ask your doctor about seeing a registered dietitian or an exercise specialist.  It can be a big challenge to lose weight. But you do not have to make huge changes at once. Make small changes, and stick with them. When those changes become habit, add a few more changes. If you do not think you are ready to make changes right now, try to pick a date in the future.  Make an appointment to see your doctor to discuss whether the time is right for you to start a plan.  Follow-up care is a key part of your treatment and safety. Be sure to make and go to all appointments, and call your doctor if you are having problems. It's also a good idea to know your test results and keep a list of the medicines you take. How can you care for yourself at home? · Set realistic goals. Many people expect to lose much more weight than is likely. A weight loss of 5% to 10% of your body weight may be enough to improve your health. · Get family and friends involved to provide support. Talk to them about why you are trying to lose weight, and ask them to help. They can help by participating in exercise and having meals with you, even if they may be eating something different. · Find what works best for you. If you do not have time or do not like to cook, a program that offers meal replacement bars or shakes may be better for you. Or if you like to prepare meals, finding a plan that includes daily menus and recipes may be best.  · Ask your doctor about other health professionals who can help you achieve your weight loss goals. ¨ A dietitian can help you make healthy changes in your diet. ¨ An exercise specialist or  can help you develop a safe and effective exercise program.  ¨ A counselor or psychiatrist can help you cope with issues such as depression, anxiety, or family problems that can make it hard to focus on weight loss. · Consider joining a support group for people who are trying to lose weight. Your doctor can suggest groups in your area. Where can you learn more? Go to [employer].be  Enter U357 in the search box to learn more about \"Starting a Weight Loss Plan: After Your Visit. \"   © 1163-7604 Healthwise, Incorporated. Care instructions adapted under license by [employer] (which disclaims liability or warranty for this information).  This care instruction is for use with your licensed healthcare professional. If you have questions about a medical condition or this instruction, always ask your healthcare professional. [address] any warranty or liability for your use of this information.   Content Version: 8.2.37243; Last Revised: September 1, 2009

## 2017-07-24 NOTE — PROGRESS NOTES
PAP pressure change notification and clinical note was faxed to [facility] on 7/24/17

## 2017-07-25 ENCOUNTER — TELEPHONE (OUTPATIENT)
Dept: INTERNAL MEDICINE CLINIC | Age: 82
End: 2017-07-25

## 2017-07-25 NOTE — TELEPHONE ENCOUNTER
Patient will take Omega 3  Otc.     30 day supply for Anika Lewis will cost patient around $100 out of pocket.

## 2017-08-03 ENCOUNTER — TELEPHONE (OUTPATIENT)
Dept: INTERNAL MEDICINE CLINIC | Age: 82
End: 2017-08-03

## 2017-08-24 ENCOUNTER — TELEPHONE (OUTPATIENT)
Dept: INTERNAL MEDICINE CLINIC | Age: 82
End: 2017-08-24

## 2017-08-24 DIAGNOSIS — J20.9 ACUTE BRONCHITIS, UNSPECIFIED ORGANISM: Primary | ICD-10-CM

## 2017-08-24 RX ORDER — AZITHROMYCIN 250 MG/1
250 TABLET, FILM COATED ORAL SEE ADMIN INSTRUCTIONS
Qty: 6 TAB | Refills: 0 | Status: SHIPPED | OUTPATIENT
Start: 2017-08-24 | End: 2017-10-06 | Stop reason: ALTCHOICE

## 2017-08-24 RX ORDER — BENZONATATE 200 MG/1
200 CAPSULE ORAL
Qty: 20 CAP | Refills: 0 | Status: SHIPPED | OUTPATIENT
Start: 2017-08-24 | End: 2017-08-31

## 2017-08-24 NOTE — TELEPHONE ENCOUNTER
Returned call to Alvin Caity. States patient has been coughing up brown mucus. Denies fever. States taking Dayquil, Nyquil, and chloricedin. He is requesting rx for z-jalyn and cough medicine. Please advise.

## 2017-08-24 NOTE — TELEPHONE ENCOUNTER
Patient's wife, Li Hedrick, states she needs a call back to see if medication can be called in for cough & cold symptoms. Li Hedrick requests same medications as last cold. Please call to advise.  Thank you

## 2017-08-24 NOTE — TELEPHONE ENCOUNTER
Patient aware prescriptions sent to pharmacy. He will return call if symptoms worsen or do not improve.

## 2017-08-28 ENCOUNTER — OFFICE VISIT (OUTPATIENT)
Dept: CARDIOLOGY CLINIC | Age: 82
End: 2017-08-28

## 2017-08-28 VITALS
DIASTOLIC BLOOD PRESSURE: 60 MMHG | RESPIRATION RATE: 16 BRPM | HEART RATE: 68 BPM | HEIGHT: 68 IN | SYSTOLIC BLOOD PRESSURE: 100 MMHG | BODY MASS INDEX: 31.37 KG/M2 | WEIGHT: 207 LBS

## 2017-08-28 DIAGNOSIS — I10 ESSENTIAL HYPERTENSION: ICD-10-CM

## 2017-08-28 DIAGNOSIS — E78.00 PURE HYPERCHOLESTEROLEMIA: ICD-10-CM

## 2017-08-28 DIAGNOSIS — G47.33 OSA (OBSTRUCTIVE SLEEP APNEA): ICD-10-CM

## 2017-08-28 DIAGNOSIS — I48.0 PAF (PAROXYSMAL ATRIAL FIBRILLATION) (HCC): Primary | ICD-10-CM

## 2017-08-28 NOTE — MR AVS SNAPSHOT
Visit Information Date & Time Provider Department Dept. Phone Encounter #  
 2017 10:20 AM Kera Raygoza MD [facility] Alyce Tello [phone] [id number] Your Appointments 10/6/2017 10:30 AM  
ROUTINE CARE with Maria Luisa Sanderson MD  
[facility]) Appt Note: 3 month follow up  
 [address]  
  
   
 [address]  [address] [address]  
  
    
 3/1/2018 10:20 AM  
ESTABLISHED PATIENT with Kera Raygoza MD  
[facility] ([facility]) Appt Note: 6 mnth f/u  
 [address]  
[address] [phone] Upcoming Health Maintenance Date Due INFLUENZA AGE 9 TO ADULT 2017 MEDICARE YEARLY EXAM 2018 GLAUCOMA SCREENING Q2Y 6/10/2019 DTaP/Tdap/Td series (2 - Td) 2021 Allergies as of 2017  Review Complete On: 2017 By: Caron Stack Severity Noted Reaction Type Reactions Oxycodone Low 2017    Other (comments) Patient states, \"It made me feel unwell. \" Current Immunizations  Reviewed on 2017 Name Date Influenza Vaccine 10/1/2016 Pneumococcal Conjugate (PCV-13) 2015 Tdap 2011 Not reviewed this visit Vitals BP Pulse Resp Height(growth percentile) Weight(growth percentile) BMI  
 100/60 (BP 1 Location: Right arm, BP Patient Position: Sitting) 68 16 5' 8\" (1.727 m) 207 lb (93.9 kg) 31.47 kg/m2 Smoking Status Never Smoker Vitals History BMI and BSA Data Body Mass Index Body Surface Area  
 31.47 kg/m 2 2.12 m 2 Preferred Pharmacy Pharmacy Name Phone [phone] [phone] Your Updated Medication List  
 This list is accurate as of: 8/28/17  5:26 PM.  Always use your most recent med list.  
  
  
  
  
 ALPHAGAN P 0.1 % ophthalmic solution Generic drug:  brimonidine Administer 1 Drop to both eyes daily. Both eyes PM and lt eye in am also  
  
 apixaban 5 mg tablet Commonly known as:  Ej Nascimento Take 1 Tab by mouth two (2) times a day. azithromycin 250 mg tablet Commonly known as:  Usha Hallman Take 1 Tab by mouth See Admin Instructions for 6 doses. Take 2 tabs on day one followed by 1 tab daily for a total of 5 days. benzonatate 200 mg capsule Commonly known as:  TESSALON Take 1 Cap by mouth three (3) times daily as needed for Cough for up to 7 days. celecoxib 200 mg capsule Commonly known as:  CELEBREX  
TAKE 1 TABLET BY MOUTH ONCE OR TWICE DAILY  
  
 clobetasol 0.05 % topical cream  
Commonly known as:  Ollen Aurora Apply  to affected area two (2) times a day. diclofenac 1 % Gel Commonly known as:  VOLTAREN  
APPLY 3-4 TIMES DAILY TO AFFECTED AREAS  
  
 doxazosin 4 mg tablet Commonly known as:  CARDURA Take 4 mg by mouth daily. FISH OIL PO Take 1,000 mg by mouth daily. fluorouracil 5 % chemo cream  
Commonly known as:  EFUDEX  
1 APPLICATION APPLY ON THE SKIN TWICE A DAY APPLY TWICE DAILY FOR 14 DAYS  
  
 furosemide 40 mg tablet Commonly known as:  LASIX Take 1 Tab by mouth daily as needed. As needed for swelling  
  
 lisinopril 20 mg tablet Commonly known as:  Valene Pencil Take 1 Tab by mouth daily. May resume medication when b/p greater than 120/80. LUMIGAN 0.01 % ophthalmic drops Generic drug:  bimatoprost  
INSTILL 1 DROP INTO EACH EYE EVERY NIGHT AT BEDTIME  
  
 omega-3 acid ethyl esters 1 gram capsule Commonly known as:  Tracey Mins Take 2 Caps by mouth two (2) times daily (with meals). Patient Instructions The four OAC's are Eliquis, Savaysa, Xarelto, and Pradaxa. Introducing [facility]! Dear Sylvain Borjas: Thank you for requesting a BridgeLux account. Our records indicate that you already have an active BridgeLux account. You can access your account anytime at [email or web address] Did you know that you can access your hospital and ER discharge instructions at any time in BridgeLux? You can also review all of your test results from your hospital stay or ER visit. Additional Information If you have questions, please visit the Frequently Asked Questions section of the BridgeLux website at [email or web address] Remember, BridgeLux is NOT to be used for urgent needs. For medical emergencies, dial 911. Now available from your iPhone and Android! Please provide this summary of care documentation to your next provider. Your primary care clinician is listed as ANGELA Whalen. If you have any questions after today's visit, please call [phone].

## 2017-08-28 NOTE — PROGRESS NOTES
Paul Cabrera     1935       Magaly Coleman MD, [facility]  Date of Visit-8/28/2017   PCP is Margarita Fry MD   [facility]  [facility] [address] [place] [address]  HPI:  Paul Cabrera is a 80 y.o. male     Follow up of PAF and HTN. Prior echo 2/3/17 with EF of 70% and mild TR done in [place]. Prior right knee surgery at [facility] with prior leg swelling. Had issues with BP and Norvasc was added in ER. Seen in May with stable BP. He is doing well from a cardiac standpoint currently. He states his BP runs around 130/90 at home. He notes he stopped taking his Norvasc due to LE swelling which has sense resolved. He denies any history of DM or CVA. Denies chest pain, syncope or shortness of breath at rest, has no tachyccardia, palpitations or sense of arrhythmia. Assessment/Plan:       PAF  clinically remains in sinus   -doing well   -CHADS VASC score is 3  -continue with [address]  -now in [facility] so we may switch if there is a more affordable one to him  -no bleeding    HTN  -has good control   -had some edema with amlodipine but that was stopped and on lisinopril alone well controlled    Sleep apnea  -diagnosed but not yet treated  -once treated this should also help lower BP    Follow up in 6 months      Impression:   1. PAF (paroxysmal atrial fibrillation) ([address]    2. Essential hypertension    3. Pure hypercholesterolemia    4. LINDA (obstructive sleep apnea)       Cardiac History:   No specialty comments available. ROS-except as noted above. . A complete cardiac and respiratory are reviewed and negative except as above ; Resp-denies wheezing  or productive cough,.  Const- No unusual weight loss or fever; Neuro-no recent seizure or CVA ; GI- No BRBPR, abdom pain, bloating ; - no  hematuria   see supplement sheet, initialed and to be scanned by staff  Past Medical History:   Diagnosis Date    Arthritis     DJD    Enlarged prostate     Hyperlipidemia     Hypertension     LINDA treated with BiPAP     [facility]oxys[facility]) [facility]     Pre-diabetes     HA1C 6.0 Dec 2016    Shingles       Social Hx= reports that he has never smoked. He has never used smokeless tobacco. He reports that he drinks alcohol. He reports that he does not use illicit drugs. Exam and Labs:    Visit Vitals    /60 (BP 1 Location: Right arm, BP Patient Position: Sitting)    Pulse 68    Resp 16    Ht 5' 8\" (1.727 m)    Wt 207 lb (93.9 kg)    BMI 31.47 kg/m2      Constitutional:  NAD, comfortable  Head: NC,AT. Eyes: No scleral icterus. Neck:  Neck supple. No JVD present. Throat: moist mucous membranes. Chest: Effort normal & normal respiratory excursion . Neurological: alert, conversant and oriented . Skin: Skin is not cold. No obvious systemic rash noted. Not diaphoretic. No erythema. Psychiatric:  Grossly normal mood and affect. Behavior appears normal. Extremities:  no clubbing or cyanosis. Abdomen: non distended    Lungs:breath sounds normal. No stridor. distress, wheezes or  Rales. Heart: normal rate, regular rhythm, normal S1, S2, no murmurs, rubs, clicks or gallops , PMI non displaced. Edema: Edema is none. Lab Results   Component Value Date/Time    Cholesterol, total 197 07/06/2017 11:23 AM    HDL Cholesterol 31 07/06/2017 11:23 AM    LDL, calculated 122 07/06/2017 11:23 AM    Triglyceride 220 07/06/2017 11:23 AM     No results found for this or any previous visit.    Lab Results   Component Value Date/Time    Sodium 143 07/06/2017 11:23 AM    Potassium 4.6 07/06/2017 11:23 AM    Chloride 101 07/06/2017 11:23 AM    CO2 24 07/06/2017 11:23 AM    Anion gap 5 05/05/2017 12:25 PM    Glucose 99 07/06/2017 11:23 AM    BUN 15 07/06/2017 11:23 AM    Creatinine 1.06 07/06/2017 11:23 AM    BUN/Creatinine ratio 14 07/06/2017 11:23 AM    GFR est AA 77 07/06/2017 11:23 AM    GFR est non-AA 66 07/06/2017 11:23 AM    Calcium 9.1 07/06/2017 11:23 AM      Wt Readings from Last 3 Encounters:   08/28/17 207 lb (93.9 kg)   07/24/17 215 lb (97.5 kg)   07/06/17 208 lb 1.6 oz (94.4 kg)      BP Readings from Last 3 Encounters:   08/28/17 100/60   07/24/17 138/75   07/06/17 128/81        Current Outpatient Prescriptions   Medication Sig    DOCOSAHEXANOIC ACID/EPA (FISH OIL PO) Take 1,000 mg by mouth daily.  benzonatate (TESSALON) 200 mg capsule Take 1 Cap by mouth three (3) times daily as needed for Cough for up to 7 days.  diclofenac (VOLTAREN) 1 % gel APPLY 3-4 TIMES DAILY TO AFFECTED AREAS    fluorouracil (EFUDEX) 5 % chemo cream 1 APPLICATION APPLY ON THE SKIN TWICE A DAY APPLY TWICE DAILY FOR 14 DAYS    lisinopril (PRINIVIL, ZESTRIL) 20 mg tablet Take 1 Tab by mouth daily. May resume medication when b/p greater than 120/80.  brimonidine (ALPHAGAN P) 0.1 % ophthalmic solution Administer 1 Drop to both eyes daily. Both eyes PM and lt eye in am also    doxazosin (CARDURA) 4 mg tablet Take 4 mg by mouth daily.  azithromycin (ZITHROMAX) 250 mg tablet Take 1 Tab by mouth See Admin Instructions for 6 doses. Take 2 tabs on day one followed by 1 tab daily for a total of 5 days.  omega-3 acid ethyl esters (LOVAZA) 1 gram capsule Take 2 Caps by mouth two (2) times daily (with meals).  LUMIGAN 0.01 % ophthalmic drops INSTILL 1 DROP INTO EACH EYE EVERY NIGHT AT BEDTIME    celecoxib (CELEBREX) 200 mg capsule TAKE 1 TABLET BY MOUTH ONCE OR TWICE DAILY    furosemide (LASIX) 40 mg tablet Take 1 Tab by mouth daily as needed. As needed for swelling    amLODIPine (NORVASC) 2.5 mg tablet TAKE 2 TABLETS BY MOUTH DAILY.  apixaban (ELIQUIS) 5 mg tablet Take 1 Tab by mouth two (2) times a day.  clobetasol (TEMOVATE) 0.05 % topical cream Apply  to affected area two (2) times a day. No current facility-administered medications for this visit. Impression see above.     Written by Radha Rosenthal, as dictated by Laina Cooper MD.

## 2017-09-11 ENCOUNTER — TELEPHONE (OUTPATIENT)
Dept: INTERNAL MEDICINE CLINIC | Age: 82
End: 2017-09-11

## 2017-09-11 NOTE — TELEPHONE ENCOUNTER
Patient states he needs a call back to discuss New Medication specialist suggested for him to start taking & would like Dr. Bebeto Dyer opinion. Please call to discuss.  Thank you

## 2017-09-12 NOTE — TELEPHONE ENCOUNTER
Mariela Santana III, DO Denisse Mccallum LPN        Caller: Unspecified (Yesterday,  3:54 PM)                     He should be able to take that eye drop with his other meds.  Not on any other BB to slow down his heart rate. tonio       Patient advised. He verbalized understanding.

## 2017-09-12 NOTE — TELEPHONE ENCOUNTER
Called patient. Two patient identifiers verified. Betaxolol NaCl 0.5% eye drops were prescribed for patient. He is concerned with possible drug interactions and/or possible side effect of bradycardia. Is this safe for patient? Please advise.

## 2017-09-12 NOTE — TELEPHONE ENCOUNTER
Spoke with patient. States he was prescribed an ophthalmic drop and wanted to make sure it was safe for him to use with his other medications. He is currently in the car and cannot remember the exact name. He will return call to me when he gets home. Patient may ask to speak directly to me.

## 2017-10-02 ENCOUNTER — TELEPHONE (OUTPATIENT)
Dept: INTERNAL MEDICINE CLINIC | Age: 82
End: 2017-10-02

## 2017-10-02 NOTE — TELEPHONE ENCOUNTER
Liza Tucker from [facility] and [facility] to check the status of a form that was faxed over on 09/26/17. Best contact number is [phone].        Message received & copied from [facility]

## 2017-10-06 ENCOUNTER — OFFICE VISIT (OUTPATIENT)
Dept: INTERNAL MEDICINE CLINIC | Age: 82
End: 2017-10-06

## 2017-10-06 VITALS
HEART RATE: 82 BPM | TEMPERATURE: 97.9 F | BODY MASS INDEX: 31.67 KG/M2 | WEIGHT: 209 LBS | HEIGHT: 68 IN | DIASTOLIC BLOOD PRESSURE: 71 MMHG | SYSTOLIC BLOOD PRESSURE: 134 MMHG | OXYGEN SATURATION: 99 % | RESPIRATION RATE: 18 BRPM

## 2017-10-06 DIAGNOSIS — I10 ESSENTIAL HYPERTENSION: Primary | ICD-10-CM

## 2017-10-06 DIAGNOSIS — Z23 ENCOUNTER FOR IMMUNIZATION: ICD-10-CM

## 2017-10-06 RX ORDER — BETAXOLOL HYDROCHLORIDE 5 MG/ML
SOLUTION/ DROPS OPHTHALMIC
Refills: 3 | COMMUNITY
Start: 2017-09-08 | End: 2018-03-01

## 2017-10-06 RX ORDER — LISINOPRIL 20 MG/1
20 TABLET ORAL DAILY
Qty: 30 TAB | Refills: 5 | Status: SHIPPED | OUTPATIENT
Start: 2017-10-06 | End: 2018-04-12 | Stop reason: SDUPTHER

## 2017-10-06 NOTE — PROGRESS NOTES
Chief Complaint   Patient presents with    Hypertension    Leg Swelling     1. Have you been to the ER, urgent care clinic since your last visit? Hospitalized since your last visit? No    2. Have you seen or consulted any other health care providers outside of the [address] [place] [address] since your last visit? Include any pap smears or colon screening.  No

## 2017-10-06 NOTE — PATIENT INSTRUCTIONS
Influenza (Flu) Vaccine: Care Instructions  Your Care Instructions  Influenza (flu) is an infection in the lungs and breathing passages. It is caused by the influenza virus. There are different strains, or types, of the flu virus every year. The flu comes on quickly. It can cause a cough, stuffy nose, fever, chills, tiredness, and aches and pains. These symptoms may last up to 10 days. The flu can make you feel very sick, but most of the time it doesn't lead to other problems. But it can cause serious problems in people who are older or who have a long-term illness, such as heart disease or diabetes. You can help prevent the flu by getting a flu vaccine every year, as soon as it is available. You cannot get the flu from the vaccine. The vaccine prevents most cases of the flu. But even when the vaccine doesn't prevent the flu, it can make symptoms less severe and reduce the chance of problems from the flu. Sometimes, young children and people who have an immune system problem may have a slight fever or muscle aches or pains 6 to 12 hours after getting the shot. These symptoms usually last 1 or 2 days. Follow-up care is a key part of your treatment and safety. Be sure to make and go to all appointments, and call your doctor if you are having problems. It's also a good idea to know your test results and keep a list of the medicines you take. Who should get the flu vaccine? Everyone age 7 months or older should get a flu vaccine each year. It lowers the chance of getting and spreading the flu. The vaccine is very important for people who are at high risk for getting other health problems from the flu. This includes:  · Anyone 48years of age or older. · People who live in a long-term care center, such as a nursing home. · All children 6 months through 25years of age. · Adults and children 6 months and older who have long-term heart or lung problems, such as asthma.   · Adults and children 6 months and older who needed medical care or were in a hospital during the past year because of diabetes, chronic kidney disease, or a weak immune system (including HIV or AIDS). · Women who will be pregnant during the flu season. · People who have any condition that can make it hard to breathe or swallow (such as a brain injury or muscle disorders). · People who can give the flu to others who are at high risk for problems from the flu. This includes all health care workers and close contacts of people age 72 or older. Who should not get the flu vaccine? The person who gives the vaccine may tell you not to get it if you:  · Have a severe allergy to eggs or any part of the vaccine. · Have had a severe reaction to a flu vaccine in the past.  · Have had Guillain-Barré syndrome (GBS). · Are sick with a fever. (Get the vaccine when symptoms are gone.)  How can you care for yourself at home? · If you or your child has a sore arm or a slight fever after the shot, take an over-the-counter pain medicine, such as acetaminophen (Tylenol) or ibuprofen (Advil, Motrin). Read and follow all instructions on the label. Do not give aspirin to anyone younger than 20. It has been linked to Reye syndrome, a serious illness. · Do not take two or more pain medicines at the same time unless the doctor told you to. Many pain medicines have acetaminophen, which is Tylenol. Too much acetaminophen (Tylenol) can be harmful. When should you call for help? Call 911 anytime you think you may need emergency care. For example, call if after getting the flu vaccine:  · You have symptoms of a severe reaction to the flu vaccine. Symptoms of a severe reaction may include:  ¨ Severe difficulty breathing. ¨ Sudden raised, red areas (hives) all over your body. ¨ Severe lightheadedness. Call your doctor now or seek immediate medical care if after getting the flu vaccine:  · You think you are having a reaction to the flu vaccine, such as a new fever.   Watch closely for changes in your health, and be sure to contact your doctor if you have any problems. Where can you learn more? Go to http://cammie-derik.info/. Enter A040 in the search box to learn more about \"Influenza (Flu) Vaccine: Care Instructions. \"  Current as of: August 1, 2016  Content Version: 11.3  © 9863-9232 [employer]. Care instructions adapted under license by [employer] (which disclaims liability or warranty for this information). If you have questions about a medical condition or this instruction, always ask your healthcare professional. [address] any warranty or liability for your use of this information.

## 2017-10-06 NOTE — PROGRESS NOTES
CC: Hypertension and Leg Swelling      HPI:      He is a 80 y.o. male  With a hx of paroxysmal atrial fibrillation on  eliquis, HTN, LINDA on CPAP and pre diabetes     Leg swelling since  right knee replacement in Jan 2017: this has improved   Stopped taking lasix, Has prescription for PRN use     Paroxysmal atrial fibrillation: Patient is on Eliquis. He is followed by Dr. Kerrie Christopher  /Metoprolol caused bradycardia and was stopped. No fluttering in the chest and no chest pain       LINDA: recent diagnosis aCPAP /evaluated by Dr. Chester Webber. Patient still felt sleepy during the day- did not tolerate CPAP due to bleeding nose and dry mouth / has tried both nasal pillow and mask without improvement        Hypertension: Patient is on  lisinopril :  blood pressure has been well controlled denies chest pain shortness of breath  Norvasc stopped due to swelling  BP is 134/71    Saw eye doctor glaucoma I received records using betaxolol- has follow up appointment on the 26th - he was concerned this could cause bradycardia - reassured him that since he is off of metoprolol he should tolerate this well. Pulse is 82 today    Saw urologist in August in 2005 [facility] [address]:  10 systems reviewed and negative other than HPI     Past Medical History:   Diagnosis Date    Arthritis     DJD    Enlarged prostate     Hyperlipidemia     Hypertension     LINDA treated with BiPAP     PAF (paroxysmal atrial fibrillation) (HCC)     Pre-diabetes     HA1C 6.0 Dec 2016    Shingles        Current Outpatient Prescriptions on File Prior to Visit   Medication Sig Dispense Refill    DOCOSAHEXANOIC ACID/EPA (FISH OIL PO) Take 1,000 mg by mouth daily.  apixaban (ELIQUIS) 5 mg tablet Take 1 Tab by mouth two (2) times a day. 28 Tab 0    omega-3 acid ethyl esters (LOVAZA) 1 gram capsule Take 2 Caps by mouth two (2) times daily (with meals).  30 Cap 5    LUMIGAN 0.01 % ophthalmic drops INSTILL 1 DROP INTO EACH EYE EVERY NIGHT AT BEDTIME  5    furosemide (LASIX) 40 mg tablet Take 1 Tab by mouth daily as needed. As needed for swelling 20 Tab 4    clobetasol (TEMOVATE) 0.05 % topical cream Apply  to affected area two (2) times a day. 15 g 0    fluorouracil (EFUDEX) 5 % chemo cream 1 APPLICATION APPLY ON THE SKIN TWICE A DAY APPLY TWICE DAILY FOR 14 DAYS  0    brimonidine (ALPHAGAN P) 0.1 % ophthalmic solution Administer 1 Drop to both eyes daily. Both eyes PM and lt eye in am also      doxazosin (CARDURA) 4 mg tablet Take 4 mg by mouth daily.  [DISCONTINUED] lisinopril (PRINIVIL, ZESTRIL) 20 mg tablet Take 1 Tab by mouth daily. May resume medication when b/p greater than 120/80. 30 Tab 5     No current facility-administered medications on file prior to visit. Past Surgical History:   Procedure Laterality Date    HX COLONOSCOPY      normal except for hemorrhoids    HX HEENT      wisdom teeth extraction x2    HX KNEE REPLACEMENT      right; 2017    HX SHOULDER ARTHROSCOPY      rt    HX TONSILLECTOMY         Family History   Problem Relation Age of Onset    Coronary Artery Disease Father 47      at [de-identified] of MI     Reviewed and no changes     Social History     Social History    Marital status:      Spouse name: N/A    Number of children: N/A    Years of education: N/A     Occupational History    Not on file.      Social History Main Topics    Smoking status: Never Smoker    Smokeless tobacco: Never Used    Alcohol use Yes      Comment: occasional    Drug use: No    Sexual activity: Not on file     Other Topics Concern    Not on file     Social History Narrative            Visit Vitals    /71 (BP 1 Location: Right arm, BP Patient Position: Sitting)    Pulse 82    Temp 97.9 °F (36.6 °C) (Oral)    Resp 18    Ht 5' 8\" (1.727 m)    Wt 209 lb (94.8 kg)    SpO2 99%    BMI 31.78 kg/m2       Physical Examination:   General - Well appearing male  HEENT - PERRL, TM no erythema/opacification, normal nasal turbinates, oropharynx no erythema or exudate, MMM  Neck - supple, no bruits, no TMG, no LAD  Pulm - clear to auscultation bilaterally  Cardio - RRR, normal S1 S2, no murmur gallops or rubs  Abd - soft, nontender, no masses, no HSM  Extrem - no edema, +2 distal pulses  MSK: right knee healed well surgical scar noted  Psych - normal affect, appropriate mood    Lab Results   Component Value Date/Time    WBC 11.2 05/05/2017 12:25 PM    HGB 13.3 05/05/2017 12:25 PM    HCT 40.6 05/05/2017 12:25 PM    PLATELET 896 70/89/2920 12:25 PM    MCV 79.6 05/05/2017 12:25 PM     Lab Results   Component Value Date/Time    Sodium 143 07/06/2017 11:23 AM    Potassium 4.6 07/06/2017 11:23 AM    Chloride 101 07/06/2017 11:23 AM    CO2 24 07/06/2017 11:23 AM    Anion gap 5 05/05/2017 12:25 PM    Glucose 99 07/06/2017 11:23 AM    BUN 15 07/06/2017 11:23 AM    Creatinine 1.06 07/06/2017 11:23 AM    BUN/Creatinine ratio 14 07/06/2017 11:23 AM    GFR est AA 77 07/06/2017 11:23 AM    GFR est non-AA 66 07/06/2017 11:23 AM    Calcium 9.1 07/06/2017 11:23 AM     Lab Results   Component Value Date/Time    Cholesterol, total 197 07/06/2017 11:23 AM    HDL Cholesterol 31 07/06/2017 11:23 AM    LDL, calculated 122 07/06/2017 11:23 AM    VLDL, calculated 44 07/06/2017 11:23 AM    Triglyceride 220 07/06/2017 11:23 AM     No results found for: TSH, TSH2, TSH3, TSHP, TSHEXT, TSHEXT  Lab Results   Component Value Date/Time    Prostate Specific Ag 4.2 02/09/2017 12:07 PM    % Free PSA 29.8 02/09/2017 12:07 PM     Lab Results   Component Value Date/Time    Hemoglobin A1c 6.0 07/06/2017 11:23 AM     No results found for: [place] AvSOY oronaRIKADY    Lab Results   Component Value Date/Time    ALT (SGPT) 13 07/06/2017 11:23 AM    AST (SGOT) 16 07/06/2017 11:23 AM    Alk.  phosphatase 66 07/06/2017 11:23 AM    Bilirubin, direct 0.11 02/09/2017 12:07 PM    Bilirubin, total 0.5 07/06/2017 11:23 AM           Assessment/Plan:  He is a 80 y.o. male  With a hx of paroxysmal atrial fibrillation on  eliquis, HTN, LINDA on CPAP and pre diabetes   1. Encounter for immunization  - Influenza virus vaccine ([address]) 72 years and older ([address]29)  - Administration fee () for Medicare insured patients    2. Essential hypertension  Blood pressure is at goal today. Refilled  - lisinopril (PRINIVIL, ZESTRIL) 20 mg tablet; Take 1 Tab by mouth daily. May resume medication when b/p greater than 120/80. Dispense: 30 Tab; Refill: 5    3. Afib: sinus rhythm / did not tolerate BB blocked  - on eliquis   - followed by cardiologist    4. Pre diabetes HA1C 6.0  - Counseled on diet/ exercise      5. LINDA: not tolerating CPAP. Advised to follow up with Dr Khalif Stewart    6. Glaucoma: recently betaxolol added to regimen and tolerating it well    In addition flu shot was given    Next appointment fasting labs   Follow-up Disposition:  Return in about 3 months (around 1/6/2018) for fasting labs and HTN .     Mickey Zavala MD

## 2017-10-06 NOTE — MR AVS SNAPSHOT
Visit Information Date & Time Provider Department Dept. Phone Encounter #  
 10/6/2017 10:30 AM Bia Kilpatrick [address] [place] [address],[address] [phone] [id number] Follow-up Instructions Return in about 3 months (around 1/6/2018) for fasting labs and HTN . Your Appointments 3/1/2018 10:20 AM  
ESTABLISHED PATIENT with Carmelita Dorsey MD  
[facility] ([facility] SCHEDULING) Appt Note: 6 mnth f/u  
 [address] [place]  
[address] [address] Upcoming Health Maintenance Date Due INFLUENZA AGE 9 TO ADULT 8/1/2017 MEDICARE YEARLY EXAM 7/7/2018 GLAUCOMA SCREENING Q2Y 6/10/2019 DTaP/Tdap/Td series (2 - Td) 12/5/2021 Allergies as of 10/6/2017  Review Complete On: 10/6/2017 By: Miky Pastrana Severity Noted Reaction Type Reactions Oxycodone Low 01/09/2017    Other (comments) Patient states, \"It made me feel unwell. \" Current Immunizations  Reviewed on 2/17/2017 Name Date Influenza High Dose Vaccine PF  Incomplete Influenza Vaccine 10/1/2016 Pneumococcal Conjugate (PCV-13) 12/18/2015 Tdap 12/5/2011 Not reviewed this visit You Were Diagnosed With   
  
 Codes Comments Essential hypertension    -  Primary ICD-10-CM: I10 
ICD-9-CM: 401.9 Encounter for immunization     ICD-10-CM: T51 ICD-9-CM: V03.89 Vitals BP Pulse Temp Resp Height(growth percentile) Weight(growth percentile) 134/71 (BP 1 Location: Right arm, BP Patient Position: Sitting) 82 97.9 °F (36.6 °C) (Oral) 18 5' 8\" (1.727 m) 209 lb (94.8 kg) SpO2 BMI Smoking Status 99% 31.78 kg/m2 Never Smoker BMI and BSA Data Body Mass Index Body Surface Area 31.78 kg/m 2 2.13 m 2 Preferred Pharmacy Pharmacy Name Phone  [id number] AT [facility] [phone] Your Updated Medication List  
  
   
This list is accurate as of: 10/6/17 11:00 AM.  Always use your most recent med list.  
  
  
  
  
 ALPHAGAN P 0.1 % ophthalmic solution Generic drug:  brimonidine Administer 1 Drop to both eyes daily. Both eyes PM and lt eye in am also  
  
 apixaban 5 mg tablet Commonly known as:  Sharon Minus Take 1 Tab by mouth two (2) times a day. betaxolol 0.5 % ophthalmic suspension Commonly known as:  BETOPTIC INSTILL 1 DROP INTO LEFT EYE TWICE A DAY  
  
 clobetasol 0.05 % topical cream  
Commonly known as:  Alfornia Hussar Apply  to affected area two (2) times a day. doxazosin 4 mg tablet Commonly known as:  CARDURA Take 4 mg by mouth daily. FISH OIL PO Take 1,000 mg by mouth daily. fluorouracil 5 % chemo cream  
Commonly known as:  EFUDEX  
1 APPLICATION APPLY ON THE SKIN TWICE A DAY APPLY TWICE DAILY FOR 14 DAYS  
  
 furosemide 40 mg tablet Commonly known as:  LASIX Take 1 Tab by mouth daily as needed. As needed for swelling  
  
 lisinopril 20 mg tablet Commonly known as:  Mamta Camarenaes Take 1 Tab by mouth daily. May resume medication when b/p greater than 120/80. LUMIGAN 0.01 % ophthalmic drops Generic drug:  bimatoprost  
INSTILL 1 DROP INTO EACH EYE EVERY NIGHT AT BEDTIME  
  
 omega-3 acid ethyl esters 1 gram capsule Commonly known as:  Gwen Crown Take 2 Caps by mouth two (2) times daily (with meals). Prescriptions Sent to Pharmacy Refills  
 lisinopril (PRINIVIL, ZESTRIL) 20 mg tablet 5 Sig: Take 1 Tab by mouth daily. May resume medication when b/p greater than 120/80. Class: Normal  
 Pharmacy: [place] [id number], [address] Ph #: [phone] Route: Oral  
  
We Performed the Following ADMIN INFLUENZA VIRUS VAC [ [facility]] INFLUENZA VIRUS VACCINE, HIGH DOSE SEASONAL, PRESERVATIVE FREE [68046 CPT(R)] Follow-up Instructions Return in about 3 months (around 1/6/2018) for fasting labs and HTN . Patient Instructions Influenza (Flu) Vaccine: Care Instructions Your Care Instructions Influenza (flu) is an infection in the lungs and breathing passages. It is caused by the influenza virus. There are different strains, or types, of the flu virus every year. The flu comes on quickly. It can cause a cough, stuffy nose, fever, chills, tiredness, and aches and pains. These symptoms may last up to 10 days. The flu can make you feel very sick, but most of the time it doesn't lead to other problems. But it can cause serious problems in people who are older or who have a long-term illness, such as heart disease or diabetes. You can help prevent the flu by getting a flu vaccine every year, as soon as it is available. You cannot get the flu from the vaccine. The vaccine prevents most cases of the flu. But even when the vaccine doesn't prevent the flu, it can make symptoms less severe and reduce the chance of problems from the flu. Sometimes, young children and people who have an immune system problem may have a slight fever or muscle aches or pains 6 to 12 hours after getting the shot. These symptoms usually last 1 or 2 days. Follow-up care is a key part of your treatment and safety. Be sure to make and go to all appointments, and call your doctor if you are having problems. It's also a good idea to know your test results and keep a list of the medicines you take. Who should get the flu vaccine? Everyone age 7 months or older should get a flu vaccine each year. It lowers the chance of getting and spreading the flu. The vaccine is very important for people who are at high risk for getting other health problems from the flu. This includes: · Anyone 48years of age or older. · People who live in a long-term care center, such as a nursing home. · All children 6 months through 25years of age. · Adults and children 6 months and older who have long-term heart or lung problems, such as asthma. · Adults and children 6 months and older who needed medical care or were in a hospital during the past year because of diabetes, chronic kidney disease, or a weak immune system (including HIV or AIDS). · Women who will be pregnant during the flu season. · People who have any condition that can make it hard to breathe or swallow (such as a brain injury or muscle disorders). · People who can give the flu to others who are at high risk for problems from the flu. This includes all health care workers and close contacts of people age 72 or older. Who should not get the flu vaccine? The person who gives the vaccine may tell you not to get it if you: 
· Have a severe allergy to eggs or any part of the vaccine. · Have had a severe reaction to a flu vaccine in the past. 
· Have had Guillain-Barré syndrome (GBS). · Are sick with a fever. (Get the vaccine when symptoms are gone.) How can you care for yourself at home? · If you or your child has a sore arm or a slight fever after the shot, take an over-the-counter pain medicine, such as acetaminophen (Tylenol) or ibuprofen (Advil, Motrin). Read and follow all instructions on the label. Do not give aspirin to anyone younger than 20. It has been linked to Reye syndrome, a serious illness. · Do not take two or more pain medicines at the same time unless the doctor told you to. Many pain medicines have acetaminophen, which is Tylenol. Too much acetaminophen (Tylenol) can be harmful. When should you call for help? Call 911 anytime you think you may need emergency care. For example, call if after getting the flu vaccine: 
· You have symptoms of a severe reaction to the flu vaccine. Symptoms of a severe reaction may include: ¨ Severe difficulty breathing. ¨ Sudden raised, red areas (hives) all over your body. ¨ Severe lightheadedness. Call your doctor now or seek immediate medical care if after getting the flu vaccine: 
· You think you are having a reaction to the flu vaccine, such as a new fever. Watch closely for changes in your health, and be sure to contact your doctor if you have any problems. Where can you learn more? Go to http://cammie-derik.info/. Enter C835 in the search box to learn more about \"Influenza (Flu) Vaccine: Care Instructions. \" Current as of: August 1, 2016 Content Version: 11.3 © 4695-8333 [employer]. Care instructions adapted under license by [employer] (which disclaims liability or warranty for this information). If you have questions about a medical condition or this instruction, always ask your healthcare professional. [address] any warranty or liability for your use of this information. Introducing [facility]! Dear Melvin Zarate: Thank you for requesting a [facility] account. Our records indicate that you already have an active [facility] account. You can access your account anytime at [email or web address] Did you know that you can access your hospital and ER discharge instructions at any time in [facility]? You can also review all of your test results from your hospital stay or ER visit. Additional Information If you have questions, please visit the Frequently Asked Questions section of the [facility] website at [email or web address] Remember, [facility] is NOT to be used for urgent needs. For medical emergencies, dial 911. Now available from your iPhone and Android! Please provide this summary of care documentation to your next provider. Your primary care clinician is listed as ANGELA Whalen.  If you have any questions after today's visit, please call [phone].

## 2017-11-21 ENCOUNTER — TELEPHONE (OUTPATIENT)
Dept: INTERNAL MEDICINE CLINIC | Age: 82
End: 2017-11-21

## 2017-12-11 RX ORDER — DOXAZOSIN 4 MG/1
4 TABLET ORAL DAILY
Qty: 30 TAB | Refills: 3 | Status: SHIPPED | OUTPATIENT
Start: 2017-12-11 | End: 2018-02-13 | Stop reason: DRUGHIGH

## 2017-12-11 NOTE — TELEPHONE ENCOUNTER
#[phone] wife is requesting refill as Dr. Leonard Middleton has never filled this yet. [facility]. Pt is out of medication wife states. Advised of 48-72 hour turn around.

## 2018-02-02 ENCOUNTER — TELEPHONE (OUTPATIENT)
Dept: INTERNAL MEDICINE CLINIC | Age: 83
End: 2018-02-02

## 2018-02-02 NOTE — TELEPHONE ENCOUNTER
Called patient's wife, Donn Qureshi on [employer]. Two patient identifiers verified. States patient's BP was elevated today. Denies headache, dizziness, or visual changes. States they ate at at [facility] for lunch yesterday. Advised patient to follow low salt diet, monitor through the weekend and if not better, to call on Monday for appointment per Dr. Mary Monroe. If patient becomes symptomatic over the weekend, call on call provider or go to urgent care. Mrs. Carolina verbalized understanding and states no further questions at this time.

## 2018-02-07 NOTE — PROGRESS NOTES
[facility] [place]. [place], [address]  Tel.  [phone]  Fax. 100 [facility] 60  [place], [address] [facility]  Tel.  [phone]  Fax. [phone] [address]   Tel.  [phone]  Fax. [phone]       S>Juan Blanca is a 80 y.o. male seen for a positive airway pressure follow-up. He reports minimal problems using the device. The following problems are identified:    Drowsiness no Problems exhaling yes   Snoring no Forget to put on no   Mask Comfortable yes Can't fall asleep no   Dry Mouth no Mask falls off yes   Air Leaking yes Frequent awakenings no       He admits that his sleep has improved. Therapy Apnea Index averaged over PAP use: 7 /hr which reflects improved sleep breathing condition. Allergies   Allergen Reactions    Oxycodone Other (comments)     Patient states, \"It made me feel unwell. \"       He has a current medication list which includes the following prescription(s): omega-3 acid ethyl esters, lumigan, furosemide, amlodipine, apixaban, lisinopril, brimonidine, doxazosin, celecoxib, clobetasol, diclofenac, and fluorouracil. Benna Him He  has a past medical history of Arthritis; Enlarged prostate; Hyperlipidemia; Hypertension; LINDA treated with BiPAP; PAF (paroxysmal atrial fibrillation) ([address] Pre-diabetes; and Shingles. [place] Sleepiness Score: 10   and Modified F.O.S.Q. Score Total / 2: 18.5   which reflect improved sleep quality over therapy time. O>    Visit Vitals    /75    Pulse 74    Temp 98.1 °F (36.7 °C)    Ht 5' 8\" (1.727 m)    Wt 215 lb (97.5 kg)    SpO2 97%    BMI 32.69 kg/m2           General:   Alert, oriented, not in distress   Neck:   No JVD    Chest/Lungs:  symetrical lung expansion , no accessory muscle use    Extremities:  no obvious rashes , negative edema    Neuro:  No focal deficits ;  No obvious tremor    Psych:  Normal affect ,  Normal countenance ;         A>    ICD-10-CM ICD-9-CM    1. LINDA (obstructive sleep apnea) G47.33 327.23 AMB SUPPLY ORDER   2. Obesity (BMI 30-39. 9) E66.9 278.00      AHI = 31 (2017). On CPAP :  4-15 cmH2O. Compliant:      yes    Therapeutic Response:  Positive    P>    Orders Placed This Encounter    AMB SUPPLY ORDER     * Device pressure change to CPAP  10 cmH2O. Sita Merlos M.D.  (electronically signed)  Diplomate in Sleep Medicine, ABI     Adherence to PAP therapy sub-optimal.  We'll request Sleep Educator assessment to improve adherence to PAP therapy. * PAP card download in 4 weeks. PAP clinic if adherence remains poor     * Follow-up Disposition:  Return if symptoms worsen or fail to improve. *He was asked to contact our office for any problems regarding  PAP therapy. * Counseling was provided regarding the importance of regular PAP use and on proper sleep hygiene and safe driving. * Re-enforced proper and regular cleaning for the device. I have reviewed/discussed the above normal BMI with the patient. I have recommended the following interventions: dietary management education, guidance, and counseling . Clarissa Aguirre Thank you for allowing us to participate in your patient's medical care. Sita Merlos M.D.  (electronically signed)  Diplomate in Sleep Medicine, ABI    Office visit exceeded 25 minutes with counseling and direction of care taking up more than 50% of the allotted time. fairly steady, no LOB noted/verbal cues

## 2018-02-08 ENCOUNTER — HOSPITAL ENCOUNTER (INPATIENT)
Age: 83
LOS: 3 days | Discharge: HOME | DRG: 343 | End: 2018-02-11
Attending: EMERGENCY MEDICINE | Admitting: SURGERY
Payer: MEDICARE

## 2018-02-08 ENCOUNTER — APPOINTMENT (OUTPATIENT)
Dept: CT IMAGING | Age: 83
DRG: 343 | End: 2018-02-08
Attending: EMERGENCY MEDICINE
Payer: MEDICARE

## 2018-02-08 ENCOUNTER — APPOINTMENT (OUTPATIENT)
Dept: GENERAL RADIOLOGY | Age: 83
DRG: 343 | End: 2018-02-08
Attending: SURGERY
Payer: MEDICARE

## 2018-02-08 ENCOUNTER — TELEPHONE (OUTPATIENT)
Dept: INTERNAL MEDICINE CLINIC | Age: 83
End: 2018-02-08

## 2018-02-08 DIAGNOSIS — K35.30 ACUTE APPENDICITIS WITH LOCALIZED PERITONITIS: Primary | ICD-10-CM

## 2018-02-08 PROBLEM — K35.80 ACUTE APPENDICITIS: Status: ACTIVE | Noted: 2018-02-08

## 2018-02-08 LAB
ALBUMIN SERPL-MCNC: 3.8 G/DL (ref 3.5–5)
ALBUMIN/GLOB SERPL: 1 {RATIO} (ref 1.1–2.2)
ALP SERPL-CCNC: 75 U/L (ref 45–117)
ALT SERPL-CCNC: 17 U/L (ref 12–78)
ANION GAP SERPL CALC-SCNC: 6 MMOL/L (ref 5–15)
APPEARANCE UR: CLEAR
AST SERPL-CCNC: 16 U/L (ref 15–37)
BACTERIA URNS QL MICRO: NEGATIVE /HPF
BASOPHILS # BLD: 0.1 K/UL (ref 0–0.1)
BASOPHILS NFR BLD: 1 % (ref 0–1)
BILIRUB SERPL-MCNC: 0.4 MG/DL (ref 0.2–1)
BILIRUB UR QL: NEGATIVE
BUN SERPL-MCNC: 19 MG/DL (ref 6–20)
BUN/CREAT SERPL: 13 (ref 12–20)
CALCIUM SERPL-MCNC: 9 MG/DL (ref 8.5–10.1)
CHLORIDE SERPL-SCNC: 105 MMOL/L (ref 97–108)
CO2 SERPL-SCNC: 29 MMOL/L (ref 21–32)
COLOR UR: ABNORMAL
CREAT SERPL-MCNC: 1.41 MG/DL (ref 0.7–1.3)
DIFFERENTIAL METHOD BLD: ABNORMAL
EOSINOPHIL # BLD: 0.2 K/UL (ref 0–0.4)
EOSINOPHIL NFR BLD: 1 % (ref 0–7)
EPITH CASTS URNS QL MICRO: ABNORMAL /LPF
ERYTHROCYTE [DISTWIDTH] IN BLOOD BY AUTOMATED COUNT: 14 % (ref 11.5–14.5)
GLOBULIN SER CALC-MCNC: 4 G/DL (ref 2–4)
GLUCOSE SERPL-MCNC: 100 MG/DL (ref 65–100)
GLUCOSE UR STRIP.AUTO-MCNC: NEGATIVE MG/DL
HCT VFR BLD AUTO: 45 % (ref 36.6–50.3)
HGB BLD-MCNC: 15.2 G/DL (ref 12.1–17)
HGB UR QL STRIP: ABNORMAL
IMM GRANULOCYTES # BLD: 0.1 K/UL (ref 0–0.04)
IMM GRANULOCYTES NFR BLD AUTO: 0 % (ref 0–0.5)
KETONES UR QL STRIP.AUTO: NEGATIVE MG/DL
LEUKOCYTE ESTERASE UR QL STRIP.AUTO: ABNORMAL
LYMPHOCYTES # BLD: 2.4 K/UL (ref 0.8–3.5)
LYMPHOCYTES NFR BLD: 17 % (ref 12–49)
MCH RBC QN AUTO: 27.1 PG (ref 26–34)
MCHC RBC AUTO-ENTMCNC: 33.8 G/DL (ref 30–36.5)
MCV RBC AUTO: 80.2 FL (ref 80–99)
MONOCYTES # BLD: 0.6 K/UL (ref 0–1)
MONOCYTES NFR BLD: 4 % (ref 5–13)
MUCOUS THREADS URNS QL MICRO: ABNORMAL /LPF
NEUTS SEG # BLD: 11.2 K/UL (ref 1.8–8)
NEUTS SEG NFR BLD: 77 % (ref 32–75)
NITRITE UR QL STRIP.AUTO: NEGATIVE
NRBC # BLD: 0 K/UL (ref 0–0.01)
NRBC BLD-RTO: 0 PER 100 WBC
PH UR STRIP: 6 [PH] (ref 5–8)
PLATELET # BLD AUTO: 156 K/UL (ref 150–400)
PMV BLD AUTO: 9 FL (ref 8.9–12.9)
POTASSIUM SERPL-SCNC: 4.5 MMOL/L (ref 3.5–5.1)
PROT SERPL-MCNC: 7.8 G/DL (ref 6.4–8.2)
PROT UR STRIP-MCNC: NEGATIVE MG/DL
RBC # BLD AUTO: 5.61 M/UL (ref 4.1–5.7)
RBC #/AREA URNS HPF: ABNORMAL /HPF (ref 0–5)
SODIUM SERPL-SCNC: 140 MMOL/L (ref 136–145)
SP GR UR REFRACTOMETRY: 1.02 (ref 1–1.03)
UROBILINOGEN UR QL STRIP.AUTO: 0.2 EU/DL (ref 0.2–1)
WBC # BLD AUTO: 14.5 K/UL (ref 4.1–11.1)
WBC URNS QL MICRO: ABNORMAL /HPF (ref 0–4)

## 2018-02-08 PROCEDURE — 71046 X-RAY EXAM CHEST 2 VIEWS: CPT

## 2018-02-08 PROCEDURE — 74011636320 HC RX REV CODE- 636/320: Performed by: EMERGENCY MEDICINE

## 2018-02-08 PROCEDURE — 85025 COMPLETE CBC W/AUTO DIFF WBC: CPT | Performed by: EMERGENCY MEDICINE

## 2018-02-08 PROCEDURE — 65270000029 HC RM PRIVATE

## 2018-02-08 PROCEDURE — 96360 HYDRATION IV INFUSION INIT: CPT

## 2018-02-08 PROCEDURE — 93005 ELECTROCARDIOGRAM TRACING: CPT

## 2018-02-08 PROCEDURE — 81001 URINALYSIS AUTO W/SCOPE: CPT | Performed by: EMERGENCY MEDICINE

## 2018-02-08 PROCEDURE — 36415 COLL VENOUS BLD VENIPUNCTURE: CPT | Performed by: EMERGENCY MEDICINE

## 2018-02-08 PROCEDURE — 99284 EMERGENCY DEPT VISIT MOD MDM: CPT

## 2018-02-08 PROCEDURE — 74011250636 HC RX REV CODE- 250/636: Performed by: EMERGENCY MEDICINE

## 2018-02-08 PROCEDURE — 80053 COMPREHEN METABOLIC PANEL: CPT | Performed by: EMERGENCY MEDICINE

## 2018-02-08 PROCEDURE — 74177 CT ABD & PELVIS W/CONTRAST: CPT

## 2018-02-08 RX ORDER — SODIUM CHLORIDE 0.9 % (FLUSH) 0.9 %
5-10 SYRINGE (ML) INJECTION AS NEEDED
Status: DISCONTINUED | OUTPATIENT
Start: 2018-02-08 | End: 2018-02-10

## 2018-02-08 RX ORDER — HYDROMORPHONE HYDROCHLORIDE 1 MG/ML
0.5 INJECTION, SOLUTION INTRAMUSCULAR; INTRAVENOUS; SUBCUTANEOUS
Status: DISCONTINUED | OUTPATIENT
Start: 2018-02-08 | End: 2018-02-11 | Stop reason: HOSPADM

## 2018-02-08 RX ORDER — DOXAZOSIN 2 MG/1
4 TABLET ORAL DAILY
Status: DISCONTINUED | OUTPATIENT
Start: 2018-02-09 | End: 2018-02-11 | Stop reason: HOSPADM

## 2018-02-08 RX ORDER — ONDANSETRON 2 MG/ML
4 INJECTION INTRAMUSCULAR; INTRAVENOUS
Status: DISCONTINUED | OUTPATIENT
Start: 2018-02-08 | End: 2018-02-11 | Stop reason: HOSPADM

## 2018-02-08 RX ORDER — BRIMONIDINE TARTRATE 2 MG/ML
1 SOLUTION/ DROPS OPHTHALMIC 3 TIMES DAILY
Status: DISCONTINUED | OUTPATIENT
Start: 2018-02-08 | End: 2018-02-11 | Stop reason: HOSPADM

## 2018-02-08 RX ORDER — SODIUM CHLORIDE 0.9 % (FLUSH) 0.9 %
5-10 SYRINGE (ML) INJECTION EVERY 8 HOURS
Status: DISCONTINUED | OUTPATIENT
Start: 2018-02-08 | End: 2018-02-10

## 2018-02-08 RX ORDER — SODIUM CHLORIDE 9 MG/ML
125 INJECTION, SOLUTION INTRAVENOUS CONTINUOUS
Status: DISCONTINUED | OUTPATIENT
Start: 2018-02-08 | End: 2018-02-08

## 2018-02-08 RX ORDER — TIMOLOL MALEATE 5 MG/ML
1-2 SOLUTION/ DROPS OPHTHALMIC 2 TIMES DAILY
Status: DISCONTINUED | OUTPATIENT
Start: 2018-02-08 | End: 2018-02-11 | Stop reason: HOSPADM

## 2018-02-08 RX ORDER — SODIUM CHLORIDE 9 MG/ML
50 INJECTION, SOLUTION INTRAVENOUS
Status: COMPLETED | OUTPATIENT
Start: 2018-02-08 | End: 2018-02-09

## 2018-02-08 RX ORDER — DEXTROSE, SODIUM CHLORIDE, AND POTASSIUM CHLORIDE 5; .45; .15 G/100ML; G/100ML; G/100ML
125 INJECTION INTRAVENOUS CONTINUOUS
Status: DISCONTINUED | OUTPATIENT
Start: 2018-02-08 | End: 2018-02-10

## 2018-02-08 RX ORDER — SODIUM CHLORIDE 0.9 % (FLUSH) 0.9 %
10 SYRINGE (ML) INJECTION
Status: COMPLETED | OUTPATIENT
Start: 2018-02-08 | End: 2018-02-08

## 2018-02-08 RX ADMIN — IOPAMIDOL 80 ML: 755 INJECTION, SOLUTION INTRAVENOUS at 17:39

## 2018-02-08 RX ADMIN — SODIUM CHLORIDE 125 ML/HR: 900 INJECTION, SOLUTION INTRAVENOUS at 17:12

## 2018-02-08 RX ADMIN — Medication 10 ML: at 17:40

## 2018-02-08 RX ADMIN — PIPERACILLIN SODIUM AND TAZOBACTAM SODIUM 3.38 G: .375; 3 INJECTION, POWDER, LYOPHILIZED, FOR SOLUTION INTRAVENOUS at 18:28

## 2018-02-08 RX ADMIN — SODIUM CHLORIDE 50 ML/HR: 900 INJECTION, SOLUTION INTRAVENOUS at 17:40

## 2018-02-08 NOTE — ROUTINE PROCESS
TRANSFER - OUT REPORT:    Verbal report given to Anita RN(name) on Leti Campos  being transferred to Gen Surg (unit) for routine progression of care       Report consisted of patients Situation, Background, Assessment and   Recommendations(SBAR). Information from the following report(s) SBAR, Kardex, ED Summary, Intake/Output, MAR, Recent Results and Med Rec Status was reviewed with the receiving nurse. Lines:   Peripheral IV 02/08/18 Right Antecubital (Active)   Site Assessment Clean, dry, & intact 2/8/2018  5:13 PM   Phlebitis Assessment 0 2/8/2018  5:13 PM   Infiltration Assessment 0 2/8/2018  5:13 PM   Dressing Status Clean, dry, & intact 2/8/2018  5:13 PM   Dressing Type Transparent 2/8/2018  5:13 PM   Hub Color/Line Status Pink 2/8/2018  5:13 PM        Opportunity for questions and clarification was provided.       Patient transported with:   Patient Transport

## 2018-02-08 NOTE — ED NOTES
Assumed care from triage. Patient comes to the ED complaining of right lower quadrant pain. Patient states that it started this afternoon around 1 pm. Patient denies having any N/V/D.  Patient states that he did eat breakfast around 10:30 am.

## 2018-02-08 NOTE — IP AVS SNAPSHOT
[address] [facility] 
[phone] Patient: Huyen Gilliland MRN: [id number] :[id number] About your hospitalization You were admitted on:  2018 You last received care in the:  [facility] 2 GENERAL SURGERY You were discharged on:  2018 Why you were hospitalized Your primary diagnosis was:  Acute Appendicitis Follow-up Information Follow up With Details Comments Contact Info Merlinda Stanford, MD In 1 week  [address] [facility] 
[phone] Lisa Cash MD In 2 weeks  [address] [address] [facility] 
[phone] Your Scheduled Appointments  10:20 AM EST  
ESTABLISHED PATIENT with Arina Petit MD  
[facility] ([facility] SCHEDULING) [address]  [address] [place] [address]  
[phone] Discharge Orders None A check daniele indicates which time of day the medication should be taken. My Medications START taking these medications Instructions Each Dose to Equal  
 Morning Noon Evening Bedtime HYDROcodone-acetaminophen 5-325 mg per tablet Commonly known as:  Xuan Chain Take 1-2 Tabs by mouth every four (4) hours as needed for Pain. Max Daily Amount: 12 Tabs. 1-2 Tab CONTINUE taking these medications Instructions Each Dose to Equal  
 Morning Noon Evening Bedtime ALPHAGAN P 0.1 % ophthalmic solution Generic drug:  brimonidine Administer 1 Drop to both eyes daily. Both eyes PM and lt eye in am also 1 Drop  
    
  
   
   
   
  
 apixaban 5 mg tablet Commonly known as:  Albino Beards Take 5 mg by mouth daily. 5 mg  
    
   
   
   
  
 betaxolol 0.5 % ophthalmic suspension Commonly known as:  BETOPTIC  INSTILL 1 DROP INTO LEFT EYE TWICE A DAY  
     
   
   
   
 clobetasol 0.05 % topical cream  
Commonly known as:  Camden Moellers Apply  to affected area two (2) times a day. doxazosin 4 mg tablet Commonly known as:  CARDURA Take 1 Tab by mouth daily. 4 mg FISH OIL PO Take 1,000 mg by mouth daily. 1000 mg  
    
   
   
   
  
 fluorouracil 5 % chemo cream  
Commonly known as:  EFUDEX  
   
 1 APPLICATION APPLY ON THE SKIN TWICE A DAY APPLY TWICE DAILY FOR 14 DAYS  
     
   
   
   
  
 furosemide 40 mg tablet Commonly known as:  LASIX Take 1 Tab by mouth daily as needed. As needed for swelling 40 mg  
    
   
   
   
  
 lisinopril 20 mg tablet Commonly known as:  Enrigue Sails Take 1 Tab by mouth daily. May resume medication when b/p greater than 120/80.  
 20 mg  
    
   
   
   
  
 LUMIGAN 0.01 % ophthalmic drops Generic drug:  bimatoprost  
   
 INSTILL 1 DROP INTO EACH EYE EVERY NIGHT AT BEDTIME  
     
   
   
   
  
 omega-3 acid ethyl esters 1 gram capsule Commonly known as:  Burnett Krabbe Take 2 Caps by mouth two (2) times daily (with meals). 2 g Where to Get Your Medications Information on where to get these meds will be given to you by the nurse or doctor. ! Ask your nurse or doctor about these medications HYDROcodone-acetaminophen 5-325 mg per tablet Discharge Instructions Appendectomy: What to Expect at [facility] Your Recovery Your doctor removed your appendix either by making many small cuts, called incisions, in your belly (laparoscopic surgery) or through open surgery. In open surgery, the doctor makes one large incision. The incisions leave scars that usually fade over time. After your surgery, it is normal to feel weak and tired for several days after you return home. Your belly may be swollen and may be painful.  If you had laparoscopic surgery, you may have pain in your shoulder for about 24 hours. You may also feel sick to your stomach and have diarrhea, constipation, gas, or a headache. This usually goes away in a few days. Your recovery time depends on the type of surgery you had. If you had laparoscopic surgery, you will probably be able to return to work or a normal routine 1 to 3 weeks after surgery. If you had an open surgery, it may take 2 to 4 weeks. If your appendix ruptured, you may have a drain in your incision. Your body will work fine without an appendix. You will not have to make any changes in your diet or lifestyle. This care sheet gives you a general idea about how long it will take for you to recover. But each person recovers at a different pace. Follow the steps below to get better as quickly as possible. How can you care for yourself at home? Activity ? · Rest when you feel tired. Getting enough sleep will help you recover. ? · Try to walk each day. Start by walking a little more than you did the day before. Bit by bit, increase the amount you walk. Walking boosts blood flow and helps prevent pneumonia and constipation. ? · For about 2 weeks, avoid lifting anything that would make you strain. This may include a child, heavy grocery bags and milk containers, a heavy briefcase or backpack, cat litter or dog food bags, or a vacuum . ? · Avoid strenuous activities, such as bicycle riding, jogging, weight lifting, or aerobic exercise, until your doctor says it is okay. ? · You may be able to take showers (unless you have a drain near your incision) 24 to 48 hours after surgery. Pat the incision dry. Do not take a bath for the first 2 weeks, or until your doctor tells you it is okay. If you have a drain near your incision, follow your doctor's instructions.   
? · You may drive when you are no longer taking pain medicine and can quickly move your foot from the gas pedal to the brake. You must also be able to sit comfortably for a long period of time, even if you do not plan on going far. You might get caught in traffic. ? · You will probably be able to go back to work in 1 to 3 weeks. If you had an open surgery, it may take 3 to 4 weeks. ? · Your doctor will tell you when you can have sex again. Diet ? · You can eat your normal diet. If your stomach is upset, try bland, low-fat foods like plain rice, broiled chicken, toast, and yogurt. ? · Drink plenty of fluids (unless your doctor tells you not to). ? · You may notice that your bowel movements are not regular right after your surgery. This is common. Try to avoid constipation and straining with bowel movements. You may want to take a fiber supplement every day. If you have not had a bowel movement after a couple of days, ask your doctor about taking a mild laxative. Medicines ? · Your doctor will tell you if and when you can restart your medicines. He or she will also give you instructions about taking any new medicines. ? · If you take blood thinners, such as warfarin (Coumadin), clopidogrel (Plavix), or aspirin, be sure to talk to your doctor. He or she will tell you if and when to start taking those medicines again. Make sure that you understand exactly what your doctor wants you to do. ? · If your appendix ruptured, you will need to take antibiotics. Take them as directed. Do not stop taking them just because you feel better. You need to take the full course of antibiotics. ? · Be safe with medicines. Take pain medicines exactly as directed. ¨ If the doctor gave you a prescription medicine for pain, take it as prescribed. ¨ If you are not taking a prescription pain medicine, take an over-the-counter medicine such as acetaminophen (Tylenol), ibuprofen (Advil, Motrin), or naproxen (Aleve). Read and follow all instructions on the label. ¨ Do not take two or more pain medicines at the same time unless the doctor told you to. Many pain medicines have acetaminophen, which is Tylenol. Too much Tylenol can be harmful. ? · If you think your pain medicine is making you sick to your stomach: 
¨ Take your medicine after meals (unless your doctor has told you not to). ¨ Ask your doctor for a different pain medicine. Incision care ? · If you had an open surgery, you may have staples in your incision. The doctor will take these out in 7 to 10 days. ? · If you have strips of tape on the incision, leave the tape on for a week or until it falls off.  
? · You may wash the area with warm, soapy water 24 to 48 hours after your surgery, unless your doctor tells you not to. Pat the area dry. ? · Keep the area clean and dry. You may cover it with a gauze bandage if it weeps or rubs against clothing. Change the bandage every day. ? · If your appendix ruptured, you may have an incision with packing in it. Change the packing as often as your doctor tells you to. ¨ Packing changes may hurt at first. Taking pain medicine about half an hour before you change the dressing can help. ¨ If your dressing sticks to your wound, try soaking it with warm water for about 10 minutes before you remove it. You can do this in the shower or by placing a wet washcloth over the dressing. ¨ Remove the old packing and flush the incision with water. Gently pat the top area dry. ¨ The size of the incision determines how much gauze you need to put inside. Fold the gauze over once, but do not wad it up so that it hurts. Put it in the wound carefully. You want to keep the sides of the wound from touching. A cotton swab may help you push the gauze in as needed. ¨ Put a gauze pad over the wound, and tape it down. ¨ You may notice greenish gray fluid seeping from your wound as you start to heal. This is normal. It is a sign that your wound is healing. Follow-up care is a key part of your treatment and safety. Be sure to make and go to all appointments, and call your doctor if you are having problems. It's also a good idea to know your test results and keep a list of the medicines you take. When should you call for help? Call 911 anytime you think you may need emergency care. For example, call if: 
? · You passed out (lost consciousness). ? · You are short of breath. Griselda Heaps ? Call your doctor now or seek immediate medical care if: 
? · You are sick to your stomach and cannot drink fluids. ? · You cannot pass stools or gas. ? · You have pain that does not get better when you take your pain medicine. ? · You have signs of infection, such as: 
¨ Increased pain, swelling, warmth, or redness. ¨ Red streaks leading from the wound. ¨ Pus draining from the wound. ¨ A fever. ? · You have loose stitches, or your incision comes open. ? · Bright red blood has soaked through the bandage over your incision. ? · You have signs of a blood clot in your leg (called a deep vein thrombosis), such as: 
¨ Pain in your calf, back of knee, thigh, or groin. ¨ Redness and swelling in your leg or groin. ? Watch closely for any changes in your health, and be sure to contact your doctor if you have any problems. Where can you learn more? Go to http://cammie-derik.info/. Enter P715 in the search box to learn more about \"Appendectomy: What to Expect at Home. \" Current as of: May 12, 2017 Content Version: 11.4 © 8305-7514 Healthwise, Incorporated. Care instructions adapted under license by [employer] (which disclaims liability or warranty for this information). If you have questions about a medical condition or this instruction, always ask your healthcare professional. [address] any warranty or liability for your use of this information. ACO Transitions of Care Introducing [facility] [address] Maris Knowles offers a voluntary care coordination program to provide high quality service and care to [facility] fee-for-service beneficiaries. Sigrid Interiano was designed to help you enhance your health and well-being through the following services: ? Transitions of Care  support for individuals who are transitioning from one care setting to another (example: Hospital to home). ? Chronic and Complex Care Coordination  support for individuals and caregivers of those with serious or chronic illnesses or with more than one chronic (ongoing) condition and those who take a number of different medications. If you meet specific medical criteria, a [address] [place] [address] Rd may call you directly to coordinate your care with your primary care physician and your other care providers. For questions about the [facility] programs, please, contact your physicians office. For general questions or additional information about Accountable Care Organizations: 
Please visit www.medicare.gov/acos. html or call 1-800-MEDICARE (6-544.594.3309) TTY users should call 4-293.747.3891. [facility]house Announcement We are excited to announce that we are making your provider's discharge notes available to you in Storehouse. You will see these notes when they are completed and signed by the physician that discharged you from your recent hospital stay. If you have any questions or concerns about any information you see in Storehouse, please call the Health Information Department where you were seen or reach out to your Primary Care Provider for more information about your plan of care. Introducing [facility] & HEALTH SERVICES! Dear Molly Wilkerson: Thank you for requesting a Storehouse account. Our records indicate that you already have an active Storehouse account. You can access your account anytime at [email or web address] Did you know that you can access your hospital and ER discharge instructions at any time in [facility] [facility]? You can also review all of your test results from your hospital stay or ER visit. Additional Information If you have questions, please visit the Frequently Asked Questions section of the [employer] website at [email or web address] Remember, LesConciergeshart is NOT to be used for urgent needs. For medical emergencies, dial 911. Now available from your iPhone and Android! Providers Seen During Your Hospitalization Provider Specialty Primary office phone Kortney Carter MD Emergency Medicine [phone] Ronaldo Harry MD General Surgery [phone] Your Primary Care Physician (PCP) Primary Care Physician Office Phone Office Fax Reena Alston [phone] You are allergic to the following Allergen Reactions Oxycodone Other (comments) Patient states, \"It made me feel unwell. \" Recent Documentation Height Weight BMI Smoking Status 1.753 m 94 kg 30.6 kg/m2 Never Smoker Emergency Contacts Name Discharge Info Relation Home Work Mobile [address] CAREGIVER [3] Spouse [3] [phone] Patient Belongings The following personal items are in your possession at time of discharge: 
  Dental Appliances: None  Visual Aid: None      Home Medications: None   Jewelry: Necklace, Ring, Watch  Clothing: Footwear, Jacket/Coat, Pants, Shirt, Socks, Undergarments    Other Valuables: Hope Please provide this summary of care documentation to your next provider. Signatures-by signing, you are acknowledging that this After Visit Summary has been reviewed with you and you have received a copy. Patient Signature:  ____________________________________________________________  Date:  ____________________________________________________________  
  
Viry Love    
 Provider Signature:  ____________________________________________________________ Date:  ____________________________________________________________

## 2018-02-08 NOTE — ED PROVIDER NOTES
EMERGENCY DEPARTMENT HISTORY AND PHYSICAL EXAM      Date: 2/8/2018  Patient Name: Leti Campos    History of Presenting Illness     Chief Complaint   Patient presents with    Abdominal Pain     Ambulatory into triage complaining of RLQ abd pain x today Pt denies any nausea/vomiting Denies any fever       History Provided By: Patient    HPI: Leti Campos, [de-identified] y.o. male with PMHx significant for Afib on Eliquis, HTN, HLD, presents ambulatory to the ED with cc of persistent RLQ pain with mild nausea onset two hours pta. Pt reports he was at his wife's therapy session when pain manifested. He explains the pain continued and is worse with movement. He notes hx of kidney stones and states current sx's are not consistent. Pt denies any prior surgical hx. He specifically denies any hematuria, dysuria, and diarrhea. PCP: María Teran MD    There are no other complaints, changes, or physical findings at this time.     Current Facility-Administered Medications   Medication Dose Route Frequency Provider Last Rate Last Dose    [START ON 2/9/2018] piperacillin-tazobactam (ZOSYN) 3.375 g in  ml premix/cmpd  3.375 g IntraVENous Q8H Tori Teixeira MD        timolol (TIMOPTIC) 0.5 % ophthalmic solution 1-2 Drop  1-2 Drop Ophthalmic BID Tori Teixeira MD        brimonidine (ALPHAGAN) 0.2 % ophthalmic solution 1 Drop  1 Drop Ophthalmic TID Tori Teixeira MD        Elverna Sharp ON 2/9/2018] doxazosin (CARDURA) tablet 4 mg  4 mg Oral DAILY Tori Teixeira MD        dextrose 5% - 0.45% NaCl with KCl 20 mEq/L infusion  125 mL/hr IntraVENous CONTINUOUS Tori Teixeira MD        sodium chloride (NS) flush 5-10 mL  5-10 mL IntraVENous Q8H Tori Teixeira MD        sodium chloride (NS) flush 5-10 mL  5-10 mL IntraVENous PRN Tori Teixeira MD        HYDROmorphone (PF) (DILAUDID) injection 0.5 mg  0.5 mg IntraVENous Q2H PRN Tori Teixeira MD        [facility] 4 mg  4 mg IntraVENous Q4H PRN Tori Teixeira MD           Past History     Past Medical History:  Past Medical History:   Diagnosis Date    Arthritis     DJD    Enlarged prostate     Hyperlipidemia     Hypertension     LINDA treated with BiPAP     PAF (paroxysmal atrial fibrillation) ([facility] [address]     Pre-diabetes     HA1C 6.0 Dec 2016    Shingles        Past Surgical History:  Past Surgical History:   Procedure Laterality Date    HX COLONOSCOPY      normal except for hemorrhoids    HX HEENT      wisdom teeth extraction x2    HX KNEE REPLACEMENT      right; 2017    HX SHOULDER ARTHROSCOPY      rt    HX TONSILLECTOMY         Family History:  Family History   Problem Relation Age of Onset    Coronary Artery Disease Father 47      at [de-identified] of [place]       Social History:  Social History   Substance Use Topics    Smoking status: Never Smoker    Smokeless tobacco: Never Used    Alcohol use Yes      Comment: occasional       Allergies: Allergies   Allergen Reactions    Oxycodone Other (comments)     Patient states, \"It made me feel unwell. \"         Review of Systems   Review of Systems   Constitutional: Negative. Negative for chills and fever. HENT: Negative. Negative for congestion, rhinorrhea and sinus pressure. Eyes: Negative. Negative for discharge and redness. Respiratory: Negative. Negative for chest tightness and shortness of breath. Cardiovascular: Negative. Negative for chest pain. Gastrointestinal: Positive for abdominal pain (RLQ) and nausea. Negative for blood in stool and diarrhea. Endocrine: Negative. Genitourinary: Negative. Negative for dysuria, flank pain and hematuria. Musculoskeletal: Negative. Negative for back pain. Skin: Negative. Negative for rash. Neurological: Negative. Negative for dizziness, seizures, weakness, numbness and headaches. Hematological: Negative. All other systems reviewed and are negative. Physical Exam   Physical Exam   Constitutional: He is oriented to person, place, and time.  He appears well-developed and well-nourished. No distress. HENT:   Head: Normocephalic and atraumatic. Nose: Nose normal.   Mouth/Throat: No oropharyngeal exudate. Eyes: Conjunctivae and EOM are normal. Pupils are equal, round, and reactive to light. No scleral icterus. Neck: Normal range of motion. Neck supple. No JVD present. No thyromegaly present. Cardiovascular: Normal rate, regular rhythm, normal heart sounds, intact distal pulses and normal pulses. PMI is not displaced. Exam reveals no gallop and no friction rub. No murmur heard. Pulmonary/Chest: Effort normal and breath sounds normal. No stridor. No respiratory distress. He has no decreased breath sounds. He has no wheezes. He has no rhonchi. He has no rales. He exhibits no tenderness. Abdominal: Soft. Normal aorta and bowel sounds are normal. He exhibits no distension, no abdominal bruit, no pulsatile midline mass and no mass. There is no hepatosplenomegaly. There is tenderness in the right lower quadrant and suprapubic area. There is guarding. There is no rebound and no CVA tenderness. No hernia. Neurological: He is alert and oriented to person, place, and time. He has normal reflexes. He displays no atrophy and no tremor. No cranial nerve deficit or sensory deficit. He exhibits normal muscle tone. He displays no seizure activity. Coordination normal. GCS eye subscore is 4. GCS verbal subscore is 5. GCS motor subscore is 6. Reflex Scores:       Patellar reflexes are 2+ on the right side and 2+ on the left side. Skin: Skin is warm. No rash noted. He is not diaphoretic. No erythema. Nursing note and vitals reviewed.         Diagnostic Study Results     Labs -     Recent Results (from the past 12 hour(s))   URINALYSIS W/ RFLX MICROSCOPIC    Collection Time: 02/08/18  3:00 PM   Result Value Ref Range    Color YELLOW/STRAW      Appearance CLEAR CLEAR      Specific gravity 1.019 1.003 - 1.030      pH (UA) 6.0 5.0 - 8.0      Protein NEGATIVE  NEG mg/dL    Glucose NEGATIVE  NEG mg/dL    Ketone NEGATIVE  NEG mg/dL    Bilirubin NEGATIVE  NEG      Blood SMALL (A) NEG      Urobilinogen 0.2 0.2 - 1.0 EU/dL    Nitrites NEGATIVE  NEG      Leukocyte Esterase SMALL (A) NEG      WBC 0-4 0 - 4 /hpf    RBC 0-5 0 - 5 /hpf    Epithelial cells FEW FEW /lpf    Bacteria NEGATIVE  NEG /hpf    Mucus 2+ (A) NEG /lpf   CBC WITH AUTOMATED DIFF    Collection Time: 02/08/18  3:17 PM   Result Value Ref Range    WBC 14.5 (H) 4.1 - 11.1 K/uL    RBC 5.61 4.10 - 5.70 M/uL    HGB 15.2 12.1 - 17.0 g/dL    HCT 45.0 36.6 - 50.3 %    MCV 80.2 80.0 - 99.0 FL    MCH 27.1 26.0 - 34.0 PG    MCHC 33.8 30.0 - 36.5 g/dL    RDW 14.0 11.5 - 14.5 %    PLATELET 090 560 - 695 K/uL    MPV 9.0 8.9 - 12.9 FL    NRBC 0.0 0  WBC    ABSOLUTE NRBC 0.00 0.00 - 0.01 K/uL    NEUTROPHILS 77 (H) 32 - 75 %    LYMPHOCYTES 17 12 - 49 %    MONOCYTES 4 (L) 5 - 13 %    EOSINOPHILS 1 0 - 7 %    BASOPHILS 1 0 - 1 %    IMMATURE GRANULOCYTES 0 0.0 - 0.5 %    ABS. NEUTROPHILS 11.2 (H) 1.8 - 8.0 K/UL    ABS. LYMPHOCYTES 2.4 0.8 - 3.5 K/UL    ABS. MONOCYTES 0.6 0.0 - 1.0 K/UL    ABS. EOSINOPHILS 0.2 0.0 - 0.4 K/UL    ABS. BASOPHILS 0.1 0.0 - 0.1 K/UL    ABS. IMM. GRANS. 0.1 (H) 0.00 - 0.04 K/UL    DF AUTOMATED     METABOLIC PANEL, COMPREHENSIVE    Collection Time: 02/08/18  3:17 PM   Result Value Ref Range    Sodium 140 136 - 145 mmol/L    Potassium 4.5 3.5 - 5.1 mmol/L    Chloride 105 97 - 108 mmol/L    CO2 29 21 - 32 mmol/L    Anion gap 6 5 - 15 mmol/L    Glucose 100 65 - 100 mg/dL    BUN 19 6 - 20 MG/DL    Creatinine 1.41 (H) 0.70 - 1.30 MG/DL    BUN/Creatinine ratio 13 12 - 20      GFR est AA 59 (L) >60 ml/min/1.73m2    GFR est non-AA 48 (L) >60 ml/min/1.73m2    Calcium 9.0 8.5 - 10.1 MG/DL    Bilirubin, total 0.4 0.2 - 1.0 MG/DL    ALT (SGPT) 17 12 - 78 U/L    AST (SGOT) 16 15 - 37 U/L    Alk.  phosphatase 75 45 - 117 U/L    Protein, total 7.8 6.4 - 8.2 g/dL    Albumin 3.8 3.5 - 5.0 g/dL    Globulin 4.0 2.0 - 4.0 g/dL    A-G Ratio 1.0 (L) 1.1 - 2.2     EKG, 12 LEAD, INITIAL    Collection Time: 02/08/18  6:05 PM   Result Value Ref Range    Ventricular Rate 90 BPM    Atrial Rate 90 BPM    P-R Interval 192 ms    QRS Duration 66 ms    Q-T Interval 340 ms    QTC Calculation (Bezet) 415 ms    Calculated P Axis 42 degrees    Calculated R Axis 92 degrees    Calculated T Axis 78 degrees    Diagnosis       Normal sinus rhythm  Rightward axis  Nonspecific ST abnormality  When compared with ECG of 05-MAY-2017 12:12,  Vent. rate has increased BY  46 BPM  Questionable change in QRS duration         Radiologic Studies -     CT Results  (Last 48 hours)               02/08/18 1740  CT ABD PELV W CONT Final result    Impression:  IMPRESSION:   Mildly dilated appendix, without significant periappendiceal inflammatory   changes; findings likely represent early acute appendicitis. Indeterminate 5 mm   right middle lobe pulmonary nodule; if there is a cancer or smoking history,   consider follow-up CT in 12 months to assure stability. Additional incidental   findings as detailed above. The findings were called to Dr. Luciano Castillo on February 2018 at 5:50 PM by Dr. Kalee Don. [phone]               Narrative:  EXAM:  CT ABD PELV W CONT       INDICATION: Right lower quadrant abdominal pain         COMPARISON: None       CONTRAST:  80 mL of Isovue-370. TECHNIQUE:    Following the uneventful intravenous administration of contrast, thin axial   images were obtained through the abdomen and pelvis. Coronal and sagittal   reconstructions were generated. Oral contrast was not administered. CT dose   reduction was achieved through use of a standardized protocol tailored for this   examination and automatic exposure control for dose modulation. FINDINGS:    LUNG BASES: 5 mm subpleural nodule in the right middle lobe, seen on series 2,   image 11. INCIDENTALLY IMAGED HEART AND MEDIASTINUM: Unremarkable.    LIVER: No mass or biliary dilatation. Dystrophic calcification in the left   hepatic lobe. GALLBLADDER: Unremarkable. SPLEEN: No mass. PANCREAS: No mass or ductal dilatation. ADRENALS: Unremarkable. KIDNEYS: Probable tiny bilateral renal cysts. No hydronephrosis. Tiny   nonobstructing right renal calculi. No ureteral calculus. STOMACH: Unremarkable. SMALL BOWEL: No dilatation or wall thickening. COLON: No dilatation or wall thickening. APPENDIX: Mildly dilated. No significant periappendiceal inflammatory changes. PERITONEUM: No ascites or pneumoperitoneum. RETROPERITONEUM: No lymphadenopathy or aortic aneurysm. REPRODUCTIVE ORGANS: Markedly enlarged prostate gland. URINARY BLADDER: No mass or calculus. BONES: No destructive bone lesion. Grade 1 anterolisthesis at L5-S1 secondary to   bilateral pars interarticularis defects. Multilevel degenerative changes in the   lumbar spine. Old, healed right-sided rib fractures. ADDITIONAL COMMENTS: N/A                 Medical Decision Making   I am the first provider for this patient. I reviewed the vital signs, available nursing notes, past medical history, past surgical history, family history and social history. Vital Signs-Reviewed the patient's vital signs. Patient Vitals for the past 12 hrs:   Temp Pulse Resp BP SpO2   02/08/18 1955 (!) 100.6 °F (38.1 °C) 84 18 146/82 96 %   02/08/18 1451 98.1 °F (36.7 °C) 85 18 144/78 96 %     EKG interpretation: (Preliminary)  1805  Rhythm: normal sinus rhythm; and regular .  Rate (approx.): 90; Axis: normal; MO interval: normal; QRS interval: normal ; ST/T wave: normal  Written by LIDIA Ireland, as dictated by Blanca Cotton MD    Records Reviewed: Nursing Notes and Old Medical Records    Provider Notes (Medical Decision Making):     DDx: acute appendicitis, perforated viscus, hernia, bowel obstruction, malignancy, renal colic    Impression/plan: Hx of Afib on Eliquis with progressive RLQ pain and CT concerning for early appendicitis. Will admit to surgical service for further evaluation. ED Course:   Initial assessment performed. The patients presenting problems have been discussed, and they are in agreement with the care plan formulated and outlined with them. I have encouraged them to ask questions as they arise throughout their visit. CONSULT NOTE:   5:56 PM  Bogdan Diana MD spoke with Levon Collier MD   Specialty: general surgery  Discussed pt's hx, disposition, and available diagnostic and imaging results. Reviewed care plans. Consultant agrees with plans as outlined. Dr. Borden Done accepts. Written by Dhara Del Rosario, LIDIA Scribe, as dictated by Bogdan Diana MD.    Disposition:    ADMIT NOTE:  6:00 PM  The patient is being admitted to the hospital by Levon Collier MD.  The results of their tests and reasons for their admission have been discussed with the patient and/or available family. They convey agreement and understanding for the need to be admitted and for their admission diagnosis. PLAN:  1. Admit to general surgery    Diagnosis     Clinical Impression:   1. Acute appendicitis with localized peritonitis        Attestations: This note is prepared by Dhara Del Rosario, acting as Scribe for Bogdan Diana MD.    Bogdan Diana MD: The scribe's documentation has been prepared under my direction and personally reviewed by me in its entirety. I confirm that the note above accurately reflects all work, treatment, procedures, and medical decision making performed by me.

## 2018-02-08 NOTE — TELEPHONE ENCOUNTER
Called patient. Two patient identifiers verified. Complaint of right lower abdominal pain that started about an hour ago. Per patient, this is not the first time this has occurred. Pain is currently 8/10. Advised patient to go to ED immediately for further evaluation per Dr. Jayne Walters. Patient verbalized understanding and states no further questions.

## 2018-02-08 NOTE — H&P
Surgery History and Physcial    Subjective:      Ketan Bauer is a [de-identified] y.o. male who presents for evaluation of abdominal pain. The pain is located in the RLQ without radiation. Pain is described as sharp and stabbing and measures 7/10 in intensity. Onset of pain was 8 hours ago. Aggravating factors include movement. Alleviating factors include none. Associated symptoms include anorexia. Pt is on Eliquis daily for A-fib, took a dose this morning. Pt s/p right knee replacement last year, no prior abdominal surgeries. Patient Active Problem List    Diagnosis Date Noted    Acute appendicitis 2018    LINDA treated with BiPAP 2017    Right leg swelling 2017    Bilateral edema of lower extremity 2017    PAF (paroxysmal atrial fibrillation) (HCC)     Hypertension     Enlarged prostate     Hyperlipidemia     DJD (degenerative joint disease) of knee 2017     Past Medical History:   Diagnosis Date    Arthritis     DJD    Enlarged prostate     Hyperlipidemia     Hypertension     LINDA treated with BiPAP     PA[facility]) ([facility] [address]     Pre-diabetes     HA1C 6.0 Dec 2016    Shingles       Past Surgical History:   Procedure Laterality Date    HX COLONOSCOPY      normal except for hemorrhoids    HX HEENT      wisdom teeth extraction x2    HX KNEE REPLACEMENT      right; 2017    HX SHOULDER ARTHROSCOPY      rt    HX TONSILLECTOMY        Social History   Substance Use Topics    Smoking status: Never Smoker    Smokeless tobacco: Never Used    Alcohol use Yes      Comment: occasional      Family History   Problem Relation Age of Onset    Coronary Artery Disease Father 47      at [de-identified] of [place]      Prior to Admission medications    Medication Sig Start Date End Date Taking? Authorizing Provider   doxazosin (CARDURA) 4 mg tablet Take 1 Tab by mouth daily.  17   Maria Luisa Rubio MD   betaxolol (BETOPTIC) 0.5 % ophthalmic suspension INSTILL 1 DROP INTO LEFT EYE TWICE A DAY 9/8/17   Historical Provider   lisinopril (PRINIVIL, ZESTRIL) 20 mg tablet Take 1 Tab by mouth daily. May resume medication when b/p greater than 120/80. 10/6/17   Edson Norris MD   DOCOSAHEXANOIC ACID/EPA (FISH OIL PO) Take 1,000 mg by mouth daily. Historical Provider   apixaban (ELIQUIS) 5 mg tablet Take 1 Tab by mouth two (2) times a day. 8/28/17   Joceline Finley MD   omega-3 acid ethyl esters (LOVAZA) 1 gram capsule Take 2 Caps by mouth two (2) times daily (with meals). 7/9/17   Maria Luisa Laureano MD   LUMIGAN 0.01 % ophthalmic drops INSTILL 1 DROP INTO EACH EYE EVERY NIGHT AT BEDTIME 6/11/17   Historical Provider   furosemide (LASIX) 40 mg tablet Take 1 Tab by mouth daily as needed. As needed for swelling 7/6/17   Maria Luisa Laureano MD   clobetasol (TEMOVATE) 0.05 % topical cream Apply  to affected area two (2) times a day. 5/19/17   Maria Luisa Laureano MD   fluorouracil (EFUDEX) 5 % chemo cream 1 APPLICATION APPLY ON THE SKIN TWICE A DAY APPLY TWICE DAILY FOR 14 DAYS 4/26/17   Historical Provider   brimonidine (ALPHAGAN P) 0.1 % ophthalmic solution Administer 1 Drop to both eyes daily. Both eyes PM and lt eye in am also    Historical Provider     Allergies   Allergen Reactions    Oxycodone Other (comments)     Patient states, \"It made me feel unwell. \"         Review of Systems   Constitutional: Positive for appetite change. Negative for chills, diaphoresis and fever. Respiratory: Negative for shortness of breath and wheezing. Cardiovascular: Negative for chest pain and palpitations. Gastrointestinal: Negative for diarrhea, nausea and vomiting. Musculoskeletal: Negative for myalgias. Hematological: Does not bruise/bleed easily.         Objective:     Visit Vitals    /78 (BP 1 Location: Left arm, BP Patient Position: Sitting)    Pulse 85    Temp 98.1 °F (36.7 °C)    Resp 18    Ht 5' 9\" (1.753 m)    Wt 207 lb 3.7 oz (94 kg)    SpO2 96%    BMI 30.6 kg/m2       Physical Exam   Constitutional: He appears well-developed and well-nourished. No distress. HENT:   Head: Normocephalic and atraumatic. Cardiovascular: Normal rate, regular rhythm, normal heart sounds and intact distal pulses. Pulmonary/Chest: Breath sounds normal. He has no wheezes. He has no rales. Abdominal: Soft. Bowel sounds are normal. He exhibits no distension and no mass. There is no hepatosplenomegaly. There is tenderness in the right lower quadrant. There is tenderness at McBurney's point. There is no rigidity, no rebound, no guarding and negative Nascimento's sign. No hernia.       + hip shake  + Rovsing's  - psoas  - heel tap   Musculoskeletal: Normal range of motion. Lymphadenopathy:     He has no cervical adenopathy.        Imaging:  images and reports reviewed    Lab Review:    Recent Results (from the past 24 hour(s))   URINALYSIS W/ RFLX MICROSCOPIC    Collection Time: 02/08/18  3:00 PM   Result Value Ref Range    Color YELLOW/STRAW      Appearance CLEAR CLEAR      Specific gravity 1.019 1.003 - 1.030      pH (UA) 6.0 5.0 - 8.0      Protein NEGATIVE  NEG mg/dL    Glucose NEGATIVE  NEG mg/dL    Ketone NEGATIVE  NEG mg/dL    Bilirubin NEGATIVE  NEG      Blood SMALL (A) NEG      Urobilinogen 0.2 0.2 - 1.0 EU/dL    Nitrites NEGATIVE  NEG      Leukocyte Esterase SMALL (A) NEG      WBC 0-4 0 - 4 /hpf    RBC 0-5 0 - 5 /hpf    Epithelial cells FEW FEW /lpf    Bacteria NEGATIVE  NEG /hpf    Mucus 2+ (A) NEG /lpf   CBC WITH AUTOMATED DIFF    Collection Time: 02/08/18  3:17 PM   Result Value Ref Range    WBC 14.5 (H) 4.1 - 11.1 K/uL    RBC 5.61 4.10 - 5.70 M/uL    HGB 15.2 12.1 - 17.0 g/dL    HCT 45.0 36.6 - 50.3 %    MCV 80.2 80.0 - 99.0 FL    MCH 27.1 26.0 - 34.0 PG    MCHC 33.8 30.0 - 36.5 g/dL    RDW 14.0 11.5 - 14.5 %    PLATELET 337 597 - 678 K/uL    MPV 9.0 8.9 - 12.9 FL    NRBC 0.0 0  WBC    ABSOLUTE NRBC 0.00 0.00 - 0.01 K/uL    NEUTROPHILS 77 (H) 32 - 75 %    LYMPHOCYTES 17 12 - 49 %    MONOCYTES 4 (L) 5 - 13 %    EOSINOPHILS 1 0 - 7 %    BASOPHILS 1 0 - 1 %    IMMATURE GRANULOCYTES 0 0.0 - 0.5 %    ABS. NEUTROPHILS 11.2 (H) 1.8 - 8.0 K/UL    ABS. LYMPHOCYTES 2.4 0.8 - 3.5 K/UL    ABS. MONOCYTES 0.6 0.0 - 1.0 K/UL    ABS. EOSINOPHILS 0.2 0.0 - 0.4 K/UL    ABS. BASOPHILS 0.1 0.0 - 0.1 K/UL    ABS. IMM. GRANS. 0.1 (H) 0.00 - 0.04 K/UL    DF AUTOMATED     METABOLIC PANEL, COMPREHENSIVE    Collection Time: 02/08/18  3:17 PM   Result Value Ref Range    Sodium 140 136 - 145 mmol/L    Potassium 4.5 3.5 - 5.1 mmol/L    Chloride 105 97 - 108 mmol/L    CO2 29 21 - 32 mmol/L    Anion gap 6 5 - 15 mmol/L    Glucose 100 65 - 100 mg/dL    BUN 19 6 - 20 MG/DL    Creatinine 1.41 (H) 0.70 - 1.30 MG/DL    BUN/Creatinine ratio 13 12 - 20      GFR est AA 59 (L) >60 ml/min/1.73m2    GFR est non-AA 48 (L) >60 ml/min/1.73m2    Calcium 9.0 8.5 - 10.1 MG/DL    Bilirubin, total 0.4 0.2 - 1.0 MG/DL    ALT (SGPT) 17 12 - 78 U/L    AST (SGOT) 16 15 - 37 U/L    Alk. phosphatase 75 45 - 117 U/L    Protein, total 7.8 6.4 - 8.2 g/dL    Albumin 3.8 3.5 - 5.0 g/dL    Globulin 4.0 2.0 - 4.0 g/dL    A-G Ratio 1.0 (L) 1.1 - 2.2           Assessment:     Abdominal pain, suspect acute appendicitis, early by CT and history. Complicated by Eliquis anticoagulation, took a dose this morning. Plan:     1. I recommend proceeding with inpatient admission with iv antibiotics (zosyn), npo tonight. If doing okay, possible clear liquids tomorrow. Plan laparoscopic appendectomy in 2 days. 2. Discussed aspects of surgical intervention, methods, risks including by not limited to infection, bleeding, hematoma, and perforation of the intestines or solid organs, possible conversion to open operation, risk of negative exploration, and the risks of general anesthetic. The patient understands the risks; any and all questions were answered to the patient's satisfaction.     Signed By: Theresa Hein MD, [facility]    February 8, 2018

## 2018-02-09 ENCOUNTER — ANESTHESIA EVENT (OUTPATIENT)
Dept: SURGERY | Age: 83
DRG: 343 | End: 2018-02-09
Payer: MEDICARE

## 2018-02-09 LAB
ANION GAP SERPL CALC-SCNC: 6 MMOL/L (ref 5–15)
ATRIAL RATE: 90 BPM
BASOPHILS # BLD: 0.1 K/UL (ref 0–0.1)
BASOPHILS NFR BLD: 1 % (ref 0–1)
BUN SERPL-MCNC: 19 MG/DL (ref 6–20)
BUN/CREAT SERPL: 13 (ref 12–20)
CALCIUM SERPL-MCNC: 8.9 MG/DL (ref 8.5–10.1)
CALCULATED P AXIS, ECG09: 42 DEGREES
CALCULATED R AXIS, ECG10: 92 DEGREES
CALCULATED T AXIS, ECG11: 78 DEGREES
CHLORIDE SERPL-SCNC: 106 MMOL/L (ref 97–108)
CO2 SERPL-SCNC: 27 MMOL/L (ref 21–32)
CREAT SERPL-MCNC: 1.47 MG/DL (ref 0.7–1.3)
DIAGNOSIS, 93000: NORMAL
DIFFERENTIAL METHOD BLD: NORMAL
EOSINOPHIL # BLD: 0.1 K/UL (ref 0–0.4)
EOSINOPHIL NFR BLD: 1 % (ref 0–7)
ERYTHROCYTE [DISTWIDTH] IN BLOOD BY AUTOMATED COUNT: 14 % (ref 11.5–14.5)
GLUCOSE SERPL-MCNC: 97 MG/DL (ref 65–100)
HCT VFR BLD AUTO: 42.2 % (ref 36.6–50.3)
HGB BLD-MCNC: 13.9 G/DL (ref 12.1–17)
IMM GRANULOCYTES # BLD: 0 K/UL (ref 0–0.04)
IMM GRANULOCYTES NFR BLD AUTO: 0 % (ref 0–0.5)
LYMPHOCYTES # BLD: 3.5 K/UL (ref 0.8–3.5)
LYMPHOCYTES NFR BLD: 34 % (ref 12–49)
MCH RBC QN AUTO: 26.8 PG (ref 26–34)
MCHC RBC AUTO-ENTMCNC: 32.9 G/DL (ref 30–36.5)
MCV RBC AUTO: 81.5 FL (ref 80–99)
MONOCYTES # BLD: 0.6 K/UL (ref 0–1)
MONOCYTES NFR BLD: 6 % (ref 5–13)
NEUTS SEG # BLD: 5.9 K/UL (ref 1.8–8)
NEUTS SEG NFR BLD: 58 % (ref 32–75)
NRBC # BLD: 0 K/UL (ref 0–0.01)
NRBC BLD-RTO: 0 PER 100 WBC
P-R INTERVAL, ECG05: 192 MS
PLATELET # BLD AUTO: 156 K/UL (ref 150–400)
PMV BLD AUTO: 10 FL (ref 8.9–12.9)
POTASSIUM SERPL-SCNC: 3.9 MMOL/L (ref 3.5–5.1)
Q-T INTERVAL, ECG07: 340 MS
QRS DURATION, ECG06: 66 MS
QTC CALCULATION (BEZET), ECG08: 415 MS
RBC # BLD AUTO: 5.18 M/UL (ref 4.1–5.7)
SODIUM SERPL-SCNC: 139 MMOL/L (ref 136–145)
VENTRICULAR RATE, ECG03: 90 BPM
WBC # BLD AUTO: 10.1 K/UL (ref 4.1–11.1)

## 2018-02-09 PROCEDURE — 85025 COMPLETE CBC W/AUTO DIFF WBC: CPT | Performed by: SURGERY

## 2018-02-09 PROCEDURE — 80048 BASIC METABOLIC PNL TOTAL CA: CPT | Performed by: SURGERY

## 2018-02-09 PROCEDURE — 36415 COLL VENOUS BLD VENIPUNCTURE: CPT | Performed by: SURGERY

## 2018-02-09 PROCEDURE — 74011250636 HC RX REV CODE- 250/636: Performed by: SURGERY

## 2018-02-09 PROCEDURE — 74011000250 HC RX REV CODE- 250: Performed by: SURGERY

## 2018-02-09 PROCEDURE — 65270000029 HC RM PRIVATE

## 2018-02-09 PROCEDURE — 74011250637 HC RX REV CODE- 250/637: Performed by: SURGERY

## 2018-02-09 RX ADMIN — DEXTROSE MONOHYDRATE, SODIUM CHLORIDE, AND POTASSIUM CHLORIDE 125 ML/HR: 50; 4.5; 1.49 INJECTION, SOLUTION INTRAVENOUS at 00:13

## 2018-02-09 RX ADMIN — PIPERACILLIN SODIUM AND TAZOBACTAM SODIUM 3.38 G: .375; 3 INJECTION, POWDER, LYOPHILIZED, FOR SOLUTION INTRAVENOUS at 11:09

## 2018-02-09 RX ADMIN — PIPERACILLIN SODIUM AND TAZOBACTAM SODIUM 3.38 G: .375; 3 INJECTION, POWDER, LYOPHILIZED, FOR SOLUTION INTRAVENOUS at 18:37

## 2018-02-09 RX ADMIN — Medication 10 ML: at 00:13

## 2018-02-09 RX ADMIN — Medication 10 ML: at 20:38

## 2018-02-09 RX ADMIN — BRIMONIDINE TARTRATE 1 DROP: 2 SOLUTION/ DROPS OPHTHALMIC at 00:18

## 2018-02-09 RX ADMIN — BRIMONIDINE TARTRATE 1 DROP: 2 SOLUTION/ DROPS OPHTHALMIC at 11:09

## 2018-02-09 RX ADMIN — BRIMONIDINE TARTRATE 1 DROP: 2 SOLUTION/ DROPS OPHTHALMIC at 20:38

## 2018-02-09 RX ADMIN — DOXAZOSIN 4 MG: 2 TABLET ORAL at 11:08

## 2018-02-09 RX ADMIN — BRIMONIDINE TARTRATE 1 DROP: 2 SOLUTION/ DROPS OPHTHALMIC at 18:37

## 2018-02-09 RX ADMIN — PIPERACILLIN SODIUM AND TAZOBACTAM SODIUM 3.38 G: .375; 3 INJECTION, POWDER, LYOPHILIZED, FOR SOLUTION INTRAVENOUS at 03:18

## 2018-02-09 NOTE — PROGRESS NOTES
Admit Date: 2018    POD * No surgery found *    Procedure:  * No surgery found *    Subjective:     Patient has no complaints. Less abdominal discomfort. No N/V    Objective:     Blood pressure 147/68, pulse 72, temperature 98.5 °F (36.9 °C), resp. rate 19, height 5' 9\" (1.753 m), weight 207 lb 3.7 oz (94 kg), SpO2 93 %. Temp (24hrs), Av.1 °F (37.3 °C), Min:98.1 °F (36.7 °C), Max:100.6 °F (38.1 °C)      Physical Exam:  GENERAL: alert, cooperative, no distress, appears stated age, LUNG: clear to auscultation bilaterally, HEART: regular rate and rhythm, ABDOMEN: soft, non-distended, focally tender RLQ but improved.  Bowel sounds normal. No masses,  no organomegaly, EXTREMITIES:  extremities normal, atraumatic, no cyanosis or edema    Labs:   Recent Results (from the past 24 hour(s))   URINALYSIS W/ RFLX MICROSCOPIC    Collection Time: 18  3:00 PM   Result Value Ref Range    Color YELLOW/STRAW      Appearance CLEAR CLEAR      Specific gravity 1.019 1.003 - 1.030      pH (UA) 6.0 5.0 - 8.0      Protein NEGATIVE  NEG mg/dL    Glucose NEGATIVE  NEG mg/dL    Ketone NEGATIVE  NEG mg/dL    Bilirubin NEGATIVE  NEG      Blood SMALL (A) NEG      Urobilinogen 0.2 0.2 - 1.0 EU/dL    Nitrites NEGATIVE  NEG      Leukocyte Esterase SMALL (A) NEG      WBC 0-4 0 - 4 /hpf    RBC 0-5 0 - 5 /hpf    Epithelial cells FEW FEW /lpf    Bacteria NEGATIVE  NEG /hpf    Mucus 2+ (A) NEG /lpf   CBC WITH AUTOMATED DIFF    Collection Time: 18  3:17 PM   Result Value Ref Range    WBC 14.5 (H) 4.1 - 11.1 K/uL    RBC 5.61 4.10 - 5.70 M/uL    HGB 15.2 12.1 - 17.0 g/dL    HCT 45.0 36.6 - 50.3 %    MCV 80.2 80.0 - 99.0 FL    MCH 27.1 26.0 - 34.0 PG    MCHC 33.8 30.0 - 36.5 g/dL    RDW 14.0 11.5 - 14.5 %    PLATELET 822 793 - 609 K/uL    MPV 9.0 8.9 - 12.9 FL    NRBC 0.0 0  WBC    ABSOLUTE NRBC 0.00 0.00 - 0.01 K/uL    NEUTROPHILS 77 (H) 32 - 75 %    LYMPHOCYTES 17 12 - 49 %    MONOCYTES 4 (L) 5 - 13 %    EOSINOPHILS 1 0 - 7 % BASOPHILS 1 0 - 1 %    IMMATURE GRANULOCYTES 0 0.0 - 0.5 %    ABS. NEUTROPHILS 11.2 (H) 1.8 - 8.0 K/UL    ABS. LYMPHOCYTES 2.4 0.8 - 3.5 K/UL    ABS. MONOCYTES 0.6 0.0 - 1.0 K/UL    ABS. EOSINOPHILS 0.2 0.0 - 0.4 K/UL    ABS. BASOPHILS 0.1 0.0 - 0.1 K/UL    ABS. IMM. GRANS. 0.1 (H) 0.00 - 0.04 K/UL    DF AUTOMATED     METABOLIC PANEL, COMPREHENSIVE    Collection Time: 02/08/18  3:17 PM   Result Value Ref Range    Sodium 140 136 - 145 mmol/L    Potassium 4.5 3.5 - 5.1 mmol/L    Chloride 105 97 - 108 mmol/L    CO2 29 21 - 32 mmol/L    Anion gap 6 5 - 15 mmol/L    Glucose 100 65 - 100 mg/dL    BUN 19 6 - 20 MG/DL    Creatinine 1.41 (H) 0.70 - 1.30 MG/DL    BUN/Creatinine ratio 13 12 - 20      GFR est AA 59 (L) >60 ml/min/1.73m2    GFR est non-AA 48 (L) >60 ml/min/1.73m2    Calcium 9.0 8.5 - 10.1 MG/DL    Bilirubin, total 0.4 0.2 - 1.0 MG/DL    ALT (SGPT) 17 12 - 78 U/L    AST (SGOT) 16 15 - 37 U/L    Alk. phosphatase 75 45 - 117 U/L    Protein, total 7.8 6.4 - 8.2 g/dL    Albumin 3.8 3.5 - 5.0 g/dL    Globulin 4.0 2.0 - 4.0 g/dL    A-G Ratio 1.0 (L) 1.1 - 2.2     EKG, 12 LEAD, INITIAL    Collection Time: 02/08/18  6:05 PM   Result Value Ref Range    Ventricular Rate 90 BPM    Atrial Rate 90 BPM    P-R Interval 192 ms    QRS Duration 66 ms    Q-T Interval 340 ms    QTC Calculation (Bezet) 415 ms    Calculated P Axis 42 degrees    Calculated R Axis 92 degrees    Calculated T Axis 78 degrees    Diagnosis       Normal sinus rhythm  Rightward axis  Nonspecific ST abnormality  When compared with ECG of 05-MAY-2017 12:12,  Vent.  rate has increased BY  46 BPM  Questionable change in QRS duration     METABOLIC PANEL, BASIC    Collection Time: 02/09/18  3:27 AM   Result Value Ref Range    Sodium 139 136 - 145 mmol/L    Potassium 3.9 3.5 - 5.1 mmol/L    Chloride 106 97 - 108 mmol/L    CO2 27 21 - 32 mmol/L    Anion gap 6 5 - 15 mmol/L    Glucose 97 65 - 100 mg/dL    BUN 19 6 - 20 MG/DL    Creatinine 1.47 (H) 0.70 - 1.30 MG/DL BUN/Creatinine ratio 13 12 - 20      GFR est AA 56 (L) >60 ml/min/1.73m2    GFR est non-AA 46 (L) >60 ml/min/1.73m2    Calcium 8.9 8.5 - 10.1 MG/DL   CBC WITH AUTOMATED DIFF    Collection Time: 02/09/18  3:27 AM   Result Value Ref Range    WBC 10.1 4.1 - 11.1 K/uL    RBC 5.18 4.10 - 5.70 M/uL    HGB 13.9 12.1 - 17.0 g/dL    HCT 42.2 36.6 - 50.3 %    MCV 81.5 80.0 - 99.0 FL    MCH 26.8 26.0 - 34.0 PG    MCHC 32.9 30.0 - 36.5 g/dL    RDW 14.0 11.5 - 14.5 %    PLATELET 206 805 - 358 K/uL    MPV 10.0 8.9 - 12.9 FL    NRBC 0.0 0  WBC    ABSOLUTE NRBC 0.00 0.00 - 0.01 K/uL    NEUTROPHILS 58 32 - 75 %    LYMPHOCYTES 34 12 - 49 %    MONOCYTES 6 5 - 13 %    EOSINOPHILS 1 0 - 7 %    BASOPHILS 1 0 - 1 %    IMMATURE GRANULOCYTES 0 0.0 - 0.5 %    ABS. NEUTROPHILS 5.9 1.8 - 8.0 K/UL    ABS. LYMPHOCYTES 3.5 0.8 - 3.5 K/UL    ABS. MONOCYTES 0.6 0.0 - 1.0 K/UL    ABS. EOSINOPHILS 0.1 0.0 - 0.4 K/UL    ABS. BASOPHILS 0.1 0.0 - 0.1 K/UL    ABS. IMM. GRANS. 0.0 0.00 - 0.04 K/UL    DF AUTOMATED         Data Review images and reports reviewed    Assessment:     Principal Problem:    Acute appendicitis (2/8/2018)        Plan/Recommendations/Medical Decision Making:     clear liquids today   Npo after midnight with plans for lap appendectomy in am    Ren Weinstein MD, [facility]

## 2018-02-09 NOTE — PROGRESS NOTES
Interdisciplinary Rounds were completed on this patient. Rounds included nursing, clinical care leader, pharmacy, and case management. Patient was doing well without problems. Patient had the following concerns: none. Goals for the day will include: continue RX as is and mobilize. Plan for surgery tomorrow. Patient will need education/resources regarding meds at discharge (states he stopped taking his eliquis twice daily because of cost issues) - Patient is a current patient at [facility] (Dr. Estiven Adame). Patient was encouraged to tell his cardiologist about this issue as he may not be getting the full anticoagulant effects of eliquis if only taken once daily.

## 2018-02-10 ENCOUNTER — ANESTHESIA (OUTPATIENT)
Dept: SURGERY | Age: 83
DRG: 343 | End: 2018-02-10
Payer: MEDICARE

## 2018-02-10 LAB
ANION GAP SERPL CALC-SCNC: 4 MMOL/L (ref 5–15)
BUN SERPL-MCNC: 14 MG/DL (ref 6–20)
BUN/CREAT SERPL: 11 (ref 12–20)
CALCIUM SERPL-MCNC: 8.4 MG/DL (ref 8.5–10.1)
CHLORIDE SERPL-SCNC: 107 MMOL/L (ref 97–108)
CO2 SERPL-SCNC: 28 MMOL/L (ref 21–32)
CREAT SERPL-MCNC: 1.26 MG/DL (ref 0.7–1.3)
GLUCOSE SERPL-MCNC: 101 MG/DL (ref 65–100)
POTASSIUM SERPL-SCNC: 4 MMOL/L (ref 3.5–5.1)
SODIUM SERPL-SCNC: 139 MMOL/L (ref 136–145)

## 2018-02-10 PROCEDURE — 77030031139 HC SUT VCRL2 J&J -A: Performed by: SURGERY

## 2018-02-10 PROCEDURE — 77010033678 HC OXYGEN DAILY

## 2018-02-10 PROCEDURE — 76010000138 HC OR TIME 0.5 TO 1 HR: Performed by: SURGERY

## 2018-02-10 PROCEDURE — 74011250636 HC RX REV CODE- 250/636

## 2018-02-10 PROCEDURE — 0DTJ4ZZ RESECTION OF APPENDIX, PERCUTANEOUS ENDOSCOPIC APPROACH: ICD-10-PCS | Performed by: SURGERY

## 2018-02-10 PROCEDURE — 77030026438 HC STYL ET INTUB CARD -A: Performed by: ANESTHESIOLOGY

## 2018-02-10 PROCEDURE — 77030037892: Performed by: SURGERY

## 2018-02-10 PROCEDURE — 77030035048 HC TRCR ENDOSC OPTCL COVD -B: Performed by: SURGERY

## 2018-02-10 PROCEDURE — 51798 US URINE CAPACITY MEASURE: CPT

## 2018-02-10 PROCEDURE — 77030013079 HC BLNKT BAIR HGGR 3M -A: Performed by: ANESTHESIOLOGY

## 2018-02-10 PROCEDURE — 36415 COLL VENOUS BLD VENIPUNCTURE: CPT | Performed by: SURGERY

## 2018-02-10 PROCEDURE — 77030035051: Performed by: SURGERY

## 2018-02-10 PROCEDURE — 77030008684 HC TU ET CUF COVD -B: Performed by: ANESTHESIOLOGY

## 2018-02-10 PROCEDURE — 77030035220 HC TRCR ENDOSC BLNTPRT ANCHR COVD -B: Performed by: SURGERY

## 2018-02-10 PROCEDURE — 80048 BASIC METABOLIC PNL TOTAL CA: CPT | Performed by: SURGERY

## 2018-02-10 PROCEDURE — 74011250637 HC RX REV CODE- 250/637: Performed by: SURGERY

## 2018-02-10 PROCEDURE — 76210000006 HC OR PH I REC 0.5 TO 1 HR: Performed by: SURGERY

## 2018-02-10 PROCEDURE — 77030002933 HC SUT MCRYL J&J -A: Performed by: SURGERY

## 2018-02-10 PROCEDURE — 74011250636 HC RX REV CODE- 250/636: Performed by: ANESTHESIOLOGY

## 2018-02-10 PROCEDURE — 76060000032 HC ANESTHESIA 0.5 TO 1 HR: Performed by: SURGERY

## 2018-02-10 PROCEDURE — 77030022474 HC RELD STPLR ENDO GIA COVD -C: Performed by: SURGERY

## 2018-02-10 PROCEDURE — 74011000250 HC RX REV CODE- 250

## 2018-02-10 PROCEDURE — 65270000029 HC RM PRIVATE

## 2018-02-10 PROCEDURE — 74011250636 HC RX REV CODE- 250/636: Performed by: SURGERY

## 2018-02-10 PROCEDURE — 77030011640 HC PAD GRND REM COVD -A: Performed by: SURGERY

## 2018-02-10 PROCEDURE — 77030034849: Performed by: SURGERY

## 2018-02-10 PROCEDURE — 74011000250 HC RX REV CODE- 250: Performed by: SURGERY

## 2018-02-10 PROCEDURE — 77030010507 HC ADH SKN DERMBND J&J -B: Performed by: SURGERY

## 2018-02-10 PROCEDURE — 77030019908 HC STETH ESOPH SIMS -A: Performed by: ANESTHESIOLOGY

## 2018-02-10 PROCEDURE — 88304 TISSUE EXAM BY PATHOLOGIST: CPT | Performed by: SURGERY

## 2018-02-10 PROCEDURE — 77030032490 HC SLV COMPR SCD KNE COVD -B

## 2018-02-10 RX ORDER — EPHEDRINE SULFATE 50 MG/ML
INJECTION, SOLUTION INTRAVENOUS AS NEEDED
Status: DISCONTINUED | OUTPATIENT
Start: 2018-02-10 | End: 2018-02-10 | Stop reason: HOSPADM

## 2018-02-10 RX ORDER — PHENYLEPHRINE HCL IN 0.9% NACL 0.4MG/10ML
SYRINGE (ML) INTRAVENOUS AS NEEDED
Status: DISCONTINUED | OUTPATIENT
Start: 2018-02-10 | End: 2018-02-10 | Stop reason: HOSPADM

## 2018-02-10 RX ORDER — BUPIVACAINE HYDROCHLORIDE 5 MG/ML
INJECTION, SOLUTION EPIDURAL; INTRACAUDAL AS NEEDED
Status: DISCONTINUED | OUTPATIENT
Start: 2018-02-10 | End: 2018-02-10

## 2018-02-10 RX ORDER — LIDOCAINE HYDROCHLORIDE 10 MG/ML
0.1 INJECTION, SOLUTION EPIDURAL; INFILTRATION; INTRACAUDAL; PERINEURAL AS NEEDED
Status: DISCONTINUED | OUTPATIENT
Start: 2018-02-10 | End: 2018-02-10 | Stop reason: ALTCHOICE

## 2018-02-10 RX ORDER — HYDROCODONE BITARTRATE AND ACETAMINOPHEN 5; 325 MG/1; MG/1
1-2 TABLET ORAL
Status: DISCONTINUED | OUTPATIENT
Start: 2018-02-10 | End: 2018-02-11 | Stop reason: HOSPADM

## 2018-02-10 RX ORDER — GLYCOPYRROLATE 0.2 MG/ML
INJECTION INTRAMUSCULAR; INTRAVENOUS AS NEEDED
Status: DISCONTINUED | OUTPATIENT
Start: 2018-02-10 | End: 2018-02-10 | Stop reason: HOSPADM

## 2018-02-10 RX ORDER — SUCCINYLCHOLINE CHLORIDE 20 MG/ML
INJECTION INTRAMUSCULAR; INTRAVENOUS AS NEEDED
Status: DISCONTINUED | OUTPATIENT
Start: 2018-02-10 | End: 2018-02-10 | Stop reason: HOSPADM

## 2018-02-10 RX ORDER — ONDANSETRON 2 MG/ML
4 INJECTION INTRAMUSCULAR; INTRAVENOUS AS NEEDED
Status: DISCONTINUED | OUTPATIENT
Start: 2018-02-10 | End: 2018-02-10 | Stop reason: ALTCHOICE

## 2018-02-10 RX ORDER — FENTANYL CITRATE 50 UG/ML
50 INJECTION, SOLUTION INTRAMUSCULAR; INTRAVENOUS AS NEEDED
Status: DISCONTINUED | OUTPATIENT
Start: 2018-02-10 | End: 2018-02-10 | Stop reason: ALTCHOICE

## 2018-02-10 RX ORDER — DEXTROSE, SODIUM CHLORIDE, AND POTASSIUM CHLORIDE 5; .45; .15 G/100ML; G/100ML; G/100ML
75 INJECTION INTRAVENOUS CONTINUOUS
Status: DISCONTINUED | OUTPATIENT
Start: 2018-02-10 | End: 2018-02-11 | Stop reason: HOSPADM

## 2018-02-10 RX ORDER — FENTANYL CITRATE 50 UG/ML
INJECTION, SOLUTION INTRAMUSCULAR; INTRAVENOUS AS NEEDED
Status: DISCONTINUED | OUTPATIENT
Start: 2018-02-10 | End: 2018-02-10 | Stop reason: HOSPADM

## 2018-02-10 RX ORDER — LIDOCAINE HYDROCHLORIDE 20 MG/ML
INJECTION, SOLUTION EPIDURAL; INFILTRATION; INTRACAUDAL; PERINEURAL AS NEEDED
Status: DISCONTINUED | OUTPATIENT
Start: 2018-02-10 | End: 2018-02-10 | Stop reason: HOSPADM

## 2018-02-10 RX ORDER — SODIUM CHLORIDE, SODIUM LACTATE, POTASSIUM CHLORIDE, CALCIUM CHLORIDE 600; 310; 30; 20 MG/100ML; MG/100ML; MG/100ML; MG/100ML
INJECTION, SOLUTION INTRAVENOUS
Status: DISCONTINUED | OUTPATIENT
Start: 2018-02-10 | End: 2018-02-10 | Stop reason: HOSPADM

## 2018-02-10 RX ORDER — PROPOFOL 10 MG/ML
INJECTION, EMULSION INTRAVENOUS AS NEEDED
Status: DISCONTINUED | OUTPATIENT
Start: 2018-02-10 | End: 2018-02-10 | Stop reason: HOSPADM

## 2018-02-10 RX ORDER — DIPHENHYDRAMINE HYDROCHLORIDE 50 MG/ML
12.5 INJECTION, SOLUTION INTRAMUSCULAR; INTRAVENOUS AS NEEDED
Status: ACTIVE | OUTPATIENT
Start: 2018-02-10 | End: 2018-02-10

## 2018-02-10 RX ORDER — ROCURONIUM BROMIDE 10 MG/ML
INJECTION, SOLUTION INTRAVENOUS AS NEEDED
Status: DISCONTINUED | OUTPATIENT
Start: 2018-02-10 | End: 2018-02-10 | Stop reason: HOSPADM

## 2018-02-10 RX ORDER — HYDROMORPHONE HYDROCHLORIDE 1 MG/ML
.2-.5 INJECTION, SOLUTION INTRAMUSCULAR; INTRAVENOUS; SUBCUTANEOUS
Status: DISCONTINUED | OUTPATIENT
Start: 2018-02-10 | End: 2018-02-10 | Stop reason: ALTCHOICE

## 2018-02-10 RX ORDER — NEOSTIGMINE METHYLSULFATE 1 MG/ML
INJECTION INTRAVENOUS AS NEEDED
Status: DISCONTINUED | OUTPATIENT
Start: 2018-02-10 | End: 2018-02-10 | Stop reason: HOSPADM

## 2018-02-10 RX ORDER — DEXAMETHASONE SODIUM PHOSPHATE 4 MG/ML
INJECTION, SOLUTION INTRA-ARTICULAR; INTRALESIONAL; INTRAMUSCULAR; INTRAVENOUS; SOFT TISSUE AS NEEDED
Status: DISCONTINUED | OUTPATIENT
Start: 2018-02-10 | End: 2018-02-10 | Stop reason: HOSPADM

## 2018-02-10 RX ORDER — SODIUM CHLORIDE, SODIUM LACTATE, POTASSIUM CHLORIDE, CALCIUM CHLORIDE 600; 310; 30; 20 MG/100ML; MG/100ML; MG/100ML; MG/100ML
25 INJECTION, SOLUTION INTRAVENOUS CONTINUOUS
Status: DISCONTINUED | OUTPATIENT
Start: 2018-02-10 | End: 2018-02-10 | Stop reason: ALTCHOICE

## 2018-02-10 RX ORDER — ONDANSETRON 2 MG/ML
INJECTION INTRAMUSCULAR; INTRAVENOUS AS NEEDED
Status: DISCONTINUED | OUTPATIENT
Start: 2018-02-10 | End: 2018-02-10 | Stop reason: HOSPADM

## 2018-02-10 RX ORDER — MIDAZOLAM HYDROCHLORIDE 1 MG/ML
1 INJECTION, SOLUTION INTRAMUSCULAR; INTRAVENOUS AS NEEDED
Status: DISCONTINUED | OUTPATIENT
Start: 2018-02-10 | End: 2018-02-10 | Stop reason: ALTCHOICE

## 2018-02-10 RX ORDER — FENTANYL CITRATE 50 UG/ML
25 INJECTION, SOLUTION INTRAMUSCULAR; INTRAVENOUS
Status: DISCONTINUED | OUTPATIENT
Start: 2018-02-10 | End: 2018-02-10 | Stop reason: ALTCHOICE

## 2018-02-10 RX ADMIN — SUCCINYLCHOLINE CHLORIDE 140 MG: 20 INJECTION INTRAMUSCULAR; INTRAVENOUS at 08:06

## 2018-02-10 RX ADMIN — SODIUM CHLORIDE, SODIUM LACTATE, POTASSIUM CHLORIDE, CALCIUM CHLORIDE: 600; 310; 30; 20 INJECTION, SOLUTION INTRAVENOUS at 07:45

## 2018-02-10 RX ADMIN — DEXAMETHASONE SODIUM PHOSPHATE 4 MG: 4 INJECTION, SOLUTION INTRA-ARTICULAR; INTRALESIONAL; INTRAMUSCULAR; INTRAVENOUS; SOFT TISSUE at 08:30

## 2018-02-10 RX ADMIN — BRIMONIDINE TARTRATE 1 DROP: 2 SOLUTION/ DROPS OPHTHALMIC at 12:18

## 2018-02-10 RX ADMIN — FENTANYL CITRATE 100 MCG: 50 INJECTION, SOLUTION INTRAMUSCULAR; INTRAVENOUS at 08:06

## 2018-02-10 RX ADMIN — Medication 40 MCG: at 08:20

## 2018-02-10 RX ADMIN — NEOSTIGMINE METHYLSULFATE 3 MG: 1 INJECTION INTRAVENOUS at 08:40

## 2018-02-10 RX ADMIN — FENTANYL CITRATE 25 MCG: 50 INJECTION, SOLUTION INTRAMUSCULAR; INTRAVENOUS at 09:05

## 2018-02-10 RX ADMIN — ROCURONIUM BROMIDE 10 MG: 10 INJECTION, SOLUTION INTRAVENOUS at 08:06

## 2018-02-10 RX ADMIN — DEXTROSE, SODIUM CHLORIDE, AND POTASSIUM CHLORIDE 75 ML/HR: 5; .45; .15 INJECTION INTRAVENOUS at 09:50

## 2018-02-10 RX ADMIN — DOXAZOSIN 4 MG: 2 TABLET ORAL at 12:19

## 2018-02-10 RX ADMIN — GLYCOPYRROLATE 0.2 MG: 0.2 INJECTION INTRAMUSCULAR; INTRAVENOUS at 08:40

## 2018-02-10 RX ADMIN — ROCURONIUM BROMIDE 20 MG: 10 INJECTION, SOLUTION INTRAVENOUS at 08:20

## 2018-02-10 RX ADMIN — PIPERACILLIN SODIUM AND TAZOBACTAM SODIUM 3.38 G: .375; 3 INJECTION, POWDER, LYOPHILIZED, FOR SOLUTION INTRAVENOUS at 09:51

## 2018-02-10 RX ADMIN — DEXTROSE MONOHYDRATE, SODIUM CHLORIDE, AND POTASSIUM CHLORIDE 125 ML/HR: 50; 4.5; 1.49 INJECTION, SOLUTION INTRAVENOUS at 00:33

## 2018-02-10 RX ADMIN — FENTANYL CITRATE 25 MCG: 50 INJECTION, SOLUTION INTRAMUSCULAR; INTRAVENOUS at 09:20

## 2018-02-10 RX ADMIN — ONDANSETRON 4 MG: 2 INJECTION INTRAMUSCULAR; INTRAVENOUS at 08:30

## 2018-02-10 RX ADMIN — LIDOCAINE HYDROCHLORIDE 80 MG: 20 INJECTION, SOLUTION EPIDURAL; INFILTRATION; INTRACAUDAL; PERINEURAL at 08:06

## 2018-02-10 RX ADMIN — PROPOFOL 150 MG: 10 INJECTION, EMULSION INTRAVENOUS at 08:06

## 2018-02-10 RX ADMIN — PIPERACILLIN SODIUM AND TAZOBACTAM SODIUM 3.38 G: .375; 3 INJECTION, POWDER, LYOPHILIZED, FOR SOLUTION INTRAVENOUS at 01:34

## 2018-02-10 RX ADMIN — BRIMONIDINE TARTRATE 1 DROP: 2 SOLUTION/ DROPS OPHTHALMIC at 21:02

## 2018-02-10 RX ADMIN — PIPERACILLIN SODIUM AND TAZOBACTAM SODIUM 3.38 G: .375; 3 INJECTION, POWDER, LYOPHILIZED, FOR SOLUTION INTRAVENOUS at 17:41

## 2018-02-10 RX ADMIN — EPHEDRINE SULFATE 10 MG: 50 INJECTION, SOLUTION INTRAVENOUS at 08:22

## 2018-02-10 NOTE — ANESTHESIA PREPROCEDURE EVALUATION
Anesthetic History   No history of anesthetic complications            Review of Systems / Medical History  Patient summary reviewed, nursing notes reviewed and pertinent labs reviewed    Pulmonary        Sleep apnea: No treatment           Neuro/Psych   Within defined limits           Cardiovascular    Hypertension        Dysrhythmias : atrial fibrillation  Hyperlipidemia    Exercise tolerance: >4 METS  Comments: EF=70%   GI/Hepatic/Renal  Within defined limits              Endo/Other        Arthritis     Other Findings            Physical Exam    Airway  Mallampati: II  TM Distance: 4 - 6 cm  Neck ROM: normal range of motion   Mouth opening: Normal     Cardiovascular  Regular rate and rhythm,  S1 and S2 normal,  no murmur, click, rub, or gallop             Dental    Dentition: Caps/crowns     Pulmonary  Breath sounds clear to auscultation               Abdominal  GI exam deferred       Other Findings            Anesthetic Plan    ASA: 2  Anesthesia type: general    Monitoring Plan: BIS      Induction: Intravenous  Anesthetic plan and risks discussed with: Patient

## 2018-02-10 NOTE — PERIOP NOTES
TRANSFER - OUT REPORT:    Verbal report given to Meghan Busby RN on [employer]  being transferred to 2112(unit) for routine post - op       Report consisted of patients Situation, Background, Assessment and   Recommendations(SBAR). Information from the following report(s) SBAR, OR Summary, Procedure Summary, Intake/Output, MAR and Cardiac Rhythm SBUS BRADYCARDIA was reviewed with the receiving nurse. Opportunity for questions and clarification was provided.       Patient transported with:   SBAR, OR Summary, Procedure Summary, Intake/Output, MAR and Cardiac Rhythm SINUS BRADYCARDIA

## 2018-02-10 NOTE — ANESTHESIA POSTPROCEDURE EVALUATION
Post-Anesthesia Evaluation and Assessment    Patient: Cande Perez MRN: [id number]  SSN: [id number]    YOB: 1935  Age: [de-identified] y.o. Sex: male       Cardiovascular Function/Vital Signs  Visit Vitals    /66    Pulse (!) 57    Temp 36.4 °C (97.6 °F)    Resp 16    Ht 5' 9\" (1.753 m)    Wt 94 kg (207 lb 3.7 oz)    SpO2 93%    BMI 30.6 kg/m2       Patient is status post general anesthesia for Procedure(s):  LAPAROSCOPIC  APPENDECTOMY . Nausea/Vomiting: None    Postoperative hydration reviewed and adequate. Pain:  Pain Scale 1: Numeric (0 - 10) (02/10/18 0905)  Pain Intensity 1: 0 (02/10/18 0858)   Managed    Neurological Status:   Neuro (WDL): Within Defined Limits (02/10/18 0727)  Neuro  Neurologic State: Drowsy (02/10/18 2424)  Orientation Level: Oriented to person;Oriented to place (02/10/18 0910)  Cognition: Follows commands (02/10/18 0910)  Speech: Clear (02/10/18 0910)  LUE Motor Response: Purposeful (02/10/18 0910)  LLE Motor Response: Purposeful (02/10/18 0910)  RUE Motor Response: Purposeful (02/10/18 0910)  RLE Motor Response: Purposeful (02/10/18 0910)   At baseline    Mental Status and Level of Consciousness: Arousable    Pulmonary Status:   O2 Device: Nasal cannula (02/10/18 0854)   Adequate oxygenation and airway patent    Complications related to anesthesia: None    Post-anesthesia assessment completed.  No concerns    Signed By: Clarke Concepcion MD     February 10, 2018

## 2018-02-10 NOTE — PERIOP NOTES
Handoff Report from Operating Room to PACU    Report received from [facility] and [facility] regarding Mason Knox. Surgeon(s):  Saad Steiner MD  And Procedure(s) (LRB):  LAPAROSCOPIC  APPENDECTOMY  (N/A)  confirmed   with allergies and dressings discussed. Anesthesia type, drugs, patient history, complications, estimated blood loss, vital signs, intake and output, and last pain medication were reviewed.

## 2018-02-10 NOTE — PERIOP NOTES
TRANSFER - IN REPORT:    Verbal report received from [facility]) on Loetta Serbian  being received from 2112(unit) for ordered procedure      Report consisted of patients Situation, Background, Assessment and   Recommendations(SBAR). Information from the following report(s) SBAR, Kardex, ED Summary, OR Summary, Procedure Summary, Intake/Output, MAR, Accordion, Recent Results, Med Rec Status and Alarm Parameters  was reviewed with the receiving nurse. Opportunity for questions and clarification was provided. Assessment completed upon patients arrival to unit and care assumed.

## 2018-02-10 NOTE — OP NOTES
OPERATION        DATE OF OPERATION:  2/10/2018    PREOPERATIVE DIAGNOSIS:  Acute Appendicitis    POSTOPERATIVE DIAGNOSIS:  Acute Appendicitis    PROCEDURE:   Laparoscopic Appendectomy    SURGEON:  Nestor Rodriguez MD, FACS. ANESTHESIA:  General Endotrachial Anesthesia    FINDINGS:  Acute appendicitis without perforation or abscess    SPECIMENS REMOVED:  Appendix and Mesoappendix    DESCRIPTION OF OPERATION:  After appropriate consent was obtained, the patient who was competent was brought to the operating room, made comfortable in the supine position, and administered general endotracheal anesthesia. Uriarte catheter was placed, and the patient was prepped and draped in standard fashion. A 0.5% Marcaine solution was used to infiltrate the planned trocar sites. A 15 blade was then used to incise just under the umbilicus was extended sharply down to and through the midline fascia. A Mari trocar was placed, and the abdominal cavity was insufflated with CO2 gas to 15 cm water pressure. Under direct visualization, two 5 mm trocars were placed in the lower abdomen. Using standard laparoscopic technique, the abdominal cavity was explored. The appendix was identified and found to be pathologically enlarged and inflamed. The base of the appendix was identified, and a window was made in the mesentery at the appendiceal base. An Endo-RUT vascular staple cartridge was then fired across the appendix at the base, ligating and dividing it. A second firing of an Endo-RUT vascular staple cartridge was used to divide the mesoappendix; and when this was all freed, the appendix was placed in an endoscopic retrieval bag and withdrawn from the abdomen through the umbilical trocar site. The staple lines were inspected and found to be hemostatic. The abdominal cavity was further explored, and no other pathologic findings noted, particularly there were no fluid collections.  The trocars were removed under direct visualization without evidence of bleeding. The umbilical fascial defect was approximated with 0-Vicryl suture, and the skin incisions were closed with 5-0 Monocryl subcuticular stitch. The wounds were cleaned and dried and dressed with Dermabond. The patient was awakened, extubated, and transferred to the recovery room in good condition. There were no operative complications. There was minimal blood loss during the case. Cody Ordonez.  Mikey Rolle MD, [facility]

## 2018-02-10 NOTE — PROGRESS NOTES
Bedside shift change report given to Kp Navarrete (oncoming nurse) by Jun Huynh (offgoing nurse). Report included the following information SBAR and Kardex. CHG done x2. Jewelry's with wife. NPO since midnight.

## 2018-02-11 VITALS
SYSTOLIC BLOOD PRESSURE: 116 MMHG | DIASTOLIC BLOOD PRESSURE: 70 MMHG | WEIGHT: 207.23 LBS | OXYGEN SATURATION: 95 % | HEIGHT: 69 IN | BODY MASS INDEX: 30.69 KG/M2 | TEMPERATURE: 97.8 F | RESPIRATION RATE: 18 BRPM | HEART RATE: 58 BPM

## 2018-02-11 PROCEDURE — 74011250636 HC RX REV CODE- 250/636: Performed by: SURGERY

## 2018-02-11 PROCEDURE — 74011250637 HC RX REV CODE- 250/637: Performed by: SURGERY

## 2018-02-11 RX ORDER — HYDROCODONE BITARTRATE AND ACETAMINOPHEN 5; 325 MG/1; MG/1
1-2 TABLET ORAL
Qty: 30 TAB | Refills: 0 | Status: SHIPPED | OUTPATIENT
Start: 2018-02-11 | End: 2018-03-01

## 2018-02-11 RX ADMIN — DEXTROSE, SODIUM CHLORIDE, AND POTASSIUM CHLORIDE 75 ML/HR: 5; .45; .15 INJECTION INTRAVENOUS at 00:20

## 2018-02-11 RX ADMIN — DOXAZOSIN 4 MG: 2 TABLET ORAL at 07:26

## 2018-02-11 RX ADMIN — BRIMONIDINE TARTRATE 1 DROP: 2 SOLUTION/ DROPS OPHTHALMIC at 07:28

## 2018-02-11 RX ADMIN — PIPERACILLIN SODIUM AND TAZOBACTAM SODIUM 3.38 G: .375; 3 INJECTION, POWDER, LYOPHILIZED, FOR SOLUTION INTRAVENOUS at 07:27

## 2018-02-11 RX ADMIN — PIPERACILLIN SODIUM AND TAZOBACTAM SODIUM 3.38 G: .375; 3 INJECTION, POWDER, LYOPHILIZED, FOR SOLUTION INTRAVENOUS at 01:37

## 2018-02-11 NOTE — PROGRESS NOTES
General Surgery End of Shift Nursing Note    Bedside shift change report given to mae izquierdo (oncoming nurse) by Martha Ross (offgoing nurse). Report included the following information SBAR and Kardex. Shift worked:   7a-7p   Summary of shift:    Lap appendectomy, dysuria and blood in urine after surgery, passing 60-100mL red urine at time, some retention on bladder scan,  aware, patient ambulated 3x in halls and has been up to chair. Issues for physician to address:        Number times ambulated in hallway past shift: 3    Number of times OOB to chair past shift: 3    Pain Management:  Current medication: none  Patient states pain is manageable on current pain medication: YES    GI:    Current diet:  DIET GI LITE (POST SURGICAL)    Tolerating current diet: YES  Passing flatus: NO  Last Bowel Movement: several days ago   Appearance:     Respiratory:    Incentive Spirometer at bedside: NO  Patient instructed on use: NO    Patient Safety:    Falls Score: 1  Bed Alarm On? Not applicable  Sitter?  Not applicable    Juliane Palacios RN

## 2018-02-11 NOTE — PROGRESS NOTES
Pt is an [de-identified] yo  male admitted on 2/8/18 for acute appendicitis. Pt lives in a one-story house with his spouse. Pt is independent in ADLs/IADLs to include driving. Pt has used [facility] in 2016. Pt has no history of SNF or acute inpatient rehab. Pt has a RW at home. Pt to dc home by private vehicle today with spouse at 11:00AM. Pt to transport self or use family support for follow-up care appointments. Pt's preferred Rx is [facility] ([phone]). CM met with pt to verify demographic info and complete initial assessment, dc planning. Pt is alert and oriented x 4. Pt has no PT/OT needs at this time. CM to schedule PCP and Surgery f/u appointment tomorrow, as offices are closed today. CM confirmed contact number to reach pt to inform of scheduled appointments. All information entered into pt AVS.     Pt has no additional CM needs at this time. Care Management Interventions  PCP Verified by CM: Yes  Palliative Care Criteria Met (RRAT>21 & CHF Dx)?: No  Mode of Transport at Discharge:  Other (see comment) (By private vehicle with spouse)  Hospital Transport Time of Discharge: 1100  Transition of Care Consult (CM Consult): Discharge Planning  Discharge Durable Medical Equipment: No (RW at home)  Health Maintenance Reviewed: Yes  Physical Therapy Consult: No  Occupational Therapy Consult: No  Speech Therapy Consult: No  Current Support Network: Lives with Spouse, Family Lives [place], Own Home (Lives with spouse; independent in ADLs/IADLs)  Confirm Follow Up Transport: Self  Plan discussed with Pt/Family/Caregiver: Yes  Discharge Location  Discharge Placement: Home with family assistance    FABIANA Christina [employer] Social Work, [address] [place] [address]  [phone]

## 2018-02-11 NOTE — DISCHARGE INSTRUCTIONS
Appendectomy: What to Expect at Home  Your Recovery    Your doctor removed your appendix either by making many small cuts, called incisions, in your belly (laparoscopic surgery) or through open surgery. In open surgery, the doctor makes one large incision. The incisions leave scars that usually fade over time. After your surgery, it is normal to feel weak and tired for several days after you return home. Your belly may be swollen and may be painful. If you had laparoscopic surgery, you may have pain in your shoulder for about 24 hours. You may also feel sick to your stomach and have diarrhea, constipation, gas, or a headache. This usually goes away in a few days. Your recovery time depends on the type of surgery you had. If you had laparoscopic surgery, you will probably be able to return to work or a normal routine 1 to 3 weeks after surgery. If you had an open surgery, it may take 2 to 4 weeks. If your appendix ruptured, you may have a drain in your incision. Your body will work fine without an appendix. You will not have to make any changes in your diet or lifestyle. This care sheet gives you a general idea about how long it will take for you to recover. But each person recovers at a different pace. Follow the steps below to get better as quickly as possible. How can you care for yourself at home? Activity  ? · Rest when you feel tired. Getting enough sleep will help you recover. ? · Try to walk each day. Start by walking a little more than you did the day before. Bit by bit, increase the amount you walk. Walking boosts blood flow and helps prevent pneumonia and constipation. ? · For about 2 weeks, avoid lifting anything that would make you strain. This may include a child, heavy grocery bags and milk containers, a heavy briefcase or backpack, cat litter or dog food bags, or a vacuum .    ? · Avoid strenuous activities, such as bicycle riding, jogging, weight lifting, or aerobic exercise, until your doctor says it is okay. ? · You may be able to take showers (unless you have a drain near your incision) 24 to 48 hours after surgery. Pat the incision dry. Do not take a bath for the first 2 weeks, or until your doctor tells you it is okay. If you have a drain near your incision, follow your doctor's instructions. ? · You may drive when you are no longer taking pain medicine and can quickly move your foot from the gas pedal to the brake. You must also be able to sit comfortably for a long period of time, even if you do not plan on going far. You might get caught in traffic. ? · You will probably be able to go back to work in 1 to 3 weeks. If you had an open surgery, it may take 3 to 4 weeks. ? · Your doctor will tell you when you can have sex again. Diet  ? · You can eat your normal diet. If your stomach is upset, try bland, low-fat foods like plain rice, broiled chicken, toast, and yogurt. ? · Drink plenty of fluids (unless your doctor tells you not to). ? · You may notice that your bowel movements are not regular right after your surgery. This is common. Try to avoid constipation and straining with bowel movements. You may want to take a fiber supplement every day. If you have not had a bowel movement after a couple of days, ask your doctor about taking a mild laxative. Medicines  ? · Your doctor will tell you if and when you can restart your medicines. He or she will also give you instructions about taking any new medicines. ? · If you take blood thinners, such as warfarin (Coumadin), clopidogrel (Plavix), or aspirin, be sure to talk to your doctor. He or she will tell you if and when to start taking those medicines again. Make sure that you understand exactly what your doctor wants you to do. ? · If your appendix ruptured, you will need to take antibiotics. Take them as directed. Do not stop taking them just because you feel better. You need to take the full course of antibiotics. ? · Be safe with medicines. Take pain medicines exactly as directed. ¨ If the doctor gave you a prescription medicine for pain, take it as prescribed. ¨ If you are not taking a prescription pain medicine, take an over-the-counter medicine such as acetaminophen (Tylenol), ibuprofen (Advil, Motrin), or naproxen (Aleve). Read and follow all instructions on the label. ¨ Do not take two or more pain medicines at the same time unless the doctor told you to. Many pain medicines have acetaminophen, which is Tylenol. Too much Tylenol can be harmful. ? · If you think your pain medicine is making you sick to your stomach:  ¨ Take your medicine after meals (unless your doctor has told you not to). ¨ Ask your doctor for a different pain medicine. Incision care  ? · If you had an open surgery, you may have staples in your incision. The doctor will take these out in 7 to 10 days. ? · If you have strips of tape on the incision, leave the tape on for a week or until it falls off.   ? · You may wash the area with warm, soapy water 24 to 48 hours after your surgery, unless your doctor tells you not to. Pat the area dry. ? · Keep the area clean and dry. You may cover it with a gauze bandage if it weeps or rubs against clothing. Change the bandage every day. ? · If your appendix ruptured, you may have an incision with packing in it. Change the packing as often as your doctor tells you to. ¨ Packing changes may hurt at first. Taking pain medicine about half an hour before you change the dressing can help. ¨ If your dressing sticks to your wound, try soaking it with warm water for about 10 minutes before you remove it. You can do this in the shower or by placing a wet washcloth over the dressing. ¨ Remove the old packing and flush the incision with water. Gently pat the top area dry. ¨ The size of the incision determines how much gauze you need to put inside.  Fold the gauze over once, but do not wad it up so that it hurts. Put it in the wound carefully. You want to keep the sides of the wound from touching. A cotton swab may help you push the gauze in as needed. ¨ Put a gauze pad over the wound, and tape it down. ¨ You may notice greenish gray fluid seeping from your wound as you start to heal. This is normal. It is a sign that your wound is healing. Follow-up care is a key part of your treatment and safety. Be sure to make and go to all appointments, and call your doctor if you are having problems. It's also a good idea to know your test results and keep a list of the medicines you take. When should you call for help? Call 911 anytime you think you may need emergency care. For example, call if:  ? · You passed out (lost consciousness). ? · You are short of breath. Arvid Olayinka ? Call your doctor now or seek immediate medical care if:  ? · You are sick to your stomach and cannot drink fluids. ? · You cannot pass stools or gas. ? · You have pain that does not get better when you take your pain medicine. ? · You have signs of infection, such as:  ¨ Increased pain, swelling, warmth, or redness. ¨ Red streaks leading from the wound. ¨ Pus draining from the wound. ¨ A fever. ? · You have loose stitches, or your incision comes open. ? · Bright red blood has soaked through the bandage over your incision. ? · You have signs of a blood clot in your leg (called a deep vein thrombosis), such as:  ¨ Pain in your calf, back of knee, thigh, or groin. ¨ Redness and swelling in your leg or groin. ? Watch closely for any changes in your health, and be sure to contact your doctor if you have any problems. Where can you learn more? Go to http://cammie-derik.info/. Enter Y680 in the search box to learn more about \"Appendectomy: What to Expect at Home. \"  Current as of: May 12, 2017  Content Version: 11.4  © 3987-4382 [employer].  Care instructions adapted under license by [employer] (which disclaims liability or warranty for this information). If you have questions about a medical condition or this instruction, always ask your healthcare professional. [address] any warranty or liability for your use of this information.

## 2018-02-11 NOTE — DISCHARGE SUMMARY
Physician Discharge Summary     Patient ID:  Mason Knox  [id number]  21 y.o.  1935    Admit Date: 2/8/2018    Discharge Date: 2/11/2018    Admission Diagnoses: Acute appendicitis;appendicitis    Discharge Diagnoses:  Principal Problem:    Acute appendicitis (2/8/2018)         Admission Condition: Fair    Discharge Condition: Good    Last Procedure: Procedure(s):  [address] Course:   Normal hospital course for this procedure. Consults: None    Disposition: home    Patient Instructions:   Current Discharge Medication List      START taking these medications    Details   HYDROcodone-acetaminophen (NORCO) 5-325 mg per tablet Take 1-2 Tabs by mouth every four (4) hours as needed for Pain. Max Daily Amount: 12 Tabs. Qty: 30 Tab, Refills: 0    Associated Diagnoses: Acute appendicitis with localized peritonitis         CONTINUE these medications which have NOT CHANGED    Details   apixaban (ELIQUIS) 5 mg tablet Take 5 mg by mouth daily. doxazosin (CARDURA) 4 mg tablet Take 1 Tab by mouth daily. Qty: 30 Tab, Refills: 3      betaxolol (BETOPTIC) 0.5 % ophthalmic suspension INSTILL 1 DROP INTO LEFT EYE TWICE A DAY  Refills: 3      lisinopril (PRINIVIL, ZESTRIL) 20 mg tablet Take 1 Tab by mouth daily. May resume medication when b/p greater than 120/80. Qty: 30 Tab, Refills: 5    Associated Diagnoses: Essential hypertension      DOCOSAHEXANOIC ACID/EPA (FISH OIL PO) Take 1,000 mg by mouth daily. omega-3 acid ethyl esters (LOVAZA) 1 gram capsule Take 2 Caps by mouth two (2) times daily (with meals). Qty: 30 Cap, Refills: 5    Associated Diagnoses: High triglycerides      LUMIGAN 0.01 % ophthalmic drops INSTILL 1 DROP INTO EACH EYE EVERY NIGHT AT BEDTIME  Refills: 5      furosemide (LASIX) 40 mg tablet Take 1 Tab by mouth daily as needed.  As needed for swelling  Qty: 20 Tab, Refills: 4    Associated Diagnoses: Leg edema, right      clobetasol (TEMOVATE) 0.05 % topical cream Apply  to affected area two (2) times a day. Qty: 15 g, Refills: 0    Associated Diagnoses: Intrinsic eczema      fluorouracil (EFUDEX) 5 % chemo cream 1 APPLICATION APPLY ON THE SKIN TWICE A DAY APPLY TWICE DAILY FOR 14 DAYS  Refills: 0      brimonidine (ALPHAGAN P) 0.1 % ophthalmic solution Administer 1 Drop to both eyes daily. Both eyes PM and lt eye in am also           Activity: No driving while on analgesics and No heavy lifting for 4 weeks  Diet: Regular Diet  Wound Care: Keep wound clean and dry    Follow-up with Dr. Romana Maloney in 2 weeks.   Follow-up tests/labs none    Signed:  Marion Koo MD  [facility]  2/11/2018  9:24 AM

## 2018-02-11 NOTE — PROGRESS NOTES
Problem: Falls - Risk of  Goal: *Absence of Falls  Document Randy Fall Risk and appropriate interventions in the flowsheet. Outcome: Progressing Towards Goal  Fall Risk Interventions:  Mobility Interventions: Patient to call before getting OOB         Medication Interventions: Patient to call before getting OOB         History of Falls Interventions: Door open when patient unattended        Problem: Patient Education: Go to Patient Education Activity  Goal: Patient/Family Education  Outcome: Progressing Towards Goal  Call bell within reach, siderails upx2.  Family at bedside

## 2018-02-11 NOTE — ROUTINE PROCESS
General Surgery End of Shift Nursing Note    Bedside shift change report given to Jayla Hanna (oncoming nurse) by Maria Eugenia Berry RN (offgoing nurse). Report included the following information SBAR, Kardex, Intake/Output and MAR. Shift worked:   7 PM to 7 AM   Summary of shift:    PT reported 0/10 pain and did not request any pain medication. PT had a restful and quiet night. Issues for physician to address:   NA     Number times ambulated in hallway past shift: 0    Number of times OOB to chair past shift: 0    Pain Management:  Current medication: None  Patient states pain is manageable on current pain medication: YES    GI:    Current diet:  DIET GI LITE (POST SURGICAL)    Tolerating current diet: YES  Passing flatus: YES  Last Bowel Movement: several days ago   Appearance: Unknown    Respiratory:    Incentive Spirometer at bedside: YES  Patient instructed on use: YES    Patient Safety:    Falls Score: 1  Bed Alarm On? No  Sitter?  No    Cordelia Bradnt

## 2018-02-12 ENCOUNTER — TELEPHONE (OUTPATIENT)
Dept: INTERNAL MEDICINE CLINIC | Age: 83
End: 2018-02-12

## 2018-02-12 NOTE — TELEPHONE ENCOUNTER
Returned call to patient's wife, Doris Navarrete on [facility]. Scheduled patient's SERA visit on 2/16/18 at 56 AM with Dr. Marcelina Zhu. Mrs. Alden Owens states patient is doing very well following appendectomy.

## 2018-02-12 NOTE — TELEPHONE ENCOUNTER
#[phone] [facility] states pt was discharged on 2-11-18 and needs a SERA this week. Please call to schedule as there is nothing available. Thanks.

## 2018-02-13 ENCOUNTER — OFFICE VISIT (OUTPATIENT)
Dept: INTERNAL MEDICINE CLINIC | Age: 83
End: 2018-02-13

## 2018-02-13 ENCOUNTER — HOSPITAL ENCOUNTER (OUTPATIENT)
Dept: ULTRASOUND IMAGING | Age: 83
Discharge: HOME | End: 2018-02-13
Attending: INTERNAL MEDICINE
Payer: MEDICARE

## 2018-02-13 ENCOUNTER — TELEPHONE (OUTPATIENT)
Dept: INTERNAL MEDICINE CLINIC | Age: 83
End: 2018-02-13

## 2018-02-13 VITALS
RESPIRATION RATE: 18 BRPM | WEIGHT: 205 LBS | OXYGEN SATURATION: 99 % | SYSTOLIC BLOOD PRESSURE: 175 MMHG | TEMPERATURE: 97.8 F | DIASTOLIC BLOOD PRESSURE: 84 MMHG | BODY MASS INDEX: 30.36 KG/M2 | HEART RATE: 90 BPM | HEIGHT: 69 IN

## 2018-02-13 DIAGNOSIS — N40.1 BENIGN PROSTATIC HYPERPLASIA WITH LOWER URINARY TRACT SYMPTOMS, SYMPTOM DETAILS UNSPECIFIED: ICD-10-CM

## 2018-02-13 DIAGNOSIS — R30.9 URINARY PAIN: ICD-10-CM

## 2018-02-13 DIAGNOSIS — R33.9 URINARY RETENTION: Primary | ICD-10-CM

## 2018-02-13 DIAGNOSIS — R31.9 HEMATURIA, UNSPECIFIED TYPE: ICD-10-CM

## 2018-02-13 DIAGNOSIS — R33.9 URINARY RETENTION: ICD-10-CM

## 2018-02-13 LAB
BILIRUB UR QL STRIP: NEGATIVE
GLUCOSE UR-MCNC: NEGATIVE MG/DL
KETONES P FAST UR STRIP-MCNC: NEGATIVE MG/DL
PH UR STRIP: 6 [PH] (ref 4.6–8)
PROT UR QL STRIP: NEGATIVE
SP GR UR STRIP: 1.01 (ref 1–1.03)
UA UROBILINOGEN AMB POC: NORMAL (ref 0.2–1)
URINALYSIS CLARITY POC: CLEAR
URINALYSIS COLOR POC: YELLOW
URINE BLOOD POC: NORMAL
URINE LEUKOCYTES POC: NEGATIVE
URINE NITRITES POC: NEGATIVE

## 2018-02-13 PROCEDURE — 76770 US EXAM ABDO BACK WALL COMP: CPT

## 2018-02-13 RX ORDER — DOXAZOSIN 8 MG/1
8 TABLET ORAL DAILY
Qty: 30 TAB | Refills: 0 | Status: SHIPPED | OUTPATIENT
Start: 2018-02-13 | End: 2018-03-06 | Stop reason: ALTCHOICE

## 2018-02-13 NOTE — TELEPHONE ENCOUNTER
Called  Yeyo Neil back and informed him to call Dr Miriam Schneider since he just had the surgery done. Patient stated he will call DR Cynthia Berry .

## 2018-02-13 NOTE — TELEPHONE ENCOUNTER
Patient states he needs a call back in reference to being advised what to do about his Appendectomy 2/10/18 Post Op symptoms/concerns of Blood in Urine, Urinary retention/only a few drops at a time, & Constipation. Please call to advise.  Thank you

## 2018-02-13 NOTE — PATIENT INSTRUCTIONS
Hold Eliquis for the next 2 days    Obtain renal US to make sure there is no blockage     Increase the Cardura ( doxazosin) to 8mg to help you urinate for 1 month - then discuss with urologist     Call your urologist

## 2018-02-13 NOTE — MR AVS SNAPSHOT
[address] [facility] 
[phone] Patient: Ella Hall MRN: [id number] :1935 Visit Information Date & Time Provider Department Dept. Phone Encounter #  
 2018 11:00 AM [facility], [address] [phone] [id number] Follow-up Instructions Return if symptoms worsen or fail to improve. Your Appointments 2018 10:30 AM  
HOSPITAL FOLLOW-UP with [facility], [address] [facility] Appt Note: SERA [facility] DC 18; appendectomy [facility] [address] [facility] [address]  
  
   
 [facility] [address]  [address] [facility]  
  
    
 2018  9:30 AM  
POST OP 10 MIN with Keny Lin MD  
[facility] ([facility] Appt Note: 2 wk po appendectomy [address] [facility]  
[phone]  
  
   
 [address] [place] [address] [address] [id number]  
  
    
 3/1/2018 10:20 AM  
ESTABLISHED PATIENT with Salima Chao MD  
[facility] ([facility]) Appt Note: 6 mnth f/u  
 [address]  [address]  
[address] [facility] [address] Upcoming Health Maintenance Date Due  
 MEDICARE YEARLY EXAM 2018 GLAUCOMA SCREENING Q2Y 2019 DTaP/Tdap/Td series (2 - Td) 2021 Allergies as of 2018  Review Complete On: 2018 By: Omero Ricardo Severity Noted Reaction Type Reactions Oxycodone Low 2017    Other (comments) Patient states, \"It made me feel unwell. \" Current Immunizations  Reviewed on 2017 Name Date Influenza High Dose Vaccine PF 10/6/2017 11:16 AM  
 Influenza Vaccine 10/1/2016 Pneumococcal Conjugate (PCV-13) 2015 Tdap 12/5/2011 Not reviewed this visit You Were Diagnosed With   
  
 Codes Comments Urinary retention    -  Primary ICD-10-CM: R33.9 ICD-9-CM: 788.20 Urinary pain     ICD-10-CM: R30.9 ICD-9-CM: 788.1 Hematuria, unspecified type     ICD-10-CM: R31.9 ICD-9-CM: 599.70 Benign prostatic hyperplasia with lower urinary tract symptoms, symptom details unspecified     ICD-10-CM: N40.1 ICD-9-CM: 600.01 Vitals BP Pulse Temp Resp Height(growth percentile) Weight(growth percentile) 175/84 (BP 1 Location: Right arm, BP Patient Position: Sitting) 90 97.8 °F (36.6 °C) (Oral) 18 5' 9\" (1.753 m) 205 lb (93 kg) SpO2 BMI Smoking Status 99% 30.27 kg/m2 Never Smoker BMI and BSA Data Body Mass Index Body Surface Area  
 30.27 kg/m 2 2.13 m 2 Preferred Pharmacy Pharmacy Name Phone [id number] [facility] [phone] Your Updated Medication List  
  
   
This list is accurate as of: 2/13/18 11:40 AM.  Always use your most recent med list.  
  
  
  
  
 ALPHAGAN P 0.1 % ophthalmic solution Generic drug:  brimonidine Administer 1 Drop to both eyes daily. Both eyes PM and lt eye in am also  
  
 apixaban 5 mg tablet Commonly known as:  Vincent Pore Take 5 mg by mouth daily. betaxolol 0.5 % ophthalmic suspension Commonly known as:  BETOPTIC INSTILL 1 DROP INTO LEFT EYE TWICE A DAY  
  
 clobetasol 0.05 % topical cream  
Commonly known as:  Cielo Pae Apply  to affected area two (2) times a day. doxazosin 8 mg tablet Commonly known as:  CARDURA Take 1 Tab by mouth daily. FISH OIL PO Take 1,000 mg by mouth daily. fluorouracil 5 % chemo cream  
Commonly known as:  EFUDEX  
1 APPLICATION APPLY ON THE SKIN TWICE A DAY APPLY TWICE DAILY FOR 14 DAYS  
  
 furosemide 40 mg tablet Commonly known as:  LASIX Take 1 Tab by mouth daily as needed. As needed for swelling HYDROcodone-acetaminophen 5-325 mg per tablet Commonly known as:  Valdez3 Julio Knowles Take 1-2 Tabs by mouth every four (4) hours as needed for Pain. Max Daily Amount: 12 Tabs. lisinopril 20 mg tablet Commonly known as:  Dang Glover Take 1 Tab by mouth daily. May resume medication when b/p greater than 120/80. LUMIGAN 0.01 % ophthalmic drops Generic drug:  bimatoprost  
INSTILL 1 DROP INTO EACH EYE EVERY NIGHT AT BEDTIME  
  
 omega-3 acid ethyl esters 1 gram capsule Commonly known as:  Bertha Calderón Take 2 Caps by mouth two (2) times daily (with meals). Prescriptions Sent to Pharmacy Refills  
 doxazosin (CARDURA) 8 mg tablet 0 Sig: Take 1 Tab by mouth daily. Class: Normal  
 Pharmacy: [facility] [id number], [address] [place] [address] #: [phone] Route: Oral  
  
We Performed the Following AMB POC URINALYSIS DIP STICK AUTO W/O MICRO [93327 CPT(R)] CULTURE, URINE [id number] CPT(R)] REFERRAL TO UROLOGY [GOL771 Custom] Follow-up Instructions Return if symptoms worsen or fail to improve. To-Do List   
 02/13/2018 Imaging:  US RETROPERITONEUM COMP Referral Information Referral ID Referred By Referred To  
  
 [id number] Marcos Drake, [address] Visits Status Start Date End Date 1 New Request 2/13/18 2/13/19 If your referral has a status of pending review or denied, additional information will be sent to support the outcome of this decision. Patient Instructions Hold Eliquis for the next 2 days Obtain renal US to make sure there is no blockage Increase the Cardura ( doxazosin) to 8mg to help you urinate for 1 month - then discuss with urologist  
 
Call your urologist 
 
 
 
 
  
Introducing [facility]! Dear Penny Funez: Thank you for requesting a [employer] account. Our records indicate that you already have an active [employer] account. You can access your account anytime at [email or web address] Did you know that you can access your hospital and ER discharge instructions at any time in [employer]? You can also review all of your test results from your hospital stay or ER visit. Additional Information If you have questions, please visit the Frequently Asked Questions section of the [employer] website at [email or web address] Remember, [employer] is NOT to be used for urgent needs. For medical emergencies, dial 911. Now available from your iPhone and Android! Please provide this summary of care documentation to your next provider. Your primary care clinician is listed as ANGELA Whalen. If you have any questions after today's visit, please call [phone].

## 2018-02-13 NOTE — TELEPHONE ENCOUNTER
Called patient. Verfied name and . Patient states  been up all night, going to the bathroom with a little urination at a time, having frequency and pressure. Pt states been having blood in the urine since they took the griffin catheter out on Saturday. Pt states had a bowel movement at 5pm yesterday, it was soft and formed. Advise pt that pcp will be informed of concerns.

## 2018-02-13 NOTE — PROGRESS NOTES
Chief Complaint   Patient presents with   [facility] Follow Up     Acute appendicitis;appendicitis 2/8/18     1. Have you been to the ER, urgent care clinic since your last visit? Hospitalized since your last visit? Yes When: 2/8/18 [facility] Reason for visit: Acute appendicitis;appendicitis    2. Have you seen or consulted any other health care providers outside of the [address] [place] [address] since your last visit? Include any pap smears or colon screening.  No

## 2018-02-13 NOTE — PROGRESS NOTES
CC: Hospital Follow Up (Acute appendicitis;appendicitis 2/8/18)  Blood in urine  Hospital follow up     HPI:    He is a [de-identified] y.o. male who presents for evaluation of urinary retention and blood in urine  Also presenting to follow up from hospitalization  I reviewed his hospital course  Patient developed severe abdominal pain and went to hospital and had appendicitis and underwent surgery last Saturday 02/10 and tolerated it well. Uriarte removed prior to waking up from surgery. Had painful urination with blood n Saturday in the hospital. Seemed a bit better Sunday therefore patient was discharged. Urinated with Sunday and Monday with less pain. Patient restarted Eliquis yesterday morning and noted difficulty to urinate and noted blood in the urine- no blood clots. Patient held eliquis this morning  Not taking narcotics  Has a hx of enlarged prostate followed by urologist in [place]        ROS:  10 systems reviewed and negative other than HPI     Past Medical History:   Diagnosis Date    Arthritis     DJD    Enlarged prostate     Hyperlipidemia     Hypertension     LINDA treated with BiPAP     PAF (paroxysmal atrial fibrillation) ([facility] [address]     Pre-diabetes     HA1C 6.0 Dec 2016    Shingles        Current Outpatient Prescriptions on File Prior to Visit   Medication Sig Dispense Refill    HYDROcodone-acetaminophen (NORCO) 5-325 mg per tablet Take 1-2 Tabs by mouth every four (4) hours as needed for Pain. Max Daily Amount: 12 Tabs. 30 Tab 0    apixaban (ELIQUIS) 5 mg tablet Take 5 mg by mouth daily.  betaxolol (BETOPTIC) 0.5 % ophthalmic suspension INSTILL 1 DROP INTO LEFT EYE TWICE A DAY  3    lisinopril (PRINIVIL, ZESTRIL) 20 mg tablet Take 1 Tab by mouth daily. May resume medication when b/p greater than 120/80. 30 Tab 5    DOCOSAHEXANOIC ACID/EPA (FISH OIL PO) Take 1,000 mg by mouth daily.  omega-3 acid ethyl esters (LOVAZA) 1 gram capsule Take 2 Caps by mouth two (2) times daily (with meals). 30 Cap 5    LUMIGAN 0.01 % ophthalmic drops INSTILL 1 DROP INTO EACH EYE EVERY NIGHT AT BEDTIME  5    furosemide (LASIX) 40 mg tablet Take 1 Tab by mouth daily as needed. As needed for swelling 20 Tab 4    clobetasol (TEMOVATE) 0.05 % topical cream Apply  to affected area two (2) times a day. 15 g 0    fluorouracil (EFUDEX) 5 % chemo cream 1 APPLICATION APPLY ON THE SKIN TWICE A DAY APPLY TWICE DAILY FOR 14 DAYS  0    brimonidine (ALPHAGAN P) 0.1 % ophthalmic solution Administer 1 Drop to both eyes daily. Both eyes PM and lt eye in am also       No current facility-administered medications on file prior to visit. Past Surgical History:   Procedure Laterality Date    HX COLONOSCOPY      normal except for hemorrhoids    HX HEENT      wisdom teeth extraction x2    HX KNEE REPLACEMENT      right; 2017    HX SHOULDER ARTHROSCOPY      rt    HX TONSILLECTOMY         Family History   Problem Relation Age of Onset    Coronary Artery Disease Father 47      at [de-identified] of MI     Reviewed and no changes     Social History     Social History    Marital status:      Spouse name: N/A    Number of children: N/A    Years of education: N/A     Occupational History    Not on file.      Social History Main Topics    Smoking status: Never Smoker    Smokeless tobacco: Never Used    Alcohol use Yes      Comment: occasional    Drug use: No    Sexual activity: Not on file     Other Topics Concern    Not on file     Social History Narrative            Visit Vitals    /84 (BP 1 Location: Right arm, BP Patient Position: Sitting)    Pulse 90    Temp 97.8 °F (36.6 °C) (Oral)    Resp 18    Ht 5' 9\" (1.753 m)    Wt 205 lb (93 kg)    SpO2 99%    BMI 30.27 kg/m2       Physical Examination:   General - Well appearing male  HEENT - PERRL, TM no erythema/opacification, normal nasal turbinates, oropharynx no erythema or exudate, MMM  Neck - supple, no bruits, no TMG, no LAD  Pulm - clear to auscultation bilaterally  Cardio - RRR, normal S1 S2, no murmur gallops or rubs  Abd - soft, nontender, no masses, no HSM  3 incisions healing appropriately in abdomen  No pain with palpation of bladder  Extrem - no edema, +2 distal pulses  Psych - normal affect, appropriate mood    Lab Results   Component Value Date/Time    WBC 10.1 02/09/2018 03:27 AM    HGB 13.9 02/09/2018 03:27 AM    HCT 42.2 02/09/2018 03:27 AM    PLATELET 427 26/43/3466 03:27 AM    MCV 81.5 02/09/2018 03:27 AM     Lab Results   Component Value Date/Time    Sodium 139 02/10/2018 03:30 AM    Potassium 4.0 02/10/2018 03:30 AM    Chloride 107 02/10/2018 03:30 AM    CO2 28 02/10/2018 03:30 AM    Anion gap 4 (L) 02/10/2018 03:30 AM    Glucose 101 (H) 02/10/2018 03:30 AM    BUN 14 02/10/2018 03:30 AM    Creatinine 1.26 02/10/2018 03:30 AM    BUN/Creatinine ratio 11 (L) 02/10/2018 03:30 AM    GFR est AA >60 02/10/2018 03:30 AM    GFR est non-AA 55 (L) 02/10/2018 03:30 AM    Calcium 8.4 (L) 02/10/2018 03:30 AM     Lab Results   Component Value Date/Time    Cholesterol, total 197 07/06/2017 11:23 AM    HDL Cholesterol 31 (L) 07/06/2017 11:23 AM    LDL, calculated 122 (H) 07/06/2017 11:23 AM    VLDL, calculated 44 (H) 07/06/2017 11:23 AM    Triglyceride 220 (H) 07/06/2017 11:23 AM     No results found for: TSH, TSH2, TSH3, TSHP, TSHEXT, TSHEXT  Lab Results   Component Value Date/Time    Prostate Specific Ag 4.2 (H) 02/09/2017 12:07 PM    % Free PSA 29.8 02/09/2017 12:07 PM     Lab Results   Component Value Date/Time    Hemoglobin A1c 6.0 (H) 07/06/2017 11:23 AM     No results found for: Rudy Fried VD3RIA    Lab Results   Component Value Date/Time    ALT (SGPT) 17 02/08/2018 03:17 PM    AST (SGOT) 16 02/08/2018 03:17 PM    Alk.  phosphatase 75 02/08/2018 03:17 PM    Bilirubin, direct 0.11 02/09/2017 12:07 PM    Bilirubin, total 0.4 02/08/2018 03:17 PM           Assessment/Plan:    Transition of care - patient was admitted for appendicitis  Reviewed hospital course  Doing well from abdominal perspective but now has urinary retention    Urinary retention  Urinary pain  Patient was able to void in clinic and noted to have 2+ blood   He did have a recent cystoscopy that was normal at his urologist ( prior to appendicitis)   I suspect the urinary pain is related to traumatic griffin - patient has a large prostate  - having hematuria therefore advised to hold eliquis for 2 days ( discussed risk of stroke when off of eliquis)  - we are doing US to rule out obstruction   - increase doxazosin to 8 mg for the next 2-3 weeks. Discussed that it can cause hypotension  - doxazosin (CARDURA) 8 mg tablet; Take 1 Tab by mouth daily. Dispense: 30 Tab; Refill: 0  - AMB POC URINALYSIS DIP STICK AUTO W/O MICRO  - US RETROPERITONEUM COMP; Future    Hematuria, unspecified type  - AMB POC URINALYSIS DIP STICK AUTO W/O MICRO 2+ blood  - US RETROPERITONEUM COMP; Future    Benign prostatic hyperplasia with lower urinary tract symptoms, symptom details unspecified  - REFERRAL TO UROLOGY  -his urologist is in [place] / since he lives in [place] would be ideal to have urologist here     High blood pressure likely due to urinary discomfort    Follow up on Friday - for BP and urinary issues  Follow-up Disposition:  Return if symptoms worsen or fail to improve.     Isabella Roger MD

## 2018-02-14 ENCOUNTER — HOSPITAL ENCOUNTER (EMERGENCY)
Age: 83
Discharge: HOME | End: 2018-02-14
Attending: EMERGENCY MEDICINE
Payer: MEDICARE

## 2018-02-14 VITALS
TEMPERATURE: 98.1 F | OXYGEN SATURATION: 98 % | DIASTOLIC BLOOD PRESSURE: 91 MMHG | WEIGHT: 205.03 LBS | HEART RATE: 79 BPM | HEIGHT: 69 IN | RESPIRATION RATE: 16 BRPM | SYSTOLIC BLOOD PRESSURE: 156 MMHG | BODY MASS INDEX: 30.37 KG/M2

## 2018-02-14 DIAGNOSIS — R33.9 URINARY RETENTION: Primary | ICD-10-CM

## 2018-02-14 PROCEDURE — 51702 INSERT TEMP BLADDER CATH: CPT

## 2018-02-14 PROCEDURE — 77030005514 HC CATH URETH FOL14 BARD -A

## 2018-02-14 PROCEDURE — 99282 EMERGENCY DEPT VISIT SF MDM: CPT

## 2018-02-14 NOTE — TELEPHONE ENCOUNTER
Spoke with patient . Informed patient that he needs to go to the ER per Dr Lisa Gaytan.  Also called and made patient appoinmtnet with Dr Sebastián Swain office 2/26/18 @ 8:00 am.

## 2018-02-14 NOTE — ED NOTES
Pt discharged by Dr. Sarina Moreno. Pt provided with discharge instructions Rx and instructions on follow up care. Pt out of ED ambulatory without difficulty accompanied by family.

## 2018-02-14 NOTE — TELEPHONE ENCOUNTER
Patient states he need a call back from Gustavo Garcia in reference to persistent issues thru the night & needs to be advised what to do. Please call to discuss.  Thank you

## 2018-02-14 NOTE — ED PROVIDER NOTES
EMERGENCY DEPARTMENT HISTORY AND PHYSICAL EXAM      Date: 2/14/2018  Patient Name: Sydnee Regan    History of Presenting Illness     Chief Complaint   Patient presents with    Urinary Retention     Pt states he had an appendectomy with urinary catheter on Saturday. Pt states he has had urinary retention, frequency, and dysuria since the operation. History Provided By: Patient    HPI: Sydnee Regan, [de-identified] y.o. male with PMHx significant for HTN, HLD, enlarged prostate who recently underwent an appendectomy under Dr. Sohan Manning on 2/10/2018, presenting ambulatory today with cc of intermittent, mild to moderate difficulty urinating x 4 days. Pt states that since the procedure, he has experienced intermittent difficulty urinating and notes associated, intermittent constipation. He denies any use of narcotic pain medications since the procedure. He notes that he does have a hx of an enlarged prostate for which they have been considering a prostatectomy, however, he has no plans to undergo the procedure currently. He notes that he saw his PCP yesterday and underwent an unremarkable post-op follow up. He is currently on Eliquis. He specifically denies any fevers, chills, nausea, vomiting, chest pain, shortness of breath, headache, rash, diarrhea, sweating or weight loss. PCP: Spencer Heredia MD    There are no other complaints, changes, or physical findings at this time. Current Outpatient Prescriptions   Medication Sig Dispense Refill    doxazosin (CARDURA) 8 mg tablet Take 1 Tab by mouth daily. 30 Tab 0    HYDROcodone-acetaminophen (NORCO) 5-325 mg per tablet Take 1-2 Tabs by mouth every four (4) hours as needed for Pain. Max Daily Amount: 12 Tabs. 30 Tab 0    apixaban (ELIQUIS) 5 mg tablet Take 5 mg by mouth daily.  betaxolol (BETOPTIC) 0.5 % ophthalmic suspension INSTILL 1 DROP INTO LEFT EYE TWICE A DAY  3    lisinopril (PRINIVIL, ZESTRIL) 20 mg tablet Take 1 Tab by mouth daily.  May resume medication when b/p greater than 120/80. 30 Tab 5    DOCOSAHEXANOIC ACID/EPA (FISH OIL PO) Take 1,000 mg by mouth daily.  omega-3 acid ethyl esters (LOVAZA) 1 gram capsule Take 2 Caps by mouth two (2) times daily (with meals). 30 Cap 5    LUMIGAN 0.01 % ophthalmic drops INSTILL 1 DROP INTO EACH EYE EVERY NIGHT AT BEDTIME  5    furosemide (LASIX) 40 mg tablet Take 1 Tab by mouth daily as needed. As needed for swelling 20 Tab 4    clobetasol (TEMOVATE) 0.05 % topical cream Apply  to affected area two (2) times a day. 15 g 0    fluorouracil (EFUDEX) 5 % chemo cream 1 APPLICATION APPLY ON THE SKIN TWICE A DAY APPLY TWICE DAILY FOR 14 DAYS  0    brimonidine (ALPHAGAN P) 0.1 % ophthalmic solution Administer 1 Drop to both eyes daily. Both eyes PM and lt eye in am also         Past History     Past Medical History:  Past Medical History:   Diagnosis Date    Arthritis     DJD    Enlarged prostate     Hyperlipidemia     Hypertension     LINDA treated with BiPAP     PAF ([facility]oxysmal atrial fibrillation) ([facility] [address]     Pre-diabetes     HA1C 6.0 Dec 2016    Shingles        Past Surgical History:  Past Surgical History:   Procedure Laterality Date    HX COLONOSCOPY      normal except for hemorrhoids    HX HEENT      wisdom teeth extraction x2    HX KNEE REPLACEMENT      right; 2017    HX SHOULDER ARTHROSCOPY      rt    HX TONSILLECTOMY         Family History:  Family History   Problem Relation Age of Onset    Coronary Artery Disease Father 47      at [de-identified] of [place]       Social History:  Social History   Substance Use Topics    Smoking status: Never Smoker    Smokeless tobacco: Never Used    Alcohol use Yes      Comment: occasional       Allergies: Allergies   Allergen Reactions    Oxycodone Other (comments)     Patient states, \"It made me feel unwell. \"         Review of Systems   Review of Systems   Constitutional: Negative.   Negative for activity change, appetite change, chills, fatigue, fever and unexpected weight change. HENT: Negative. Negative for congestion, hearing loss, rhinorrhea, sneezing and voice change. Eyes: Negative. Negative for pain and visual disturbance. Respiratory: Negative. Negative for apnea, cough, choking, chest tightness and shortness of breath. Cardiovascular: Negative. Negative for chest pain and palpitations. Gastrointestinal: Positive for constipation. Negative for abdominal distention, abdominal pain, blood in stool, diarrhea, nausea and vomiting. Genitourinary: Positive for difficulty urinating. Negative for flank pain, frequency and urgency. No discharge   Musculoskeletal: Negative. Negative for arthralgias, back pain, myalgias and neck stiffness. Skin: Negative. Negative for color change and rash. Neurological: Negative. Negative for dizziness, seizures, syncope, speech difficulty, weakness, numbness and headaches. Hematological: Negative for adenopathy. Psychiatric/Behavioral: Negative. Negative for agitation, behavioral problems, dysphoric mood and suicidal ideas. The patient is not nervous/anxious. Physical Exam   Physical Exam   Constitutional: He is oriented to person, place, and time. He appears well-developed and well-nourished. No distress. HENT:   Head: Normocephalic and atraumatic. Mouth/Throat: Oropharynx is clear and moist. No oropharyngeal exudate. Eyes: Conjunctivae and EOM are normal. Pupils are equal, round, and reactive to light. Right eye exhibits no discharge. Left eye exhibits no discharge. Neck: Normal range of motion. Neck supple. Cardiovascular: Normal rate, regular rhythm and intact distal pulses. Exam reveals no gallop and no friction rub. No murmur heard. Pulmonary/Chest: Effort normal and breath sounds normal. No respiratory distress. He has no wheezes. He has no rales. He exhibits no tenderness. Abdominal: Soft. Bowel sounds are normal. He exhibits no distension and no mass.  There is no tenderness. There is no rebound and no guarding. Musculoskeletal: Normal range of motion. He exhibits no edema. Lymphadenopathy:     He has no cervical adenopathy. Neurological: He is alert and oriented to person, place, and time. No cranial nerve deficit. Coordination normal.   Skin: Skin is warm and dry. No rash noted. No erythema. Psychiatric: He has a normal mood and affect. Nursing note and vitals reviewed. Medical Decision Making   I am the first provider for this patient. I reviewed the vital signs, available nursing notes, past medical history, past surgical history, family history and social history. Vital Signs-Reviewed the patient's vital signs. Patient Vitals for the past 12 hrs:   Temp Pulse Resp BP SpO2   02/14/18 1007 98.1 °F (36.7 °C) 79 16 (!) 156/91 98 %       Records Reviewed: Nursing Notes and Old Medical Records    Provider Notes (Medical Decision Making):   DDx: Urinary outlet obstruction, UTI, BPH    ED Course:   Initial assessment performed. The patients presenting problems have been discussed, and they are in agreement with the care plan formulated and outlined with them. I have encouraged them to ask questions as they arise throughout their visit. 10:48 AM  Uriarte catheter successful placed by RN. Pt voided 500 mL urine. He reports he is feeling better. Anticipate discharge. Disposition:  11:41 AM  Gonzalez Damián Abrahamer's  results have been reviewed with him. He has been counseled regarding his diagnosis. He verbally conveys understanding and agreement of the signs, symptoms, diagnosis, treatment and prognosis and additionally agrees to follow up as recommended with Dr. Briana Soriano MD in 24 - 48 hours. He also agrees with the care-plan and conveys that all of his questions have been answered.   I have also put together some discharge instructions for him that include: 1) educational information regarding their diagnosis, 2) how to care for their diagnosis at home, as well a 3) list of reasons why they would want to return to the ED prior to their follow-up appointment, should their condition change. PLAN:  1. Current Discharge Medication List        2. Follow-up Information     Follow up With Details Comments [address], [place] Call in 2 days As needed, If symptoms worsen [address]  [place]  [phone]          Return to ED if worse     Diagnosis     Clinical Impression:   1. Urinary retention        Attestations: This note is prepared by Elly Heimlich, acting as Scribe for [employer] MD: The scribe's documentation has been prepared under my direction and personally reviewed by me in its entirety. I confirm that the note above accurately reflects all work, treatment, procedures, and medical decision making performed by me.

## 2018-02-14 NOTE — ED TRIAGE NOTES
Assumed care of pt ambulatory from triage with family. Pt reports CC of urinary retention since surgery this past sat. Pt reports he has not been able to completely empty his bladder since the surgery. Pt reports he goes every few min. Pt ambulatory to restroom on arrival to room. Pt A&O x 4. Pt resting on stretcher in POC. No acute distress noted at this time.

## 2018-02-14 NOTE — DISCHARGE INSTRUCTIONS

## 2018-02-14 NOTE — ED NOTES
Inserted griffin catheter using sterile procedure. Jann Duverney RN in room during the procedure. Pt tolerated well. 500 ml drained immediately. Clamped off for a few min and then allowed to continue to drain.

## 2018-02-15 ENCOUNTER — PATIENT OUTREACH (OUTPATIENT)
Dept: INTERNAL MEDICINE CLINIC | Age: 83
End: 2018-02-15

## 2018-02-16 ENCOUNTER — OFFICE VISIT (OUTPATIENT)
Dept: INTERNAL MEDICINE CLINIC | Age: 83
End: 2018-02-16

## 2018-02-16 VITALS
OXYGEN SATURATION: 99 % | RESPIRATION RATE: 18 BRPM | TEMPERATURE: 97.8 F | WEIGHT: 205 LBS | DIASTOLIC BLOOD PRESSURE: 83 MMHG | SYSTOLIC BLOOD PRESSURE: 138 MMHG | HEIGHT: 69 IN | BODY MASS INDEX: 30.36 KG/M2 | HEART RATE: 76 BPM

## 2018-02-16 DIAGNOSIS — I48.0 PAF (PAROXYSMAL ATRIAL FIBRILLATION) (HCC): ICD-10-CM

## 2018-02-16 DIAGNOSIS — R33.9 URINARY RETENTION: Primary | ICD-10-CM

## 2018-02-16 RX ORDER — DOXAZOSIN 4 MG/1
TABLET ORAL
Refills: 3 | COMMUNITY
Start: 2018-02-01 | End: 2018-02-16 | Stop reason: ALTCHOICE

## 2018-02-16 NOTE — PROGRESS NOTES
Chief Complaint   Patient presents with   [facility] Follow Up     1. Have you been to the ER, urgent care clinic since your last visit? Hospitalized since your last visit? Yes When: 2/14/18 [facility] Reason for visit: Urinary Retention    2. Have you seen or consulted any other health care providers outside of the [address] [place] [address] since your last visit? Include any pap smears or colon screening.  No

## 2018-02-16 NOTE — PROGRESS NOTES
[address] - SKINNY - 2/16/2018    Suellen Arriaga is a [de-identified] y.o. male   This patient was received as a referral from inpatient admission   Inpatient RRAT Score: 10  Patient's challenges to self management identified:  financial, medication management, utilization of services    Medication Management:  good understanding, poor adherence    Summary of patients top three problems:     Problem 1: \"Donut hole\" - Pt fears the donut hole coming in September and is attempting to stretch his medications by taking only partial doses. Pt encouraged to discuss cost of eliquis and other medications with cardiologist to determine options. Problem 2: Potential for infection - Pt has indwelling griffin catheter r/t urinary retention in the hospital. Voiding trial planned for week ending in 2/23/18. Patients motivational level on a scale of 0-10: 9  Advance Care Planning:   Patient was offered the opportunity to discuss advance care planning:  yes     Does patient have an Advance Directive:  no   If no, did you provide information on Advance Care Planning? yes     [employer], Referrals, and Durable Medical Equipment: none at this time    Follow up appointments:  Pt attended PCP appt with Dr. Tatyana Vega on 2/16/18. Goals      ACP            2/16/2018 - AFD  Pt offered opportunity to discuss advanced care planning. Pt states he has no interest at this time. Suggested pt review the forms while in the office. P - Pt agrees to take ACP forms with him to discuss with his wife.  Knowledge and adherence of prescribed medication (ie. action, side effects, missed dose, etc.).            2/16/2018 - AFD  Pt is stretching his medications by taking partial doses due to pending \"donut hole\"  Pt states MD prescribed eliquis BID. Pt states he is only taking every day due to cost.  Pt is currently holding eliquis due to blood in urine post appendectomy.   Pt advised to call Dr. Yuli Perez and inform him of difficulty complying with prescription. P - Pt will contact Dr. Riya Kuhn for advice or additional resource for eliquis.  Understands red flags post discharge. 2/16/2018 - AFD    Pt discharged with griffin cath due to urine retention while in-pt. Pt seen by urologist on 2/16/18 and decision was to delay voiding trial until \"next week\" - around 2/22/18. Pt able to provide explanation for griffin care. Pt states he feels comfortable caring for griffin. Pt denies questions concerning griffin care. Pt states he is aware of potential for infection. P - Pt will continue diligent griffin care until voiding trial next week. Patient verbalized understanding of all information discussed. Patient has this Nurse Navigators contact information for any further questions, concerns, or needs.

## 2018-02-16 NOTE — PROGRESS NOTES
CC: Hospital Follow Up      HPI:    He is a [de-identified] y.o. male with a history of paroxysmal A. fib, hypertension, BPH who had a recent admission for appendicitis. Patient underwent appendectomy on February 10. He tolerated the procedure well but developed difficulty to urinate after the procedure. On February 13 I evaluated the patient and had a urinalysis which showed 2+ blood. I sent the patient for US and had residual bladder urine 200 ml - patient was having pain and dribbling urine therefore I advised him to go ER - he had griffin placed on 02/14 then was seein by urologist today ( at Dr Martinez's office). Decided to wait until next week for voiding trial.   He has not taken Eliquis since last Tuesday  Today he denies pain   He denies blood in griffin          ROS:  10 systems reviewed and negative     Past Medical History:   Diagnosis Date    Arthritis     DJD    Enlarged prostate     Hyperlipidemia     Hypertension     LINDA treated with BiPAP     [facility]) [facility] [address]     Pre-diabetes     HA1C 6.0 Dec 2016    Shingles        Current Outpatient Prescriptions on File Prior to Visit   Medication Sig Dispense Refill    doxazosin (CARDURA) 8 mg tablet Take 1 Tab by mouth daily. 30 Tab 0    HYDROcodone-acetaminophen (NORCO) 5-325 mg per tablet Take 1-2 Tabs by mouth every four (4) hours as needed for Pain. Max Daily Amount: 12 Tabs. 30 Tab 0    apixaban (ELIQUIS) 5 mg tablet Take 5 mg by mouth daily.  betaxolol (BETOPTIC) 0.5 % ophthalmic suspension INSTILL 1 DROP INTO LEFT EYE TWICE A DAY  3    lisinopril (PRINIVIL, ZESTRIL) 20 mg tablet Take 1 Tab by mouth daily. May resume medication when b/p greater than 120/80. 30 Tab 5    DOCOSAHEXANOIC ACID/EPA (FISH OIL PO) Take 1,000 mg by mouth daily.  omega-3 acid ethyl esters (LOVAZA) 1 gram capsule Take 2 Caps by mouth two (2) times daily (with meals).  30 Cap 5    LUMIGAN 0.01 % ophthalmic drops INSTILL 1 DROP INTO EACH EYE EVERY NIGHT AT BEDTIME  5    furosemide (LASIX) 40 mg tablet Take 1 Tab by mouth daily as needed. As needed for swelling 20 Tab 4    clobetasol (TEMOVATE) 0.05 % topical cream Apply  to affected area two (2) times a day. 15 g 0    fluorouracil (EFUDEX) 5 % chemo cream 1 APPLICATION APPLY ON THE SKIN TWICE A DAY APPLY TWICE DAILY FOR 14 DAYS  0    brimonidine (ALPHAGAN P) 0.1 % ophthalmic solution Administer 1 Drop to both eyes daily. Both eyes PM and lt eye in am also       No current facility-administered medications on file prior to visit. Past Surgical History:   Procedure Laterality Date    HX COLONOSCOPY      normal except for hemorrhoids    HX HEENT      wisdom teeth extraction x2    HX KNEE REPLACEMENT      right; 2017    HX SHOULDER ARTHROSCOPY      rt    HX TONSILLECTOMY         Family History   Problem Relation Age of Onset    Coronary Artery Disease Father 47      at [de-identified] of MI     Reviewed and no changes     Social History     Social History    Marital status:      Spouse name: N/A    Number of children: N/A    Years of education: N/A     Occupational History    Not on file.      Social History Main Topics    Smoking status: Never Smoker    Smokeless tobacco: Never Used    Alcohol use Yes      Comment: occasional    Drug use: No    Sexual activity: Not on file     Other Topics Concern    Not on file     Social History Narrative            Visit Vitals    /83 (BP 1 Location: Right arm, BP Patient Position: Sitting)    Pulse 76    Temp 97.8 °F (36.6 °C) (Oral)    Resp 18    Ht 5' 9\" (1.753 m)    Wt 205 lb (93 kg)    SpO2 99%    BMI 30.27 kg/m2       Physical Examination:   General - Well appearing male  HEENT - PERRL, TM no erythema/opacification, normal nasal turbinates, oropharynx no erythema or exudate, MMM  Neck - supple, no bruits, no TMG, no LAD  Pulm - clear to auscultation bilaterally  Cardio - RRR, normal S1 S2, no murmur gallops or rubs  Abd - soft, nontender, no masses, no HSM  Extrem - no edema, +2 distal pulses  Psych - normal affect, appropriate mood  Uriarte with clear urine    Lab Results   Component Value Date/Time    WBC 10.1 02/09/2018 03:27 AM    HGB 13.9 02/09/2018 03:27 AM    HCT 42.2 02/09/2018 03:27 AM    PLATELET 662 61/53/1514 03:27 AM    MCV 81.5 02/09/2018 03:27 AM     Lab Results   Component Value Date/Time    Sodium 139 02/10/2018 03:30 AM    Potassium 4.0 02/10/2018 03:30 AM    Chloride 107 02/10/2018 03:30 AM    CO2 28 02/10/2018 03:30 AM    Anion gap 4 (L) 02/10/2018 03:30 AM    Glucose 101 (H) 02/10/2018 03:30 AM    BUN 14 02/10/2018 03:30 AM    Creatinine 1.26 02/10/2018 03:30 AM    BUN/Creatinine ratio 11 (L) 02/10/2018 03:30 AM    GFR est AA >60 02/10/2018 03:30 AM    GFR est non-AA 55 (L) 02/10/2018 03:30 AM    Calcium 8.4 (L) 02/10/2018 03:30 AM     Lab Results   Component Value Date/Time    Cholesterol, total 197 07/06/2017 11:23 AM    HDL Cholesterol 31 (L) 07/06/2017 11:23 AM    LDL, calculated 122 (H) 07/06/2017 11:23 AM    VLDL, calculated 44 (H) 07/06/2017 11:23 AM    Triglyceride 220 (H) 07/06/2017 11:23 AM     No results found for: TSH, TSH2, TSH3, TSHP, TSHEXT, TSHEXT  Lab Results   Component Value Date/Time    Prostate Specific Ag 4.2 (H) 02/09/2017 12:07 PM    % Free PSA 29.8 02/09/2017 12:07 PM     Lab Results   Component Value Date/Time    Hemoglobin A1c 6.0 (H) 07/06/2017 11:23 AM     No results found for: Ulysses Lacy, [id number]    Lab Results   Component Value Date/Time    ALT (SGPT) 17 02/08/2018 03:17 PM    AST (SGOT) 16 02/08/2018 03:17 PM    Alk. phosphatase 75 02/08/2018 03:17 PM    Bilirubin, direct 0.11 02/09/2017 12:07 PM    Bilirubin, total 0.4 02/08/2018 03:17 PM           Assessment/Plan:    1. PAF (paroxysmal atrial fibrillation) ([facility] [address]  - resume eliquis this was also discussed with urologist today  - monitor for urinary bleeding       2.  Urinary retention  - continue increased dose of doxazosin  - griffin on - will have voiding trial next week    3. HTN: blood pressure is better today- continue lisinopril    4. Recent appendectomy recovering well from sx  Follow up with surgeon    Follow-up Disposition:  Return in about 3 months (around 5/16/2018) for general follow up .     Merlinda Stanford, MD

## 2018-02-16 NOTE — PATIENT INSTRUCTIONS
Resume Eliquis and call if having any bleeding     Continue higher dose of ( doxazosin)  cardura 8mg

## 2018-02-16 NOTE — MR AVS SNAPSHOT
[address] [facility] 
[phone] Patient: Jesse Hoskins MRN: [id number] :1935 Visit Information Date & Time Provider Department Dept. Phone Encounter #  
 2018 10:30 AM Uri Perez, [address] [place] [address],[address] [phone] [id number] Follow-up Instructions Return in about 3 months (around 2018) for general follow up . Your Appointments 2018  9:30 AM  
POST OP 10 MIN with Wali Benavides MD  
[facility], [facility] ([facility] Appt Note: 2 wk po appendectomy [address] [facility]  
[phone]  
  
   
 [address] [place] [address] [address] [phone]  
  
    
 3/1/2018 10:20 AM  
ESTABLISHED PATIENT with Delores Millan MD  
[facility] Appt Note: 6 mnth f/u  
 [address] [place] [address]  
[address] [address] Upcoming Health Maintenance Date Due  
 MEDICARE YEARLY EXAM 2018 GLAUCOMA SCREENING Q2Y 2019 DTaP/Tdap/Td series (2 - Td) 2021 Allergies as of 2018  Review Complete On: 2018 By: Costa Hernandez Severity Noted Reaction Type Reactions Oxycodone Low 2017    Other (comments) Patient states, \"It made me feel unwell. \" Current Immunizations  Reviewed on 2017 Name Date Influenza High Dose Vaccine PF 10/6/2017 11:16 AM  
 Influenza Vaccine 10/1/2016 Pneumococcal Conjugate (PCV-13) 2015 Tdap 2011 Not reviewed this visit You Were Diagnosed With   
  
 Codes Comments Urinary retention    -  Primary ICD-10-CM: R33.9 ICD-9-CM: 788.20 PAF (paroxysmal atrial fibrillation) (HCC)     ICD-10-CM: I48.0 ICD-9-CM: 427.31 Vitals BP Pulse Temp Resp Height(growth percentile) Weight(growth percentile) 138/83 (BP 1 Location: Right arm, BP Patient Position: Sitting) 76 97.8 °F (36.6 °C) (Oral) 18 5' 9\" (1.753 m) 205 lb (93 kg) SpO2 BMI Smoking Status 99% 30.27 kg/m2 Never Smoker BMI and BSA Data Body Mass Index Body Surface Area  
 30.27 kg/m 2 2.13 m 2 Preferred Pharmacy Pharmacy Name Phone [id number] [facility] [phone] Your Updated Medication List  
  
   
This list is accurate as of: 2/16/18 11:20 AM.  Always use your most recent med list.  
  
  
  
  
 ALPHAGAN P 0.1 % ophthalmic solution Generic drug:  brimonidine Administer 1 Drop to both eyes daily. Both eyes PM and lt eye in am also  
  
 apixaban 5 mg tablet Commonly known as:  Veronica Le Take 5 mg by mouth daily. betaxolol 0.5 % ophthalmic suspension Commonly known as:  BETOPTIC INSTILL 1 DROP INTO LEFT EYE TWICE A DAY  
  
 clobetasol 0.05 % topical cream  
Commonly known as:  Missy Lapine Apply  to affected area two (2) times a day. doxazosin 8 mg tablet Commonly known as:  CARDURA Take 1 Tab by mouth daily. FISH OIL PO Take 1,000 mg by mouth daily. fluorouracil 5 % chemo cream  
Commonly known as:  EFUDEX  
1 APPLICATION APPLY ON THE SKIN TWICE A DAY APPLY TWICE DAILY FOR 14 DAYS  
  
 furosemide 40 mg tablet Commonly known as:  LASIX Take 1 Tab by mouth daily as needed. As needed for swelling HYDROcodone-acetaminophen 5-325 mg per tablet Commonly known as:  Belinda Apa Take 1-2 Tabs by mouth every four (4) hours as needed for Pain. Max Daily Amount: 12 Tabs. lisinopril 20 mg tablet Commonly known as:  Rennis Douse Take 1 Tab by mouth daily. May resume medication when b/p greater than 120/80. LUMIGAN 0.01 % ophthalmic drops Generic drug:  bimatoprost  
 INSTILL 1 DROP INTO EACH EYE EVERY NIGHT AT BEDTIME  
  
 omega-3 acid ethyl esters 1 gram capsule Commonly known as:  Ana Juarez Take 2 Caps by mouth two (2) times daily (with meals). Follow-up Instructions Return in about 3 months (around 5/16/2018) for general follow up . Patient Instructions Resume Eliquis and call if having any bleeding Continue higher dose of ( doxazosin)  cardura 8mg Introducing [facility] SERVICES! Dear Nestor Medeiros: Thank you for requesting a [employer] account. Our records indicate that you already have an active [employer] account. You can access your account anytime at [email or web address] Did you know that you can access your hospital and ER discharge instructions at any time in [employer]? You can also review all of your test results from your hospital stay or ER visit. Additional Information If you have questions, please visit the Frequently Asked Questions section of the [employer] website at [email or web address] Remember, [employer] is NOT to be used for urgent needs. For medical emergencies, dial 911. Now available from your iPhone and Android! Please provide this summary of care documentation to your next provider. Your primary care clinician is listed as ANGELA Whalen. If you have any questions after today's visit, please call [phone].

## 2018-02-18 ENCOUNTER — TELEPHONE (OUTPATIENT)
Dept: INTERNAL MEDICINE CLINIC | Age: 83
End: 2018-02-18

## 2018-02-18 NOTE — TELEPHONE ENCOUNTER
Patient called with concern of blood in urine and movement of urinary catheter. Eliquis was resumed on 2/16. He has since stopped it. He is not taking aspirin or NSAIDS. There has been no catheter trauma, no pain, urine is flowing. Patient reassured that the catheter is secured by balloon tip. Advised to continue off Eliquis. To see urologist on 2/20.

## 2018-02-23 ENCOUNTER — OFFICE VISIT (OUTPATIENT)
Dept: SURGERY | Age: 83
End: 2018-02-23

## 2018-02-23 VITALS
BODY MASS INDEX: 30.21 KG/M2 | WEIGHT: 204 LBS | SYSTOLIC BLOOD PRESSURE: 123 MMHG | HEIGHT: 69 IN | RESPIRATION RATE: 20 BRPM | HEART RATE: 85 BPM | TEMPERATURE: 98.3 F | DIASTOLIC BLOOD PRESSURE: 68 MMHG | OXYGEN SATURATION: 96 %

## 2018-02-23 DIAGNOSIS — K35.30 ACUTE APPENDICITIS WITH LOCALIZED PERITONITIS: Primary | ICD-10-CM

## 2018-02-23 NOTE — PROGRESS NOTES
Chief Complaint   Patient presents with    Post OP Follow Up     appemdectomy     1. Have you been to the ER, urgent care clinic since your last visit? Hospitalized since your last visit? Yes Where: [address] [facility]    2. Have you seen or consulted any other health care providers outside of the [address] [place] [address] since your last visit? Include any pap smears or colon screening.  No    Visit Vitals    /68 (BP 1 Location: Left arm, BP Patient Position: Sitting)    Pulse 85    Temp 98.3 °F (36.8 °C) (Oral)    Resp 20    Ht 5' 9\" (1.753 m)    Wt 204 lb (92.5 kg)    SpO2 96%    BMI 30.13 kg/m2

## 2018-02-23 NOTE — MR AVS SNAPSHOT
[address] 
[phone] Patient: Santiago Morrissey MRN: [id number] :1935 Visit Information Date & Time Provider Department Dept. Phone Encounter #  
 2018  9:30 AM Kellee Page MD [facility] [phone] [id number] Your Appointments 3/1/2018 10:20 AM  
ESTABLISHED PATIENT with Abby Garduno MD  
[facility] ([facility] SCHEDULING) Appt Note: 6 mnth f/u  
 [address] [facility] [address]  
[address] [facility] [address]  
  
    
 2018 10:45 AM  
Any with Shonna [address] [place] [address]) Appt Note: 3 month follow up  
 [facility] [address] [id number]  
[address] [facility] [address] [facility] Upcoming Health Maintenance Date Due  
 MEDICARE YEARLY EXAM 2018 GLAUCOMA SCREENING Q2Y 2019 DTaP/Tdap/Td series (2 - Td) 2021 Allergies as of 2018  Review Complete On: 2018 By: Luzma Allan LPN Severity Noted Reaction Type Reactions Oxycodone Low 2017    Other (comments) Patient states, \"It made me feel unwell. \" Current Immunizations  Reviewed on 2017 Name Date Influenza High Dose Vaccine PF 10/6/2017 11:16 AM  
 Influenza Vaccine 10/1/2016 Pneumococcal Conjugate (PCV-13) 2015 Tdap 2011 Not reviewed this visit You Were Diagnosed With   
  
 Codes Comments Acute appendicitis with localized peritonitis    -  Primary ICD-10-CM: K35.3 ICD-9-CM: 827. 1 Vitals BP Pulse Temp Resp Height(growth percentile) Weight(growth percentile)  123/68 (BP 1 Location: Left arm, BP Patient Position: Sitting) 85 98.3 °F (36.8 °C) (Oral) 20 5' 9\" (1.753 m) 204 lb (92.5 kg) SpO2 BMI Smoking Status 96% 30.13 kg/m2 Never Smoker Vitals History BMI and BSA Data Body Mass Index Body Surface Area  
 30.13 kg/m 2 2.12 m 2 Preferred Pharmacy Pharmacy Name Phone [id number] [phone] Your Updated Medication List  
  
   
This list is accurate as of 2/23/18  9:38 AM.  Always use your most recent med list.  
  
  
  
  
 ALPHAGAN P 0.1 % ophthalmic solution Generic drug:  brimonidine Administer 1 Drop to both eyes daily. Both eyes PM and lt eye in am also  
  
 apixaban 5 mg tablet Commonly known as:  Brandon Rued Take 5 mg by mouth daily. betaxolol 0.5 % ophthalmic suspension Commonly known as:  BETOPTIC INSTILL 1 DROP INTO LEFT EYE TWICE A DAY  
  
 clobetasol 0.05 % topical cream  
Commonly known as:  Ailyn Ailyn Apply  to affected area two (2) times a day. doxazosin 8 mg tablet Commonly known as:  CARDURA Take 1 Tab by mouth daily. FISH OIL PO Take 1,000 mg by mouth daily. fluorouracil 5 % chemo cream  
Commonly known as:  EFUDEX  
1 APPLICATION APPLY ON THE SKIN TWICE A DAY APPLY TWICE DAILY FOR 14 DAYS  
  
 furosemide 40 mg tablet Commonly known as:  LASIX Take 1 Tab by mouth daily as needed. As needed for swelling HYDROcodone-acetaminophen 5-325 mg per tablet Commonly known as:  Bina Nascimento Take 1-2 Tabs by mouth every four (4) hours as needed for Pain. Max Daily Amount: 12 Tabs. lisinopril 20 mg tablet Commonly known as:  Loc Clark Take 1 Tab by mouth daily. May resume medication when b/p greater than 120/80. LUMIGAN 0.01 % ophthalmic drops Generic drug:  bimatoprost  
INSTILL 1 DROP INTO EACH EYE EVERY NIGHT AT BEDTIME  
  
 omega-3 acid ethyl esters 1 gram capsule Commonly known as:  Jovita Mead Take 2 Caps by mouth two (2) times daily (with meals). Introducing [facility] & HEALTH SERVICES! Dear Radhames Richmond: Thank you for requesting a Mobissimo account. Our records indicate that you already have an active Mobissimo account. You can access your account anytime at [email or web address] Did you know that you can access your hospital and ER discharge instructions at any time in Mobissimo? You can also review all of your test results from your hospital stay or ER visit. Additional Information If you have questions, please visit the Frequently Asked Questions section of the Mobissimo website at [email or web address] Remember, Mobissimo is NOT to be used for urgent needs. For medical emergencies, dial 911. Now available from your iPhone and Android! Please provide this summary of care documentation to your next provider. Your primary care clinician is listed as ANGELA Whalen. If you have any questions after today's visit, please call [phone].

## 2018-03-01 ENCOUNTER — OFFICE VISIT (OUTPATIENT)
Dept: CARDIOLOGY CLINIC | Age: 83
End: 2018-03-01

## 2018-03-01 ENCOUNTER — TELEPHONE (OUTPATIENT)
Dept: CARDIOLOGY CLINIC | Age: 83
End: 2018-03-01

## 2018-03-01 VITALS
WEIGHT: 205 LBS | SYSTOLIC BLOOD PRESSURE: 110 MMHG | HEART RATE: 84 BPM | HEIGHT: 69 IN | RESPIRATION RATE: 16 BRPM | OXYGEN SATURATION: 97 % | DIASTOLIC BLOOD PRESSURE: 60 MMHG | BODY MASS INDEX: 30.36 KG/M2

## 2018-03-01 DIAGNOSIS — G47.33 OBSTRUCTIVE SLEEP APNEA SYNDROME: ICD-10-CM

## 2018-03-01 DIAGNOSIS — E78.00 PURE HYPERCHOLESTEROLEMIA: ICD-10-CM

## 2018-03-01 DIAGNOSIS — I10 ESSENTIAL HYPERTENSION: ICD-10-CM

## 2018-03-01 DIAGNOSIS — I48.0 PAF (PAROXYSMAL ATRIAL FIBRILLATION) (HCC): Primary | ICD-10-CM

## 2018-03-01 NOTE — MR AVS SNAPSHOT
[address] [facility] [address] [facility] 
[phone] Patient: Kathrene Crigler MRN: [id number] :1935 Visit Information Date & Time Provider Department Dept. Phone Encounter #  
 3/1/2018 10:20 AM Laurel Cowan MD [facility] [phone] [id number] Your Appointments 2018 10:45 AM  
Any with Maria Luisa Nathan MD  
[facility] [address] [facility]) Appt Note: 3 month follow up  
 [facility] [address] [address]  
[address] [facility] [address] [place] [address] Upcoming Health Maintenance Date Due  
 MEDICARE YEARLY EXAM 2018 GLAUCOMA SCREENING Q2Y 2019 DTaP/Tdap/Td series (2 - Td) 2021 Allergies as of 3/1/2018  Review Complete On: 3/1/2018 By: Bertis Merlin Severity Noted Reaction Type Reactions Oxycodone Low 2017    Other (comments) Patient states, \"It made me feel unwell. \" Current Immunizations  Reviewed on 2017 Name Date Influenza High Dose Vaccine PF 10/6/2017 11:16 AM  
 Influenza Vaccine 10/1/2016 Pneumococcal Conjugate (PCV-13) 2015 Tdap 2011 Not reviewed this visit You Were Diagnosed With   
  
 Codes Comments PAF (paroxysmal atrial fibrillation) ([address] [facility]    -  Primary ICD-10-CM: I48.0 ICD-9-CM: 427.31 Essential hypertension     ICD-10-CM: I10 
ICD-9-CM: 401.9 Obstructive sleep apnea syndrome     ICD-10-CM: G47.33 
ICD-9-CM: 327.23 Vitals BP Pulse Resp Height(growth percentile) Weight(growth percentile) SpO2  
 110/60 (BP 1 Location: Left arm, BP Patient Position: Sitting) 84 16 5' 9\" (1.753 m) 205 lb (93 kg) 97% BMI Smoking Status 30.27 kg/m2 Never Smoker Vitals History BMI and BSA Data  Body Mass Index Body Surface Area  
 30.27 kg/m 2 2.13 m 2  
  
  
 Preferred Pharmacy Pharmacy Name Phone [facility]/PHARMACY #[id number] [facility] [phone] Your Updated Medication List  
  
   
This list is accurate as of 3/1/18 11:40 AM.  Always use your most recent med list.  
  
  
  
  
 ALPHAGAN P 0.1 % ophthalmic solution Generic drug:  brimonidine Administer 1 Drop to both eyes daily. Both eyes PM and lt eye in am also  
  
 apixaban 5 mg tablet Commonly known as:  Angela Grad Take 1 Tab by mouth daily. clobetasol 0.05 % topical cream  
Commonly known as:  Caridad Comber Apply  to affected area two (2) times a day. doxazosin 8 mg tablet Commonly known as:  CARDURA Take 1 Tab by mouth daily. FISH OIL PO Take 1,000 mg by mouth daily. fluorouracil 5 % chemo cream  
Commonly known as:  EFUDEX  
1 APPLICATION APPLY ON THE SKIN TWICE A DAY APPLY TWICE DAILY FOR 14 DAYS  
  
 lisinopril 20 mg tablet Commonly known as:  [place] Kalata Take 1 Tab by mouth daily. May resume medication when b/p greater than 120/80. omega-3 acid ethyl esters 1 gram capsule Commonly known as:  Deborha Earthly Take 2 Caps by mouth two (2) times daily (with meals). Prescriptions Sent to Pharmacy Refills  
 apixaban (ELIQUIS) 5 mg tablet 5 Sig: Take 1 Tab by mouth daily. Class: Normal  
 Pharmacy: [address] #: [phone] Route: Oral  
  
We Performed the Following AMB POC EKG ROUTINE W/ 12 LEADS, INTER & REP [46681 CPT(R)] Patient Instructions You will need to follow up in clinic with Dr. Devonte Welch in 1 year. Introducing hospitals & HEALTH SERVICES! Dear Shahzad Jacobs: Thank you for requesting a Tumbie account. Our records indicate that you already have an active Tumbie account. You can access your account anytime at [email or web address] Did you know that you can access your hospital and ER discharge instructions at any time in Availink? You can also review all of your test results from your hospital stay or ER visit. Additional Information If you have questions, please visit the Frequently Asked Questions section of the Availink website at [email or web address] Remember, Availink is NOT to be used for urgent needs. For medical emergencies, dial 911. Now available from your iPhone and Android! Please provide this summary of care documentation to your next provider. Your primary care clinician is listed as ANGELA Whalen. If you have any questions after today's visit, please call [phone].

## 2018-03-01 NOTE — PROGRESS NOTES
Per  VO to discontinue all medication not taken. Refills for Eliquis sent  Verbal order per Dr. Isidoro Harp    Samples of Eliquis 5 mg given. 2 boxes given with LOT # [id number], expiring JAN 2020. Directions explained to patient and verbalizes understanding.  Verbal order per Dr. Isidoro Harp.

## 2018-03-01 NOTE — PROGRESS NOTES
Ruth Hoffmann     1935       Magaly Carter MD, [facility]  Date of Visit-3/1/2018   PCP is MD Shonna   [facility]  [facility]  HPI:  Ruth Hoffmann is a [de-identified] y.o. male   1 year follow up of PAF and HTN, echo in 2017 showed an EF of 70%. Hospitalized in February with appendicitis and had surgery on 2/10/18 and then returned to the ED 2/14/18 with urinary retention and a Uriarte catheter was placed. Overall the pt states he is doing well. He reports that his catheter has been taken out. He had no cardiac issues during his appendix surgery and the placement of the catheter. The pt states that he has been taking either no eliquis or only one dose instead of BID due to his procedures and bleeding. He is now back to taking it BID. Denies chest pain, edema, syncope or shortness of breath at rest, has no tachycardia, palpitations or sense of arrhythmia. EKG: Sinus rhythm WNL    Assessment/Plan:     1. PAF remains in sinus, was taking the eliquis once a day but now back to twice a day but it is okay for him to hold it if he is having bleeding   ----CHADS VASC score is 3 and the EF is normal     2. HTN at goal at 110, had edema with amlodipine and switched to lisinopril     3. Sleep apnea unable to tolerate mask    4. dyslipidemia --Primary prevention  Lab Results   Component Value Date/Time    LDL, calculated 122 (H) 07/06/2017 11:23 AM   fu with PCP for repeat labs FLP  See me in one year  Future Appointments  Date Time Provider Kassy Kumar   5/8/2018 10:45 AM MD Shonna [address]   3/4/2019 9:20 AM Robby Blanca  [address]         Key CAD CHF Meds             apixaban (ELIQUIS) 5 mg tablet  (Taking) Take 1 Tab by mouth two (2) times a day. apixaban (ELIQUIS) 5 mg tablet  (Taking/Discontinued) Take 1 Tab by mouth daily.     doxazosin (CARDURA) 8 mg tablet  (Taking/Discontinued) Take 1 Tab by mouth daily. lisinopril (PRINIVIL, ZESTRIL) 20 mg tablet  (Taking) Take 1 Tab by mouth daily. May resume medication when b/p greater than 120/80. DOCOSAHEXANOIC ACID/EPA (FISH OIL PO)  (Taking) Take 1,000 mg by mouth daily. omega-3 acid ethyl esters (LOVAZA) 1 gram capsule  (Taking) Take 2 Caps by mouth two (2) times daily (with meals). Impression:   1. PAF (paroxysmal atrial fibrillation) ([facility]    2. Essential hypertension    3. Obstructive sleep apnea syndrome         Cardiac History:   No specialty comments available. ROS-except as noted above. . A complete cardiac and respiratory are reviewed and negative except as above ; Resp-denies wheezing  or productive cough,. Const- No unusual weight loss or fever; Neuro-no recent seizure or CVA ; GI- + for BRBPR, Pos + abdom pain, bloating ; - no  hematuria   see supplement sheet, initialed and to be scanned by staff  Past Medical History:   Diagnosis Date    Arthritis     DJD    Enlarged prostate     Hyperlipidemia     Hypertension     LINDA treated with BiPAP     PAF (paroxysmal atrial fibrillation) ([facility]     Pre-diabetes     HA1C 6.0 Dec 2016    Shingles       Social Hx= reports that he has never smoked. He has never used smokeless tobacco. He reports that he drinks alcohol. He reports that he does not use illicit drugs. Exam and Labs:  /60 (BP 1 Location: Left arm, BP Patient Position: Sitting)  Pulse 84  Resp 16  Ht 5' 9\" (1.753 m)  Wt 205 lb (93 kg)  SpO2 97%  BMI 30.27 kg/i2Ojqvrigqxnpgmr:  NAD, comfortable  Head: NC,AT. Eyes: No scleral icterus. Neck:  Neck supple. No JVD present. Throat: moist mucous membranes. Chest: Effort normal & normal respiratory excursion . Neurological: alert, conversant and oriented . Skin: Skin is not cold. No obvious systemic rash noted. Not diaphoretic. No erythema. Psychiatric:  Grossly normal mood and affect. Behavior appears normal. Extremities:  no clubbing or cyanosis.  Abdomen: non distended    Lungs:breath sounds normal. No stridor. distress, wheezes or  Rales. Heart: normal rate, regular rhythm, normal S1, S2, no murmurs, rubs, clicks or gallops , PMI non displaced. Edema: Edema is none. Lab Results   Component Value Date/Time    Cholesterol, total 197 07/06/2017 11:23 AM    HDL Cholesterol 31 (L) 07/06/2017 11:23 AM    LDL, calculated 122 (H) 07/06/2017 11:23 AM    Triglyceride 220 (H) 07/06/2017 11:23 AM     Lab Results   Component Value Date/Time    Sodium 139 02/10/2018 03:30 AM    Potassium 4.0 02/10/2018 03:30 AM    Chloride 107 02/10/2018 03:30 AM    CO2 28 02/10/2018 03:30 AM    Anion gap 4 (L) 02/10/2018 03:30 AM    Glucose 101 (H) 02/10/2018 03:30 AM    BUN 14 02/10/2018 03:30 AM    Creatinine 1.26 02/10/2018 03:30 AM    BUN/Creatinine ratio 11 (L) 02/10/2018 03:30 AM    GFR est AA >60 02/10/2018 03:30 AM    GFR est non-AA 55 (L) 02/10/2018 03:30 AM    Calcium 8.4 (L) 02/10/2018 03:30 AM      Wt Readings from Last 3 Encounters:   03/01/18 205 lb (93 kg)   02/23/18 204 lb (92.5 kg)   02/16/18 205 lb (93 kg)      BP Readings from Last 3 Encounters:   03/01/18 110/60   02/23/18 123/68   02/16/18 138/83      Current Outpatient Prescriptions   Medication Sig    apixaban (ELIQUIS) 5 mg tablet Take 1 Tab by mouth two (2) times a day.  lisinopril (PRINIVIL, ZESTRIL) 20 mg tablet Take 1 Tab by mouth daily. May resume medication when b/p greater than 120/80.  DOCOSAHEXANOIC ACID/EPA (FISH OIL PO) Take 1,000 mg by mouth daily.  omega-3 acid ethyl esters (LOVAZA) 1 gram capsule Take 2 Caps by mouth two (2) times daily (with meals).  clobetasol (TEMOVATE) 0.05 % topical cream Apply  to affected area two (2) times a day.  fluorouracil (EFUDEX) 5 % chemo cream 1 APPLICATION APPLY ON THE SKIN TWICE A DAY APPLY TWICE DAILY FOR 14 DAYS    brimonidine (ALPHAGAN P) 0.1 % ophthalmic solution Administer 1 Drop to both eyes daily.  Both eyes PM and lt eye in am also    doxazosin (CARDURA) 8 mg tablet Take 1 Tab by mouth nightly.  apixaban (ELIQUIS) 5 mg tablet Take 1 Tab by mouth two (2) times a day. No current facility-administered medications for this visit. Impression see above.       Written by Loni Bowens, as dictated by Estefanía Green MD.

## 2018-03-01 NOTE — TELEPHONE ENCOUNTER
Kt Canada with [facility] called and has a question regarding the patient's dosage for Eliquis.   Phone [phone]  [place]

## 2018-03-06 DIAGNOSIS — N40.1 BENIGN PROSTATIC HYPERPLASIA WITH URINARY FREQUENCY: Primary | ICD-10-CM

## 2018-03-06 DIAGNOSIS — R35.0 BENIGN PROSTATIC HYPERPLASIA WITH URINARY FREQUENCY: Primary | ICD-10-CM

## 2018-03-06 RX ORDER — DOXAZOSIN 8 MG/1
8 TABLET ORAL
Qty: 30 TAB | Refills: 5 | Status: SHIPPED | OUTPATIENT
Start: 2018-03-06 | End: 2018-09-14 | Stop reason: SDUPTHER

## 2018-03-28 ENCOUNTER — PATIENT OUTREACH (OUTPATIENT)
Dept: INTERNAL MEDICINE CLINIC | Age: 83
End: 2018-03-28

## 2018-03-28 NOTE — PROGRESS NOTES
[address]    Patient has graduated from the Transitions of Care Coordination  program on 3/27/18. Patient's symptoms are stable at this time. Patient/family has the ability to self-manage. Care management goals have been completed at this time. No further nurse navigator follow up scheduled. Pt has nurse navigator's contact information for any further questions, concerns, or needs.   Patients upcoming visits:  Future Appointments  Date Time Provider Kassy Kumar   5/8/2018 10:45 AM Antonio Duffy MD [address]   3/4/2019 9:20 AM Fadia Paul  [address]

## 2018-04-12 DIAGNOSIS — I10 ESSENTIAL HYPERTENSION: ICD-10-CM

## 2018-04-12 RX ORDER — LISINOPRIL 20 MG/1
TABLET ORAL
Qty: 30 TAB | Refills: 5 | Status: SHIPPED | OUTPATIENT
Start: 2018-04-12 | End: 2018-10-13 | Stop reason: SDUPTHER

## 2018-05-08 ENCOUNTER — HOSPITAL ENCOUNTER (OUTPATIENT)
Dept: LAB | Age: 83
Discharge: HOME | End: 2018-05-08
Payer: MEDICARE

## 2018-05-08 ENCOUNTER — OFFICE VISIT (OUTPATIENT)
Dept: INTERNAL MEDICINE CLINIC | Age: 83
End: 2018-05-08

## 2018-05-08 DIAGNOSIS — R79.89 ELEVATED SERUM CREATININE: ICD-10-CM

## 2018-05-08 DIAGNOSIS — R73.03 PRE-DIABETES: ICD-10-CM

## 2018-05-08 DIAGNOSIS — N40.0 ENLARGED PROSTATE: ICD-10-CM

## 2018-05-08 DIAGNOSIS — E78.00 PURE HYPERCHOLESTEROLEMIA: ICD-10-CM

## 2018-05-08 DIAGNOSIS — I10 ESSENTIAL HYPERTENSION: ICD-10-CM

## 2018-05-08 DIAGNOSIS — I48.0 PAF (PAROXYSMAL ATRIAL FIBRILLATION) (HCC): ICD-10-CM

## 2018-05-08 DIAGNOSIS — G25.0 ESSENTIAL TREMOR: Primary | ICD-10-CM

## 2018-05-08 DIAGNOSIS — G47.33 OSA TREATED WITH BIPAP: ICD-10-CM

## 2018-05-08 PROCEDURE — 80053 COMPREHEN METABOLIC PANEL: CPT

## 2018-05-08 PROCEDURE — 83036 HEMOGLOBIN GLYCOSYLATED A1C: CPT

## 2018-05-08 PROCEDURE — 36415 COLL VENOUS BLD VENIPUNCTURE: CPT

## 2018-05-08 NOTE — PROGRESS NOTES
CC: Hypertension (follow up) and Shaking (hands )      HPI:    He is a [de-identified] y.o. male who presents for follow up on chronic medical issues     Notes hand tremor: Right more than left. Patient states this has been present for 2 years and at times is bothersome especially when eating. Denies rigidity. Denies falls. Denies weakness    Obstructive sleep apnea: Patient has a history of obstructive sleep apnea but does not wear CPAP. I counseled him extensively on the need to wear CPAP discussed risk of sudden death and they fatigue and development of heart failure. Hypertension this is well controlled on lisinopril    Atrial fibrillation patient denies palpitations he is currently on Eliquis. Recall that he developed bradycardia when he was on a beta-blocker       ROS:  Constitutional: negative for fevers, chills, anorexia and weight loss  Eyes:   negative for visual disturbance,  irritation  ENT:   negative for tinnitus,sore throat,nasal congestion,ear pain, sinus pain. Respiratory:  negative for cough, hemoptysis, dyspnea,wheezing  CV:   negative for chest pain, palpitations, lower extremity edema  GI:   negative for nausea, vomiting, diarrhea, abdominal pain,melena  Genitourinary: negative for frequency, dysuria, hematuria  Musculoskel: negative for myalgias, arthralgias, back pain, muscle weakness, joint pain  Neurological:  negative for headaches, dizziness, focal weakness, numbness-complains of tremors see HPI  Psych:             Negative for depression and anxiety    Past Medical History:   Diagnosis Date    Arthritis     DJD    Enlarged prostate     Hyperlipidemia     Hypertension     LINDA treated with BiPAP     PAF [facility]oxysmal atrial fibrillation) ([facility] [address]     Pre-diabetes     HA1C 6.0 Dec 2016    Shingles        Current Outpatient Prescriptions on File Prior to Visit   Medication Sig Dispense Refill    lisinopril (PRINIVIL, ZESTRIL) 20 mg tablet TAKE 1 TABLET BY MOUTH DAILY.  MAY RESUME MEDICATION WHEN B/P GREATER THAN 120/80 30 Tab 5    doxazosin (CARDURA) 8 mg tablet Take 1 Tab by mouth nightly. 30 Tab 5    apixaban (ELIQUIS) 5 mg tablet Take 1 Tab by mouth two (2) times a day. 60 Tab 5    apixaban (ELIQUIS) 5 mg tablet Take 1 Tab by mouth two (2) times a day. 28 Tab 0    DOCOSAHEXANOIC ACID/EPA (FISH OIL PO) Take 1,000 mg by mouth daily.  omega-3 acid ethyl esters (LOVAZA) 1 gram capsule Take 2 Caps by mouth two (2) times daily (with meals). 30 Cap 5    clobetasol (TEMOVATE) 0.05 % topical cream Apply  to affected area two (2) times a day. 15 g 0    fluorouracil (EFUDEX) 5 % chemo cream 1 APPLICATION APPLY ON THE SKIN TWICE A DAY APPLY TWICE DAILY FOR 14 DAYS  0    brimonidine (ALPHAGAN P) 0.1 % ophthalmic solution Administer 1 Drop to both eyes daily. Both eyes PM and lt eye in am also       No current facility-administered medications on file prior to visit. Past Surgical History:   Procedure Laterality Date    HX APPENDECTOMY      HX COLONOSCOPY      normal except for hemorrhoids    HX HEENT      wisdom teeth extraction x2    HX KNEE REPLACEMENT      right; 2017    HX SHOULDER ARTHROSCOPY      rt    HX TONSILLECTOMY         Family History   Problem Relation Age of Onset    Coronary Artery Disease Father 47      at [de-identified] of MI     Reviewed and no changes     Social History     Social History    Marital status:      Spouse name: N/A    Number of children: N/A    Years of education: N/A     Occupational History    Not on file. Social History Main Topics    Smoking status: Never Smoker    Smokeless tobacco: Never Used    Alcohol use Yes      Comment: occasional    Drug use: No    Sexual activity: Not Currently     Other Topics Concern    Not on file     Social History Narrative          There were no vitals taken for this visit.     Physical Examination:   General - Well appearing male  HEENT - PERRL, TM no erythema/opacification, normal nasal turbinates, oropharynx no erythema or exudate, MMM  Neck - supple, no bruits, no TMG, no LAD  Pulm - clear to auscultation bilaterally  Cardio - RRR, normal S1 S2, no murmur gallops or rubs  Abd - soft, nontender, no masses, no HSM  Extrem - no edema, +2 distal pulses  Psych - normal affect, appropriate mood    Neuro: Alert and oriented ×4, normal fluent speech, strength is 5 out of 5 throughout, normal gait. Patient has a tremor in both hands consistent with essential tremor. Minimal rigidity noted on his arms.   Lab Results   Component Value Date/Time    WBC 10.1 02/09/2018 03:27 AM    HGB 13.9 02/09/2018 03:27 AM    HCT 42.2 02/09/2018 03:27 AM    PLATELET 450 03/96/5250 03:27 AM    MCV 81.5 02/09/2018 03:27 AM     Lab Results   Component Value Date/Time    Sodium 139 02/10/2018 03:30 AM    Potassium 4.0 02/10/2018 03:30 AM    Chloride 107 02/10/2018 03:30 AM    CO2 28 02/10/2018 03:30 AM    Anion gap 4 (L) 02/10/2018 03:30 AM    Glucose 101 (H) 02/10/2018 03:30 AM    BUN 14 02/10/2018 03:30 AM    Creatinine 1.26 02/10/2018 03:30 AM    BUN/Creatinine ratio 11 (L) 02/10/2018 03:30 AM    GFR est AA >60 02/10/2018 03:30 AM    GFR est non-AA 55 (L) 02/10/2018 03:30 AM    Calcium 8.4 (L) 02/10/2018 03:30 AM     Lab Results   Component Value Date/Time    Cholesterol, total 197 07/06/2017 11:23 AM    HDL Cholesterol 31 (L) 07/06/2017 11:23 AM    LDL, calculated 122 (H) 07/06/2017 11:23 AM    VLDL, calculated 44 (H) 07/06/2017 11:23 AM    Triglyceride 220 (H) 07/06/2017 11:23 AM     No results found for: TSH, TSH2, TSH3, TSHP, TSHEXT, TSHEXT  Lab Results   Component Value Date/Time    Prostate Specific Ag 4.2 (H) 02/09/2017 12:07 PM    % Free PSA 29.8 02/09/2017 12:07 PM     Lab Results   Component Value Date/Time    Hemoglobin A1c 6.0 (H) 07/06/2017 11:23 AM     No results found for: RIVER Jaimes    Lab Results   Component Value Date/Time    ALT (SGPT) 17 02/08/2018 03:17 PM    AST (SGOT) 16 02/08/2018 03:17 PM    Alk. phosphatase 75 02/08/2018 03:17 PM    Bilirubin, direct 0.11 02/09/2017 12:07 PM    Bilirubin, total 0.4 02/08/2018 03:17 PM           Assessment/Plan:    1. Essential tremor  -Advised to monitor if worsens I will refer him to a neurologist.  Recall he did not tolerate beta-blockers in the past due to bradycardia. 2. PAF (paroxysmal atrial fibrillation) ([facility])  Sinus rhythm. He does not tolerate beta blockers due to bradycardia. He is currently on Eliquis twice a day. -Recent CBC was fine    3. Essential hypertension  -Blood pressures well controlled on lisinopril 20 mg.  - METABOLIC PANEL, COMPREHENSIVE      4. Enlarged prostate  Symptoms of urinary retention improved with higher dose of doxazosin. Continue current dose    6. LINDA treated with BiPAP  Patient is not compliant with using CPAP we had an extensive discussion on the importance of him using the CPAP    7. Elevated serum creatinine  Creatinine is mildly elevated on recent check the patient was having issues with urinary obstruction repeat today. Urinary obstruction resolved. - METABOLIC PANEL, COMPREHENSIVE    8. Pre-diabetes  Counseled on healthy diet  - HEMOGLOBIN A1C WITH EAG      Follow-up Disposition:  Return in about 4 months (around 9/8/2018) for HTN LINDA and afib.     MD Shonna

## 2018-05-08 NOTE — MR AVS SNAPSHOT
[address] [facility] 
[phone] Patient: Grant Forrester MRN: [id number] :1935 Visit Information Date & Time Provider Department Dept. Phone Encounter #  
 2018 10:45 AM Shonna, [address] [place] [address],[address] [phone] [id number] Follow-up Instructions Return in about 4 months (around 2018) for HTN LINDA and afib. Your Appointments 3/4/2019  9:20 AM  
ESTABLISHED PATIENT with Renea Corley MD  
[facility] ([facility] SCHEDULING) Appt Note: 1 yr f/u  
 [address]  [address]  
[address] [facility] [address] [address] Upcoming Health Maintenance Date Due  
 MEDICARE YEARLY EXAM 2018 Influenza Age 5 to Adult 2018 GLAUCOMA SCREENING Q2Y 2019 DTaP/Tdap/Td series (2 - Td) 2021 Allergies as of 2018  Review Complete On: 2018 By: MD Shonna  
  
 Severity Noted Reaction Type Reactions Oxycodone Low 2017    Other (comments) Patient states, \"It made me feel unwell. \" Current Immunizations  Reviewed on 2017 Name Date Influenza High Dose Vaccine PF 10/6/2017 11:16 AM  
 Influenza Vaccine 10/1/2016 Pneumococcal Conjugate (PCV-13) 2015 Tdap 2011 Not reviewed this visit You Were Diagnosed With   
  
 Codes Comments Essential tremor    -  Primary ICD-10-CM: G25.0 ICD-9-CM: 333.1 PAF (paroxysmal atrial fibrillation) (HCC)     ICD-10-CM: I48.0 ICD-9-CM: 427.31 Essential hypertension     ICD-10-CM: I10 
ICD-9-CM: 401.9 Pure hypercholesterolemia     ICD-10-CM: E78.00 ICD-9-CM: 272.0 Enlarged prostate     ICD-10-CM: N40.0 ICD-9-CM: 600.00 LINDA treated with BiPAP     ICD-10-CM: G47.33 
ICD-9-CM: 327.23 Elevated serum creatinine     ICD-10-CM: R79.89 ICD-9-CM: 790.99   
 Pre-diabetes     ICD-10-CM: R73.03 
ICD-9-CM: 790.29 Vitals Smoking Status Never Smoker Preferred Pharmacy Pharmacy Name Phone [id number] [phone] Your Updated Medication List  
  
   
This list is accurate as of 5/8/18 11:21 AM.  Always use your most recent med list.  
  
  
  
  
 ALPHAGAN P 0.1 % ophthalmic solution Generic drug:  brimonidine Administer 1 Drop to both eyes daily. Both eyes PM and lt eye in am also * apixaban 5 mg tablet Commonly known as:  [place] Bloodgood Take 1 Tab by mouth two (2) times a day. * apixaban 5 mg tablet Commonly known as:  [place] Bloodgood Take 1 Tab by mouth two (2) times a day. clobetasol 0.05 % topical cream  
Commonly known as:  Calista Cruz Apply  to affected area two (2) times a day. doxazosin 8 mg tablet Commonly known as:  CARDURA Take 1 Tab by mouth nightly. FISH OIL PO Take 1,000 mg by mouth daily. fluorouracil 5 % chemo cream  
Commonly known as:  EFUDEX  
1 APPLICATION APPLY ON THE SKIN TWICE A DAY APPLY TWICE DAILY FOR 14 DAYS  
  
 lisinopril 20 mg tablet Commonly known as:  PRINIVIL, ZESTRIL  
TAKE 1 TABLET BY MOUTH DAILY. MAY RESUME MEDICATION WHEN B/P GREATER THAN 120/80  
  
 omega-3 acid ethyl esters 1 gram capsule Commonly known as:  Trellis Fragmin Take 2 Caps by mouth two (2) times daily (with meals). * Notice: This list has 2 medication(s) that are the same as other medications prescribed for you. Read the directions carefully, and ask your doctor or other care provider to review them with you. We Performed the Following HEMOGLOBIN A1C WITH EAG [51195 CPT(R)] METABOLIC PANEL, COMPREHENSIVE [93470 CPT(R)] Follow-up Instructions Return in about 4 months (around 9/8/2018) for HTN LINDA and afib. Patient Instructions We are checking kidney function today Benign Essential Tremor: Care Instructions Your Care Instructions Benign essential tremor is a medical term for shaking that you can't control. Your hand or fingers may shake when you lift a cup or point at something. Or your voice may shake when you speak. This type of tremor is not harmful. It is not caused by a stroke or Parkinson's disease. Some things can affect how much you shake. For example, drinking or eating something with caffeine may make tremors worse for a while. Some medicines also can increase tremors. These include antidepressants and too much thyroid replacement. Talk to your doctor if you think one of your medicines makes your tremors worse. If you are self-conscious about your tremors, there are some things you can do to reduce them or make them less noticeable. This includes taking medicine. Follow-up care is a key part of your treatment and safety. Be sure to make and go to all appointments, and call your doctor if you are having problems. It's also a good idea to know your test results and keep a list of the medicines you take. How can you care for yourself at home? · Take your medicines exactly as prescribed. Call your doctor if you think you are having a problem with your medicine. Some medicines that help control tremors have to be taken every day, even if you are not having tremors. You will get more details on the specific medicines your doctor prescribes. · Get plenty of rest. 
· Eat a balanced, healthy diet. · Try to reduce stress. Regular exercise and massages may help. · Limit alcohol. Heavy drinking can make your tremors worse. · Avoid drinks or foods with caffeine if they make your tremors worse. These include tea, cola, coffee, and chocolate. · Wear a heavy bracelet or watch. This adds a little weight to your hand. The extra weight may reduce tremors. · Drink from cups or glasses that are only half full.  You may also want to try drinking with a straw. When should you call for help? Watch closely for changes in your health, and be sure to contact your doctor if: 
? · You notice your tremors are getting worse. ? · You can't do your everyday activities because of your tremors. ? · You are sad and embarrassed about your shaking. Where can you learn more? Go to http://cammie-derik.info/. Enter B746 in the search box to learn more about \"Benign Essential Tremor: Care Instructions. \" Current as of: October 14, 2016 Content Version: 11.4 © 5090-8135 [employer]. Care instructions adapted under license by [employer] (which disclaims liability or warranty for this information). If you have questions about a medical condition or this instruction, always ask your healthcare professional. [address] any warranty or liability for your use of this information. Introducing [address]! Dear Berna Chavez: Thank you for requesting a CareParent account. Our records indicate that you already have an active CareParent account. You can access your account anytime at [email or web address] [email or web address] Did you know that you can access your hospital and ER discharge instructions at any time in CareParent? You can also review all of your test results from your hospital stay or ER visit. Additional Information If you have questions, please visit the Frequently Asked Questions section of the CareParent website at [email or web address] Remember, CareParent is NOT to be used for urgent needs. For medical emergencies, dial 911. Now available from your iPhone and Android! Please provide this summary of care documentation to your next provider. Your primary care clinician is listed as ANGELA Whalen. If you have any questions after today's visit, please call [phone].

## 2018-05-08 NOTE — PROGRESS NOTES
Reviewed record in preparation for visit and have obtained necessary documentation. Identified pt with two pt identifiers(name and ). Chief Complaint   Patient presents with    Hypertension     follow up    Shaking     hands        There are no preventive care reminders to display for this patient. Mr. Litzy Davis has a reminder for a \"due or due soon\" health maintenance. I have asked that he discuss this further with his primary care provider for follow-up on this health maintenance. Coordination of Care Questionnaire:  :     1) Have you been to an emergency room, urgent care clinic since your last visit? no   Hospitalized since your last visit? no             2) Have you seen or consulted any other health care providers outside of [address] [place] [address] since your last visit? no  (Include any pap smears or colon screenings in this section.)    3) In the event something were to happen to you and you were unable to speak on your behalf, do you have an Advance Directive/ Living Will in place stating your wishes? NO    Do you have an Advance Directive on file? no    4) Are you interested in receiving information on Advance Directives? NO    Patient is accompanied by spouse I have received verbal consent from Karin Brownlee to discuss any/all medical information while they are present in the room.

## 2018-05-08 NOTE — PATIENT INSTRUCTIONS
We are checking kidney function today      Benign Essential Tremor: Care Instructions  Your Care Instructions    Benign essential tremor is a medical term for shaking that you can't control. Your hand or fingers may shake when you lift a cup or point at something. Or your voice may shake when you speak. This type of tremor is not harmful. It is not caused by a stroke or Parkinson's disease. Some things can affect how much you shake. For example, drinking or eating something with caffeine may make tremors worse for a while. Some medicines also can increase tremors. These include antidepressants and too much thyroid replacement. Talk to your doctor if you think one of your medicines makes your tremors worse. If you are self-conscious about your tremors, there are some things you can do to reduce them or make them less noticeable. This includes taking medicine. Follow-up care is a key part of your treatment and safety. Be sure to make and go to all appointments, and call your doctor if you are having problems. It's also a good idea to know your test results and keep a list of the medicines you take. How can you care for yourself at home? · Take your medicines exactly as prescribed. Call your doctor if you think you are having a problem with your medicine. Some medicines that help control tremors have to be taken every day, even if you are not having tremors. You will get more details on the specific medicines your doctor prescribes. · Get plenty of rest.  · Eat a balanced, healthy diet. · Try to reduce stress. Regular exercise and massages may help. · Limit alcohol. Heavy drinking can make your tremors worse. · Avoid drinks or foods with caffeine if they make your tremors worse. These include tea, cola, coffee, and chocolate. · Wear a heavy bracelet or watch. This adds a little weight to your hand. The extra weight may reduce tremors. · Drink from cups or glasses that are only half full.  You may also want to try drinking with a straw. When should you call for help? Watch closely for changes in your health, and be sure to contact your doctor if:  ? · You notice your tremors are getting worse. ? · You can't do your everyday activities because of your tremors. ? · You are sad and embarrassed about your shaking. Where can you learn more? Go to http://cammie-derik.info/. Enter B746 in the search box to learn more about \"Benign Essential Tremor: Care Instructions. \"  Current as of: October 14, 2016  Content Version: 11.4  © 2608-0691 [employer]Talk. Care instructions adapted under license by [employer] (which disclaims liability or warranty for this information). If you have questions about a medical condition or this instruction, always ask your healthcare professional. [address] any warranty or liability for your use of this information.

## 2018-05-09 LAB
ALBUMIN SERPL-MCNC: 4.1 G/DL (ref 3.5–4.7)
ALBUMIN/GLOB SERPL: 1.6 {RATIO} (ref 1.2–2.2)
ALP SERPL-CCNC: 65 IU/L (ref 39–117)
ALT SERPL-CCNC: 11 IU/L (ref 0–44)
AST SERPL-CCNC: 15 IU/L (ref 0–40)
BILIRUB SERPL-MCNC: 0.3 MG/DL (ref 0–1.2)
BUN SERPL-MCNC: 15 MG/DL (ref 8–27)
BUN/CREAT SERPL: 13 (ref 10–24)
CALCIUM SERPL-MCNC: 9.1 MG/DL (ref 8.6–10.2)
CHLORIDE SERPL-SCNC: 103 MMOL/L (ref 96–106)
CO2 SERPL-SCNC: 25 MMOL/L (ref 18–29)
CREAT SERPL-MCNC: 1.16 MG/DL (ref 0.76–1.27)
EST. AVERAGE GLUCOSE BLD GHB EST-MCNC: 123 MG/DL
GFR SERPLBLD CREATININE-BSD FMLA CKD-EPI: 59 ML/MIN/1.73
GFR SERPLBLD CREATININE-BSD FMLA CKD-EPI: 68 ML/MIN/1.73
GLOBULIN SER CALC-MCNC: 2.6 G/DL (ref 1.5–4.5)
GLUCOSE SERPL-MCNC: 95 MG/DL (ref 65–99)
HBA1C MFR BLD: 5.9 % (ref 4.8–5.6)
POTASSIUM SERPL-SCNC: 4.3 MMOL/L (ref 3.5–5.2)
PROT SERPL-MCNC: 6.7 G/DL (ref 6–8.5)
SODIUM SERPL-SCNC: 142 MMOL/L (ref 134–144)

## 2018-05-09 NOTE — PROGRESS NOTES
Kidney function has improved and back to baseline    Prediabetes is stable - continue to avoid sugar and work on increasing exercise

## 2018-08-30 ENCOUNTER — OFFICE VISIT (OUTPATIENT)
Dept: INTERNAL MEDICINE CLINIC | Age: 83
End: 2018-08-30

## 2018-08-30 VITALS
RESPIRATION RATE: 18 BRPM | HEART RATE: 71 BPM | SYSTOLIC BLOOD PRESSURE: 140 MMHG | BODY MASS INDEX: 30.66 KG/M2 | TEMPERATURE: 97.8 F | DIASTOLIC BLOOD PRESSURE: 82 MMHG | WEIGHT: 207 LBS | OXYGEN SATURATION: 99 % | HEIGHT: 69 IN

## 2018-08-30 DIAGNOSIS — I10 ESSENTIAL HYPERTENSION: ICD-10-CM

## 2018-08-30 DIAGNOSIS — I48.0 PAF (PAROXYSMAL ATRIAL FIBRILLATION) (HCC): ICD-10-CM

## 2018-08-30 DIAGNOSIS — Z00.00 MEDICARE ANNUAL WELLNESS VISIT, SUBSEQUENT: Primary | ICD-10-CM

## 2018-08-30 DIAGNOSIS — N40.0 ENLARGED PROSTATE: ICD-10-CM

## 2018-08-30 DIAGNOSIS — E78.00 PURE HYPERCHOLESTEROLEMIA: ICD-10-CM

## 2018-08-30 NOTE — PATIENT INSTRUCTIONS
Medicare Wellness Visit, Male The best way to live healthy is to have a lifestyle where you eat a well-balanced diet, exercise regularly, limit alcohol use, and quit all forms of tobacco/nicotine, if applicable. Regular preventive services are another way to keep healthy. Preventive services (vaccines, screening tests, monitoring & exams) can help personalize your care plan, which helps you manage your own care. Screening tests can find health problems at the earliest stages, when they are easiest to treat. [address] [facility] follows the current, evidence-based guidelines published by the [facility] Jose Alfredo Daniele ([facility]) when recommending preventive services for our patients. Because we follow these guidelines, sometimes recommendations change over time as research supports it. (For example, a prostate screening blood test is no longer routinely recommended for men with no symptoms.) Of course, you and your doctor may decide to screen more often for some diseases, based on your risk and co-morbidities (chronic disease you are already diagnosed with). Preventive services for you include: - Medicare offers their members a free annual wellness visit, which is time for you and your primary care provider to discuss and plan for your preventive service needs. Take advantage of this benefit every year! 
-All adults over age 72 should receive the recommended pneumonia vaccines. Current USPSTF guidelines recommend a series of two vaccines for the best pneumonia protection.  
-All adults should have a flu vaccine yearly and an ECG.  All adults age 61 and older should receive a shingles vaccine once in their lifetime.   
-All adults age 38-68 who are overweight should have a diabetes screening test once every three years.  
-Other screening tests & preventive services for persons with diabetes include: an eye exam to screen for diabetic retinopathy, a kidney function test, a foot exam, and stricter control over your cholesterol.  
-Cardiovascular screening for adults with routine risk involves an electrocardiogram (ECG) at intervals determined by the provider.  
-Colorectal cancer screening should be done for adults age 54-65 with no increased risk factors for colorectal cancer. There are a number of acceptable methods of screening for this type of cancer. Each test has its own benefits and drawbacks. Discuss with your provider what is most appropriate for you during your annual wellness visit. The different tests include: colonoscopy (considered the best screening method), a fecal occult blood test, a fecal DNA test, and sigmoidoscopy. 
-All adults born between [facility] should be screened once for Hepatitis C. 
-An Abdominal Aortic Aneurysm (AAA) Screening is recommended for men age 73-68 who has ever smoked in their lifetime. Here is a list of your current Health Maintenance items (your personalized list of preventive services) with a due date: 
Health Maintenance Due Topic Date Due  
 Annual Well Visit  Done today  Flu Vaccine  Due in the fall

## 2018-08-30 NOTE — PROGRESS NOTES
CC: Hypertension HPI: 
 
He is a [de-identified] y.o. male who presents for follow up on chronic medical issues Obstructive sleep apnea: Patient has a history of obstructive sleep and has a CPAP. .  I counseled him extensively on the need to wear CPAP discussed risk of sudden death and they fatigue and development of heart failure. Hypertension: initialyl BP was elevated in the visit 160/70s repeat was in better range 140/82 denies chest pain and dyspnea. Currently on lisinopril 20mg daily Atrial fibrillation patient denies palpitations he is currently on Eliquis. Recall that he developed bradycardia when he was on a beta-blocker BPH-  on cardura, patient is seeing urologist  And being evaluated for possible Uro lift Getting up to go to restroom several times at night time which is bothersome to patient ROS: 
10 systems reviewed and negative other than HPI Past Medical History:  
Diagnosis Date  Appendicitis 2018 underwent appendectomy  Arthritis DJD  Enlarged prostate  Hyperlipidemia  Hypertension  LINDA treated with BiPAP  PAF (paroxysmal atrial fibrillation) ([facility] [address]  Pre-diabetes HA1C 6.0 Dec 2016  Shingles Current Outpatient Prescriptions on File Prior to Visit Medication Sig Dispense Refill  lisinopril (PRINIVIL, ZESTRIL) 20 mg tablet TAKE 1 TABLET BY MOUTH DAILY. MAY RESUME MEDICATION WHEN B/P GREATER THAN 120/80 30 Tab 5  
 doxazosin (CARDURA) 8 mg tablet Take 1 Tab by mouth nightly. 30 Tab 5  
 apixaban (ELIQUIS) 5 mg tablet Take 1 Tab by mouth two (2) times a day. 60 Tab 5  
 apixaban (ELIQUIS) 5 mg tablet Take 1 Tab by mouth two (2) times a day. 28 Tab 0  
 DOCOSAHEXANOIC ACID/EPA (FISH OIL PO) Take 1,000 mg by mouth daily.  omega-3 acid ethyl esters (LOVAZA) 1 gram capsule Take 2 Caps by mouth two (2) times daily (with meals). 30 Cap 5  clobetasol (TEMOVATE) 0.05 % topical cream Apply  to affected area two (2) times a day. 15 g 0  
 fluorouracil (EFUDEX) 5 % chemo cream 1 APPLICATION APPLY ON THE SKIN TWICE A DAY APPLY TWICE DAILY FOR 14 DAYS  0  
 brimonidine (ALPHAGAN P) 0.1 % ophthalmic solution Administer 1 Drop to both eyes daily. Both eyes PM and lt eye in am also No current facility-administered medications on file prior to visit. Past Surgical History:  
Procedure Laterality Date  HX APPENDECTOMY  HX COLONOSCOPY    
 normal except for hemorrhoids  HX HEENT    
 wisdom teeth extraction x2  HX KNEE REPLACEMENT    
 right; 2017  HX SHOULDER ARTHROSCOPY    
 rt  HX TONSILLECTOMY Family History Problem Relation Age of Onset  Coronary Artery Disease Father 47  
   at [de-identified] of MI Reviewed and no changes Social History Social History  Marital status:  Spouse name: N/A  
 Number of children: N/A  
 Years of education: N/A Occupational History  Not on file. Social History Main Topics  Smoking status: Never Smoker  Smokeless tobacco: Never Used  Alcohol use Yes Comment: occasional  
 Drug use: No  
 Sexual activity: Not Currently Other Topics Concern  Not on file Social History Narrative Visit Vitals  /82 (BP 1 Location: Right arm, BP Patient Position: Sitting)  Pulse 71  Temp 97.8 °F (36.6 °C) (Oral)  Resp 18  Ht 5' 9\" (1.753 m)  Wt 207 lb (93.9 kg)  SpO2 99%  BMI 30.57 kg/m2 Physical Examination:  
General - Well appearing male HEENT - PERRL, TM no erythema/opacification, normal nasal turbinates, oropharynx no erythema or exudate, MMM Neck - supple, no bruits, no TMG, no LAD Pulm - clear to auscultation bilaterally Cardio - RRR, normal S1 S2, no murmur gallops or rubs Abd - soft, nontender, no masses, no HSM Extrem - no edema, +2 distal pulses Psych - normal affect, appropriate mood Lab Results Component Value Date/Time WBC 10.1 02/09/2018 03:27 AM  
 HGB 13.9 02/09/2018 03:27 AM  
 HCT 42.2 02/09/2018 03:27 AM  
 PLATELET 239 75/50/8681 03:27 AM  
 MCV 81.5 02/09/2018 03:27 AM  
 
Lab Results Component Value Date/Time Sodium 142 05/08/2018 11:35 AM  
 Potassium 4.3 05/08/2018 11:35 AM  
 Chloride 103 05/08/2018 11:35 AM  
 CO2 25 05/08/2018 11:35 AM  
 Anion gap 4 (L) 02/10/2018 03:30 AM  
 Glucose 95 05/08/2018 11:35 AM  
 BUN 15 05/08/2018 11:35 AM  
 Creatinine 1.16 05/08/2018 11:35 AM  
 BUN/Creatinine ratio 13 05/08/2018 11:35 AM  
 GFR est AA 68 05/08/2018 11:35 AM  
 GFR est non-AA 59 (L) 05/08/2018 11:35 AM  
 Calcium 9.1 05/08/2018 11:35 AM  
 
Lab Results Component Value Date/Time Cholesterol, total 197 07/06/2017 11:23 AM  
 HDL Cholesterol 31 (L) 07/06/2017 11:23 AM  
 LDL, calculated 122 (H) 07/06/2017 11:23 AM  
 VLDL, calculated 44 (H) 07/06/2017 11:23 AM  
 Triglyceride 220 (H) 07/06/2017 11:23 AM  
 
No results found for: TSH, TSH2, TSH3, TSHP, TSHEXT, TSHEXT Lab Results Component Value Date/Time  
 Prostate Specific Ag 4.2 (H) 02/09/2017 12:07 PM  
 % Free PSA 29.8 02/09/2017 12:07 PM  
 
Lab Results Component Value Date/Time Hemoglobin A1c 5.9 (H) 05/08/2018 11:35 AM  
 
No results found for: Perry Washington, Junior Hatfield, [id number] Lab Results Component Value Date/Time ALT (SGPT) 11 05/08/2018 11:35 AM  
 AST (SGOT) 15 05/08/2018 11:35 AM  
 Alk. phosphatase 65 05/08/2018 11:35 AM  
 Bilirubin, direct 0.11 02/09/2017 12:07 PM  
 Bilirubin, total 0.3 05/08/2018 11:35 AM  
 
  
  
Assessment/Plan: 1. Essential tremor 
-stable, no medication needed 2. PAF (paroxysmal atrial fibrillation) ([facility] [address] Sinus rhythm. He does not tolerate beta blockers due to bradycardia. He is currently on Eliquis twice a day. -CBC today 3. Essential hypertension 
-Blood pressures well controlled on lisinopril 20 mg. 
- METABOLIC PANEL, COMPREHENSIVE 4. Enlarged prostate Able to urinate ( recall patient had retention) but still has symptoms 
- on cardura - patient will see urologist soon 5. LINDA treated with BiPAP Encouraged to use CPAP 6. Pre-diabetes: Pre Diabetes: Recommend following diabetic diet, exercise and maintain a healthy weight.  
  
 
 
This is the Subsequent Medicare Annual Wellness Exam, performed 12 months or more after the Initial AWV or the last Subsequent AWV I have reviewed the patient's medical history in detail and updated the computerized patient record. Depression Risk Factor Screening: PHQ over the last two weeks 8/30/2018 Little interest or pleasure in doing things Not at all Feeling down, depressed, irritable, or hopeless Not at all Total Score PHQ 2 0 Alcohol Risk Factor Screening: You do not drink alcohol or very rarely. Functional Ability and Level of Safety:  
Hearing Loss Hearing is good. Activities of Daily Living The home contains: no safety equipment. Patient does total self care Fall Risk Fall Risk Assessment, last 12 mths 8/30/2018 Able to walk? Yes Fall in past 12 months? No  
 
 
Abuse Screen Patient is not abused Cognitive Screening Evaluation of Cognitive Function: 
Has your family/caregiver stated any concerns about your memory: no 
Normal 
 
Patient Care Team  
Patient Care Team: 
Jacque Caputo MD as PCP - General (Internal Medicine) Carmelita Dorsey MD (Cardiology) Imani Owen MD as Physician (Sleep Medicine) Dima Banuelos RN as Ambulatory Care Navigator (Internal Medicine) Wally Esquivel MD as Surgeon (General Surgery) Assessment/Plan Education and counseling provided: 
Are appropriate based on today's review and evaluation Diagnoses and all orders for this visit: 
 
1. Medicare annual wellness visit, subsequent Health Maintenance Due Topic Date Due  Influenza Age 5 to Adult  Flu shot

## 2018-08-30 NOTE — MR AVS SNAPSHOT
[address] [facility] 
[phone] Patient: Marvin Sweet MRN: [id number] :1935 Visit Information Date & Time Provider Department Dept. Phone Encounter #  
 2018 10:30 AM Shonna, [address] [place] [address],[address] [phone] [id number] Follow-up Instructions Return in about 3 months (around 2018) for high blood pressure. Your Appointments 3/4/2019  9:20 AM  
ESTABLISHED PATIENT with Carmelita Dorsey MD  
[facility] ([facility] SCHEDUL[facility]) Appt Note: 1 yr f/u  
 [address] [place] [address]  
[address] [facility] [address] [id number] Upcoming Health Maintenance Date Due Influenza Age 5 to Adult 2018 MEDICARE YEARLY EXAM 2019 GLAUCOMA SCREENING Q2Y 2019 DTaP/Tdap/Td series (2 - Td) 2021 Allergies as of 2018  Review Complete On: 2018 By: MD Shonna  
  
 Severity Noted Reaction Type Reactions Oxycodone Low 2017    Other (comments) Patient states, \"It made me feel unwell. \" Current Immunizations  Reviewed on 2017 Name Date Influenza High Dose Vaccine PF 10/6/2017 11:16 AM  
 Influenza Vaccine 10/1/2016 Pneumococcal Conjugate (PCV-13) 2015 Tdap 2011 Not reviewed this visit You Were Diagnosed With   
  
 Codes Comments Medicare annual wellness visit, subsequent    -  Primary ICD-10-CM: Z00.00 ICD-9-CM: V70.0 PAF (paroxysmal atrial fibrillation) (HCC)     ICD-10-CM: I48.0 ICD-9-CM: 427.31 Essential hypertension     ICD-10-CM: I10 
ICD-9-CM: 401.9 Enlarged prostate     ICD-10-CM: N40.0 ICD-9-CM: 600.00 Pure hypercholesterolemia     ICD-10-CM: E78.00 ICD-9-CM: 272.0 Vitals BP Pulse Temp Resp Height(growth percentile) Weight(growth percentile) 140/82 (BP 1 Location: Right arm, BP Patient Position: Sitting) 71 97.8 °F (36.6 °C) (Oral) 18 5' 9\" (1.753 m) 207 lb (93.9 kg) SpO2 BMI Smoking Status 99% 30.57 kg/m2 Never Smoker Vitals History BMI and BSA Data Body Mass Index Body Surface Area 30.57 kg/m 2 2.14 m 2 Preferred Pharmacy Pharmacy Name Phone [facility]/PHARMACY #[id number] [id number] [facility] [phone] Your Updated Medication List  
  
   
This list is accurate as of 8/30/18 11:10 AM.  Always use your most recent med list.  
  
  
  
  
 ALPHAGAN P 0.1 % ophthalmic solution Generic drug:  brimonidine Administer 1 Drop to both eyes daily. Both eyes PM and lt eye in am also * apixaban 5 mg tablet Commonly known as:  Gui Riley Take 1 Tab by mouth two (2) times a day. * apixaban 5 mg tablet Commonly known as:  Gui Riley Take 1 Tab by mouth two (2) times a day. clobetasol 0.05 % topical cream  
Commonly known as:  Pippa Lunch Apply  to affected area two (2) times a day. doxazosin 8 mg tablet Commonly known as:  CARDURA Take 1 Tab by mouth nightly. FISH OIL PO Take 1,000 mg by mouth daily. fluorouracil 5 % chemo cream  
Commonly known as:  EFUDEX  
1 APPLICATION APPLY ON THE SKIN TWICE A DAY APPLY TWICE DAILY FOR 14 DAYS  
  
 lisinopril 20 mg tablet Commonly known as:  PRINIVIL, ZESTRIL  
TAKE 1 TABLET BY MOUTH DAILY. MAY RESUME MEDICATION WHEN B/P GREATER THAN 120/80  
  
 omega-3 acid ethyl esters 1 gram capsule Commonly known as:  Thersa Alix Take 2 Caps by mouth two (2) times daily (with meals). * Notice: This list has 2 medication(s) that are the same as other medications prescribed for you. Read the directions carefully, and ask your doctor or other care provider to review them with you. We Performed the Following CBC WITH AUTOMATED DIFF [17877 CPT(R)] LIPID PANEL [12169 CPT(R)] METABOLIC PANEL, COMPREHENSIVE [65738 CPT(R)] Follow-up Instructions Return in about 3 months (around 11/30/2018) for high blood pressure. Patient Instructions Medicare Wellness Visit, Male The best way to live healthy is to have a lifestyle where you eat a well-balanced diet, exercise regularly, limit alcohol use, and quit all forms of tobacco/nicotine, if applicable. Regular preventive services are another way to keep healthy. Preventive services (vaccines, screening tests, monitoring & exams) can help personalize your care plan, which helps you manage your own care. Screening tests can find health problems at the earliest stages, when they are easiest to treat. [address] follows the current, evidence-based guidelines published by the [facility] ([facility]) when recommending preventive services for our patients. Because we follow these guidelines, sometimes recommendations change over time as research supports it. (For example, a prostate screening blood test is no longer routinely recommended for men with no symptoms.) Of course, you and your doctor may decide to screen more often for some diseases, based on your risk and co-morbidities (chronic disease you are already diagnosed with). Preventive services for you include: - Medicare offers their members a free annual wellness visit, which is time for you and your primary care provider to discuss and plan for your preventive service needs. Take advantage of this benefit every year! 
-All adults over age 72 should receive the recommended pneumonia vaccines. Current USPSTF guidelines recommend a series of two vaccines for the best pneumonia protection.  
-All adults should have a flu vaccine yearly and an ECG. All adults age 61 and older should receive a shingles vaccine once in their lifetime. -All adults age 38-68 who are overweight should have a diabetes screening test once every three years.  
-Other screening tests & preventive services for persons with diabetes include: an eye exam to screen for diabetic retinopathy, a kidney function test, a foot exam, and stricter control over your cholesterol.  
-Cardiovascular screening for adults with routine risk involves an electrocardiogram (ECG) at intervals determined by the provider.  
-Colorectal cancer screening should be done for adults age 54-65 with no increased risk factors for colorectal cancer. There are a number of acceptable methods of screening for this type of cancer. Each test has its own benefits and drawbacks. Discuss with your provider what is most appropriate for you during your annual wellness visit. The different tests include: colonoscopy (considered the best screening method), a fecal occult blood test, a fecal DNA test, and sigmoidoscopy. 
-All adults born between [facility] should be screened once for Hepatitis C. 
-An Abdominal Aortic Aneurysm (AAA) Screening is recommended for men age 73-68 who has ever smoked in their lifetime. Here is a list of your current Health Maintenance items (your personalized list of preventive services) with a due date: 
Health Maintenance Due Topic Date Due  
 Annual Well Visit  Done today  Flu Vaccine  Due in the fall Introducing [facility] & HEALTH SERVICES! Dear Chang Patel: Thank you for requesting a [employer] account. Our records indicate that you already have an active [employer] account. You can access your account anytime at [email or web address] Did you know that you can access your hospital and ER discharge instructions at any time in [employer]? You can also review all of your test results from your hospital stay or ER visit. Additional Information If you have questions, please visit the Frequently Asked Questions section of the [employer] website at [email or web address] Remember, [employer] is NOT to be used for urgent needs. For medical emergencies, dial 911. Now available from your iPhone and Android! Please provide this summary of care documentation to your next provider. Your primary care clinician is listed as ANGELA Whalen. If you have any questions after today's visit, please call [phone].

## 2018-08-30 NOTE — PROGRESS NOTES
Reviewed record in preparation for visit and have obtained necessary documentation. Identified pt with two pt identifiers(name and ). Chief Complaint Patient presents with  Hypertension Health Maintenance Due Topic Date Due  
 Annual Well Visit  2018  Flu Vaccine  2018 Mr. Steven Manning has a reminder for a \"due or due soon\" health maintenance. I have asked that he discuss this further with his primary care provider for follow-up on this health maintenance. Coordination of Care Questionnaire: 
:  
 
1) Have you been to an emergency room, urgent care clinic since your last visit? no  
Hospitalized since your last visit? no          
 
2) Have you seen or consulted any other health care providers outside of [address] [place] [address] since your last visit? no  (Include any pap smears or colon screenings in this section.) 3) In the event something were to happen to you and you were unable to speak on your behalf, do you have an Advance Directive/ Living Will in place stating your wishes? NO Do you have an Advance Directive on file? no 
 
4) Are you interested in receiving information on Advance Directives? NO Patient is accompanied by self and wife I have received verbal consent from Todd Yuen to discuss any/all medical information while they are present in the room.

## 2018-08-31 LAB
ALBUMIN SERPL-MCNC: 4.2 G/DL (ref 3.5–4.7)
ALBUMIN/GLOB SERPL: 1.6 {RATIO} (ref 1.2–2.2)
ALP SERPL-CCNC: 67 IU/L (ref 39–117)
ALT SERPL-CCNC: 11 IU/L (ref 0–44)
AST SERPL-CCNC: 14 IU/L (ref 0–40)
BASOPHILS # BLD AUTO: 0 X10E3/UL (ref 0–0.2)
BASOPHILS NFR BLD AUTO: 0 %
BILIRUB SERPL-MCNC: 0.3 MG/DL (ref 0–1.2)
BUN SERPL-MCNC: 14 MG/DL (ref 8–27)
BUN/CREAT SERPL: 12 (ref 10–24)
CALCIUM SERPL-MCNC: 9.2 MG/DL (ref 8.6–10.2)
CHLORIDE SERPL-SCNC: 101 MMOL/L (ref 96–106)
CHOLEST SERPL-MCNC: 189 MG/DL (ref 100–199)
CO2 SERPL-SCNC: 25 MMOL/L (ref 20–29)
CREAT SERPL-MCNC: 1.14 MG/DL (ref 0.76–1.27)
EOSINOPHIL # BLD AUTO: 0.2 X10E3/UL (ref 0–0.4)
EOSINOPHIL NFR BLD AUTO: 3 %
ERYTHROCYTE [DISTWIDTH] IN BLOOD BY AUTOMATED COUNT: 15.1 % (ref 12.3–15.4)
GLOBULIN SER CALC-MCNC: 2.6 G/DL (ref 1.5–4.5)
GLUCOSE SERPL-MCNC: 86 MG/DL (ref 65–99)
HCT VFR BLD AUTO: 41.3 % (ref 37.5–51)
HDLC SERPL-MCNC: 32 MG/DL
HGB BLD-MCNC: 14.2 G/DL (ref 13–17.7)
IMM GRANULOCYTES # BLD: 0 X10E3/UL (ref 0–0.1)
IMM GRANULOCYTES NFR BLD: 0 %
LDLC SERPL CALC-MCNC: 129 MG/DL (ref 0–99)
LYMPHOCYTES # BLD AUTO: 2.5 X10E3/UL (ref 0.7–3.1)
LYMPHOCYTES NFR BLD AUTO: 37 %
MCH RBC QN AUTO: 27 PG (ref 26.6–33)
MCHC RBC AUTO-ENTMCNC: 34.4 G/DL (ref 31.5–35.7)
MCV RBC AUTO: 79 FL (ref 79–97)
MONOCYTES # BLD AUTO: 0.3 X10E3/UL (ref 0.1–0.9)
MONOCYTES NFR BLD AUTO: 5 %
NEUTROPHILS # BLD AUTO: 3.7 X10E3/UL (ref 1.4–7)
NEUTROPHILS NFR BLD AUTO: 55 %
PLATELET # BLD AUTO: 140 X10E3/UL (ref 150–379)
POTASSIUM SERPL-SCNC: 4.5 MMOL/L (ref 3.5–5.2)
PROT SERPL-MCNC: 6.8 G/DL (ref 6–8.5)
RBC # BLD AUTO: 5.25 X10E6/UL (ref 4.14–5.8)
SODIUM SERPL-SCNC: 142 MMOL/L (ref 134–144)
TRIGL SERPL-MCNC: 139 MG/DL (ref 0–149)
VLDLC SERPL CALC-MCNC: 28 MG/DL (ref 5–40)
WBC # BLD AUTO: 6.8 X10E3/UL (ref 3.4–10.8)

## 2018-09-12 NOTE — PROGRESS NOTES
LDL is elevated slightly higher than previous - work on low fat diet and exercising 20 minutes to 30 minutes daily Kidney and liver function are normal 
Blood count shows slightly low platelets which is not new - we will monitor every 6 months

## 2018-09-14 DIAGNOSIS — N40.1 BENIGN PROSTATIC HYPERPLASIA WITH URINARY FREQUENCY: ICD-10-CM

## 2018-09-14 DIAGNOSIS — R35.0 BENIGN PROSTATIC HYPERPLASIA WITH URINARY FREQUENCY: ICD-10-CM

## 2018-09-15 RX ORDER — DOXAZOSIN 8 MG/1
TABLET ORAL
Qty: 30 TAB | Refills: 5 | Status: SHIPPED | OUTPATIENT
Start: 2018-09-15 | End: 2018-10-02 | Stop reason: SDUPTHER

## 2018-10-02 ENCOUNTER — OFFICE VISIT (OUTPATIENT)
Dept: INTERNAL MEDICINE CLINIC | Age: 83
End: 2018-10-02

## 2018-10-02 VITALS
TEMPERATURE: 98.2 F | OXYGEN SATURATION: 97 % | DIASTOLIC BLOOD PRESSURE: 73 MMHG | WEIGHT: 205 LBS | RESPIRATION RATE: 16 BRPM | SYSTOLIC BLOOD PRESSURE: 140 MMHG | HEART RATE: 63 BPM | BODY MASS INDEX: 30.36 KG/M2 | HEIGHT: 69 IN

## 2018-10-02 DIAGNOSIS — Z23 ENCOUNTER FOR IMMUNIZATION: ICD-10-CM

## 2018-10-02 DIAGNOSIS — Z01.818 PRE-OP EXAM: Primary | ICD-10-CM

## 2018-10-02 DIAGNOSIS — N40.1 BENIGN PROSTATIC HYPERPLASIA WITH URINARY FREQUENCY: ICD-10-CM

## 2018-10-02 DIAGNOSIS — R35.0 BENIGN PROSTATIC HYPERPLASIA WITH URINARY FREQUENCY: ICD-10-CM

## 2018-10-02 RX ORDER — DOXAZOSIN 8 MG/1
TABLET ORAL
Qty: 30 TAB | Refills: 5 | Status: SHIPPED | OUTPATIENT
Start: 2018-10-02 | End: 2018-10-17

## 2018-10-02 NOTE — MR AVS SNAPSHOT
[address] [facility] 
[phone] Patient: Jayla Nance MRN: [id number] :1935 Visit Information Date & Time Provider Department Dept. Phone Encounter #  
 10/2/2018 11:30 AM Shonna, [address] [place] [address],[address] [phone] [id number] Follow-up Instructions Return in about 3 months (around 2019) for Hypertension . Your Appointments 2018  9:45 AM  
ROUTINE CARE with Maria Luisa Pérez MD  
[facility] [address]) Appt Note: 3 month follow up for HTN  
 [facility] [address]  
  
   
 [facility] [address] [facility]  [address] [address]  
  
    
 3/4/2019  9:20 AM  
ESTABLISHED PATIENT with Mark Hagan MD  
[facility] ([facility] SCH[facility]) Appt Note: 1 yr f/u  
 [address] [facility] [address] [place] [address]  
[address] [address] Upcoming Health Maintenance Date Due Shingrix Vaccine Age 50> (1 of 2) 10/10/1987 Influenza Age 5 to Adult 2018 MEDICARE YEARLY EXAM 2019 GLAUCOMA SCREENING Q2Y 2019 DTaP/Tdap/Td series (2 - Td) 2021 Allergies as of 10/2/2018  Review Complete On: 10/2/2018 By: MD Shonna  
  
 Severity Noted Reaction Type Reactions Oxycodone Low 2017    Other (comments) Patient states, \"It made me feel unwell. \" Current Immunizations  Reviewed on 2017 Name Date Influenza High Dose Vaccine PF 10/6/2017 11:16 AM  
 Influenza Vaccine 10/1/2016 Pneumococcal Conjugate (PCV-13) 2015 Tdap 2011 Not reviewed this visit You Were Diagnosed With   
  
 Codes Comments Pre-op exam    -  Primary ICD-10-CM: B59.327 ICD-9-CM: V72.84   
 Benign prostatic hyperplasia with urinary frequency     ICD-10-CM: N40.1, R35.0 ICD-9-CM: 600.01, 788.41 Vitals BP Pulse Temp Resp Height(growth percentile) Weight(growth percentile) 153/73 (BP 1 Location: Right arm, BP Patient Position: Sitting) 63 98.2 °F (36.8 °C) (Oral) 16 5' 9\" (1.753 m) 205 lb (93 kg) SpO2 BMI Smoking Status 97% 30.27 kg/m2 Never Smoker BMI and BSA Data Body Mass Index Body Surface Area  
 30.27 kg/m 2 2.13 m 2 Preferred Pharmacy Pharmacy Name Phone [facility][id number] [facility] [phone] Your Updated Medication List  
  
   
This list is accurate as of 10/2/18 12:13 PM.  Always use your most recent med list.  
  
  
  
  
 ALPHAGAN P 0.1 % ophthalmic solution Generic drug:  brimonidine Administer 1 Drop to both eyes daily. Both eyes PM and lt eye in am also  
  
 apixaban 5 mg tablet Commonly known as:  Patrick Raid Take 1 Tab by mouth two (2) times a day. clobetasol 0.05 % topical cream  
Commonly known as:  Domingo Andrew Apply  to affected area two (2) times a day. doxazosin 8 mg tablet Commonly known as:  CARDURA TAKE 1 TAB BY MOUTH NIGHTLY. FISH OIL PO Take 1,000 mg by mouth daily. fluorouracil 5 % chemo cream  
Commonly known as:  EFUDEX  
1 APPLICATION APPLY ON THE SKIN TWICE A DAY APPLY TWICE DAILY FOR 14 DAYS  
  
 lisinopril 20 mg tablet Commonly known as:  PRINIVIL, ZESTRIL  
TAKE 1 TABLET BY MOUTH DAILY. MAY RESUME MEDICATION WHEN B/P GREATER THAN 120/80  
  
 omega-3 acid ethyl esters 1 gram capsule Commonly known as:  Svetlana Boron Take 2 Caps by mouth two (2) times daily (with meals). Prescriptions Sent to Pharmacy Refills  
 doxazosin (CARDURA) 8 mg tablet 5 Sig: TAKE 1 TAB BY MOUTH NIGHTLY.   
 Class: Normal  
 Pharmacy: [facility]/pharmacy #[id number], [address] [place] [address] [facility]ES AT [facility] #: [phone] We Performed the Following AMB POC EKG ROUTINE W/ 12 LEADS, INTER & REP [17642 CPT(R)] Follow-up Instructions Return in about 3 months (around 1/2/2019) for Hypertension . Patient Instructions Please discuss with Urologist the bleeding risk of the procedure. Therefore you will need to hold the apixaban ( eliquis) 2 to 3 days prior to the procedure Resume the  ( eliquis) eliquis 2 days after the procedure Patient is low risk of cardiovascular adverse event for a intermediate risk procedure. EKG done today and unchanged and normal  
 
 
 
 
 
 
 
  
Introducing [facility] & [facility] SERVICES! Dear Elmer Betancourt: Thank you for requesting a [employer]RGR account. Our records indicate that you already have an active [employer]RGR account. You can access your account anytime at [email or web address] Did you know that you can access your hospital and ER discharge instructions at any time in EVRGR? You can also review all of your test results from your hospital stay or ER visit. Additional Information If you have questions, please visit the Frequently Asked Questions section of the [employer] website at [email or web address] Remember, [employer] is NOT to be used for urgent needs. For medical emergencies, dial 911. Now available from your iPhone and Android! Please provide this summary of care documentation to your next provider. Your primary care clinician is listed as ANGELA Whalen. If you have any questions after today's visit, please call [phone].

## 2018-10-02 NOTE — PROGRESS NOTES
CC: Pre-op Exam (prostate surgery 10/27) HPI: 
 
He is a [de-identified] y.o. male who presents for follow up on chronic medical issues Patient needs pre op evaluation for prostate procedure, that will be done on 10/27. \" uro lift\" Patient is currently taking cardura 8mg. Recall he had urinary retention after appendectomy. He has difficulty initiating stream and has urinary frequency Denies blood in the urine Denies chest pain. Patient is able to  goup a flight of stairs and play lacross without chest pain or chest tightness and no shortness of breath Obstructive sleep apnea: Patient has a history of obstructive sleep and has a CPAP. Not tolerating CPAP we discussed the need to use it. Hypertension: Blood pressure is 140/73. Currently on lisinopril 20mg daily. Denies chest pain. Atrial fibrillation patient denies palpitations he is currently on Eliquis. Recall that he developed bradycardia when he was on a beta-blocker /and on Norvasc. Due to finances he at times only takes Eliquis once a day. Discussed that patient is to take twice daily dosing to prevent stroke. ROS: 
10 systems reviewed and negative other than HPI Past Medical History:  
Diagnosis Date  Appendicitis 2018 underwent appendectomy  Arthritis DJD  Enlarged prostate  Hyperlipidemia  Hypertension  LINDA treated with BiPAP  PAF (paroxysmal atrial fibrillation) ([facility] [address]  Pre-diabetes HA1C 6.0 Dec 2016  Shingles Current Outpatient Prescriptions on File Prior to Visit Medication Sig Dispense Refill  lisinopril (PRINIVIL, ZESTRIL) 20 mg tablet TAKE 1 TABLET BY MOUTH DAILY. MAY RESUME MEDICATION WHEN B/P GREATER THAN 120/80 30 Tab 5  
 apixaban (ELIQUIS) 5 mg tablet Take 1 Tab by mouth two (2) times a day. 60 Tab 5  
 brimonidine (ALPHAGAN P) 0.1 % ophthalmic solution Administer 1 Drop to both eyes daily. Both eyes PM and lt eye in am also  DOCOSAHEXANOIC ACID/EPA (FISH OIL PO) Take 1,000 mg by mouth daily.  omega-3 acid ethyl esters (LOVAZA) 1 gram capsule Take 2 Caps by mouth two (2) times daily (with meals). 30 Cap 5  clobetasol (TEMOVATE) 0.05 % topical cream Apply  to affected area two (2) times a day. 15 g 0  
 fluorouracil (EFUDEX) 5 % chemo cream 1 APPLICATION APPLY ON THE SKIN TWICE A DAY APPLY TWICE DAILY FOR 14 DAYS  0 No current facility-administered medications on file prior to visit. Past Surgical History:  
Procedure Laterality Date  HX APPENDECTOMY  HX COLONOSCOPY    
 normal except for hemorrhoids  HX HEENT    
 wisdom teeth extraction x2  HX KNEE REPLACEMENT    
 right; 2017  HX SHOULDER ARTHROSCOPY    
 rt  HX TONSILLECTOMY Family History Problem Relation Age of Onset  Coronary Artery Disease Father 47  
   at [de-identified] of MI Reviewed and no changes Social History Social History  Marital status:  Spouse name: N/A  
 Number of children: N/A  
 Years of education: N/A Occupational History  Not on file. Social History Main Topics  Smoking status: Never Smoker  Smokeless tobacco: Never Used  Alcohol use Yes Comment: occasional  
 Drug use: No  
 Sexual activity: Not Currently Other Topics Concern  Not on file Social History Narrative Visit Vitals  /73 (BP 1 Location: Right arm, BP Patient Position: Sitting)  Pulse 63  Temp 98.2 °F (36.8 °C) (Oral)  Resp 16  
 Ht 5' 9\" (1.753 m)  Wt 205 lb (93 kg)  SpO2 97%  BMI 30.27 kg/m2 Physical Examination:  
General - Well appearing male HEENT - PERRL, TM no erythema/opacification, normal nasal turbinates, oropharynx no erythema or exudate, MMM Neck - supple, no bruits, no TMG, no LAD Pulm - clear to auscultation bilaterally Cardio - RRR, normal S1 S2, no murmur gallops or rubs Abd - soft, nontender, no masses, no HSM Extrem - no edema, +2 distal pulses Psych - normal affect, appropriate mood Lab Results Component Value Date/Time WBC 6.8 08/30/2018 11:15 AM  
 HGB 14.2 08/30/2018 11:15 AM  
 HCT 41.3 08/30/2018 11:15 AM  
 PLATELET 794 (L) 73/49/6109 11:15 AM  
 MCV 79 08/30/2018 11:15 AM  
 
Lab Results Component Value Date/Time Sodium 142 08/30/2018 11:15 AM  
 Potassium 4.5 08/30/2018 11:15 AM  
 Chloride 101 08/30/2018 11:15 AM  
 CO2 25 08/30/2018 11:15 AM  
 Anion gap 4 (L) 02/10/2018 03:30 AM  
 Glucose 86 08/30/2018 11:15 AM  
 BUN 14 08/30/2018 11:15 AM  
 Creatinine 1.14 08/30/2018 11:15 AM  
 BUN/Creatinine ratio 12 08/30/2018 11:15 AM  
 GFR est AA 70 08/30/2018 11:15 AM  
 GFR est non-AA 60 08/30/2018 11:15 AM  
 Calcium 9.2 08/30/2018 11:15 AM  
 
Lab Results Component Value Date/Time Cholesterol, total 189 08/30/2018 11:15 AM  
 HDL Cholesterol 32 (L) 08/30/2018 11:15 AM  
 LDL, calculated 129 (H) 08/30/2018 11:15 AM  
 VLDL, calculated 28 08/30/2018 11:15 AM  
 Triglyceride 139 08/30/2018 11:15 AM  
 
No results found for: TSH, TSH2, TSH3, TSHP, TSHEXT, TSHEXT Lab Results Component Value Date/Time  
 Prostate Specific Ag 4.2 (H) 02/09/2017 12:07 PM  
 % Free PSA 29.8 02/09/2017 12:07 PM  
 
Lab Results Component Value Date/Time Hemoglobin A1c 5.9 (H) 05/08/2018 11:35 AM  
 
No results found for: Ethan Marrero, Marylene Blamer, Diana Grover, [id number] Lab Results Component Value Date/Time ALT (SGPT) 11 08/30/2018 11:15 AM  
 AST (SGOT) 14 08/30/2018 11:15 AM  
 Alk. phosphatase 67 08/30/2018 11:15 AM  
 Bilirubin, direct 0.11 02/09/2017 12:07 PM  
 Bilirubin, total 0.3 08/30/2018 11:15 AM  
 
EKG done today in normal sinus rhythm. Normal EKG Assessment/Plan: 1. Pre op evaluation:  
-stable, Patient is low risk of cardiovascular adverse event for a intermediate risk procedure. 
-Patient to send in paperwork for us to fill out. 2. PAF (paroxysmal atrial fibrillation) ([facility] [address] Sinus rhythm. He does not tolerate beta blockers due to bradycardia. He is currently on Eliquis twice a day. 
-His EKG showed sinus rhythm today. 3. Essential hypertension 
-Blood pressure at goal for age, continue lisinopril 20 mg. 
 
 
4. Enlarged prostate Able to urinate ( recall patient had retention) but still has symptoms 
- on cardura - refill ordered 
-Has planned your left to alleviate symptoms. 5. LINDA treated with BiPAP Encouraged to use CPAP 6. Pre-diabetes: Pre Diabetes: Recommend following diabetic diet, exercise and maintain a healthy weight.  
  
In addition a flu shot was given.

## 2018-10-02 NOTE — PATIENT INSTRUCTIONS
Please discuss with Urologist the bleeding risk of the procedure. Therefore you will need to hold the apixaban ( eliquis) 2 to 3 days prior to the procedure Resume the  ( eliquis) eliquis 2 days after the procedure Patient is low risk of cardiovascular adverse event for a intermediate risk procedure.  
 
EKG done today and unchanged and normal

## 2018-10-02 NOTE — PROGRESS NOTES
1. Have you been to the ER, urgent care clinic since your last visit? Hospitalized since your last visit? No 
 
2. Have you seen or consulted any other health care providers outside of the [address] [place] [address] since your last visit? Include any pap smears or colon screening.  No

## 2018-10-04 ENCOUNTER — TELEPHONE (OUTPATIENT)
Dept: INTERNAL MEDICINE CLINIC | Age: 83
End: 2018-10-04

## 2018-10-04 NOTE — TELEPHONE ENCOUNTER
Fas #[phone] Attn: Mary HOLLIDAY, LAB & pre op form is needed by [address] [place] [address] St   They need this right away she states. Thanks.

## 2018-10-13 DIAGNOSIS — I10 ESSENTIAL HYPERTENSION: ICD-10-CM

## 2018-10-15 RX ORDER — LISINOPRIL 20 MG/1
TABLET ORAL
Qty: 30 TAB | Refills: 5 | Status: SHIPPED | OUTPATIENT
Start: 2018-10-15 | End: 2018-10-17

## 2018-10-17 DIAGNOSIS — R35.0 BENIGN PROSTATIC HYPERPLASIA WITH URINARY FREQUENCY: ICD-10-CM

## 2018-10-17 DIAGNOSIS — N40.1 BENIGN PROSTATIC HYPERPLASIA WITH URINARY FREQUENCY: ICD-10-CM

## 2018-10-17 DIAGNOSIS — I10 ESSENTIAL HYPERTENSION: ICD-10-CM

## 2018-10-17 RX ORDER — LISINOPRIL 20 MG/1
TABLET ORAL
Qty: 90 TAB | Refills: 1 | Status: SHIPPED | OUTPATIENT
Start: 2018-10-17 | End: 2019-10-03 | Stop reason: SDUPTHER

## 2018-10-17 RX ORDER — DOXAZOSIN 8 MG/1
TABLET ORAL
Qty: 90 TAB | Refills: 1 | Status: SHIPPED | OUTPATIENT
Start: 2018-10-17 | End: 2018-12-04 | Stop reason: ALTCHOICE

## 2018-10-29 NOTE — TELEPHONE ENCOUNTER
PCP: Jaswinder Zelaya MD    Last appt: 10/2/2018  Future Appointments   Date Time Provider Kassy Kumar   1/3/2019 10:30 AM Appa Tracey Davidson MD [address]   3/4/2019  9:20 AM Linda Rosales  [address]       Requested Prescriptions     Pending Prescriptions Disp Refills    brinzolamide-brimonidine (SIMBRINZA) 1-0.2 % drps       Sig: Apply  to eye.

## 2018-10-30 ENCOUNTER — TELEPHONE (OUTPATIENT)
Dept: INTERNAL MEDICINE CLINIC | Age: 83
End: 2018-10-30

## 2018-10-30 NOTE — TELEPHONE ENCOUNTER
----- Message from Edwige Villatoro sent at 10/30/2018 10:49 AM EDT -----  Regarding: [place]/telephone  Pts wife Igancio Redmond is requesting for you to call her. Pt had surgery on his prostate last Friday and had a catheter and it was removed yesterday and he is having a hard time urinating. The doctor that did the surgery is in [place] and he is not able to travel. Wifes number is [phone].         Message copied/pasted from [facility]

## 2018-10-30 NOTE — TELEPHONE ENCOUNTER
Spoke with patients wife Ana Rosa Dutta ([facility])  Two pt identifiers confirmed. Ana Rosa Dutta states that patient is at his urologist office waiting to be seen now.

## 2018-10-30 NOTE — TELEPHONE ENCOUNTER
Spoke with patients wife Vernell Fay ([phone])  Two pt identifiers confirmed. Vernell Fay advised that patient Estefani Lias prescription needs to come from patients eye doctor. Pt verbalized understanding of information discussed w/ no further questions at this time.

## 2018-11-27 ENCOUNTER — HOSPITAL ENCOUNTER (EMERGENCY)
Age: 83
Discharge: HOME | End: 2018-11-27
Attending: EMERGENCY MEDICINE
Payer: MEDICARE

## 2018-11-27 ENCOUNTER — APPOINTMENT (OUTPATIENT)
Dept: GENERAL RADIOLOGY | Age: 83
End: 2018-11-27
Attending: EMERGENCY MEDICINE
Payer: MEDICARE

## 2018-11-27 VITALS
TEMPERATURE: 97.6 F | BODY MASS INDEX: 30.37 KG/M2 | SYSTOLIC BLOOD PRESSURE: 100 MMHG | HEIGHT: 69 IN | RESPIRATION RATE: 19 BRPM | WEIGHT: 205.03 LBS | DIASTOLIC BLOOD PRESSURE: 69 MMHG | OXYGEN SATURATION: 94 % | HEART RATE: 126 BPM

## 2018-11-27 DIAGNOSIS — I47.1 SUPRAVENTRICULAR TACHYCARDIA (HCC): Primary | ICD-10-CM

## 2018-11-27 LAB
ALBUMIN SERPL-MCNC: 3.6 G/DL (ref 3.5–5)
ALBUMIN/GLOB SERPL: 0.9 {RATIO} (ref 1.1–2.2)
ALP SERPL-CCNC: 71 U/L (ref 45–117)
ALT SERPL-CCNC: 21 U/L (ref 12–78)
ANION GAP SERPL CALC-SCNC: 11 MMOL/L (ref 5–15)
APPEARANCE UR: CLEAR
AST SERPL-CCNC: 14 U/L (ref 15–37)
BACTERIA URNS QL MICRO: ABNORMAL /HPF
BASOPHILS # BLD: 0.1 K/UL (ref 0–0.1)
BASOPHILS NFR BLD: 1 % (ref 0–1)
BILIRUB SERPL-MCNC: 0.4 MG/DL (ref 0.2–1)
BILIRUB UR QL: NEGATIVE
BUN SERPL-MCNC: 18 MG/DL (ref 6–20)
BUN/CREAT SERPL: 13 (ref 12–20)
CALCIUM SERPL-MCNC: 8.8 MG/DL (ref 8.5–10.1)
CHLORIDE SERPL-SCNC: 107 MMOL/L (ref 97–108)
CK SERPL-CCNC: 159 U/L (ref 39–308)
CO2 SERPL-SCNC: 23 MMOL/L (ref 21–32)
COLOR UR: ABNORMAL
COMMENT, HOLDF: NORMAL
CREAT SERPL-MCNC: 1.38 MG/DL (ref 0.7–1.3)
DIFFERENTIAL METHOD BLD: NORMAL
EOSINOPHIL # BLD: 0.2 K/UL (ref 0–0.4)
EOSINOPHIL NFR BLD: 2 % (ref 0–7)
EPITH CASTS URNS QL MICRO: ABNORMAL /LPF
ERYTHROCYTE [DISTWIDTH] IN BLOOD BY AUTOMATED COUNT: 14.1 % (ref 11.5–14.5)
GLOBULIN SER CALC-MCNC: 3.8 G/DL (ref 2–4)
GLUCOSE SERPL-MCNC: 114 MG/DL (ref 65–100)
GLUCOSE UR STRIP.AUTO-MCNC: NEGATIVE MG/DL
HCT VFR BLD AUTO: 44.6 % (ref 36.6–50.3)
HGB BLD-MCNC: 14.7 G/DL (ref 12.1–17)
HGB UR QL STRIP: ABNORMAL
IMM GRANULOCYTES # BLD: 0 K/UL (ref 0–0.04)
IMM GRANULOCYTES NFR BLD AUTO: 0 % (ref 0–0.5)
KETONES UR QL STRIP.AUTO: NEGATIVE MG/DL
LACTATE SERPL-SCNC: 1.2 MMOL/L (ref 0.4–2)
LEUKOCYTE ESTERASE UR QL STRIP.AUTO: ABNORMAL
LYMPHOCYTES # BLD: 3.2 K/UL (ref 0.8–3.5)
LYMPHOCYTES NFR BLD: 35 % (ref 12–49)
MAGNESIUM SERPL-MCNC: 2 MG/DL (ref 1.6–2.4)
MCH RBC QN AUTO: 27.2 PG (ref 26–34)
MCHC RBC AUTO-ENTMCNC: 33 G/DL (ref 30–36.5)
MCV RBC AUTO: 82.6 FL (ref 80–99)
MONOCYTES # BLD: 0.5 K/UL (ref 0–1)
MONOCYTES NFR BLD: 6 % (ref 5–13)
NEUTS SEG # BLD: 5.1 K/UL (ref 1.8–8)
NEUTS SEG NFR BLD: 57 % (ref 32–75)
NITRITE UR QL STRIP.AUTO: NEGATIVE
NRBC # BLD: 0 K/UL (ref 0–0.01)
NRBC BLD-RTO: 0 PER 100 WBC
PH UR STRIP: 5.5 [PH] (ref 5–8)
PLATELET # BLD AUTO: 151 K/UL (ref 150–400)
PMV BLD AUTO: 9.8 FL (ref 8.9–12.9)
POTASSIUM SERPL-SCNC: 3.9 MMOL/L (ref 3.5–5.1)
PROT SERPL-MCNC: 7.4 G/DL (ref 6.4–8.2)
PROT UR STRIP-MCNC: 30 MG/DL
RBC # BLD AUTO: 5.4 M/UL (ref 4.1–5.7)
RBC #/AREA URNS HPF: ABNORMAL /HPF (ref 0–5)
SAMPLES BEING HELD,HOLD: NORMAL
SODIUM SERPL-SCNC: 141 MMOL/L (ref 136–145)
SP GR UR REFRACTOMETRY: 1.02 (ref 1–1.03)
TROPONIN I SERPL-MCNC: <0.05 NG/ML
UA: UC IF INDICATED,UAUC: ABNORMAL
UROBILINOGEN UR QL STRIP.AUTO: 0.2 EU/DL (ref 0.2–1)
WBC # BLD AUTO: 9 K/UL (ref 4.1–11.1)
WBC URNS QL MICRO: ABNORMAL /HPF (ref 0–4)

## 2018-11-27 PROCEDURE — 96374 THER/PROPH/DIAG INJ IV PUSH: CPT

## 2018-11-27 PROCEDURE — 87086 URINE CULTURE/COLONY COUNT: CPT

## 2018-11-27 PROCEDURE — 83605 ASSAY OF LACTIC ACID: CPT

## 2018-11-27 PROCEDURE — 96361 HYDRATE IV INFUSION ADD-ON: CPT

## 2018-11-27 PROCEDURE — 81001 URINALYSIS AUTO W/SCOPE: CPT

## 2018-11-27 PROCEDURE — 82550 ASSAY OF CK (CPK): CPT

## 2018-11-27 PROCEDURE — 93005 ELECTROCARDIOGRAM TRACING: CPT

## 2018-11-27 PROCEDURE — 74011000250 HC RX REV CODE- 250: Performed by: EMERGENCY MEDICINE

## 2018-11-27 PROCEDURE — 99284 EMERGENCY DEPT VISIT MOD MDM: CPT

## 2018-11-27 PROCEDURE — 80053 COMPREHEN METABOLIC PANEL: CPT

## 2018-11-27 PROCEDURE — 84484 ASSAY OF TROPONIN QUANT: CPT

## 2018-11-27 PROCEDURE — 71045 X-RAY EXAM CHEST 1 VIEW: CPT

## 2018-11-27 PROCEDURE — 74011250636 HC RX REV CODE- 250/636: Performed by: EMERGENCY MEDICINE

## 2018-11-27 PROCEDURE — 36415 COLL VENOUS BLD VENIPUNCTURE: CPT

## 2018-11-27 PROCEDURE — 99285 EMERGENCY DEPT VISIT HI MDM: CPT

## 2018-11-27 PROCEDURE — 83735 ASSAY OF MAGNESIUM: CPT

## 2018-11-27 PROCEDURE — 85025 COMPLETE CBC W/AUTO DIFF WBC: CPT

## 2018-11-27 RX ORDER — DILTIAZEM HYDROCHLORIDE 120 MG/1
120 CAPSULE, EXTENDED RELEASE ORAL DAILY
Qty: 30 CAP | Refills: 0 | Status: SHIPPED | OUTPATIENT
Start: 2018-11-27 | End: 2019-01-03 | Stop reason: SDUPTHER

## 2018-11-27 RX ORDER — METOPROLOL TARTRATE 5 MG/5ML
5 INJECTION INTRAVENOUS
Status: COMPLETED | OUTPATIENT
Start: 2018-11-27 | End: 2018-11-27

## 2018-11-27 RX ADMIN — SODIUM CHLORIDE 1000 ML: 900 INJECTION, SOLUTION INTRAVENOUS at 14:10

## 2018-11-27 RX ADMIN — METOPROLOL TARTRATE 5 MG: 1 INJECTION, SOLUTION INTRAVENOUS at 14:10

## 2018-11-27 RX ADMIN — SODIUM CHLORIDE 1000 ML: 900 INJECTION, SOLUTION INTRAVENOUS at 13:29

## 2018-11-27 NOTE — PROGRESS NOTES
Dr. Dayna Lima reviewed discharge instructions with the patient. The patient verbalized understanding. All questions and concerns were addressed. The patient declined a wheelchair and is discharged ambulatory in the care of family members with instructions and prescriptions in hand. Pt is alert and oriented x 4. Respirations are clear and unlabored.

## 2018-11-27 NOTE — ED NOTES
Care of patient assumed from triage. Patient presents with complaints of anxiety, nervousness, and an irregular heart rate. Patient states that he felt nervousness and a fast heart rate with accompanying dizziness.

## 2018-11-27 NOTE — ED PROVIDER NOTES
EMERGENCY DEPARTMENT HISTORY AND PHYSICAL EXAM 
 
 
Date: 11/27/2018 Patient Name: Ness Simon History of Presenting Illness Chief Complaint Patient presents with  Irregular Heart Beat Patient presents SOB. States he has a hx of A. Fib that has been controlled x3-4 years. Recently started taking eliquis once a day instead of twice because of cost. Patient denies CP History Provided By: Patient HPI: Ness Simon, 82 y.o. male with PMHx significant for HTN, hyperlipidemia, arthritis, shingles, pre-diabetes, presents ambulatory to the ED with cc of increased SOB with associated palpitations and anxiety earlier today. Pt reports he was monitoring his BP earlier today and noticed his HR was 135 with a BP reading 80/60. He mentions being dx with A-fib ~ 4 years ago. He is prescribed to take eliquis twice daily, but conveys non-compliance with only endorsing eliquis tablet once a day. He states metoprolol was once part of his medication regimen, but states he is no longer taking. He denies any exacerbating and alleviating factors. Pt specifically denies any signs of syncope, lightheadedness, CP, abdominal pain, leg swelling, N/V/D, fever, chills, cough, and any other associated symptoms. There are no other complaints, changes, or physical findings at this time. PCP: Moses Keenan MD 
 
Current Outpatient Medications Medication Sig Dispense Refill  dilTIAZem XR (DILT-XR) 120 mg XR capsule Take 1 Cap by mouth daily. 30 Cap 0  
 apixaban (ELIQUIS) 5 mg tablet Take 1 Tab by mouth two (2) times a day. 180 Tab 1  
 lisinopril (PRINIVIL, ZESTRIL) 20 mg tablet TAKE 1 TABLET BY MOUTH DAILY. MAY RESUME MEDICATION WHEN B/P GREATER THAN 120/80 90 Tab 1  
 doxazosin (CARDURA) 8 mg tablet TAKE 1 TAB BY MOUTH NIGHTLY. 90 Tab 1  
 DOCOSAHEXANOIC ACID/EPA (FISH OIL PO) Take 1,000 mg by mouth daily.     
 omega-3 acid ethyl esters (LOVAZA) 1 gram capsule Take 2 Caps by mouth two (2) times daily (with meals). 30 Cap 5  clobetasol (TEMOVATE) 0.05 % topical cream Apply  to affected area two (2) times a day. 15 g 0  
 fluorouracil (EFUDEX) 5 % chemo cream 1 APPLICATION APPLY ON THE SKIN TWICE A DAY APPLY TWICE DAILY FOR 14 DAYS  0  
 brimonidine (ALPHAGAN P) 0.1 % ophthalmic solution Administer 1 Drop to both eyes daily. Both eyes PM and lt eye in am also Past History Past Medical History: 
Past Medical History:  
Diagnosis Date  Appendicitis 2018 underwent appendectomy  Arthritis DJD  Enlarged prostate  Hyperlipidemia  Hypertension  LINDA treated with BiPAP  PAF (paroxysmal atrial fibrillation) ([facility] [address]  Pre-diabetes HA1C 6.0 Dec 2016  Shingles Past Surgical History: 
Past Surgical History:  
Procedure Laterality Date  HX APPENDECTOMY  HX COLONOSCOPY    
 normal except for hemorrhoids  HX HEENT    
 wisdom teeth extraction x2  HX KNEE REPLACEMENT    
 right; 2017  HX SHOULDER ARTHROSCOPY    
 rt  HX TONSILLECTOMY Family History: 
Family History Problem Relation Age of Onset  Coronary Artery Disease Father 47  
      at [de-identified] of MI Social History: 
Social History Tobacco Use  Smoking status: Never Smoker  Smokeless tobacco: Never Used Substance Use Topics  Alcohol use: Yes Comment: occasional  
 Drug use: No  
 
 
Allergies: Allergies Allergen Reactions  Oxycodone Other (comments) Patient states, \"It made me feel unwell. \" Review of Systems Review of Systems Constitutional: Negative for fatigue and fever. HENT: Negative. Eyes: Negative. Respiratory: Positive for shortness of breath. Negative for wheezing. Cardiovascular: Positive for palpitations. Negative for chest pain and leg swelling. Gastrointestinal: Negative for blood in stool, constipation, diarrhea, nausea and vomiting. Endocrine: Negative. Genitourinary: Negative for difficulty urinating and dysuria. Musculoskeletal: Negative. Skin: Negative for rash. Allergic/Immunologic: Negative. Neurological: Negative for syncope, weakness, light-headedness and numbness. Hematological: Negative. Psychiatric/Behavioral: The patient is nervous/anxious. Physical Exam  
Physical Exam  
Constitutional: He is oriented to person, place, and time. He appears well-developed and well-nourished. No distress. HENT:  
Head: Normocephalic and atraumatic. Mouth/Throat: Oropharynx is clear and moist.  
Eyes: Conjunctivae and EOM are normal.  
Neck: Neck supple. No JVD present. No tracheal deviation present. Cardiovascular: Regular rhythm and intact distal pulses. Tachycardia present. Exam reveals no gallop and no friction rub. No murmur heard. Pulmonary/Chest: Effort normal and breath sounds normal. No stridor. No respiratory distress. He has no wheezes. Abdominal: Soft. Bowel sounds are normal. He exhibits no distension and no mass. There is no tenderness. There is no guarding. Musculoskeletal: Normal range of motion. He exhibits no edema or tenderness. No deformity Neurological: He is alert and oriented to person, place, and time. He has normal strength. No focal deficits Skin: Skin is warm, dry and intact. No rash noted. Psychiatric: He has a normal mood and affect. His behavior is normal. Judgment and thought content normal.  
Nursing note and vitals reviewed. Diagnostic Study Results Labs - Recent Results (from the past 12 hour(s)) EKG, 12 LEAD, INITIAL Collection Time: 11/27/18 12:54 PM  
Result Value Ref Range Ventricular Rate 135 BPM  
 Atrial Rate 120 BPM  
 QRS Duration 82 ms Q-T Interval 308 ms QTC Calculation (Bezet) 462 ms Calculated R Axis 83 degrees Calculated T Axis 32 degrees Diagnosis Supraventricular tachycardia Nonspecific ST abnormality When compared with ECG of 08-FEB-2018 18:05, 
Vent. rate has increased BY  45 BPM 
ST now depressed in Inferior leads ST more depressed in Lateral leads T wave inversion now evident in Inferior leads Nonspecific T wave abnormality no longer evident in Lateral leads CBC WITH AUTOMATED DIFF Collection Time: 11/27/18  1:07 PM  
Result Value Ref Range WBC 9.0 4.1 - 11.1 K/uL  
 RBC 5.40 4. 10 - 5.70 M/uL  
 HGB 14.7 12.1 - 17.0 g/dL HCT 44.6 36.6 - 50.3 % MCV 82.6 80.0 - 99.0 FL  
 MCH 27.2 26.0 - 34.0 PG  
 MCHC 33.0 30.0 - 36.5 g/dL  
 RDW 14.1 11.5 - 14.5 % PLATELET 331 688 - 498 K/uL MPV 9.8 8.9 - 12.9 FL  
 NRBC 0.0 0  WBC ABSOLUTE NRBC 0.00 0.00 - 0.01 K/uL NEUTROPHILS 57 32 - 75 % LYMPHOCYTES 35 12 - 49 % MONOCYTES 6 5 - 13 % EOSINOPHILS 2 0 - 7 % BASOPHILS 1 0 - 1 % IMMATURE GRANULOCYTES 0 0.0 - 0.5 % ABS. NEUTROPHILS 5.1 1.8 - 8.0 K/UL  
 ABS. LYMPHOCYTES 3.2 0.8 - 3.5 K/UL  
 ABS. MONOCYTES 0.5 0.0 - 1.0 K/UL  
 ABS. EOSINOPHILS 0.2 0.0 - 0.4 K/UL  
 ABS. BASOPHILS 0.1 0.0 - 0.1 K/UL  
 ABS. IMM. GRANS. 0.0 0.00 - 0.04 K/UL  
 DF AUTOMATED METABOLIC PANEL, COMPREHENSIVE Collection Time: 11/27/18  1:07 PM  
Result Value Ref Range Sodium 141 136 - 145 mmol/L Potassium 3.9 3.5 - 5.1 mmol/L Chloride 107 97 - 108 mmol/L  
 CO2 23 21 - 32 mmol/L Anion gap 11 5 - 15 mmol/L Glucose 114 (H) 65 - 100 mg/dL BUN 18 6 - 20 MG/DL Creatinine 1.38 (H) 0.70 - 1.30 MG/DL  
 BUN/Creatinine ratio 13 12 - 20 GFR est AA 60 (L) >60 ml/min/1.73m2 GFR est non-AA 49 (L) >60 ml/min/1.73m2 Calcium 8.8 8.5 - 10.1 MG/DL Bilirubin, total 0.4 0.2 - 1.0 MG/DL  
 ALT (SGPT) 21 12 - 78 U/L  
 AST (SGOT) 14 (L) 15 - 37 U/L Alk. phosphatase 71 45 - 117 U/L Protein, total 7.4 6.4 - 8.2 g/dL Albumin 3.6 3.5 - 5.0 g/dL Globulin 3.8 2.0 - 4.0 g/dL A-G Ratio 0.9 (L) 1.1 - 2.2 CK W/ REFLX CKMB  Collection Time: 11/27/18  1:07 PM  
 Result Value Ref Range  39 - 308 U/L  
TROPONIN I Collection Time: 11/27/18  1:07 PM  
Result Value Ref Range Troponin-I, Qt. <0.05 <0.05 ng/mL SAMPLES BEING HELD Collection Time: 11/27/18  1:07 PM  
Result Value Ref Range SAMPLES BEING HELD 1BLUE   
 COMMENT Add-on orders for these samples will be processed based on acceptable specimen integrity and analyte stability, which may vary by analyte. MAGNESIUM Collection Time: 11/27/18  1:07 PM  
Result Value Ref Range Magnesium 2.0 1.6 - 2.4 mg/dL URINALYSIS W/ REFLEX CULTURE Collection Time: 11/27/18  2:31 PM  
Result Value Ref Range Color YELLOW/STRAW Appearance CLEAR CLEAR Specific gravity 1.019 1.003 - 1.030    
 pH (UA) 5.5 5.0 - 8.0 Protein 30 (A) NEG mg/dL Glucose NEGATIVE  NEG mg/dL Ketone NEGATIVE  NEG mg/dL Bilirubin NEGATIVE  NEG Blood TRACE (A) NEG Urobilinogen 0.2 0.2 - 1.0 EU/dL Nitrites NEGATIVE  NEG Leukocyte Esterase TRACE (A) NEG    
 WBC 5-10 0 - 4 /hpf  
 RBC 0-5 0 - 5 /hpf Epithelial cells FEW FEW /lpf Bacteria 1+ (A) NEG /hpf  
 UA:UC IF INDICATED URINE CULTURE ORDERED (A) CNI    
LACTIC ACID Collection Time: 11/27/18  2:31 PM  
Result Value Ref Range Lactic acid 1.2 0.4 - 2.0 MMOL/L Radiologic Studies - CXR Results  (Last 48 hours)  
          
 11/27/18 1456  XR CHEST PORT Final result Impression:  IMPRESSION: Very mild left basilar atelectasis. Narrative:  INDICATION: Chest pain. Irregular heartbeat, shortness of breath. Portable AP upright view of the chest.  
   
Direct comparison made to prior chest x-ray dated February 2018. Cardiomediastinal silhouette is stable. There is very mild left basilar  
atelectasis. Lungs are otherwise clear bilaterally. Pleural spaces are normal.  
Osseous structures are intact. Medical Decision Making I am the first provider for this patient. I reviewed the vital signs, available nursing notes, past medical history, past surgical history, family history and social history. Vital Signs-Reviewed the patient's vital signs. Patient Vitals for the past 12 hrs: 
 Temp Pulse Resp BP SpO2  
11/27/18 1410  (!) 126  100/69   
11/27/18 1407  (!) 128 19 100/69 94 % 11/27/18 1345  (!) 129 18 96/73 91 % 11/27/18 1248 97.6 °F (36.4 °C) (!) 144 20 93/66 100 % Pulse Oximetry Analysis - 100% on RA Cardiac Monitor:  
Rate: 135 bpm 
Rhythm: Supraventricular Tachycardia (SVT) EKG interpretation: (Preliminary) 12:54 Rhythm: Supraventricular tachycardia; and regular . Rate (approx.): 135; Axis: normal;  QRS interval: normal ; ST/T wave: non-specific changes; Other findings: No other findingd. Written by Emeka Berry ED Scrviviana, as dictated by Jian Medrano DO. Records Reviewed: Nursing Notes, Old Medical Records, Previous Radiology Studies and Previous Laboratory Studies Provider Notes (Medical Decision Making): Pt presenting with tachycardia, has a hx of paroxysmal afib, but is no longer on metoprolol. Pt is only on Eliquis. BP is mildly hypotensive here. Will treat with ivf, check labs, cardiac enzymes, ekg, CXR. If BP improves and patient is still tachycardic, will give IV metoprolol and reassess. ED Course:  
Initial assessment performed. The patients presenting problems have been discussed, and they are in agreement with the care plan formulated and outlined with them. I have encouraged them to ask questions as they arise throughout their visit. Progress Note: 
2:35 PM 
Pt's BP is now 112/80 and HR is 74. Consult Note: 
3:33 PM 
Jian Medrano DO spoke with Elise Hylton NP Specialty: Cardiology Discussed pt's hx, disposition, and available diagnostic and imaging results. Reviewed care plans. Consultant agrees to discuss with Dr. Frandy Puente in regards to medication plan, will call back. Consult Note: 3:54 PM 
Lucy Moreno DO spoke with Mickey Sherman NP Specialty: Cardiology Consultant says start on diltiazem 120 mg and have follow up with Dr. Mitzy Randolph. 
Written by LIDIA Head, as dictated by Lucy Moreno DO. Critical Care Time: CRITICAL CARE NOTE : 
 
5:05 PM 
 
IMPENDING DETERIORATION -Cardiovascular ASSOCIATED RISK FACTORS - Hypotension and Dysrhythmia MANAGEMENT- Bedside Assessment and Supervision of Care INTERPRETATION -  Xrays, ECG and Blood Pressure INTERVENTIONS - hemodynamic mngmt and vascular control CASE REVIEW - Medical Sub-Specialist and Nursing TREATMENT RESPONSE -Improved PERFORMED BY - Self NOTES   : 
I have spent 45 minutes of critical care time involved in lab review, consultations with specialist, family decision- making, bedside attention and documentation. During this entire length of time I was immediately available to the patient . Lucy Moreno DO Disposition: 
Discharge Note: 
3:56 PM 
The pt is ready for discharge. The pt's signs, symptoms, diagnosis, and discharge instructions have been discussed and pt has conveyed their understanding. The pt is to follow up as recommended or return to ER should their symptoms worsen. Plan has been discussed and pt is in agreement. PLAN: 
1. Current Discharge Medication List  
  
START taking these medications Details  
dilTIAZem XR (DILT-XR) 120 mg XR capsule Take 1 Cap by mouth daily. Qty: 30 Cap, Refills: 0  
  
  
 
2. Follow-up Information Follow up With Specialties Details Why Contact Info Cee Ulloa MD Cardiology Schedule an appointment as soon as possible for a visit  [address] [place] [address] [address] [place] [address] 
[phone] Evy Worley MD Internal Medicine Schedule an appointment as soon as possible for a visit  [address] [facility] [address] [facility] 
[phone] [facility] EMERGENCY DEPT Emergency Medicine  As needed, If symptoms worsen [address] [facility] 
[phone] Return to ED if worse Diagnosis Clinical Impression: 1. Supraventricular tachycardia ([address] Attestations: This note is prepared by Colin Rod, acting as Scribe for Dion Carter DO. Dion Carter DO: The scribe's documentation has been prepared under my direction and personally reviewed by me in its entirety. I confirm that the note above accurately reflects all work, treatment, procedures, and medical decision making performed by me

## 2018-11-27 NOTE — DISCHARGE INSTRUCTIONS
Supraventricular Tachycardia: Care Instructions  Your Care Instructions    Having supraventricular tachycardia (SVT) means that from time to time your heart beats abnormally fast. This fast rhythm is caused by changes in the electrical system of your heart. You may feel a fluttering in your chest (palpitations) and have a fast pulse. When your heart is beating fast, you may feel anxious and lightheaded, be short of breath, and feel discomfort in the chest.  Your doctor may prescribe medicines to help slow down your heartbeat. Your doctor may also suggest you try vagal maneuvers when having an episode of SVT. These are things, like bearing down, that might help slow your heart rate. Bearing down means that you try to breathe out with your stomach muscles but you don't let air out of your nose or mouth. Your doctor can show you how to do vagal maneuvers. He or she may suggest you lie down on your back to do them. In some cases, either cardioversion treatment or a procedure called catheter ablation is done to correct SVT. Your doctor may ask you to wear a small electronic device for 1 or 2 days to monitor your heart. It is called a Holter monitor. Follow-up care is a key part of your treatment and safety. Be sure to make and go to all appointments, and call your doctor if you are having problems. It's also a good idea to know your test results and keep a list of the medicines you take. How can you care for yourself at home? · Be safe with medicines. Take your medicines exactly as prescribed. Call your doctor if you think you are having a problem with your medicine. You will get more details on the specific medicines your doctor prescribes. · If your doctor showed you how to do vagal maneuvers, try them when you have an episode. These maneuvers include bearing down or putting an ice-cold, wet towel on your face. · Monitor your condition by keeping a diary of your SVT episodes.  Bring this to your doctor appointments. ? Write down how fast or slow your heart was beating. To count your heart rate:  § Gently place 2 fingers of your hand on the inside of your other wrist, below your thumb. § Count the beats for 30 seconds. § Then, double the result to get the number of beats per minute. ? Write down if your heart rhythm was regular or irregular. ? Write down the symptoms you had.  ? Write down the time of day your symptoms occurred. ? Write down how long your symptoms lasted. ? Write down what you were doing when your symptoms started. ? Write down what may have helped your symptoms go away. · If they trigger episodes, limit or avoid alcohol or drinks with caffeine. · Do not use over-the-counter decongestants, herbal remedies, diet pills, or \"pep\" pills, which often contain stimulants. · Do not use illegal drugs, such as cocaine, ecstasy, or methamphetamine, which can speed up your heart's rhythm. · Do not smoke. Smoking can make this condition worse. If you need help quitting, talk to your doctor about stop-smoking programs and medicines. These can increase your chances of quitting for good. · Be alert for new or worsening symptoms, such as shortness of breath, pounding of your heart, or unusual tiredness. If new symptoms develop or your symptoms become worse, call your doctor. When should you call for help? Call 911 anytime you think you may need emergency care. For example, call if:    · You passed out (lost consciousness).     · You are short of breath.    Call your doctor now or seek immediate medical care if:    · You have a fast heartbeat.     · You are dizzy or lightheaded, or feel like you may faint.    Watch closely for changes in your health, and be sure to contact your doctor if:    · You do not get better as expected. Where can you learn more? Go to http://cammie-derik.info/.   Enter G244 in the search box to learn more about \"Supraventricular Tachycardia: Care Instructions. \"  Current as of: December 6, 2017  Content Version: 11.8  © 5004-2501 Healthwise, [facility]. Care instructions adapted under license by [employer] (which disclaims liability or warranty for this information). If you have questions about a medical condition or this instruction, always ask your healthcare professional. [place] [address] any warranty or liability for your use of this information.

## 2018-11-28 LAB
ATRIAL RATE: 120 BPM
ATRIAL RATE: 73 BPM
BACTERIA SPEC CULT: NORMAL
CALCULATED P AXIS, ECG09: 43 DEGREES
CALCULATED R AXIS, ECG10: 73 DEGREES
CALCULATED R AXIS, ECG10: 83 DEGREES
CALCULATED T AXIS, ECG11: 32 DEGREES
CALCULATED T AXIS, ECG11: 47 DEGREES
CC UR VC: NORMAL
DIAGNOSIS, 93000: NORMAL
DIAGNOSIS, 93000: NORMAL
P-R INTERVAL, ECG05: 204 MS
Q-T INTERVAL, ECG07: 308 MS
Q-T INTERVAL, ECG07: 362 MS
QRS DURATION, ECG06: 80 MS
QRS DURATION, ECG06: 82 MS
QTC CALCULATION (BEZET), ECG08: 398 MS
QTC CALCULATION (BEZET), ECG08: 462 MS
SERVICE CMNT-IMP: NORMAL
VENTRICULAR RATE, ECG03: 135 BPM
VENTRICULAR RATE, ECG03: 73 BPM

## 2018-11-29 ENCOUNTER — TELEPHONE (OUTPATIENT)
Dept: CARDIOLOGY CLINIC | Age: 83
End: 2018-11-29

## 2018-11-29 NOTE — TELEPHONE ENCOUNTER
ER records reviewed  Had SVT started on diltiazem  Please call and set up appt in next 1-2 weeks  Next Wednesday you can add patient spots

## 2018-11-30 NOTE — TELEPHONE ENCOUNTER
Per Marcille Felty, PSR \"Patient states that he's unable to come in on 12/5, schd appt for 12/10 @1:00. \"

## 2018-12-04 ENCOUNTER — OFFICE VISIT (OUTPATIENT)
Dept: INTERNAL MEDICINE CLINIC | Age: 83
End: 2018-12-04

## 2018-12-04 VITALS
DIASTOLIC BLOOD PRESSURE: 72 MMHG | WEIGHT: 204 LBS | BODY MASS INDEX: 30.21 KG/M2 | RESPIRATION RATE: 18 BRPM | SYSTOLIC BLOOD PRESSURE: 144 MMHG | TEMPERATURE: 97.9 F | HEIGHT: 69 IN | OXYGEN SATURATION: 99 % | HEART RATE: 56 BPM

## 2018-12-04 DIAGNOSIS — I48.0 PAROXYSMAL A-FIB (HCC): Primary | ICD-10-CM

## 2018-12-04 NOTE — PROGRESS NOTES
Reviewed record in preparation for visit and have obtained necessary documentation. Identified pt with two pt identifiers(name and ). Chief Complaint Patient presents with  
[facility] Follow Up Health Maintenance Due Topic Date Due  Shingles Vaccine (1 of 2) 10/10/1987 Mr. Tresa Evans has a reminder for a \"due or due soon\" health maintenance. I have asked that he discuss this further with his primary care provider for follow-up on this health maintenance. Coordination of Care Questionnaire: 
:  
 
1) Have you been to an emergency room, urgent care clinic since your last visit? yes Hospitalized since your last visit? no          
 
2) Have you seen or consulted any other health care providers outside of [address] [place] [address] since your last visit? no  (Include any pap smears or colon screenings in this section.) 3) In the event something were to happen to you and you were unable to speak on your behalf, do you have an Advance Directive/ Living Will in place stating your wishes? NO Do you have an Advance Directive on file? no 
 
4) Are you interested in receiving information on Advance Directives? NO Patient is accompanied by wife I have received verbal consent from Rosangela Holt to discuss any/all medical information while they are present in the room.

## 2018-12-04 NOTE — PROGRESS NOTES
CC: Hospital Follow Up 
 
 
HPI: 
 
He is a 82 y.o. male who presents for evaluation of ER follow up patient presented to ER on 11/27 with palpitations/ Afib with heart rate of 126 and /69 he was given diltiazem and converted to sinus rhythm and discharged on diltiazem 120mg long acting. He since feels well. Denies dizziness headaches palpitations. Denies chest pain shortness of breath. Recall that previously he did not tolerate beta blockers due to bradycardia. His heart rate today is 56. He has a follow-up with cardiology on December 10. He is currently on Eliquis twice a day In the interval he had a uro-lift procedure and he is off of Cardura. Denies urinary symptoms He is not compliant with CPAP therapy ROS: 
10 systems reviewed and negative other than HPI Past Medical History:  
Diagnosis Date  Appendicitis 2018 underwent appendectomy  Arthritis DJD  Enlarged prostate  Hyperlipidemia  Hypertension  LINDA treated with BiPAP  PAF (paroxysmal atrial fibrillation) ([facility] [address]  Pre-diabetes HA1C 6.0 Dec 2016  Shingles Current Outpatient Medications on File Prior to Visit Medication Sig Dispense Refill  dilTIAZem XR (DILT-XR) 120 mg XR capsule Take 1 Cap by mouth daily. 30 Cap 0  
 apixaban (ELIQUIS) 5 mg tablet Take 1 Tab by mouth two (2) times a day. 180 Tab 1  
 lisinopril (PRINIVIL, ZESTRIL) 20 mg tablet TAKE 1 TABLET BY MOUTH DAILY. MAY RESUME MEDICATION WHEN B/P GREATER THAN 120/80 90 Tab 1  
 DOCOSAHEXANOIC ACID/EPA (FISH OIL PO) Take 1,000 mg by mouth daily.  omega-3 acid ethyl esters (LOVAZA) 1 gram capsule Take 2 Caps by mouth two (2) times daily (with meals). 30 Cap 5  clobetasol (TEMOVATE) 0.05 % topical cream Apply  to affected area two (2) times a day.  15 g 0  
 fluorouracil (EFUDEX) 5 % chemo cream 1 APPLICATION APPLY ON THE SKIN TWICE A DAY APPLY TWICE DAILY FOR 14 DAYS  0  
  brimonidine (ALPHAGAN P) 0.1 % ophthalmic solution Administer 1 Drop to both eyes daily. Both eyes PM and lt eye in am also No current facility-administered medications on file prior to visit. Past Surgical History:  
Procedure Laterality Date  HX APPENDECTOMY  HX COLONOSCOPY    
 normal except for hemorrhoids  HX HEENT    
 wisdom teeth extraction x2  HX KNEE REPLACEMENT    
 right; 2017  HX SHOULDER ARTHROSCOPY    
 rt  HX TONSILLECTOMY Family History Problem Relation Age of Onset  Coronary Artery Disease Father 47  
      at [de-identified] of MI Reviewed and no changes Social History Socioeconomic History  Marital status:  Spouse name: Not on file  Number of children: Not on file  Years of education: Not on file  Highest education level: Not on file Social Needs  Financial resource strain: Not on file  Food insecurity - worry: Not on file  Food insecurity - inability: Not on file  Transportation needs - medical: Not on file  Transportation needs - non-medical: Not on file Occupational History  Not on file Tobacco Use  Smoking status: Never Smoker  Smokeless tobacco: Never Used Substance and Sexual Activity  Alcohol use: Yes Comment: occasional  
 Drug use: No  
 Sexual activity: Not Currently Other Topics Concern  Not on file Social History Narrative  Not on file Visit Vitals /72 (BP 1 Location: Right arm, BP Patient Position: Sitting) Pulse (!) 56 Temp 97.9 °F (36.6 °C) (Oral) Resp 18 Ht 5' 9\" (1.753 m) Wt 204 lb (92.5 kg) SpO2 99% BMI 30.13 kg/m² Physical Examination:  
General - Well appearing male HEENT - PERRL, TM no erythema/opacification, normal nasal turbinates, oropharynx no erythema or exudate, MMM Neck - supple, no bruits, no TMG, no LAD Pulm - clear to auscultation bilaterally Cardio - RRR, normal S1 S2, no murmur gallops or rubs Abd - soft, nontender, no masses, no HSM Extrem - no edema, +2 distal pulses Psych - normal affect, appropriate mood Lab Results Component Value Date/Time WBC 9.0 11/27/2018 01:07 PM  
 HGB 14.7 11/27/2018 01:07 PM  
 HCT 44.6 11/27/2018 01:07 PM  
 PLATELET 708 28/79/4760 01:07 PM  
 MCV 82.6 11/27/2018 01:07 PM  
 
Lab Results Component Value Date/Time Sodium 141 11/27/2018 01:07 PM  
 Potassium 3.9 11/27/2018 01:07 PM  
 Chloride 107 11/27/2018 01:07 PM  
 CO2 23 11/27/2018 01:07 PM  
 Anion gap 11 11/27/2018 01:07 PM  
 Glucose 114 (H) 11/27/2018 01:07 PM  
 BUN 18 11/27/2018 01:07 PM  
 Creatinine 1.38 (H) 11/27/2018 01:07 PM  
 BUN/Creatinine ratio 13 11/27/2018 01:07 PM  
 GFR est AA 60 (L) 11/27/2018 01:07 PM  
 GFR est non-AA 49 (L) 11/27/2018 01:07 PM  
 Calcium 8.8 11/27/2018 01:07 PM  
 
Lab Results Component Value Date/Time Cholesterol, total 189 08/30/2018 11:15 AM  
 HDL Cholesterol 32 (L) 08/30/2018 11:15 AM  
 LDL, calculated 129 (H) 08/30/2018 11:15 AM  
 VLDL, calculated 28 08/30/2018 11:15 AM  
 Triglyceride 139 08/30/2018 11:15 AM  
 
No results found for: TSH, TSH2, TSH3, TSHP, TSHEXT, TSHEXT Lab Results Component Value Date/Time  
 Prostate Specific Ag 4.2 (H) 02/09/2017 12:07 PM  
 % Free PSA 29.8 02/09/2017 12:07 PM  
 
Lab Results Component Value Date/Time Hemoglobin A1c 5.9 (H) 05/08/2018 11:35 AM  
 
No results found for: Christa Nathan, Silas Manning, Trupti Mccain, VD3RIA Lab Results Component Value Date/Time ALT (SGPT) 21 11/27/2018 01:07 PM  
 AST (SGOT) 14 (L) 11/27/2018 01:07 PM  
 Alk. phosphatase 71 11/27/2018 01:07 PM  
 Bilirubin, direct 0.11 02/09/2017 12:07 PM  
 Bilirubin, total 0.4 11/27/2018 01:07 PM  
 
  
  
Assessment/Plan: 1. Paroxysmal A-fib ([facility] [address] Patient had recent ER visit for A. fib with RVR.   Recall in the past not tolerate beta-blockers due to bradycardia. He was started on Cardizem 120 mg long-acting and today he is in sinus rhythm. His heart rate is 56/blood pressure is adequate. He denies chest pain and dizziness. He has a follow-up with cardiology on December 10. He may need an evaluation for possible sick sinus syndrome will defer to cardiology Continue Eliquis twice a day I encouraged him to be compliant with CPAP use as untreated obstructive sleep apnea will worsen A. Fib and possibly cause heart failure and sudden death. this was discussed at length with patient. Follow-up Disposition: 
Return in about 3 months (around 3/4/2019) for HTN and afib and LINDA.  
 
Mallory Jimenez MD

## 2018-12-13 ENCOUNTER — OFFICE VISIT (OUTPATIENT)
Dept: CARDIOLOGY CLINIC | Age: 83
End: 2018-12-13

## 2018-12-13 VITALS
BODY MASS INDEX: 29.18 KG/M2 | SYSTOLIC BLOOD PRESSURE: 138 MMHG | HEART RATE: 55 BPM | WEIGHT: 197 LBS | RESPIRATION RATE: 16 BRPM | HEIGHT: 69 IN | DIASTOLIC BLOOD PRESSURE: 80 MMHG | OXYGEN SATURATION: 98 %

## 2018-12-13 DIAGNOSIS — R00.1 BRADYCARDIA: ICD-10-CM

## 2018-12-13 DIAGNOSIS — I48.0 PAF (PAROXYSMAL ATRIAL FIBRILLATION) (HCC): Primary | ICD-10-CM

## 2018-12-13 DIAGNOSIS — G47.33 OBSTRUCTIVE SLEEP APNEA SYNDROME: ICD-10-CM

## 2018-12-13 DIAGNOSIS — E78.00 PURE HYPERCHOLESTEROLEMIA: ICD-10-CM

## 2018-12-13 DIAGNOSIS — G47.33 OSA (OBSTRUCTIVE SLEEP APNEA): ICD-10-CM

## 2018-12-13 DIAGNOSIS — I10 ESSENTIAL HYPERTENSION: ICD-10-CM

## 2018-12-13 NOTE — PROGRESS NOTES
Wilder Polk     1935       Magaly Saucedo MD, [facility]  Date of Visit-12/13/2018   PCP is Surjit Kelly MD   [facility] [place]  [facility]  HPI:  Wilder Polk is a 82 y.o. male   + tachycardia and palps  Had blood in urine  Went to ER Nov 27th  With SOB -noticed his HR was 135 with a BP reading 80/60. Started on diltiazem    Last seen in March. F/u  of PAF and HTN. Normal echo in the past.  Has been on Eliquis and has sleep apnea. Saw PCP recently after having further afib and HR went up to 127. Pt is accompanied by his wife. Pt believes he's had afib 4 -5 years ago other than his recent episode, where he felt that this was quite different. Pt did not experience any CP or SOB during his afib episode and did not feel like was going to pass out. Pt feels fine today, other than fatigue which has been constant. Denies chest pain, edema, syncope or shortness of breath at rest, has no tachycardia, palpitations or sense of arrhythmia. Had OP surgery with Urolift by LAURA on Oct 2018  Went to ER Nov 27th   EKG: Sinus bradycardia at 55 minor t wave inversion     Assessment/Plan:     1. Afib paroxymal.  Since event has had no further clinical sx and HR is reasonably controlled. I don't sense at this dose he'll have any issue of bradycardia, the real issue is if he switches to any other antiarrhythmia drugs or BB then we will have issue likely of tachy-esteban and then he would need a pacemaker    2. HTN. At goal.   3. Continue Eliquis with CHADS VASC score of 3.   4. Dyslipidemia primary prevention. 5. Sleep apnea unable to tolerate mask. F/u in 3 months.    Future Appointments   Date Time Provider Kassy Kumar   1/3/2019 11:00 AM Surjit Kelly MD [address]   3/7/2019  9:40 AM MD LOUISE [facility]       [facility] CHF Meds             dilTIAZem XR (DILT-XR) 120 mg XR capsule  (Taking) Take 1 Cap by mouth daily. apixaban (ELIQUIS) 5 mg tablet  (Taking) Take 1 Tab by mouth two (2) times a day. lisinopril (PRINIVIL, ZESTRIL) 20 mg tablet  (Taking) TAKE 1 TABLET BY MOUTH DAILY. MAY RESUME MEDICATION WHEN B/P GREATER THAN 120/80    DOCOSAHEXANOIC ACID/EPA (FISH OIL PO) Take 1,000 mg by mouth daily. omega-3 acid ethyl esters (LOVAZA) 1 gram capsule Take 2 Caps by mouth two (2) times daily (with meals). Impression:   1. PAF (paroxysmal atrial fibrillation) ([facility] [address]    2. Essential hypertension    3. Pure hypercholesterolemia    4. LINDA (obstructive sleep apnea)    5. Bradycardia    6. Obstructive sleep apnea syndrome         ROS-except as noted above. . A complete cardiac and respiratory are reviewed and negative except as above ; Resp-denies wheezing  or productive cough,. Const- No unusual weight loss or fever; Neuro-no recent seizure or CVA ; GI- No BRBPR, abdom pain, bloating ; - no  hematuria   see supplement sheet, initialed and to be scanned by staff  Past Medical History:   Diagnosis Date    Appendicitis     2018 underwent appendectomy    Arthritis     DJD    Enlarged prostate     Hyperlipidemia     Hypertension     LINDA treated with BiPAP     PAF (paroxysmal atrial fibrillation) ([facility] [address]     Pre-diabetes     HA1C 6.0 Dec 2016    Shingles       Social Hx= reports that  has never smoked. he has never used smokeless tobacco. He reports that he drinks alcohol. He reports that he does not use drugs. Exam and Labs:  /80 (BP 1 Location: Left arm, BP Patient Position: Sitting)   Pulse (!) 55   Resp 16   Ht 5' 9\" (1.753 m)   Wt 197 lb (89.4 kg)   SpO2 98%   BMI 29.09 kg/m² Constitutional:  NAD, comfortable  Head: NC,AT. Eyes: No scleral icterus. Neck:  Neck supple. No JVD present. Throat: moist mucous membranes. Chest: Effort normal & normal respiratory excursion . Neurological: alert, conversant and oriented . Skin: Skin is not cold. No obvious systemic rash noted.  Not diaphoretic. No erythema. Psychiatric:  Grossly normal mood and affect. Behavior appears normal. Extremities:  no clubbing or cyanosis. Abdomen: non distended    Lungs:breath sounds normal. No stridor. distress, wheezes or  Rales. Heart: normal rate, regular rhythm, normal S1, S2, no murmurs, rubs, clicks or gallops , PMI non displaced. Edema: Edema is none. Lab Results   Component Value Date/Time    Cholesterol, total 189 08/30/2018 11:15 AM    HDL Cholesterol 32 (L) 08/30/2018 11:15 AM    LDL, calculated 129 (H) 08/30/2018 11:15 AM    Triglyceride 139 08/30/2018 11:15 AM     Lab Results   Component Value Date/Time    Sodium 141 11/27/2018 01:07 PM    Potassium 3.9 11/27/2018 01:07 PM    Chloride 107 11/27/2018 01:07 PM    CO2 23 11/27/2018 01:07 PM    Anion gap 11 11/27/2018 01:07 PM    Glucose 114 (H) 11/27/2018 01:07 PM    BUN 18 11/27/2018 01:07 PM    Creatinine 1.38 (H) 11/27/2018 01:07 PM    BUN/Creatinine ratio 13 11/27/2018 01:07 PM    GFR est AA 60 (L) 11/27/2018 01:07 PM    GFR est non-AA 49 (L) 11/27/2018 01:07 PM    Calcium 8.8 11/27/2018 01:07 PM      Wt Readings from Last 3 Encounters:   12/13/18 197 lb (89.4 kg)   12/04/18 204 lb (92.5 kg)   11/27/18 205 lb 0.4 oz (93 kg)      BP Readings from Last 3 Encounters:   12/13/18 138/80   12/04/18 144/72   11/27/18 100/69      Current Outpatient Medications   Medication Sig    dilTIAZem XR (DILT-XR) 120 mg XR capsule Take 1 Cap by mouth daily.  apixaban (ELIQUIS) 5 mg tablet Take 1 Tab by mouth two (2) times a day.  lisinopril (PRINIVIL, ZESTRIL) 20 mg tablet TAKE 1 TABLET BY MOUTH DAILY. MAY RESUME MEDICATION WHEN B/P GREATER THAN 120/80    brimonidine (ALPHAGAN P) 0.1 % ophthalmic solution Administer 1 Drop to both eyes daily. Both eyes PM and lt eye in am also    DOCOSAHEXANOIC ACID/EPA (FISH OIL PO) Take 1,000 mg by mouth daily.  omega-3 acid ethyl esters (LOVAZA) 1 gram capsule Take 2 Caps by mouth two (2) times daily (with meals).  clobetasol (TEMOVATE) 0.05 % topical cream Apply  to affected area two (2) times a day.  fluorouracil (EFUDEX) 5 % chemo cream 1 APPLICATION APPLY ON THE SKIN TWICE A DAY APPLY TWICE DAILY FOR 14 DAYS     No current facility-administered medications for this visit. Impression see above.       Written by Olena Concepcion, as dictated by Jett Tao MD.

## 2018-12-13 NOTE — PROGRESS NOTES
Visit Vitals  /80 (BP 1 Location: Left arm, BP Patient Position: Sitting)   Pulse (!) 55   Resp 16   Ht 5' 9\" (1.753 m)   Wt 197 lb (89.4 kg)   SpO2 98%   BMI 29.09 kg/m²

## 2019-01-03 ENCOUNTER — OFFICE VISIT (OUTPATIENT)
Dept: INTERNAL MEDICINE CLINIC | Age: 84
End: 2019-01-03

## 2019-01-03 VITALS
OXYGEN SATURATION: 95 % | TEMPERATURE: 97.8 F | HEIGHT: 69 IN | HEART RATE: 65 BPM | RESPIRATION RATE: 18 BRPM | DIASTOLIC BLOOD PRESSURE: 84 MMHG | WEIGHT: 200.2 LBS | BODY MASS INDEX: 29.65 KG/M2 | SYSTOLIC BLOOD PRESSURE: 151 MMHG

## 2019-01-03 DIAGNOSIS — R73.03 PRE-DIABETES: ICD-10-CM

## 2019-01-03 DIAGNOSIS — I48.0 PAROXYSMAL ATRIAL FIBRILLATION (HCC): Primary | ICD-10-CM

## 2019-01-03 DIAGNOSIS — I10 ESSENTIAL HYPERTENSION: ICD-10-CM

## 2019-01-03 DIAGNOSIS — E78.00 HIGH CHOLESTEROL: ICD-10-CM

## 2019-01-03 RX ORDER — DILTIAZEM HYDROCHLORIDE 120 MG/1
120 CAPSULE, EXTENDED RELEASE ORAL DAILY
Qty: 30 CAP | Refills: 11 | Status: SHIPPED | OUTPATIENT
Start: 2019-01-03 | End: 2019-03-07 | Stop reason: SDUPTHER

## 2019-01-03 NOTE — PATIENT INSTRUCTIONS
You should continue taking diltiazem - for the atrial fibrillation Continue lisinopril Continue the eliquis twice a day Return in one month for fasting cholesterol

## 2019-01-03 NOTE — PROGRESS NOTES
CC: chronic medical problems, HTN and afib HPI: 
 
Recall for afib Mr Chaparrita Caceres - was seen in ER  on 11/27 with palpitations/ Afib with heart rate of 126 and /69 he was given diltiazem and converted to sinus rhythm and discharged on diltiazem 120mg long acting. He saw me on 12/04 and then saw cardiologist on 12/13 I reviewed his note and advised Mr Chaparrita Caceres to continue taking the diltiazem and lisinopril and Eliquis. Mr. Chaparrita Caceres ran out of diltiazem 5 days ago. He denies palpitations dizziness lightheadedness. He is currently on Eliquis twice a day In the interval he had a uro-lift procedure and he is off of Cardura. He is still having frequent urination will discuss this with his urologist 
  
He is not compliant with CPAP therapy -did not tolerate CPAP. 
 
 
 
 
ROS: 
10 systems reviewed and negative other than HPI Past Medical History:  
Diagnosis Date  Appendicitis 2018 underwent appendectomy  Arthritis DJD  Enlarged prostate  Hyperlipidemia  Hypertension  LINDA treated with BiPAP  PAF (paroxysmal atrial fibrillation) ([facility] [address]  Pre-diabetes HA1C 6.0 Dec 2016  Shingles Current Outpatient Medications on File Prior to Visit Medication Sig Dispense Refill  apixaban (ELIQUIS) 5 mg tablet Take 1 Tab by mouth two (2) times a day. 180 Tab 1  
 lisinopril (PRINIVIL, ZESTRIL) 20 mg tablet TAKE 1 TABLET BY MOUTH DAILY. MAY RESUME MEDICATION WHEN B/P GREATER THAN 120/80 90 Tab 1  
 DOCOSAHEXANOIC ACID/EPA (FISH OIL PO) Take 1,000 mg by mouth daily.  clobetasol (TEMOVATE) 0.05 % topical cream Apply  to affected area two (2) times a day. 15 g 0  
 fluorouracil (EFUDEX) 5 % chemo cream 1 APPLICATION APPLY ON THE SKIN TWICE A DAY APPLY TWICE DAILY FOR 14 DAYS  0  
 brimonidine (ALPHAGAN P) 0.1 % ophthalmic solution Administer 1 Drop to both eyes daily. Both eyes PM and lt eye in am also No current facility-administered medications on file prior to visit. Past Surgical History:  
Procedure Laterality Date  HX APPENDECTOMY  HX COLONOSCOPY  2009  
 normal except for hemorrhoids  HX HEENT    
 wisdom teeth extraction x2  HX KNEE REPLACEMENT    
 right; 2017  HX SHOULDER ARTHROSCOPY    
 rt  HX TONSILLECTOMY Family History Problem Relation Age of Onset  Coronary Artery Disease Father 47  
      at [de-identified] of MI Reviewed and no changes Social History Socioeconomic History  Marital status:  Spouse name: Not on file  Number of children: Not on file  Years of education: Not on file  Highest education level: Not on file Social Needs  Financial resource strain: Not on file  Food insecurity - worry: Not on file  Food insecurity - inability: Not on file  Transportation needs - medical: Not on file  Transportation needs - non-medical: Not on file Occupational History  Not on file Tobacco Use  Smoking status: Never Smoker  Smokeless tobacco: Never Used Substance and Sexual Activity  Alcohol use: Yes Comment: occasional  
 Drug use: No  
 Sexual activity: Not Currently Other Topics Concern  Not on file Social History Narrative  Not on file Visit Vitals /84 (BP 1 Location: Right arm, BP Patient Position: Sitting) Pulse 65 Temp 97.8 °F (36.6 °C) (Oral) Resp 18 Ht 5' 9\" (1.753 m) Wt 200 lb 3.2 oz (90.8 kg) SpO2 95% BMI 29.56 kg/m² Physical Examination:  
General - Well appearing male HEENT - PERRL, TM no erythema/opacification, normal nasal turbinates, oropharynx no erythema or exudate, MMM Neck - supple, no bruits, no TMG, no LAD Pulm - clear to auscultation bilaterally Cardio - RRR, normal S1 S2, no murmur gallops or rubs Abd - soft, nontender, no masses, no HSM Extrem - no edema, +2 distal pulses Psych - normal affect, appropriate mood Lab Results Component Value Date/Time WBC 9.0 11/27/2018 01:07 PM  
 HGB 14.7 11/27/2018 01:07 PM  
 HCT 44.6 11/27/2018 01:07 PM  
 PLATELET 764 09/53/3403 01:07 PM  
 MCV 82.6 11/27/2018 01:07 PM  
 
Lab Results Component Value Date/Time Sodium 141 11/27/2018 01:07 PM  
 Potassium 3.9 11/27/2018 01:07 PM  
 Chloride 107 11/27/2018 01:07 PM  
 CO2 23 11/27/2018 01:07 PM  
 Anion gap 11 11/27/2018 01:07 PM  
 Glucose 114 (H) 11/27/2018 01:07 PM  
 BUN 18 11/27/2018 01:07 PM  
 Creatinine 1.38 (H) 11/27/2018 01:07 PM  
 BUN/Creatinine ratio 13 11/27/2018 01:07 PM  
 GFR est AA 60 (L) 11/27/2018 01:07 PM  
 GFR est non-AA 49 (L) 11/27/2018 01:07 PM  
 Calcium 8.8 11/27/2018 01:07 PM  
 
Lab Results Component Value Date/Time Cholesterol, total 189 08/30/2018 11:15 AM  
 HDL Cholesterol 32 (L) 08/30/2018 11:15 AM  
 LDL, calculated 129 (H) 08/30/2018 11:15 AM  
 VLDL, calculated 28 08/30/2018 11:15 AM  
 Triglyceride 139 08/30/2018 11:15 AM  
 
No results found for: TSH, TSH2, TSH3, TSHP, TSHEXT, TSHEXT Lab Results Component Value Date/Time  
 Prostate Specific Ag 4.2 (H) 02/09/2017 12:07 PM  
 % Free PSA 29.8 02/09/2017 12:07 PM  
 
Lab Results Component Value Date/Time Hemoglobin A1c 5.9 (H) 05/08/2018 11:35 AM  
 
No results found for: Zonia Resendez, Sonya Madrid, Ion Lopez, [id number] Lab Results Component Value Date/Time ALT (SGPT) 21 11/27/2018 01:07 PM  
 AST (SGOT) 14 (L) 11/27/2018 01:07 PM  
 Alk. phosphatase 71 11/27/2018 01:07 PM  
 Bilirubin, direct 0.11 02/09/2017 12:07 PM  
 Bilirubin, total 0.4 11/27/2018 01:07 PM  
 
  
  
Assessment/Plan: 1. Paroxysmal A-fib ([address] Patient had recent ER visit for A. fib with RVR. Recall in the past not tolerate beta-blockers due to bradycardia. He was started on Cardizem 120 mg and doing well. He did run out of Cardizem 5 days ago. I refilled her medication today and advised him to stay on it. Continue Eliquis twice a day 2.  Hypertension: He is on lisinopril 20 mg. His blood pressure is elevated today but I suspect that is due to being off of Cardizem. Advised to take lisinopril and Cardizem. 3.  Obstructive sleep apnea does not tolerate CPAP 4. Elevated cholesterol: Advised to work on diet. He was on fish oil but stopped. Return for fasting cholesterol 5. Mild elevation in creatinine: BMP check in 1 month. Advised to stay off of NSAIDs 6. Enlarged prostate: Patient had uroliths procedure he is having frequent urination and will discuss with his urologist. 
 
 
 
 
Follow-up Disposition: 
Return in about 4 weeks (around 1/31/2019) for labs only, with MD in 3 months . Prince Wade MD

## 2019-01-03 NOTE — PROGRESS NOTES
Reviewed record in preparation for visit and have obtained necessary documentation. Identified pt with two pt identifiers(name and ). Chief Complaint Patient presents with  Irregular Heart Beat  Hypertension Health Maintenance Due Topic Date Due  Shingles Vaccine (1 of 2) 10/10/1987 Mr. Raphael Laboy has a reminder for a \"due or due soon\" health maintenance. I have asked that he discuss this further with his primary care provider for follow-up on this health maintenance. Coordination of Care Questionnaire: 
:  
 
1) Have you been to an emergency room, urgent care clinic since your last visit? yes Hospitalized since your last visit? no          
 
2) Have you seen or consulted any other health care providers outside of [address] [place] [address] since your last visit? no  (Include any pap smears or colon screenings in this section.) 3) In the event something were to happen to you and you were unable to speak on your behalf, do you have an Advance Directive/ Living Will in place stating your wishes? NO Do you have an Advance Directive on file? no 
 
4) Are you interested in receiving information on Advance Directives? NO Patient is accompanied by self I have received verbal consent from Jina Mccullough to discuss any/all medical information while they are present in the room.

## 2019-01-30 ENCOUNTER — HOSPITAL ENCOUNTER (OUTPATIENT)
Dept: LAB | Age: 84
Discharge: HOME | End: 2019-01-30
Payer: MEDICARE

## 2019-01-30 PROCEDURE — 80061 LIPID PANEL: CPT

## 2019-01-30 PROCEDURE — 36415 COLL VENOUS BLD VENIPUNCTURE: CPT

## 2019-01-30 PROCEDURE — 80048 BASIC METABOLIC PNL TOTAL CA: CPT

## 2019-01-30 PROCEDURE — 83036 HEMOGLOBIN GLYCOSYLATED A1C: CPT

## 2019-01-31 LAB
BUN SERPL-MCNC: 14 MG/DL (ref 8–27)
BUN/CREAT SERPL: 12 (ref 10–24)
CALCIUM SERPL-MCNC: 8.9 MG/DL (ref 8.6–10.2)
CHLORIDE SERPL-SCNC: 104 MMOL/L (ref 96–106)
CHOLEST SERPL-MCNC: 189 MG/DL (ref 100–199)
CO2 SERPL-SCNC: 26 MMOL/L (ref 20–29)
CREAT SERPL-MCNC: 1.16 MG/DL (ref 0.76–1.27)
EST. AVERAGE GLUCOSE BLD GHB EST-MCNC: 126 MG/DL
GLUCOSE SERPL-MCNC: 100 MG/DL (ref 65–99)
HBA1C MFR BLD: 6 % (ref 4.8–5.6)
HDLC SERPL-MCNC: 30 MG/DL
LDLC SERPL CALC-MCNC: 123 MG/DL (ref 0–99)
POTASSIUM SERPL-SCNC: 4.2 MMOL/L (ref 3.5–5.2)
SODIUM SERPL-SCNC: 143 MMOL/L (ref 134–144)
TRIGL SERPL-MCNC: 179 MG/DL (ref 0–149)
VLDLC SERPL CALC-MCNC: 36 MG/DL (ref 5–40)

## 2019-02-02 NOTE — PROGRESS NOTES
Kidney function is normal 
LDL is stable Triglycerides are slightly high - recommend that you eat a low fat diet and low sugar diet Pre diabetes is stable

## 2019-03-07 ENCOUNTER — OFFICE VISIT (OUTPATIENT)
Dept: CARDIOLOGY CLINIC | Age: 84
End: 2019-03-07

## 2019-03-07 VITALS
HEART RATE: 74 BPM | BODY MASS INDEX: 29.68 KG/M2 | SYSTOLIC BLOOD PRESSURE: 138 MMHG | WEIGHT: 200.4 LBS | HEIGHT: 69 IN | RESPIRATION RATE: 16 BRPM | OXYGEN SATURATION: 98 % | DIASTOLIC BLOOD PRESSURE: 80 MMHG

## 2019-03-07 DIAGNOSIS — G47.33 OSA (OBSTRUCTIVE SLEEP APNEA): ICD-10-CM

## 2019-03-07 DIAGNOSIS — E78.00 PURE HYPERCHOLESTEROLEMIA: ICD-10-CM

## 2019-03-07 DIAGNOSIS — I48.0 PAROXYSMAL ATRIAL FIBRILLATION (HCC): ICD-10-CM

## 2019-03-07 DIAGNOSIS — R00.1 BRADYCARDIA: ICD-10-CM

## 2019-03-07 DIAGNOSIS — I10 ESSENTIAL HYPERTENSION: Primary | ICD-10-CM

## 2019-03-07 RX ORDER — DILTIAZEM HYDROCHLORIDE 120 MG/1
120 CAPSULE, EXTENDED RELEASE ORAL DAILY
Qty: 90 CAP | Refills: 3 | Status: SHIPPED | OUTPATIENT
Start: 2019-03-07 | End: 2020-03-07

## 2019-03-07 NOTE — PROGRESS NOTES
Visit Vitals  /80 (BP 1 Location: Left arm, BP Patient Position: Sitting)   Pulse 74   Resp 16   Ht 5' 9\" (1.753 m)   Wt 200 lb 6.4 oz (90.9 kg)   SpO2 98%   BMI 29.59 kg/m²

## 2019-03-07 NOTE — PROGRESS NOTES
Marie Paulson     1935       Magaly Desai MD, [facility]  Date of Visit-3/7/2019   PCP is Surjit Turner MD   [facility] [place]  [facility]  HPI:  Marie Paulson is a 82 y.o. male   3 month f/u for PAF and HTN. Went to the ER in 11/27 HR was 135 afib  with SOB with BP 80/60. Normal echo in the past.  I was concerned with bradycardia so did not start BB too but he did start diltiazem and Eliquis and is tolerating well. Pt is high-normal today. Overall the pt states he is doing well. Pt has felt well generally after his last visit. Pt's home BP has been reported around 140/85. Pt is not taking diltiazem anymore as he's out of it. Denies chest pain, edema, syncope or shortness of breath at rest, has no new  tachycardia, palpitations or sense of arrhythmia. Assessment/Plan:     1. PAF. Clinically doing well. Will continue Eliquis for stroke prevention, EF is normal.  CHADS score is 2 for age, 1 for HTN, and he has no diabetes or stroke. He's been off diltiazem and we could use that for afib and HTN   so we'll put him back on it. 2. HTN. acceptable but could be more optimal by adding diltiazem. 3. Dyslipidemia --primary prevention. F/u with PCP. 4. Sleep apnea unable to tolerate mask which may be contributing to higher BP.      5. F/u in 6 months. Patient Instructions   You will be scheduled for a 6 month follow up with Dr. Oj Desai. Please restart your diltiazem. Key CAD CHF Meds             dilTIAZem XR (DILT-XR) 120 mg XR capsule (Taking) Take 1 Cap by mouth daily. apixaban (ELIQUIS) 5 mg tablet (Taking) Take 1 Tab by mouth two (2) times a day. lisinopril (PRINIVIL, ZESTRIL) 20 mg tablet (Taking) TAKE 1 TABLET BY MOUTH DAILY. MAY RESUME MEDICATION WHEN B/P GREATER THAN 120/80            Impression:   1. Essential hypertension    2.  Paroxysmal atrial fibrillation (HCC)    3. Pure hypercholesterolemia 4. LINDA (obstructive sleep apnea)    5. Bradycardia       Cardiac History:   No specialty comments available. ROS-except as noted above. . A complete cardiac and respiratory are reviewed and negative except as above ; Resp-denies wheezing  or productive cough,. Const- No unusual weight loss or fever; Neuro-no recent seizure or CVA ; GI- No BRBPR, abdom pain, bloating ; - no  hematuria   see supplement sheet, initialed and to be scanned by staff  Past Medical History:   Diagnosis Date    Appendicitis     2018 underwent appendectomy    Arthritis     DJD    Enlarged prostate     Hyperlipidemia     Hypertension     LINDA treated with BiPAP     PAF (paroxysmal atrial fibrillation) ([facility] [address]     Pre-diabetes     HA1C 6.0 Dec 2016    Shingles       Social Hx= reports that  has never smoked. he has never used smokeless tobacco. He reports that he drinks alcohol. He reports that he does not use drugs. Exam and Labs:  /80 (BP 1 Location: Left arm, BP Patient Position: Sitting)   Pulse 74   Resp 16   Ht 5' 9\" (1.753 m)   Wt 200 lb 6.4 oz (90.9 kg)   SpO2 98%   BMI 29.59 kg/m² Constitutional:  NAD, comfortable  Head: NC,AT. Eyes: No scleral icterus. Neck:  Neck supple. No JVD present. Throat: moist mucous membranes. Chest: Effort normal & normal respiratory excursion . Neurological: alert, conversant and oriented . Skin: Skin is not cold. No obvious systemic rash noted. Not diaphoretic. No erythema. Psychiatric:  Grossly normal mood and affect. Behavior appears normal. Extremities:  no clubbing or cyanosis. Abdomen: non distended    Lungs:breath sounds normal. No stridor. distress, wheezes or  Rales. Heart: normal rate, regular rhythm, normal S1, S2, no murmurs, rubs, clicks or gallops , PMI non displaced. Edema: Edema is none.   Lab Results   Component Value Date/Time    Cholesterol, total 189 01/30/2019 08:12 AM    HDL Cholesterol 30 (L) 01/30/2019 08:12 AM    LDL, calculated 123 (H) 01/30/2019 08:12 AM    Triglyceride 179 (H) 01/30/2019 08:12 AM     Lab Results   Component Value Date/Time    Sodium 143 01/30/2019 08:12 AM    Potassium 4.2 01/30/2019 08:12 AM    Chloride 104 01/30/2019 08:12 AM    CO2 26 01/30/2019 08:12 AM    Anion gap 11 11/27/2018 01:07 PM    Glucose 100 (H) 01/30/2019 08:12 AM    BUN 14 01/30/2019 08:12 AM    Creatinine 1.16 01/30/2019 08:12 AM    BUN/Creatinine ratio 12 01/30/2019 08:12 AM    GFR est AA 68 01/30/2019 08:12 AM    GFR est non-AA 59 (L) 01/30/2019 08:12 AM    Calcium 8.9 01/30/2019 08:12 AM      Wt Readings from Last 3 Encounters:   03/07/19 200 lb 6.4 oz (90.9 kg)   01/03/19 200 lb 3.2 oz (90.8 kg)   12/13/18 197 lb (89.4 kg)      BP Readings from Last 3 Encounters:   03/07/19 138/80   01/03/19 151/84   12/13/18 138/80      Current Outpatient Medications   Medication Sig    apixaban (ELIQUIS) 5 mg tablet Take 1 Tab by mouth two (2) times a day.  lisinopril (PRINIVIL, ZESTRIL) 20 mg tablet TAKE 1 TABLET BY MOUTH DAILY. MAY RESUME MEDICATION WHEN B/P GREATER THAN 120/80    clobetasol (TEMOVATE) 0.05 % topical cream Apply  to affected area two (2) times a day.  brimonidine (ALPHAGAN P) 0.1 % ophthalmic solution Administer 1 Drop to both eyes daily. Both eyes PM and lt eye in am also    dilTIAZem XR (DILT-XR) 120 mg XR capsule Take 1 Cap by mouth daily.  DOCOSAHEXANOIC ACID/EPA (FISH OIL PO) Take 1,000 mg by mouth daily.  fluorouracil (EFUDEX) 5 % chemo cream 1 APPLICATION APPLY ON THE SKIN TWICE A DAY APPLY TWICE DAILY FOR 14 DAYS     No current facility-administered medications for this visit. Impression see above.       Written by Raheel Simental, as dictated by Faustina Prajapati MD.

## 2019-03-18 ENCOUNTER — TELEPHONE (OUTPATIENT)
Dept: INTERNAL MEDICINE CLINIC | Age: 84
End: 2019-03-18

## 2019-03-18 DIAGNOSIS — J40 BRONCHITIS: Primary | ICD-10-CM

## 2019-03-18 RX ORDER — AZITHROMYCIN 250 MG/1
250 TABLET, FILM COATED ORAL SEE ADMIN INSTRUCTIONS
Qty: 6 TAB | Refills: 0 | Status: SHIPPED | OUTPATIENT
Start: 2019-03-18 | End: 2019-04-15 | Stop reason: ALTCHOICE

## 2019-03-18 NOTE — TELEPHONE ENCOUNTER
Pt is experiencing chest congestion, excessive productive coughing. No appointments available within pts requested time. Best contact number [phone].        Message received & copied from [facility]

## 2019-03-18 NOTE — TELEPHONE ENCOUNTER
z pack prescription called in for Mr Ismael Mendez.  He may take flonase nasal spray 2 sprays in each nostril to help with post nasal drip

## 2019-03-18 NOTE — TELEPHONE ENCOUNTER
Spoke with patient. Two pt identifiers confirmed. Patient states that he has been coughing and congested. Patients states that the cough is productive with dark yellow sputum. Patient states that his throat is sore. Patient states that he has some post nasal drip. Patient denies fever. Patient states that he only wants to see Dr. Chasity Wolf and since she has no available appointments he would like to know if she would be willing to send in a prescription for him to his pharmacy. Advised patient that I will send his request to Dr. Chasity Wolf and will give him a call back as soon as she responds. Pt verbalized understanding of information discussed w/ no further questions at this time.

## 2019-04-12 ENCOUNTER — TELEPHONE (OUTPATIENT)
Dept: INTERNAL MEDICINE CLINIC | Age: 84
End: 2019-04-12

## 2019-04-12 NOTE — TELEPHONE ENCOUNTER
Spoke with patient. Two pt identifiers confirmed. Patient states that he is coughing and congested. Patient states that he is coughing up clear mucus. Patient states that he has been taking nyquil, but this is not helping. Advised patient to try taking an antihistamine, plain mucinex and delsym. Patient advised to contact the office if no better in a few days. Pt verbalized understanding of information discussed w/ no further questions at this time.

## 2019-04-12 NOTE — TELEPHONE ENCOUNTER
#[phone] pt has cold/allergy symptoms and a frequent cough. Pt would like to be seen today. Putting back at high priority due to cough/age. Thanks.

## 2019-04-15 ENCOUNTER — OFFICE VISIT (OUTPATIENT)
Dept: INTERNAL MEDICINE CLINIC | Age: 84
End: 2019-04-15

## 2019-04-15 ENCOUNTER — TELEPHONE (OUTPATIENT)
Dept: INTERNAL MEDICINE CLINIC | Age: 84
End: 2019-04-15

## 2019-04-15 VITALS
HEIGHT: 69 IN | OXYGEN SATURATION: 99 % | DIASTOLIC BLOOD PRESSURE: 81 MMHG | HEART RATE: 63 BPM | RESPIRATION RATE: 18 BRPM | BODY MASS INDEX: 29.47 KG/M2 | WEIGHT: 199 LBS | TEMPERATURE: 97.8 F | SYSTOLIC BLOOD PRESSURE: 150 MMHG

## 2019-04-15 DIAGNOSIS — R05.9 COUGH: Primary | ICD-10-CM

## 2019-04-15 DIAGNOSIS — J40 BRONCHITIS: ICD-10-CM

## 2019-04-15 RX ORDER — BRINZOLAMIDE/BRIMONIDINE TARTRATE 10; 2 MG/ML; MG/ML
SUSPENSION/ DROPS OPHTHALMIC
Refills: 0 | COMMUNITY
Start: 2019-04-03

## 2019-04-15 NOTE — TELEPHONE ENCOUNTER
#[phone] pt is still coughing up brown phlegm and needs a call from Sarah Beth. He is not doing good he states.

## 2019-04-15 NOTE — PROGRESS NOTES
CC:  
Chief Complaint Patient presents with  Cough HPI: 
 
Rom Solis is a 82 y.o. male who complains of URI symptoms for  4  days. The patient reports sore throat, nasal congestion and cough. Denies fever, chills, ear pain, sinus pain. Having a productive cough. Had an episode of wheezing yesterday. Marta Santana Feels better today [facility] to [place]/ patient drove Past Medical History:  
Diagnosis Date  Appendicitis 2018 underwent appendectomy  Arthritis DJD  Enlarged prostate  Hyperlipidemia  Hypertension  LINDA treated with BiPAP  PAF (paroxysmal atrial fibrillation) ([facility] [address]  Pre-diabetes HA1C 6.0 Dec 2016  Shingles Review of Systems Constitutional: negative for fevers, chills, anorexia and weight loss Eyes:   negative for visual disturbance and irritation ENT:   negative for tinnitus, positive for nasal congestion Negative for ear pain Respiratory:  Positive  for cough, negative for hemoptysis, dyspnea,wheezing CV:   negative for chest pain, palpitations, lower extremity edema GI:   negative for nausea, vomiting, diarrhea, abdominal pain,melena Genitourinary: negative for frequency, dysuria and hematuria, vaginal discharge, lesions Musculoskel: negative for myalgias, arthralgias, back pain, muscle weakness, joint pain Neurological:  negative for headaches, dizziness, focal weakness, numbness Psych:         : negative for depression, anxiety Objective:  
 
Visit Vitals /81 (BP 1 Location: Right arm, BP Patient Position: Sitting) Pulse 63 Temp 97.8 °F (36.6 °C) (Oral) Resp 18 Ht 5' 9\" (1.753 m) Wt 199 lb (90.3 kg) SpO2 99% BMI 29.39 kg/m² Gen: Mildly ill appearing male HEENT:   PERRL,normal conjunctiva.  External ear and canals normal, TMs no opacification or erythema,  Swollen nasal turbinates, no sinus pain on palpation,  OP no erythema, no exudates, MMM 
 Neck:  Supple. Thyroid normal size, nontender, without nodules. No masses or LAD Resp:  No wheezing, no rhonchi, no rales. CV:  RRR, normal S1S2, no murmur. GI: soft, nontender, without masses. No hepatosplenomegaly. Extrem:  +2 pulses, no edema, warm distally Skin:  No rash, no lesions, no ulcers. Skin warm, normal turgor, without induration or nodules. Lab Results Component Value Date/Time WBC 9.0 11/27/2018 01:07 PM  
 HGB 14.7 11/27/2018 01:07 PM  
 HCT 44.6 11/27/2018 01:07 PM  
 PLATELET 948 64/85/3920 01:07 PM  
 MCV 82.6 11/27/2018 01:07 PM  
 
Lab Results Component Value Date/Time Sodium 143 01/30/2019 08:12 AM  
 Potassium 4.2 01/30/2019 08:12 AM  
 Chloride 104 01/30/2019 08:12 AM  
 CO2 26 01/30/2019 08:12 AM  
 Anion gap 11 11/27/2018 01:07 PM  
 Glucose 100 (H) 01/30/2019 08:12 AM  
 BUN 14 01/30/2019 08:12 AM  
 Creatinine 1.16 01/30/2019 08:12 AM  
 BUN/Creatinine ratio 12 01/30/2019 08:12 AM  
 GFR est AA 68 01/30/2019 08:12 AM  
 GFR est non-AA 59 (L) 01/30/2019 08:12 AM  
 Calcium 8.9 01/30/2019 08:12 AM  
 
 
 
Assessment/Plan: 1. Cough 2. URI suspect viral  
- XR CHEST PA LAT; Future 
- rest and drink fluids, take zyrtec ( currently high pollen count) Follow-up and Dispositions · Return if symptoms worsen or fail to improve.  
  
 
 
Wali Hussein MD

## 2019-04-15 NOTE — TELEPHONE ENCOUNTER
Spoke with patient. Two pt identifiers confirmed. Patient offered an appointment with Dr. Ivett Dumont today 04/15/19 at 1pm.  Patient accepted. Patient advised if anything changes or if unable to keep this appointment to call the office  Pt verbalized understanding of information discussed w/ no further questions at this time.

## 2019-04-15 NOTE — PROGRESS NOTES
Reviewed record in preparation for visit and have obtained necessary documentation. Identified pt with two pt identifiers(name and ). Chief Complaint Patient presents with  Cough Health Maintenance Due Topic Date Due  Shingles Vaccine (1 of 2) 10/10/1987 Mr. Amanda Pratt has a reminder for a \"due or due soon\" health maintenance. I have asked that he discuss this further with his primary care provider for follow-up on this health maintenance. Coordination of Care Questionnaire: 
:  
 
1) Have you been to an emergency room, urgent care clinic since your last visit? no  
Hospitalized since your last visit? no          
 
2) Have you seen or consulted any other health care providers outside of [address] [place] [address] since your last visit? no  (Include any pap smears or colon screenings in this section.) 3) In the event something were to happen to you and you were unable to speak on your behalf, do you have an Advance Directive/ Living Will in place stating your wishes? NO Do you have an Advance Directive on file? no 
 
4) Are you interested in receiving information on Advance Directives? NO Patient is accompanied by self I have received verbal consent from Stacey Lagunas to discuss any/all medical information while they are present in the room.

## 2019-04-15 NOTE — PATIENT INSTRUCTIONS
Take zyrtec daily Rest and drink fluids Suspect a viral respiratory infection Doing chest x ray today Call if symptoms worsen

## 2019-05-16 ENCOUNTER — OFFICE VISIT (OUTPATIENT)
Dept: INTERNAL MEDICINE CLINIC | Age: 84
End: 2019-05-16

## 2019-05-16 ENCOUNTER — HOSPITAL ENCOUNTER (OUTPATIENT)
Dept: LAB | Age: 84
Discharge: HOME | End: 2019-05-16
Payer: MEDICARE

## 2019-05-16 VITALS
HEIGHT: 69 IN | OXYGEN SATURATION: 99 % | SYSTOLIC BLOOD PRESSURE: 137 MMHG | TEMPERATURE: 97.8 F | DIASTOLIC BLOOD PRESSURE: 89 MMHG | BODY MASS INDEX: 29.47 KG/M2 | RESPIRATION RATE: 18 BRPM | WEIGHT: 199 LBS | HEART RATE: 57 BPM

## 2019-05-16 DIAGNOSIS — I48.0 PAROXYSMAL ATRIAL FIBRILLATION (HCC): Primary | ICD-10-CM

## 2019-05-16 DIAGNOSIS — I10 ESSENTIAL HYPERTENSION: ICD-10-CM

## 2019-05-16 DIAGNOSIS — R73.03 PRE-DIABETES: ICD-10-CM

## 2019-05-16 PROCEDURE — 36415 COLL VENOUS BLD VENIPUNCTURE: CPT

## 2019-05-16 PROCEDURE — 85025 COMPLETE CBC W/AUTO DIFF WBC: CPT

## 2019-05-16 PROCEDURE — 83036 HEMOGLOBIN GLYCOSYLATED A1C: CPT

## 2019-05-16 PROCEDURE — 80048 BASIC METABOLIC PNL TOTAL CA: CPT

## 2019-05-16 NOTE — PROGRESS NOTES
Reviewed record in preparation for visit and have obtained necessary documentation. Identified pt with two pt identifiers(name and ). Chief Complaint Patient presents with  Hypertension  Cholesterol Problem  Cough Health Maintenance Due Topic Date Due  Shingles Vaccine (1 of 2) 10/10/1987 Mr. Amanda Pratt has a reminder for a \"due or due soon\" health maintenance. I have asked that he discuss this further with his primary care provider for follow-up on this health maintenance. Coordination of Care Questionnaire: 
:  
 
1) Have you been to an emergency room, urgent care clinic since your last visit? no  
Hospitalized since your last visit? no          
 
2) Have you seen or consulted any other health care providers outside of [address] [place] [address] since your last visit? no  (Include any pap smears or colon screenings in this section.) 3) In the event something were to happen to you and you were unable to speak on your behalf, do you have an Advance Directive/ Living Will in place stating your wishes? NO Do you have an Advance Directive on file? no 
 
4) Are you interested in receiving information on Advance Directives? NO Patient is accompanied by self I have received verbal consent from Stacey Lagunas to discuss any/all medical information while they are present in the room.

## 2019-05-16 NOTE — PROGRESS NOTES
CC: chronic medical problems, HTN and afib HPI: 
 
Atrial fibrillation : taking the diltiazem and and Eliquis. Mr. Amanda Pratt is taking eliquis once a day due to cost - I told him he is at high risk of stroke as the medication only lasts 12 hours. He saw Dr Coco Hodges and was resumed on diltiazem. BP is better today. Denies CP and dyspnea 
 
 
he had a uro-lift procedure and he is off of Cardura. He is still having frequent urination. He has an appointment with urologist 
 
He is not compliant with CPAP therapy -did not tolerate CPAP. 
 
 
 
 
ROS: 
10 systems reviewed and negative other than HPI Past Medical History:  
Diagnosis Date  Appendicitis 2018 underwent appendectomy  Arthritis DJD  Enlarged prostate  Hyperlipidemia  Hypertension  LINDA treated with BiPAP  PAF (paroxysmal atrial fibrillation) ([facility] [address]  Pre-diabetes HA1C 6.0 Dec 2016  Shingles Current Outpatient Medications on File Prior to Visit Medication Sig Dispense Refill  SIMBRINZA 1-0.2 % drps INSTILL ONE DROP THREE TIMES A DAY INTO THE LEFT EYE AND ONCE A DAY INTO THE RIGHT EYE  0  
 dilTIAZem XR (DILT-XR) 120 mg XR capsule Take 1 Cap by mouth daily. 90 Cap 3  
 apixaban (ELIQUIS) 5 mg tablet Take 1 Tab by mouth two (2) times a day. 180 Tab 3  
 lisinopril (PRINIVIL, ZESTRIL) 20 mg tablet TAKE 1 TABLET BY MOUTH DAILY. MAY RESUME MEDICATION WHEN B/P GREATER THAN 120/80 90 Tab 1  clobetasol (TEMOVATE) 0.05 % topical cream Apply  to affected area two (2) times a day. 15 g 0  
 brimonidine (ALPHAGAN P) 0.1 % ophthalmic solution Administer 1 Drop to both eyes daily. Both eyes PM and lt eye in am also No current facility-administered medications on file prior to visit. Past Surgical History:  
Procedure Laterality Date  HX APPENDECTOMY  HX COLONOSCOPY  2009  
 normal except for hemorrhoids  HX HEENT    
 wisdom teeth extraction x2  HX KNEE REPLACEMENT right; 2017  HX SHOULDER ARTHROSCOPY    
 rt  HX TONSILLECTOMY Family History Problem Relation Age of Onset  Coronary Artery Disease Father 47  
      at [de-identified] of MI Reviewed and no changes Social History Socioeconomic History  Marital status:  Spouse name: Not on file  Number of children: Not on file  Years of education: Not on file  Highest education level: Not on file Occupational History  Not on file Social Needs  Financial resource strain: Not on file  Food insecurity:  
  Worry: Not on file Inability: Not on file  Transportation needs:  
  Medical: Not on file Non-medical: Not on file Tobacco Use  Smoking status: Never Smoker  Smokeless tobacco: Never Used Substance and Sexual Activity  Alcohol use: Yes Comment: occasional  
 Drug use: No  
 Sexual activity: Not Currently Lifestyle  Physical activity:  
  Days per week: Not on file Minutes per session: Not on file  Stress: Not on file Relationships  Social connections:  
  Talks on phone: Not on file Gets together: Not on file Attends [employer] service: Not on file Active member of club or organization: Not on file Attends meetings of clubs or organizations: Not on file Relationship status: Not on file  Intimate partner violence:  
  Fear of current or ex partner: Not on file Emotionally abused: Not on file Physically abused: Not on file Forced sexual activity: Not on file Other Topics Concern  Not on file Social History Narrative  Not on file Visit Vitals /89 (BP 1 Location: Right arm, BP Patient Position: Sitting) Pulse (!) 57 Temp 97.8 °F (36.6 °C) (Oral) Resp 18 Ht 5' 9\" (1.753 m) Wt 199 lb (90.3 kg) SpO2 99% BMI 29.39 kg/m² Physical Examination:  
General - Well appearing male HEENT - PERRL, TM no erythema/opacification, normal nasal turbinates, oropharynx no erythema or exudate, MMM Neck - supple, no bruits, no TMG, no LAD Pulm - clear to auscultation bilaterally Cardio - RRR, normal S1 S2, no murmur gallops or rubs Abd - soft, nontender, no masses, no HSM Extrem - no edema, +2 distal pulses Psych - normal affect, appropriate mood Lab Results Component Value Date/Time WBC 9.0 11/27/2018 01:07 PM  
 HGB 14.7 11/27/2018 01:07 PM  
 HCT 44.6 11/27/2018 01:07 PM  
 PLATELET 252 45/38/9641 01:07 PM  
 MCV 82.6 11/27/2018 01:07 PM  
 
Lab Results Component Value Date/Time Sodium 143 01/30/2019 08:12 AM  
 Potassium 4.2 01/30/2019 08:12 AM  
 Chloride 104 01/30/2019 08:12 AM  
 CO2 26 01/30/2019 08:12 AM  
 Anion gap 11 11/27/2018 01:07 PM  
 Glucose 100 (H) 01/30/2019 08:12 AM  
 BUN 14 01/30/2019 08:12 AM  
 Creatinine 1.16 01/30/2019 08:12 AM  
 BUN/Creatinine ratio 12 01/30/2019 08:12 AM  
 GFR est AA 68 01/30/2019 08:12 AM  
 GFR est non-AA 59 (L) 01/30/2019 08:12 AM  
 Calcium 8.9 01/30/2019 08:12 AM  
 
Lab Results Component Value Date/Time Cholesterol, total 189 01/30/2019 08:12 AM  
 HDL Cholesterol 30 (L) 01/30/2019 08:12 AM  
 LDL, calculated 123 (H) 01/30/2019 08:12 AM  
 VLDL, calculated 36 01/30/2019 08:12 AM  
 Triglyceride 179 (H) 01/30/2019 08:12 AM  
 
No results found for: TSH, TSH2, TSH3, TSHP, TSHEXT, TSHEXT Lab Results Component Value Date/Time  
 Prostate Specific Ag 4.2 (H) 02/09/2017 12:07 PM  
 % Free PSA 29.8 02/09/2017 12:07 PM  
 
Lab Results Component Value Date/Time Hemoglobin A1c 6.0 (H) 01/30/2019 08:12 AM  
 
No results found for: Kit Krystyna, Lolly Dumont, Juliet Mack, [id number] Lab Results Component Value Date/Time ALT (SGPT) 21 11/27/2018 01:07 PM  
 AST (SGOT) 14 (L) 11/27/2018 01:07 PM  
 Alk. phosphatase 71 11/27/2018 01:07 PM  
 Bilirubin, direct 0.11 02/09/2017 12:07 PM  
 Bilirubin, total 0.4 11/27/2018 01:07 PM  
 
  
  
Assessment/Plan: 1. Paroxysmal A-fib ([address] Sinus today on diltiazem Taking eliquis once a day - discussed the importance of BID dosing and risk of stroke when second dose is missed 2. Hypertension: He is on lisinopril 20 mg and cardizem with adequate control 3. Obstructive sleep apnea does not tolerate CPAP 4. Elevated cholesterol: Advised to work on diet. He was on fish oil but stopped. Return for fasting cholesterol 5. Pre diabetes: counseled on healthy diet recheck today 6. Enlarged prostate: Patient had uroliths procedure he is having frequent urination and will discuss with his urologist. 
 
 
 
Orders Placed This Encounter  CBC WITH AUTOMATED DIFF  
 METABOLIC PANEL, BASIC  
 HEMOGLOBIN A1C W/O EAG Valery Marsh MD

## 2019-05-17 LAB
BASOPHILS # BLD AUTO: 0 X10E3/UL (ref 0–0.2)
BASOPHILS NFR BLD AUTO: 0 %
BUN SERPL-MCNC: 18 MG/DL (ref 8–27)
BUN/CREAT SERPL: 15 (ref 10–24)
CALCIUM SERPL-MCNC: 9 MG/DL (ref 8.6–10.2)
CHLORIDE SERPL-SCNC: 106 MMOL/L (ref 96–106)
CO2 SERPL-SCNC: 21 MMOL/L (ref 20–29)
CREAT SERPL-MCNC: 1.18 MG/DL (ref 0.76–1.27)
EOSINOPHIL # BLD AUTO: 0.2 X10E3/UL (ref 0–0.4)
EOSINOPHIL NFR BLD AUTO: 2 %
ERYTHROCYTE [DISTWIDTH] IN BLOOD BY AUTOMATED COUNT: 14.6 % (ref 12.3–15.4)
GLUCOSE SERPL-MCNC: 128 MG/DL (ref 65–99)
HBA1C MFR BLD: 6.1 % (ref 4.8–5.6)
HCT VFR BLD AUTO: 43.9 % (ref 37.5–51)
HGB BLD-MCNC: 14.8 G/DL (ref 13–17.7)
IMM GRANULOCYTES # BLD AUTO: 0 X10E3/UL (ref 0–0.1)
IMM GRANULOCYTES NFR BLD AUTO: 0 %
LYMPHOCYTES # BLD AUTO: 3.4 X10E3/UL (ref 0.7–3.1)
LYMPHOCYTES NFR BLD AUTO: 30 %
MCH RBC QN AUTO: 27.5 PG (ref 26.6–33)
MCHC RBC AUTO-ENTMCNC: 33.7 G/DL (ref 31.5–35.7)
MCV RBC AUTO: 81 FL (ref 79–97)
MONOCYTES # BLD AUTO: 0.6 X10E3/UL (ref 0.1–0.9)
MONOCYTES NFR BLD AUTO: 5 %
NEUTROPHILS # BLD AUTO: 7.2 X10E3/UL (ref 1.4–7)
NEUTROPHILS NFR BLD AUTO: 63 %
PLATELET # BLD AUTO: 198 X10E3/UL (ref 150–379)
POTASSIUM SERPL-SCNC: 4.1 MMOL/L (ref 3.5–5.2)
RBC # BLD AUTO: 5.39 X10E6/UL (ref 4.14–5.8)
SODIUM SERPL-SCNC: 141 MMOL/L (ref 134–144)
WBC # BLD AUTO: 11.4 X10E3/UL (ref 3.4–10.8)

## 2019-10-03 DIAGNOSIS — I10 ESSENTIAL HYPERTENSION: ICD-10-CM

## 2019-10-03 RX ORDER — LISINOPRIL 20 MG/1
TABLET ORAL
Qty: 30 TAB | Refills: 4 | Status: SHIPPED | OUTPATIENT
Start: 2019-10-03 | End: 2020-02-02

## 2019-10-11 ENCOUNTER — OFFICE VISIT (OUTPATIENT)
Dept: CARDIOLOGY CLINIC | Age: 84
End: 2019-10-11

## 2019-10-11 VITALS
HEIGHT: 69 IN | OXYGEN SATURATION: 98 % | DIASTOLIC BLOOD PRESSURE: 60 MMHG | WEIGHT: 200 LBS | SYSTOLIC BLOOD PRESSURE: 120 MMHG | BODY MASS INDEX: 29.62 KG/M2 | RESPIRATION RATE: 16 BRPM | HEART RATE: 65 BPM

## 2019-10-11 DIAGNOSIS — E78.00 PURE HYPERCHOLESTEROLEMIA: ICD-10-CM

## 2019-10-11 DIAGNOSIS — I48.0 PAROXYSMAL ATRIAL FIBRILLATION (HCC): Primary | ICD-10-CM

## 2019-10-11 DIAGNOSIS — I10 ESSENTIAL HYPERTENSION: ICD-10-CM

## 2019-10-11 RX ORDER — TAMSULOSIN HYDROCHLORIDE 0.4 MG/1
CAPSULE ORAL
Refills: 3 | COMMUNITY
Start: 2019-10-04

## 2019-10-11 NOTE — PROGRESS NOTES
Selena Murray     1935       Magaly Venegas MD, [facility] Date of Visit-10/11/2019 PCP is Appa Jack Apley, MD  
[address] [place] [address] [facility] HPI:  Selena Murray is a 82 y.o. male 6 month f/u for PAF and HTN. Normal echo. On Eliquis. Last visit we restarted diltiazem. Since last visit seen PCP and notes reviewed. Overall the pt states he is doing well. Pt is able to complete normal ADL without limitations. Denies chest pain, edema, syncope or shortness of breath at rest, has no tachycardia, palpitations or sense of arrhythmia. Assessment/Plan: 1. Paroxysmal atrial fibrillation (HCC) Confirms he is on diltiazem. Has had previous ep of rapid af. Tolerating current meds, but needs to take Eliquis BID. He can cut them in half. 2. Essential hypertension Stable. 3. Pure hypercholesterolemia Lab Results Component Value Date/Time LDL, calculated 123 (H) 01/30/2019 08:12 AM  
Diet controlled. Follow up in 6 months with echo. Impression: 1. Paroxysmal atrial fibrillation (HCC) 2. Essential hypertension 3. Pure hypercholesterolemia Cardiac History: No specialty comments available. ROS-except as noted above. . A complete cardiac and respiratory are reviewed and negative except as above ; Resp-denies wheezing  or productive cough,. Const- No unusual weight loss or fever; Neuro-no recent seizure or CVA ; GI- No BRBPR, abdom pain, bloating ; - no  hematuria  
see supplement sheet, initialed and to be scanned by staff Past Medical History:  
Diagnosis Date  Appendicitis 2018 underwent appendectomy  Arthritis DJD  Enlarged prostate  Hyperlipidemia  Hypertension  LINDA treated with BiPAP  PAF (paroxysmal atrial fibrillation) ([facility] [address]  Pre-diabetes HA1C 6.0 Dec 2016  Shingles Social Hx= reports that he has never smoked.  He has never used smokeless tobacco. He reports that he drinks alcohol. He reports that he does not use drugs. Exam and Labs:  /60 (BP 1 Location: Left arm, BP Patient Position: Sitting)   Pulse 65   Resp 16   Ht 5' 9\" (1.753 m)   Wt 200 lb (90.7 kg)   SpO2 98%   BMI 29.53 kg/m² Constitutional:  NAD, comfortable Head: NC,AT. Eyes: No scleral icterus. Neck:  Neck supple. No JVD present. Throat: moist mucous membranes. Chest: Effort normal & normal respiratory excursion . Neurological: alert, conversant and oriented . Skin: Skin is not cold. No obvious systemic rash noted. Not diaphoretic. No erythema. Psychiatric:  Grossly normal mood and affect. Behavior appears normal. Extremities:  no clubbing or cyanosis. Abdomen: non distended Lungs:breath sounds normal. No stridor. distress, wheezes or  Rales. Heart:  normal rate, regular rhythm, normal S1, S2, no murmurs, rubs, clicks or gallops , PMI non displaced. Edema: Edema is none. Lab Results Component Value Date/Time Cholesterol, total 189 01/30/2019 08:12 AM  
 HDL Cholesterol 30 (L) 01/30/2019 08:12 AM  
 LDL, calculated 123 (H) 01/30/2019 08:12 AM  
 Triglyceride 179 (H) 01/30/2019 08:12 AM  
 
Lab Results Component Value Date/Time Sodium 141 05/16/2019 02:55 PM  
 Potassium 4.1 05/16/2019 02:55 PM  
 Chloride 106 05/16/2019 02:55 PM  
 CO2 21 05/16/2019 02:55 PM  
 Anion gap 11 11/27/2018 01:07 PM  
 Glucose 128 (H) 05/16/2019 02:55 PM  
 BUN 18 05/16/2019 02:55 PM  
 Creatinine 1.18 05/16/2019 02:55 PM  
 BUN/Creatinine ratio 15 05/16/2019 02:55 PM  
 GFR est AA 67 05/16/2019 02:55 PM  
 GFR est non-AA 58 (L) 05/16/2019 02:55 PM  
 Calcium 9.0 05/16/2019 02:55 PM  
  
Wt Readings from Last 3 Encounters:  
10/11/19 200 lb (90.7 kg) 05/16/19 199 lb (90.3 kg) 04/15/19 199 lb (90.3 kg) BP Readings from Last 3 Encounters:  
10/11/19 120/60  
05/16/19 137/89  
04/15/19 150/81 Current Outpatient Medications Medication Sig  
  tamsulosin (FLOMAX) 0.4 mg capsule TAKE ONE CAPSULE BY MOUTH EVERYNIGHT  lisinopril (PRINIVIL, ZESTRIL) 20 mg tablet TAKE 1 TABLET BY MOUTH EVERY DAY  SIMBRINZA 1-0.2 % drps INSTILL ONE DROP THREE TIMES A DAY INTO THE LEFT EYE AND ONCE A DAY INTO THE RIGHT EYE  dilTIAZem XR (DILT-XR) 120 mg XR capsule Take 1 Cap by mouth daily.  apixaban (ELIQUIS) 5 mg tablet Take 1 Tab by mouth two (2) times a day.  brimonidine (ALPHAGAN P) 0.1 % ophthalmic solution Administer 1 Drop to both eyes daily. Both eyes PM and lt eye in am also  clobetasol (TEMOVATE) 0.05 % topical cream Apply  to affected area two (2) times a day. No current facility-administered medications for this visit. Impression see above.   
  
Written by Petey Jimenez, as dictated by Mary Reinoso MD.

## 2019-10-11 NOTE — Clinical Note
10/11/19 Patient: Winston Rowan YOB: 1935 Date of Visit: 10/11/2019 Michi Parnell MD 
[address] [address] VIA In Basket Dear Michi Parnell MD, Thank you for referring Mr. Brady Cole to [facility] for evaluation. My notes for this consultation are attached. If you have questions, please do not hesitate to call me. I look forward to following your patient along with you.  
 
 
Sincerely, 
 
Nancy Kidd MD

## 2019-10-11 NOTE — PATIENT INSTRUCTIONS
Start taking eliquis 2.5 mg twice a day. Schedule echocardiogram.    Follow up with Dr. Kya Pelayo in 6 months.

## 2019-10-15 ENCOUNTER — CLINICAL SUPPORT (OUTPATIENT)
Dept: INTERNAL MEDICINE CLINIC | Age: 84
End: 2019-10-15

## 2019-10-15 ENCOUNTER — TELEPHONE (OUTPATIENT)
Dept: INTERNAL MEDICINE CLINIC | Age: 84
End: 2019-10-15

## 2019-10-15 ENCOUNTER — OFFICE VISIT (OUTPATIENT)
Dept: INTERNAL MEDICINE CLINIC | Age: 84
End: 2019-10-15

## 2019-10-15 VITALS
TEMPERATURE: 98.1 F | BODY MASS INDEX: 29.62 KG/M2 | OXYGEN SATURATION: 97 % | RESPIRATION RATE: 16 BRPM | DIASTOLIC BLOOD PRESSURE: 72 MMHG | WEIGHT: 200 LBS | HEIGHT: 69 IN | HEART RATE: 55 BPM | SYSTOLIC BLOOD PRESSURE: 157 MMHG

## 2019-10-15 DIAGNOSIS — I48.0 PAF (PAROXYSMAL ATRIAL FIBRILLATION) (HCC): ICD-10-CM

## 2019-10-15 DIAGNOSIS — Z23 ENCOUNTER FOR IMMUNIZATION: Primary | ICD-10-CM

## 2019-10-15 DIAGNOSIS — S01.511A LIP LACERATION, INITIAL ENCOUNTER: ICD-10-CM

## 2019-10-15 DIAGNOSIS — M25.512 ACUTE PAIN OF LEFT SHOULDER: ICD-10-CM

## 2019-10-15 DIAGNOSIS — M79.641 RIGHT HAND PAIN: Primary | ICD-10-CM

## 2019-10-15 DIAGNOSIS — M79.641 HAND PAIN, RIGHT: Primary | ICD-10-CM

## 2019-10-15 DIAGNOSIS — M25.512 LEFT SHOULDER PAIN, UNSPECIFIED CHRONICITY: ICD-10-CM

## 2019-10-15 NOTE — TELEPHONE ENCOUNTER
#[phone] pt just fell at a resteraunt and his shoulder is extremely painful. Can pt get an x ray across the [place] as soon as possible today?  Please call Virginia Solomon

## 2019-10-15 NOTE — PATIENT INSTRUCTIONS
Office Policies    Phone calls/patient messages:            Please allow up to 24 hours for someone in the office to contact you about your call or message. Be mindful your provider may be out of the office or your message may require further review. We encourage you to use [employer] for your messages as this is a faster, more efficient way to communicate with our office                         Medication Refills:            Prescription medications require 48-72 business hours to process. We encourage you to use [employer] for your refills. For controlled medications: Please allow 72 business hours to process. Certain medications may require you to  a written prescription at our office. NO narcotic/controlled medications will be prescribed after 4pm Monday through Friday or on weekends              Form/Paperwork Completion:            Please note a $25 fee may incur for all paperwork for completed by our providers. We ask that you allow 7-10 business days. Pre-payment is due prior to picking up/faxing the completed form. You may also download your forms to [employer] to have your doctor print off.

## 2019-10-15 NOTE — LETTER
10/15/2019 4:03 PM 
 
Mr. Cyndie Gamez [address] [id number] Dear Cyndie Gamez: 
 
Please find your most recent results below. Resulted Orders XR HAND RT MIN 3 V (Exam End: 10/15/2019  3:00 PM) Narrative EXAM: XR HAND RT MIN 3 V 
 
INDICATION: Right hand pain after fall. COMPARISON: None. FINDINGS: Three views of the right hand demonstrate no fracture or other acute 
osseous or articular abnormality. Mild osteoarthritis is greatest in the fourth 
PIP joint. No evidence of inflammatory arthritis. Impression IMPRESSION: No acute abnormality. XR SHOULDER LT AP/LAT MIN 2 V (Exam End: 10/15/2019  2:59 PM) Narrative EXAM: XR SHOULDER LT AP/LAT MIN 2 V 
 
INDICATION: Left shoulder pain after fall COMPARISON: None. TECHNIQUE: 2 views left shoulder FINDINGS: No acute fracture or dislocation. Acromioclavicular joint 
osteoarthritis may cause subacromial impingement. At least mild glenohumeral 
osteoarthritis is partially imaged. Impression IMPRESSION:  
 
No evidence of fracture on these views. RECOMMENDATIONS: 
None. Keep up the good work! No fracture Please call me if you have any questions: [phone] Sincerely, 
 
 
Michele Bae MD

## 2019-10-15 NOTE — PROGRESS NOTES
HISTORY OF PRESENT ILLNESS  Kenny Moncada is a 82 y.o. male. HPI   Pt normally follows with Dr. Neeraj Polanco  (PCP). Pt is here for acute care. Pt tripped and [place] into the store   Pt hit his knees, L shoulder, R hand and his lips   States he did not hit his head just hit his teeth   He was able to stand back up   Pt was able to walk back to his car and drove back to the clinic   Denies any LOC   Discussed laceration on his lip may need stitches-- sent pt to dr Kaity Flanagan for stitches   States his shoulder hurts to move in any direction   Will get XR of L shoulder and R hand           Patient Active Problem List    Diagnosis Date Noted    Acute appendicitis 02/08/2018    LINDA treated with BiPAP 07/06/2017    Right leg swelling 02/27/2017    Bilateral edema of lower extremity 02/27/2017    PA[facility]ox[facility]mal [facility] fibrillation) ([facility] [address]     Hypertension     Enlarged prostate     Hyperlipidemia     DJD (degenerative joint disease) of knee 01/09/2017     Current Outpatient Medications   Medication Sig Dispense Refill    apixaban (ELIQUIS) 2.5 mg tablet Take 1 Tab by mouth two (2) times a day. 180 Tab 1    tamsulosin (FLOMAX) 0.4 mg capsule TAKE ONE CAPSULE BY MOUTH EVERYNIGHT  3    lisinopril (PRINIVIL, ZESTRIL) 20 mg tablet TAKE 1 TABLET BY MOUTH EVERY DAY 30 Tab 4    SIMBRINZA 1-0.2 % drps INSTILL ONE DROP THREE TIMES A DAY INTO THE LEFT EYE AND ONCE A DAY INTO THE RIGHT EYE  0    dilTIAZem XR (DILT-XR) 120 mg XR capsule Take 1 Cap by mouth daily. 90 Cap 3    clobetasol (TEMOVATE) 0.05 % topical cream Apply  to affected area two (2) times a day. 15 g 0    brimonidine (ALPHAGAN P) 0.1 % ophthalmic solution Administer 1 Drop to both eyes daily.  Both eyes PM and lt eye in am also       Past Surgical History:   Procedure Laterality Date    HX APPENDECTOMY      HX COLONOSCOPY  2009    normal except for hemorrhoids    HX HEENT      wisdom teeth extraction x2    HX KNEE REPLACEMENT right; Jan. 2017    HX SHOULDER ARTHROSCOPY      rt    HX TONSILLECTOMY        Lab Results   Component Value Date/Time    WBC 11.4 (H) 05/16/2019 02:55 PM    HGB 14.8 05/16/2019 02:55 PM    HCT 43.9 05/16/2019 02:55 PM    PLATELET 371 15/18/1413 02:55 PM    MCV 81 05/16/2019 02:55 PM     Lab Results   Component Value Date/Time    Cholesterol, total 189 01/30/2019 08:12 AM    HDL Cholesterol 30 (L) 01/30/2019 08:12 AM    LDL, calculated 123 (H) 01/30/2019 08:12 AM    Triglyceride 179 (H) 01/30/2019 08:12 AM     Lab Results   Component Value Date/Time    GFR est non-AA 58 (L) 05/16/2019 02:55 PM    GFR est AA 67 05/16/2019 02:55 PM    Creatinine 1.18 05/16/2019 02:55 PM    BUN 18 05/16/2019 02:55 PM    Sodium 141 05/16/2019 02:55 PM    Potassium 4.1 05/16/2019 02:55 PM    Chloride 106 05/16/2019 02:55 PM    CO2 21 05/16/2019 02:55 PM    Magnesium 2.0 11/27/2018 01:07 PM        Review of Systems   Respiratory: Negative for shortness of breath. Cardiovascular: Negative for chest pain. Physical Exam   Constitutional: He is oriented to person, place, and time. He appears well-developed and well-nourished. No distress. HENT:   Head: Normocephalic and atraumatic. Mouth/Throat: Oropharynx is clear and moist. No oropharyngeal exudate. Eyes: Conjunctivae and EOM are normal. Right eye exhibits no discharge. Left eye exhibits no discharge. Neck: Normal range of motion. Neck supple. Cardiovascular: Normal rate, regular rhythm and normal heart sounds. Exam reveals no gallop and no friction rub. No murmur heard. Pulmonary/Chest: Effort normal and breath sounds normal. No respiratory distress. He has no wheezes. He has no rales. He exhibits no tenderness. Musculoskeletal: Normal range of motion. He exhibits no edema, tenderness or deformity.    No TTP over shoulder, no echomosis, no abrasion   Decreased ROM of L shoulder, unable to abduct at all, can extend L arm behind back    Lymphadenopathy:     He has no cervical adenopathy. Neurological: He is alert and oriented to person, place, and time. He has normal reflexes. Coordination normal.   Skin: Skin is warm and dry. No rash noted. He is not diaphoretic. No erythema. No pallor. Laceration 1 cm long about 2-3 mm deep on his lip   Abrasion on L knee, mild   Small abrasion on R knee     echomosis on R hand    Psychiatric: He has a normal mood and affect. His behavior is normal.       ASSESSMENT and PLAN    ICD-10-CM ICD-9-CM    1. Hand pain, right            S/p traumatic fall check plain film M79.641 729.5 XR HAND RT MIN 3 V      XR SHOULDER LT AP/LAT MIN 2 V   2. Acute pain of left shoulder        Pt with significant decreased ROM, had a fall, will get plain film, of note he is on eliquis  M25.512 719.41 XR HAND RT MIN 3 V      XR SHOULDER LT AP/LAT MIN 2 V   3. Lip laceration, initial encounter              Pt will be seeing dr Kim Montemayor today for sutures  S01.511A 873.43    4. PAF (paroxysmal atrial fibrillation) ([facility] [address]            On chronic anti coag for now continue  I48.0 427.31               Scribed by Jonn Monroe of [address] [place] [address] [address], as dictated by Dr. Anthony Noonan. Current diagnosis and concerns discussed with pt at length. Pt understands risks and benefits or current treatment plan and medications, and accepts the treatment and medication with any possible risks. Pt asks appropriate questions, which were answered. Pt was instructed to call with any concerns or problems. I have reviewed the note documented by the scribe. The services provided are my own.   The documentation is accurate

## 2019-10-15 NOTE — PROGRESS NOTES
Magnus Epley     1935       Magaly Moreno MD, [facility] [place]  Date of Visit-10/15/2019   PCP is Surjit Sanchez MD   [facility]  [facility]  HPI:  Magnus Epley is a 82 y.o. male   6 month f/u for PAF and HTN. Normal echo. On Eliquis. Last visit we restarted diltiazem. Since last visit seen PCP and notes reviewed. Overall the pt states he is doing well. Pt is able to complete normal ADL without limitations. Denies chest pain, edema, syncope or shortness of breath at rest, has no tachycardia, palpitations or sense of arrhythmia. Assessment/Plan:     1. Paroxysmal atrial fibrillation (HCC)  Confirms he is on diltiazem. Has had previous ep of rapid af. Tolerating current meds, but needs to take Eliquis BID. He can cut them in half as I think he will agree to 2.5 mg dose, he is aware of the risk for stroke with lower dose and if he only takes them once a day, that really worries me as he is activating the clotting system haphazardly so half dose BID is fine    2. Essential hypertension  Stable. BP Readings from Last 6 Encounters:   10/11/19 120/60   05/16/19 137/89   04/15/19 150/81   03/07/19 138/80   01/03/19 151/84   12/13/18 138/80       3. Pure hypercholesterolemia  Lab Results   Component Value Date/Time    LDL, calculated 123 (H) 01/30/2019 08:12 AM   Diet controlled. Follow up in 6 months with echo. Future Appointments   Date Time Provider Kassy Kumar   [facility]   4/17/2020  9:40 AM Jeniffer Carr  [address]      Patient Instructions   Start taking eliquis 2.5 mg twice a day. Schedule echocardiogram.    Follow up with Dr. John Moreno in 6 months. Key CAD CHF Meds             apixaban (ELIQUIS) 2.5 mg tablet (Taking) Take 1 Tab by mouth two (2) times a day.     lisinopril (PRINIVIL, ZESTRIL) 20 mg tablet (Taking) TAKE 1 TABLET BY MOUTH EVERY DAY dilTIAZem XR (DILT-XR) 120 mg XR capsule (Taking) Take 1 Cap by mouth daily. Impression:   1. Paroxysmal atrial fibrillation (HCC)    2. Essential hypertension    3. Pure hypercholesterolemia       Cardiac History:   No specialty comments available. ROS-except as noted above. . A complete cardiac and respiratory are reviewed and negative except as above ; Resp-denies wheezing  or productive cough,. Const- No unusual weight loss or fever; Neuro-no recent seizure or CVA ; GI- No BRBPR, abdom pain, bloating ; - no  hematuria   see supplement sheet, initialed and to be scanned by staff  Past Medical History:   Diagnosis Date    Appendicitis     2018 underwent appendectomy    Arthritis     DJD    Enlarged prostate     Hyperlipidemia     Hypertension     LINDA treated with BiPAP     PAF (paroxysmal atrial fibrillation) ([facility] [address]     Pre-diabetes     HA1C 6.0 Dec 2016    Shingles       Social Hx= reports that he has never smoked. He has never used smokeless tobacco. He reports that he drinks alcohol. He reports that he does not use drugs. Exam and Labs:  /60 (BP 1 Location: Left arm, BP Patient Position: Sitting)   Pulse 65   Resp 16   Ht 5' 9\" (1.753 m)   Wt 200 lb (90.7 kg)   SpO2 98%   BMI 29.53 kg/m² Constitutional:  NAD, comfortable  Head: NC,AT. Eyes: No scleral icterus. Neck:  Neck supple. No JVD present. Throat: moist mucous membranes. Chest: Effort normal & normal respiratory excursion . Neurological: alert, conversant and oriented . Skin: Skin is not cold. No obvious systemic rash noted. Not diaphoretic. No erythema. Psychiatric:  Grossly normal mood and affect. Behavior appears normal. Extremities:  no clubbing or cyanosis. Abdomen: non distended    Lungs:breath sounds normal. No stridor. distress, wheezes or  Rales. Heart:  normal rate, regular rhythm, normal S1, S2, no murmurs, rubs, clicks or gallops , PMI non displaced. Edema: Edema is none.   Lab Results   Component Value Date/Time    Cholesterol, total 189 01/30/2019 08:12 AM    HDL Cholesterol 30 (L) 01/30/2019 08:12 AM    LDL, calculated 123 (H) 01/30/2019 08:12 AM    Triglyceride 179 (H) 01/30/2019 08:12 AM     Lab Results   Component Value Date/Time    Sodium 141 05/16/2019 02:55 PM    Potassium 4.1 05/16/2019 02:55 PM    Chloride 106 05/16/2019 02:55 PM    CO2 21 05/16/2019 02:55 PM    Anion gap 11 11/27/2018 01:07 PM    Glucose 128 (H) 05/16/2019 02:55 PM    BUN 18 05/16/2019 02:55 PM    Creatinine 1.18 05/16/2019 02:55 PM    BUN/Creatinine ratio 15 05/16/2019 02:55 PM    GFR est AA 67 05/16/2019 02:55 PM    GFR est non-AA 58 (L) 05/16/2019 02:55 PM    Calcium 9.0 05/16/2019 02:55 PM      Wt Readings from Last 3 Encounters:   10/11/19 200 lb (90.7 kg)   05/16/19 199 lb (90.3 kg)   04/15/19 199 lb (90.3 kg)      BP Readings from Last 3 Encounters:   10/11/19 120/60   05/16/19 137/89   04/15/19 150/81      Current Outpatient Medications   Medication Sig    apixaban (ELIQUIS) 2.5 mg tablet Take 1 Tab by mouth two (2) times a day.  tamsulosin (FLOMAX) 0.4 mg capsule TAKE ONE CAPSULE BY MOUTH EVERYNIGHT    lisinopril (PRINIVIL, ZESTRIL) 20 mg tablet TAKE 1 TABLET BY MOUTH EVERY DAY    SIMBRINZA 1-0.2 % drps INSTILL ONE DROP THREE TIMES A DAY INTO THE LEFT EYE AND ONCE A DAY INTO THE RIGHT EYE    dilTIAZem XR (DILT-XR) 120 mg XR capsule Take 1 Cap by mouth daily.  brimonidine (ALPHAGAN P) 0.1 % ophthalmic solution Administer 1 Drop to both eyes daily. Both eyes PM and lt eye in am also    clobetasol (TEMOVATE) 0.05 % topical cream Apply  to affected area two (2) times a day. No current facility-administered medications for this visit. Impression see above.       Written by latonia Menchaca dictated by Arielle Hudson MD.

## 2019-10-15 NOTE — LETTER
10/15/2019 4:01 PM 
 
Mr. Samantha Acosta [id number] Dear Samantha Sanders: 
 
Please find your most recent results below. Resulted Orders XR SHOULDER LT AP/LAT MIN 2 V (Exam End: 10/15/2019  2:59 PM) Narrative EXAM: XR SHOULDER LT AP/LAT MIN 2 V 
 
INDICATION: Left shoulder pain after fall COMPARISON: None. TECHNIQUE: 2 views left shoulder FINDINGS: No acute fracture or dislocation. Acromioclavicular joint 
osteoarthritis may cause subacromial impingement. At least mild glenohumeral 
osteoarthritis is partially imaged. Impression IMPRESSION:  
 
No evidence of fracture on these views. RECOMMENDATIONS: 
No fracture seen Arthritis found on xray Please call me if you have any questions: [phone] Sincerely, 
 
 
Winston Bobby MD

## 2019-10-16 ENCOUNTER — TELEPHONE (OUTPATIENT)
Dept: INTERNAL MEDICINE CLINIC | Age: 84
End: 2019-10-16

## 2019-10-16 DIAGNOSIS — M25.512 ACUTE PAIN OF LEFT SHOULDER: Primary | ICD-10-CM

## 2019-10-16 RX ORDER — HYDROCODONE BITARTRATE AND ACETAMINOPHEN 5; 325 MG/1; MG/1
1 TABLET ORAL
Qty: 21 TAB | Refills: 0 | Status: SHIPPED | OUTPATIENT
Start: 2019-10-16 | End: 2019-10-23

## 2019-10-16 NOTE — TELEPHONE ENCOUNTER
MD Jad Landa LPN   Caller: Unspecified (Today,  9:33 AM)             Hydrocodone ordered     See if he can tolerate     Ask about lip sutures

## 2019-10-16 NOTE — TELEPHONE ENCOUNTER
Dr. Julien Oshea patient last seen by Dr. Joycelyn Del Toro yesterday for a fall & Arm pain. Patient's wife, Britney Abreu states she needs a call back this morning to discuss Patient's Arm Pain & patient can't move arm. Please call to advise.  Thank you

## 2019-10-16 NOTE — TELEPHONE ENCOUNTER
Called, spoke to [employer] (HIPAA). Two pt identifiers confirmed. Estrellita Sanderson informed per Dr. Carly Perrin that Hydrocodone ordered. Estrellita Sanderson states that pt has decided against taking the pain medication. Pt taking tylenol w/ some alleviation. Estrellita Sanderson states that pt's lip is looking better but still a little puffy. Estrellita Sanderson advised to call back Friday if pt/s shoulder sx are persistent/worse. Estrellita Sanderson verbalized understanding of information discussed w/ no further questions at this time.

## 2019-10-16 NOTE — TELEPHONE ENCOUNTER
Spoke to [employer] (HIPAA). Two pt identifiers confirmed. Estrellita Sanderson states that pt is having trouble moving L shoulder. Estrellita Sanderson states pt is having trouble putting a shirt on. Estrellita Sanderson informed per Dr. Carly Perrin that pt should ice shoulder, pain rx can be given--Patti agreed. Estrellita Sanderson informed per Dr. Carly Perrin to give shoulder sx a couple more days to heal and if not better, than an MRI would be ordered. Estrellita Sanderson verbalized understanding of information discussed w/ no further questions at this time.

## 2019-10-18 ENCOUNTER — TELEPHONE (OUTPATIENT)
Dept: INTERNAL MEDICINE CLINIC | Age: 84
End: 2019-10-18
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
## 2019-10-18 DIAGNOSIS — M25.512 ACUTE PAIN OF LEFT SHOULDER: Primary | ICD-10-CM

## 2019-10-18 NOTE — TELEPHONE ENCOUNTER
Patient's wife, Grant Lara, states she needs a call back to discuss getting an order for patient that fell & was seen by Dr. Bebe Price on 10/15/19 & is still having persistent shoulder pain. Please call.  Thank you

## 2019-10-18 NOTE — TELEPHONE ENCOUNTER
Spoke to pt. Two pt identifiers confirmed. Pt c/o from shoulder down to the elbow-constant. Pt states certain movements aggravates the shoulder. Pt states only minimal healing from fall but otherwise, no change in pain. Pt informed per Dr. Shereen Lewis that MRI shoulder will be placed. Brenda Lewis, MRI ordered. Pt informed once the MRI is complete, then a plan of action will be set. Advised pt on pain relief methods per Dr. Shereen Lewis. Pt verbalized understanding of information discussed w/ no further questions at this time.

## 2019-10-22 ENCOUNTER — HOSPITAL ENCOUNTER (OUTPATIENT)
Dept: MRI IMAGING | Age: 84
Discharge: HOME | End: 2019-10-22
Attending: INTERNAL MEDICINE
Payer: MEDICARE
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
## 2019-10-22 DIAGNOSIS — M25.512 ACUTE PAIN OF LEFT SHOULDER: ICD-10-CM

## 2019-10-22 PROCEDURE — 73221 MRI JOINT UPR EXTREM W/O DYE: CPT

## 2019-10-22 NOTE — PROGRESS NOTES
Called, spoke to pt. Two pt identifiers confirmed. Pt informed per Dr. Carly Perrin severe tendon damage likely needs surgery send to Dr. Sunday Alcocer pt referral.   Pt verbalized understanding of information discussed w/ no further questions at this time.

## 2019-12-11 ENCOUNTER — HOSPITAL ENCOUNTER (OUTPATIENT)
Dept: CT IMAGING | Age: 84
Discharge: HOME | End: 2019-12-11
Attending: ORTHOPAEDIC SURGERY
Payer: MEDICARE

## 2019-12-11 DIAGNOSIS — S46.012D TRAUMATIC COMPLETE TEAR OF LEFT ROTATOR CUFF, SUBSEQUENT ENCOUNTER: ICD-10-CM

## 2019-12-11 PROCEDURE — 73200 CT UPPER EXTREMITY W/O DYE: CPT

## 2019-12-12 ENCOUNTER — CLINICAL SUPPORT (OUTPATIENT)
Dept: INTERNAL MEDICINE CLINIC | Age: 84
End: 2019-12-12

## 2019-12-12 VITALS
HEART RATE: 69 BPM | DIASTOLIC BLOOD PRESSURE: 76 MMHG | RESPIRATION RATE: 16 BRPM | BODY MASS INDEX: 29.53 KG/M2 | SYSTOLIC BLOOD PRESSURE: 143 MMHG | HEIGHT: 69 IN | OXYGEN SATURATION: 98 %

## 2019-12-12 DIAGNOSIS — Z23 ENCOUNTER FOR IMMUNIZATION: Primary | ICD-10-CM

## 2019-12-31 ENCOUNTER — TELEPHONE (OUTPATIENT)
Dept: CARDIOLOGY CLINIC | Age: 84
End: 2019-12-31

## 2019-12-31 NOTE — TELEPHONE ENCOUNTER
Verified patient with two types of identifiers. Let patient know Dr. Nereida Pop is out of the office this week but he can look at it next week. Patient verbalized understanding and will call with any other questions.

## 2019-12-31 NOTE — LETTER
12/31/2019 2:02 PM 
 
Mr. Gisela Acosta [address] [id number] Gisela Acosta is a 82 y.o. male 1935 We see him for atrial fibrillation Having shoulder surgery with Dr Randy Leon 1- I have no objection to the planned  surgery. Patient seems to be at a low risk for christine-operative severe adverse cardiac events. He can stop Eliquis 48 hours prior to surgery. Resume 24 hours later if not severe bleeding. Last office note enclosed.  
  
Sincerely, 
 
 
Lobito Sotelo MD

## 2019-12-31 NOTE — TELEPHONE ENCOUNTER
Patient states that he is having a shoulder replacement by Dr. Juan Bryant from [facility] on 1/16 and needs permission to stop his eliquis and hoe soon prior to surgery as well as getting the okay from Dr. Carlos Randolph to have the surgery. Please advise.     Phone: [phone]

## 2020-01-07 ENCOUNTER — TELEPHONE (OUTPATIENT)
Dept: CARDIOLOGY CLINIC | Age: 85
End: 2020-01-07

## 2020-01-07 NOTE — PERIOP NOTES
LM with  at cardiologist office for Eliquis clearance and to see if patient needed to have Echocardiogram prior to shoulder surgery. ASU main line and fax line given. Awaiting a call back.

## 2020-01-07 NOTE — TELEPHONE ENCOUNTER
Christine with [facility] calling to see if the patient can stop his medication for his shoulder surg on 1/16/20. She can be reached at [phone] or the clearance can be faxed to [phone].     [employer]

## 2020-01-07 NOTE — PERIOP NOTES
[facility]  Ambulatory Surgery Unit  Pre-operative Instructions    Surgery/Procedure Date  1/16/2020            Tentative Arrival Time TBD      1. On the day of your surgery/procedure, please report to the Ambulatory Surgery Unit Registration Desk and sign in at your designated time. The Ambulatory Surgery Unit is located in [facility] on the [facility] side of the \[facility] [place] [facility]\"" across from the [facility] building. Please have all of your health insurance cards and a photo ID. 2. You must have someone with you to drive you home, as you should not drive a car for 24 hours following anesthesia. Please make arrangements for a responsible adult friend or family member to stay with you for at least the first 24 hours after your surgery. 3. Do not have anything to eat or drink (including water, gum, mints, coffee, juice) after 11:59 PM  1/15/2020, Wed. This may not apply to medications prescribed by your physician. (Please note below the special instructions with medications to take the morning of surgery, if applicable.)    4. We recommend you do not drink any alcoholic beverages for 24 hours before and after your surgery. 5. Contact your surgeons office for instructions on the following medications: non-steroidal anti-inflammatory drugs (i.e. Advil, Aleve), vitamins, and supplements. (Some surgeons will want you to stop these medications prior to surgery and others may allow you to take them)   **If you are currently taking Plavix, Coumadin, Aspirin and/or other blood-thinning agents, contact your surgeon for instructions. ** Your surgeon will partner with the physician prescribing these medications to determine if it is safe to stop or if you need to continue taking. Please do not stop taking these medications without instructions from your surgeon. 6. SEE HIBICLENS INSTRUCTIONS    7. Wear comfortable clothes. Wear glasses instead of contacts.  Do not bring any jewelry or money (other than copays or fees as instructed). Do not wear make-up, particularly mascara, the morning of your surgery. Do not wear nail polish, particularly if you are having foot /hand surgery. Wear your hair loose or down, no ponytails, buns, willard pins or clips. All body piercings must be removed. 8. You should understand that if you do not follow these instructions your surgery may be cancelled. If your physical condition changes (i.e. fever, cold or flu) please contact your surgeon as soon as possible. 9. It is important that you be on time. If a situation occurs where you may be late, or if you have any questions or problems, please call [phone].    10. Your surgery time may be subject to change. You will receive a phone call the day prior to surgery to confirm your arrival time. 11. Pediatric patients: please bring a change of clothes, diapers, bottle/sippy cup, pacifier, etc.      Special Instructions: Take all medications and inhalers, as prescribed, on the morning of surgery with a sip of water EXCEPT: None. Insulin Dependent Diabetic patients: Take your diabetic medications as prescribed the day before surgery. Hold all diabetic medications the day of surgery. If you are scheduled to arrive for surgery after 8:00 AM, and your AM blood sugar is >200, please call Ambulatory Surgery. I understand a pre-operative phone call will be made to verify my surgery time. In the event that I am not available, I give permission for a message to be left on my answering service and/or with another person?       yes         ___________________      ___________________      ________________  (Signature of Patient)          (Witness)                   (Date and Time)

## 2020-01-07 NOTE — PERIOP NOTES
Preventing Infections Before and After  Your Surgery    IMPORTANT INSTRUCTIONS    Please read and follow these instructions carefully. If you are unable to comply with the below instructions your procedure will be cancelled. Every Night for Three (3) nights before your surgery:  1. Shower with an antibacterial soap, such as Dial, or the soap provided at your preassessment appointment. A shower is better than a bath for cleaning your skin. 2. If needed, ask someone to help you reach all areas of your body. Dont forget to clean your belly button with every shower. The night before your surgery: If you lose your Hibiclens/chlorhexidine please contact surgery center or you can purchase it at a local pharmacy  1. On the night before your surgery, shower with an antibacterial soap, such as Dial, or the soap provided at your preassessment appointment. 2. With one packet of Hibiclens/Chlorhexidine in hand, turn water off.  3. Apply Hibiclens antiseptic skin cleanser with a clean, freshly washed washcloth. ? Gently apply to your body from chin to toes (except the genital area) and especially the area(s) where your incision(s) will be. ? Leave Hibiclens/Chlorhexidine on your skin for at least 20 seconds. CAUTION: If needed, Hibiclens/chlorhexidine may be used to clean the folds of skin of the legs (such as in the area of the groin) and on your buttocks and hips. However, do not use Hibiclens/Chlorhexidine above the neck or in the genital area (your bottom) or put inside any area of your body. 4. Turn the water back on and rinse. 5. Dry gently with a clean, freshly washed towel. 6. After your shower, do not use any powder, deodorant, perfumes or lotion. 7. Use clean, freshly washed towels and washcloths every time you shower. 8. Wear clean, freshly washed pajamas to bed the night before surgery. 9. Sleep on clean, freshly washed sheets. 10. Do not allow pets to sleep in your bed with you.     The Morning of your surgery:  1. Shower again thoroughly with an antibacterial soap, such as Dial or the soap provided at your preassessment appointment. If needed, ask someone for help to reach all areas of your body. Dont forget to clean your belly button! Rinse. 2. Dry gently with a clean, freshly washed towel. 3. After your shower, do not use any powder, deodorant, perfumes or lotion prior to surgery. 4. Put on clean, freshly washed clothing. Tips to help prevent infections after your surgery:  1. Protect your surgical wound from germs:  ? Hand washing is the most important thing you and your caregivers can do to prevent infections. ? Keep your bandage clean and dry! ? Do not touch your surgical wound. 2. Use clean, freshly washed towels and washcloths every time you shower; do not share bath linens with others. 3. Until your surgical wound is healed, wear clothing and sleep on bed linens each day that are clean and freshly washed. 4. Do not allow pets to sleep in your bed with you or touch your surgical wound. 5. Do not smoke  smoking delays wound healing. This may be a good time to stop smoking. 6. If you have diabetes, it is important for you to manage your blood sugar levels properly before your surgery as well as after your surgery. Poorly managed blood sugar levels slow down wound healing and prevent you from healing completely. If you lose your Hibiclens/chlorhexidine, please call the [facility], or it is available for purchase at your pharmacy.                ___________________      ___________________      ________________  (Signature of Patient)          (Witness)                   (Date and Time)

## 2020-01-08 ENCOUNTER — HOSPITAL ENCOUNTER (OUTPATIENT)
Dept: SURGERY | Age: 85
Discharge: HOME | DRG: 951 | End: 2020-01-08
Admitting: ORTHOPAEDIC SURGERY
Payer: MEDICARE

## 2020-01-08 VITALS
SYSTOLIC BLOOD PRESSURE: 157 MMHG | RESPIRATION RATE: 18 BRPM | HEART RATE: 60 BPM | TEMPERATURE: 98.6 F | DIASTOLIC BLOOD PRESSURE: 72 MMHG | OXYGEN SATURATION: 97 %

## 2020-01-08 LAB
ABO + RH BLD: NORMAL
ALBUMIN SERPL-MCNC: 3.7 G/DL (ref 3.5–5)
ALBUMIN/GLOB SERPL: 1 {RATIO} (ref 1.1–2.2)
ALP SERPL-CCNC: 73 U/L (ref 45–117)
ALT SERPL-CCNC: 23 U/L (ref 12–78)
ANION GAP SERPL CALC-SCNC: 7 MMOL/L (ref 5–15)
APPEARANCE UR: CLEAR
AST SERPL-CCNC: 12 U/L (ref 15–37)
ATRIAL RATE: 58 BPM
BACTERIA URNS QL MICRO: NEGATIVE /HPF
BILIRUB SERPL-MCNC: 0.5 MG/DL (ref 0.2–1)
BILIRUB UR QL: NEGATIVE
BLOOD GROUP ANTIBODIES SERPL: NORMAL
BUN SERPL-MCNC: 17 MG/DL (ref 6–20)
BUN/CREAT SERPL: 15 (ref 12–20)
CALCIUM SERPL-MCNC: 9 MG/DL (ref 8.5–10.1)
CALCULATED P AXIS, ECG09: 9 DEGREES
CALCULATED R AXIS, ECG10: 78 DEGREES
CALCULATED T AXIS, ECG11: 68 DEGREES
CHLORIDE SERPL-SCNC: 107 MMOL/L (ref 97–108)
CO2 SERPL-SCNC: 26 MMOL/L (ref 21–32)
COLOR UR: ABNORMAL
CREAT SERPL-MCNC: 1.13 MG/DL (ref 0.7–1.3)
DIAGNOSIS, 93000: NORMAL
EPITH CASTS URNS QL MICRO: ABNORMAL /LPF
ERYTHROCYTE [DISTWIDTH] IN BLOOD BY AUTOMATED COUNT: 14 % (ref 11.5–14.5)
EST. AVERAGE GLUCOSE BLD GHB EST-MCNC: 123 MG/DL
GLOBULIN SER CALC-MCNC: 3.8 G/DL (ref 2–4)
GLUCOSE SERPL-MCNC: 98 MG/DL (ref 65–100)
GLUCOSE UR STRIP.AUTO-MCNC: NEGATIVE MG/DL
HBA1C MFR BLD: 5.9 % (ref 4–5.6)
HCT VFR BLD AUTO: 44.8 % (ref 36.6–50.3)
HGB BLD-MCNC: 14.7 G/DL (ref 12.1–17)
HGB UR QL STRIP: ABNORMAL
HYALINE CASTS URNS QL MICRO: ABNORMAL /LPF (ref 0–5)
INR PPP: 1 (ref 0.9–1.1)
KETONES UR QL STRIP.AUTO: NEGATIVE MG/DL
LEUKOCYTE ESTERASE UR QL STRIP.AUTO: NEGATIVE
MCH RBC QN AUTO: 27.5 PG (ref 26–34)
MCHC RBC AUTO-ENTMCNC: 32.8 G/DL (ref 30–36.5)
MCV RBC AUTO: 83.9 FL (ref 80–99)
NITRITE UR QL STRIP.AUTO: NEGATIVE
NRBC # BLD: 0 K/UL (ref 0–0.01)
NRBC BLD-RTO: 0 PER 100 WBC
P-R INTERVAL, ECG05: 190 MS
PH UR STRIP: 6.5 [PH] (ref 5–8)
PLATELET # BLD AUTO: 158 K/UL (ref 150–400)
PMV BLD AUTO: 9.6 FL (ref 8.9–12.9)
POTASSIUM SERPL-SCNC: 4.2 MMOL/L (ref 3.5–5.1)
PROT SERPL-MCNC: 7.5 G/DL (ref 6.4–8.2)
PROT UR STRIP-MCNC: NEGATIVE MG/DL
PROTHROMBIN TIME: 10.3 SEC (ref 9–11.1)
Q-T INTERVAL, ECG07: 404 MS
QRS DURATION, ECG06: 94 MS
QTC CALCULATION (BEZET), ECG08: 396 MS
RBC # BLD AUTO: 5.34 M/UL (ref 4.1–5.7)
RBC #/AREA URNS HPF: ABNORMAL /HPF (ref 0–5)
SODIUM SERPL-SCNC: 140 MMOL/L (ref 136–145)
SP GR UR REFRACTOMETRY: 1.02 (ref 1–1.03)
SPECIMEN EXP DATE BLD: NORMAL
UA: UC IF INDICATED,UAUC: ABNORMAL
UROBILINOGEN UR QL STRIP.AUTO: 0.2 EU/DL (ref 0.2–1)
VENTRICULAR RATE, ECG03: 58 BPM
WBC # BLD AUTO: 8.6 K/UL (ref 4.1–11.1)
WBC URNS QL MICRO: ABNORMAL /HPF (ref 0–4)

## 2020-01-08 PROCEDURE — 36415 COLL VENOUS BLD VENIPUNCTURE: CPT

## 2020-01-08 PROCEDURE — 93005 ELECTROCARDIOGRAM TRACING: CPT

## 2020-01-08 PROCEDURE — 85610 PROTHROMBIN TIME: CPT

## 2020-01-08 PROCEDURE — 85027 COMPLETE CBC AUTOMATED: CPT

## 2020-01-08 PROCEDURE — 80053 COMPREHEN METABOLIC PANEL: CPT

## 2020-01-08 PROCEDURE — 86901 BLOOD TYPING SEROLOGIC RH(D): CPT

## 2020-01-08 PROCEDURE — 83036 HEMOGLOBIN GLYCOSYLATED A1C: CPT

## 2020-01-08 PROCEDURE — 81001 URINALYSIS AUTO W/SCOPE: CPT

## 2020-01-08 NOTE — PERIOP NOTES
Preventing Infections Before and After  Your Surgery    IMPORTANT INSTRUCTIONS    Please read and follow these instructions carefully. If you are unable to comply with the below instructions your procedure will be cancelled. Every Night for Three (3) nights before your surgery:  1. Shower with an antibacterial soap, such as Dial, or the soap provided at your preassessment appointment. A shower is better than a bath for cleaning your skin. 2. If needed, ask someone to help you reach all areas of your body. Dont forget to clean your belly button with every shower. The night before your surgery: If you lose your Hibiclens/chlorhexidine please contact surgery center or you can purchase it at a local pharmacy  1. On the night before your surgery, shower with an antibacterial soap, such as Dial, or the soap provided at your preassessment appointment. 2. With one packet of Hibiclens/Chlorhexidine in hand, turn water off.  3. Apply Hibiclens antiseptic skin cleanser with a clean, freshly washed washcloth. ? Gently apply to your body from chin to toes (except the genital area) and especially the area(s) where your incision(s) will be. ? Leave Hibiclens/Chlorhexidine on your skin for at least 20 seconds. CAUTION: If needed, Hibiclens/chlorhexidine may be used to clean the folds of skin of the legs (such as in the area of the groin) and on your buttocks and hips. However, do not use Hibiclens/Chlorhexidine above the neck or in the genital area (your bottom) or put inside any area of your body. 4. Turn the water back on and rinse. 5. Dry gently with a clean, freshly washed towel. 6. After your shower, do not use any powder, deodorant, perfumes or lotion. 7. Use clean, freshly washed towels and washcloths every time you shower. 8. Wear clean, freshly washed pajamas to bed the night before surgery. 9. Sleep on clean, freshly washed sheets. 10. Do not allow pets to sleep in your bed with you.     The Morning of your surgery:  1. Shower again thoroughly with an antibacterial soap, such as Dial or the soap provided at your preassessment appointment. If needed, ask someone for help to reach all areas of your body. Dont forget to clean your belly button! Rinse. 2. Dry gently with a clean, freshly washed towel. 3. After your shower, do not use any powder, deodorant, perfumes or lotion prior to surgery. 4. Put on clean, freshly washed clothing. Tips to help prevent infections after your surgery:  1. Protect your surgical wound from germs:  ? Hand washing is the most important thing you and your caregivers can do to prevent infections. ? Keep your bandage clean and dry! ? Do not touch your surgical wound. 2. Use clean, freshly washed towels and washcloths every time you shower; do not share bath linens with others. 3. Until your surgical wound is healed, wear clothing and sleep on bed linens each day that are clean and freshly washed. 4. Do not allow pets to sleep in your bed with you or touch your surgical wound. 5. Do not smoke  smoking delays wound healing. This may be a good time to stop smoking. 6. If you have diabetes, it is important for you to manage your blood sugar levels properly before your surgery as well as after your surgery. Poorly managed blood sugar levels slow down wound healing and prevent you from healing completely. If you lose your Hibiclens/chlorhexidine, please call the [facility], or it is available for purchase at your pharmacy.                ___________________      ___________________      ________________  (Signature of Patient)          (Witness)                   (Date and Time)

## 2020-01-08 NOTE — PERIOP NOTES
[facility]  Ambulatory Surgery Unit  Pre-operative Instructions    Surgery/Procedure Date  01/16            Tentative Arrival Time TBD      1. On the day of your surgery/procedure, please report to the Ambulatory Surgery Unit Registration Desk and sign in at your designated time. The Ambulatory Surgery Unit is located in [facility] on the [facility] side of the [facility] across from the [address] [place] [address]. Please have all of your health insurance cards and a photo ID. 2. You must have someone with you to drive you home, as you should not drive a car for 24 hours following anesthesia. Please make arrangements for a responsible adult friend or family member to stay with you for at least the first 24 hours after your surgery. 3. Do not have anything to eat or drink (including water, gum, mints, coffee, juice) after 11:59 PM  01/15. This may not apply to medications prescribed by your physician. (Please note below the special instructions with medications to take the morning of surgery, if applicable.)    4. We recommend you do not drink any alcoholic beverages for 24 hours before and after your surgery. 5. Contact your surgeons office for instructions on the following medications: non-steroidal anti-inflammatory drugs (i.e. Advil, Aleve), vitamins, and supplements. (Some surgeons will want you to stop these medications prior to surgery and others may allow you to take them)   **If you are currently taking Plavix, Coumadin, Aspirin and/or other blood-thinning agents, contact your surgeon for instructions. ** Your surgeon will partner with the physician prescribing these medications to determine if it is safe to stop or if you need to continue taking. Please do not stop taking these medications without instructions from your surgeon.     6. In an effort to help prevent surgical site infection, we ask that you shower with an anti-bacterial soap (i.e. Dial/Safeguard, or the soap provided to you at your preadmission testing appointment) for 3 days prior to and on the morning of surgery, using a fresh towel after each shower. (Please begin this process with fresh bed linens.) Do not apply any lotions, powders, or deodorants after the shower on the day of your procedure. If applicable, please do not shave the operative site for 48 hours prior to surgery. 7. Wear comfortable clothes. Wear glasses instead of contacts. Do not bring any jewelry or money (other than copays or fees as instructed). Do not wear make-up, particularly mascara, the morning of your surgery. Do not wear nail polish, particularly if you are having foot /hand surgery. Wear your hair loose or down, no ponytails, buns, willard pins or clips. All body piercings must be removed. 8. You should understand that if you do not follow these instructions your surgery may be cancelled. If your physical condition changes (i.e. fever, cold or flu) please contact your surgeon as soon as possible. 9. It is important that you be on time. If a situation occurs where you may be late, or if you have any questions or problems, please call [phone].    10. Your surgery time may be subject to change. You will receive a phone call the day prior to surgery to confirm your arrival time. 11. Pediatric patients: please bring a change of clothes, diapers, bottle/sippy cup, pacifier, etc.      Special Instructions: Take all medications and inhalers, as prescribed, on the morning of surgery with a sip of water EXCEPT: Lisinopril   Stop Eliquis for 48 hours prior to procedure per cardiology     I understand a pre-operative phone call will be made to verify my surgery time. In the event that I am not available, I give permission for a message to be left on my answering service and/or with another person?       Yes           ___________________      ___________________      ________________  (Signature of Patient)          (Witness) (Date and Time)

## 2020-01-09 ENCOUNTER — TELEPHONE (OUTPATIENT)
Dept: CARDIOLOGY CLINIC | Age: 85
End: 2020-01-09

## 2020-01-09 LAB
BACTERIA SPEC CULT: NORMAL
BACTERIA SPEC CULT: NORMAL
SERVICE CMNT-IMP: NORMAL

## 2020-01-09 NOTE — TELEPHONE ENCOUNTER
Adelina Castillo with PAT called to ask if patient has to have the echo cardiogram before the 1/16/20 surgery. Please advise.     [phone]

## 2020-01-09 NOTE — PERIOP NOTES
Received VM from Juan at Dr. Cleveland Barber office stating pt does not need ECHO prior to 1/16 surgery.

## 2020-01-15 ENCOUNTER — ANESTHESIA EVENT (OUTPATIENT)
Dept: SURGERY | Age: 85
DRG: 483 | End: 2020-01-15
Payer: MEDICARE

## 2020-01-15 PROBLEM — M75.102 LEFT ROTATOR CUFF TEAR ARTHROPATHY: Status: ACTIVE | Noted: 2020-01-15

## 2020-01-15 PROBLEM — M12.812 LEFT ROTATOR CUFF TEAR ARTHROPATHY: Status: ACTIVE | Noted: 2020-01-15

## 2020-01-15 NOTE — H&P
PRE- OP History and Physical                             Subjective:     Patient is a 82 y.o. male presented with a history of left shoulder pain.  He states his symptoms are essentially unchanged since previous visit 3 weeks ago.       He has known history of right rotator cuff arthropathy with limited motion  and  his left shoulder was his good shoulder until a little over 2 months ago when he tripped and fell onto his left shoulder.  He had immediate severe pain with inability to move his left arm at the time of injury.  He subsequently saw a specialist and had an MRI ordered which we reviewed which showed what looks like an acute infraspinatus tear and chronic supraspinatus tear, and we talked about attempted cuff repair vs. arthroplasty. About 2 months ago we gave him a cortisone shot and had him to do deltoid strengthening rehab  and  states these overall did not help.       Sharp stabbing achy pain located deltoid and deep in the shoulder with radiating pain to the elbow.  He rates the pain as a 9/10 at its worse.  Pain causes difficulty sleeping and night awakening.  Pain with any attempted movement and use.  Better with resting his arm by his side.     .  The patient's condition has been resistant to non-operative treatment and is being admitted for surgical management of this condition.      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    Left rotator cuff tear arthropathy 01/15/2020    Acute appendicitis 02/08/2018    LINDA treated with BiPAP 07/06/2017    Right leg swelling 02/27/2017    Bilateral edema of lower extremity 02/27/2017    PAF (paroxysmal atrial fibrillation) (HCC)     Hypertension     Enlarged prostate     Hyperlipidemia     DJD (degenerative joint disease) of knee 01/09/2017     Past Medical History:   Diagnosis Date    Appendicitis     2018 underwent appendectomy    Arthritis     DJD    Asthma     As a child     Enlarged prostate     Hyperlipidemia     Hypertension     LINDA treated with BiPAP     PAF [facility]ox[facility] fibrillation) ([facility] [address]     Pre-diabetes     HA1C 6.0 Dec 2016    Shingles       Past Surgical History:   Procedure Laterality Date    HX APPENDECTOMY  2018    HX COLONOSCOPY  2009    normal except for hemorrhoids    HX HEENT      wisdom teeth extraction x2    HX HEENT  1950s    Cyst removed from under tongue     HX KNEE REPLACEMENT      right; Jan. 2017    HX SHOULDER ARTHROSCOPY Right     rt    HX TONSILLECTOMY      HX UROLOGICAL  2018    Urolift       Prior to Admission medications    Medication Sig Start Date End Date Taking? Authorizing Provider   apixaban (ELIQUIS) 2.5 mg tablet Take 1 Tab by mouth two (2) times a day. Patient taking differently: Take 1.25 mg by mouth two (2) times a day. As of 1/7/2020, pt takes 1/2 tab twice/day 10/14/19  Yes Saeed Sutton MD   tamsulosin (FLOMAX) 0.4 mg capsule TAKE ONE CAPSULE BY MOUTH EVERYNIGHT 10/4/19  Yes Provider, Historical   lisinopril (PRINIVIL, ZESTRIL) 20 mg tablet TAKE 1 TABLET BY MOUTH EVERY DAY 10/3/19  Yes Surjit Etienne MD   SIMBRINZA 1-0.2 % drps INSTILL ONE DROP THREE TIMES A DAY INTO THE LEFT EYE AND ONCE A DAY INTO THE RIGHT EYE 4/3/19  Yes Provider, Historical   dilTIAZem XR (DILT-XR) 120 mg XR capsule Take 1 Cap by mouth daily. 3/7/19  Yes Saeed Sutton MD   clobetasol (TEMOVATE) 0.05 % topical cream Apply  to affected area two (2) times a day. 5/19/17   Surjit Etienne MD     Allergies   Allergen Reactions    Oxycodone Other (comments)     Patient states, \"It made me feel unwell. \"      Social History     Tobacco Use    Smoking status: Former Smoker    Smokeless tobacco: Never Used   Substance Use Topics    Alcohol use: Yes     Comment: occasional      Family History   Problem Relation Age of Onset    Coronary Artery Disease Father 47         at [de-identified] of MI      Review of Systems  A comprehensive review of systems was negative except for that written in the HPI. Objective:     Patient Vitals for the past 8 hrs:   BP Temp Pulse Resp SpO2 Height Weight   20 1141 175/85 99.3 °F (37.4 °C) 77 18 97 % 5' 9\" (1.753 m) 87.5 kg (193 lb)     Visit Vitals  /85 (BP 1 Location: Right arm, BP Patient Position: At rest)   Pulse 77   Temp 99.3 °F (37.4 °C)   Resp 18   Ht 5' 9\" (1.753 m)   Wt 87.5 kg (193 lb)   SpO2 97%   BMI 28.50 kg/m²     General:  Alert, cooperative, no distress, appears stated age. Head:  Normocephalic, without obvious abnormality, atraumatic. Eyes:  Conjunctivae/corneas clear. PERRL, EOMs intact. Ears:  Normal TMs and external ear canals both ears. Nose: Nares normal. Septum midline. Mucosa normal. No drainage or sinus tenderness. Throat: Lips, mucosa, and tongue normal. Teeth and gums normal.   Neck: Supple, symmetrical, trachea midline, no adenopathy, thyroid: no enlargement/tenderness/nodules, no carotid bruit and no JVD. Back:   Symmetric, no curvature. ROM normal. No CVA tenderness. Lungs:   Clear to auscultation bilaterally. Chest wall:  No tenderness or deformity. Heart:  Regular rate and rhythm, S1, S2, no murmur, click, rub or gallop. Abdomen:   Soft, non-tender. Bowel sounds normal. No masses,  No organomegaly. Extremities: Extremities normal except LEFT shoulder pseudoparalytic , passive elevation to 90, positive external rotation lag sign. All 3 heads of the deltoid seem to be firing. Positive drop arm test, positive Monterroso', weakness with external rotation testing, torn biceps.  , atraumatic, no cyanosis or edema. Pulses: 2+ and symmetric all extremities. Skin: Skin color, texture, turgor normal. No rashes or lesions   Lymph nodes: Cervical, supraclavicular, and axillary nodes normal.   Neurologic: CNII-XII intact.  Neurovascular exam intact in distal extremities        Imaging Review  I independently reviewed and interpreted both the CT scan and report of left shoulder from 12/11/19 which shows good glenoid bone stock for a reverse.       I reviewed and interpreted previous 2-view x-rays of L shoulder from 10/24/2019 which show proximal humerus migration, type 1 acromion, mild glenohumeral osteoarthritis, no fracture.        Assessment:     Active Problems:    Left rotator cuff tear arthropathy (1/15/2020)      Osteoarthritis of left shoulder (1/16/2020)      Status post reverse arthroplasty of shoulder (1/16/2020)        Plan:   Patients axillary nerve seems to be ok despite persistent pseudoparalysis now 2 months after left shoulder injury. He also has known full thickness chronic rotator cuff tear seen on CT scan and has adequate bone stock for a reverse. I explained a reverse is my recommendation over attempted rotator cuff repair.       I reviewed patients medical record, previous office notes, previous x-rays, CT scan,  and  discussed findings, imaging, impression, treatment options, and plan with the patient and his wife. I discussed the pros and cons of nonsurgical treatment versus surgical intervention, but his history of nonsurgical treatments have been helpful. Treatment options discussed to include no intervention, prescription strength oral anti-inflammatories, injections, Tylenol, physical therapy, and surgical intervention.      I discussed indications, risks, benefits, and recovery for reverse total shoulder arthroplasty. I explained surgery is predictable for pain relief, average elevation of about 120 degrees, may have some loss of external rotation strength, and it is not designed for heavy use. He demonstrated being able to get up from a chair without assistance of his arms.     Due to risk of infection I explained he should not have any open sores and should have good oral health prior to surgery.       After discussion and answering questions, it was elected to proceed with reverse TSA and surgery is already set up for 1 month from today. Medical history was reviewed preop. We will give him a subacromial cortisone shot for interim pain relief and he should try to work on passive to active motion to try to prevent stiffness. Patient and wife know to call with any questions. Operative and non-operative treatments have been discussed with the patient including risks and benefits of each. After consideration of risks, benefits limitations to the consented procedures and alternative options for treatment, the patient has consented to surgical interventions. Questions were answered and Pre-op teaching was completed.       ELADIO Rhodes

## 2020-01-16 ENCOUNTER — HOSPITAL ENCOUNTER (INPATIENT)
Age: 85
LOS: 1 days | Discharge: HOME | DRG: 483 | End: 2020-01-17
Attending: ORTHOPAEDIC SURGERY | Admitting: ORTHOPAEDIC SURGERY
Payer: MEDICARE

## 2020-01-16 ENCOUNTER — ANESTHESIA (OUTPATIENT)
Dept: SURGERY | Age: 85
DRG: 483 | End: 2020-01-16
Payer: MEDICARE

## 2020-01-16 DIAGNOSIS — M12.812 LEFT ROTATOR CUFF TEAR ARTHROPATHY: Primary | ICD-10-CM

## 2020-01-16 DIAGNOSIS — M75.102 LEFT ROTATOR CUFF TEAR ARTHROPATHY: Primary | ICD-10-CM

## 2020-01-16 PROBLEM — Z96.619 STATUS POST REVERSE ARTHROPLASTY OF SHOULDER: Status: ACTIVE | Noted: 2020-01-16

## 2020-01-16 PROBLEM — M19.012 OSTEOARTHRITIS OF LEFT SHOULDER: Status: ACTIVE | Noted: 2020-01-16

## 2020-01-16 PROCEDURE — 77030011640 HC PAD GRND REM COVD -A: Performed by: ORTHOPAEDIC SURGERY

## 2020-01-16 PROCEDURE — 74011250637 HC RX REV CODE- 250/637: Performed by: NURSE ANESTHETIST, CERTIFIED REGISTERED

## 2020-01-16 PROCEDURE — 77030018673: Performed by: ORTHOPAEDIC SURGERY

## 2020-01-16 PROCEDURE — 77030033138 HC SUT PGA STRATFX J&J -B: Performed by: ORTHOPAEDIC SURGERY

## 2020-01-16 PROCEDURE — 65270000029 HC RM PRIVATE

## 2020-01-16 PROCEDURE — 77030013708 HC HNDPC SUC IRR PULS STRY –B: Performed by: ORTHOPAEDIC SURGERY

## 2020-01-16 PROCEDURE — 77030002922 HC SUT FBRWRE ARTH -B: Performed by: ORTHOPAEDIC SURGERY

## 2020-01-16 PROCEDURE — 77030002912 HC SUT ETHBND J&J -A: Performed by: ORTHOPAEDIC SURGERY

## 2020-01-16 PROCEDURE — 76210000036 HC AMBSU PH I REC 1.5 TO 2 HR: Performed by: ORTHOPAEDIC SURGERY

## 2020-01-16 PROCEDURE — 74011250636 HC RX REV CODE- 250/636: Performed by: ANESTHESIOLOGY

## 2020-01-16 PROCEDURE — 77030019908 HC STETH ESOPH SIMS -A: Performed by: NURSE ANESTHETIST, CERTIFIED REGISTERED

## 2020-01-16 PROCEDURE — 74011000250 HC RX REV CODE- 250: Performed by: PHYSICIAN ASSISTANT

## 2020-01-16 PROCEDURE — 74011250636 HC RX REV CODE- 250/636: Performed by: NURSE ANESTHETIST, CERTIFIED REGISTERED

## 2020-01-16 PROCEDURE — 77030018846 HC SOL IRR STRL H20 ICUM -A: Performed by: ORTHOPAEDIC SURGERY

## 2020-01-16 PROCEDURE — 77030021352 HC CBL LD SYS DISP COVD -B: Performed by: ORTHOPAEDIC SURGERY

## 2020-01-16 PROCEDURE — 74011000250 HC RX REV CODE- 250: Performed by: ORTHOPAEDIC SURGERY

## 2020-01-16 PROCEDURE — 77030006835 HC BLD SAW SAG STRY -B: Performed by: ORTHOPAEDIC SURGERY

## 2020-01-16 PROCEDURE — 74011250637 HC RX REV CODE- 250/637: Performed by: ORTHOPAEDIC SURGERY

## 2020-01-16 PROCEDURE — 74011250636 HC RX REV CODE- 250/636: Performed by: ORTHOPAEDIC SURGERY

## 2020-01-16 PROCEDURE — 76030000020 HC AMB SURG 1.5 TO 2 HR INTENSV-TIER 1: Performed by: ORTHOPAEDIC SURGERY

## 2020-01-16 PROCEDURE — 77030018836 HC SOL IRR NACL ICUM -A: Performed by: ORTHOPAEDIC SURGERY

## 2020-01-16 PROCEDURE — C1713 ANCHOR/SCREW BN/BN,TIS/BN: HCPCS | Performed by: ORTHOPAEDIC SURGERY

## 2020-01-16 PROCEDURE — 74011000250 HC RX REV CODE- 250: Performed by: NURSE ANESTHETIST, CERTIFIED REGISTERED

## 2020-01-16 PROCEDURE — 77030020143 HC AIRWY LARYN INTUB CGAS -A: Performed by: NURSE ANESTHETIST, CERTIFIED REGISTERED

## 2020-01-16 PROCEDURE — 76030000003 HC AMB SURG OR TIME 1.5 TO 2: Performed by: ORTHOPAEDIC SURGERY

## 2020-01-16 PROCEDURE — 77030010516 HC APPL HEMA CLP TELE -B: Performed by: ORTHOPAEDIC SURGERY

## 2020-01-16 PROCEDURE — 77030010507 HC ADH SKN DERMBND J&J -B: Performed by: ORTHOPAEDIC SURGERY

## 2020-01-16 PROCEDURE — 77030020255 HC SOL INJ LR 1000ML BG: Performed by: ORTHOPAEDIC SURGERY

## 2020-01-16 PROCEDURE — 77030018850 HC STOCK ANTIEMB THG COVD -A: Performed by: ORTHOPAEDIC SURGERY

## 2020-01-16 PROCEDURE — 77030010782 HC BOWL MX BN ENCR -B: Performed by: ORTHOPAEDIC SURGERY

## 2020-01-16 PROCEDURE — C9290 INJ, BUPIVACAINE LIPOSOME: HCPCS | Performed by: ORTHOPAEDIC SURGERY

## 2020-01-16 PROCEDURE — 77030031139 HC SUT VCRL2 J&J -A: Performed by: ORTHOPAEDIC SURGERY

## 2020-01-16 PROCEDURE — 3E0T3BZ INTRODUCTION OF ANESTHETIC AGENT INTO PERIPHERAL NERVES AND PLEXI, PERCUTANEOUS APPROACH: ICD-10-PCS | Performed by: ANESTHESIOLOGY

## 2020-01-16 PROCEDURE — C1776 JOINT DEVICE (IMPLANTABLE): HCPCS | Performed by: ORTHOPAEDIC SURGERY

## 2020-01-16 PROCEDURE — C9290 INJ, BUPIVACAINE LIPOSOME: HCPCS | Performed by: ANESTHESIOLOGY

## 2020-01-16 PROCEDURE — 0RRK00Z REPLACEMENT OF LEFT SHOULDER JOINT WITH REVERSE BALL AND SOCKET SYNTHETIC SUBSTITUTE, OPEN APPROACH: ICD-10-PCS | Performed by: ORTHOPAEDIC SURGERY

## 2020-01-16 PROCEDURE — 77030013837 HC NERV BLK KT BBMI -B: Performed by: ANESTHESIOLOGY

## 2020-01-16 PROCEDURE — 77030027138 HC INCENT SPIROMETER -A: Performed by: ORTHOPAEDIC SURGERY

## 2020-01-16 PROCEDURE — 74011250637 HC RX REV CODE- 250/637: Performed by: PHYSICIAN ASSISTANT

## 2020-01-16 PROCEDURE — 74011250636 HC RX REV CODE- 250/636: Performed by: PHYSICIAN ASSISTANT

## 2020-01-16 PROCEDURE — 76060000063 HC AMB SURG ANES 1.5 TO 2 HR: Performed by: ORTHOPAEDIC SURGERY

## 2020-01-16 PROCEDURE — 74011000250 HC RX REV CODE- 250: Performed by: ANESTHESIOLOGY

## 2020-01-16 DEVICE — COMPONENT SHLDR REVERSED RSP: Type: IMPLANTABLE DEVICE | Status: FUNCTIONAL

## 2020-01-16 DEVICE — BASEPLATE, GLENOID HA-COAT, RSP, 6.5MM X 30MM
Type: IMPLANTABLE DEVICE | Site: SHOULDER | Status: FUNCTIONAL
Brand: DJO SURGICAL

## 2020-01-16 DEVICE — SCREW, LOCKING BONE, RSP, 5X14
Type: IMPLANTABLE DEVICE | Site: SHOULDER | Status: FUNCTIONAL
Brand: DJO SURGICAL

## 2020-01-16 DEVICE — GLENOID, HEAD W/RETAINING SCREW, RSP, 36MM, NEUTRAL
Type: IMPLANTABLE DEVICE | Site: SHOULDER | Status: FUNCTIONAL
Brand: DJO SURGICAL

## 2020-01-16 DEVICE — PLUG, CEMENT SZ. 10.0
Type: IMPLANTABLE DEVICE | Site: SHOULDER | Status: FUNCTIONAL
Brand: DJO SURGICAL

## 2020-01-16 DEVICE — COBALT G-HV BONE CEMENT 40GM
Type: IMPLANTABLE DEVICE | Site: SHOULDER | Status: FUNCTIONAL
Brand: DJO SURGICAL

## 2020-01-16 DEVICE — SCREW, LOCKING BONE, RSP, 5X18
Type: IMPLANTABLE DEVICE | Site: SHOULDER | Status: FUNCTIONAL
Brand: DJO SURGICAL

## 2020-01-16 RX ORDER — MORPHINE SULFATE 10 MG/ML
2 INJECTION, SOLUTION INTRAMUSCULAR; INTRAVENOUS
Status: DISCONTINUED | OUTPATIENT
Start: 2020-01-16 | End: 2020-01-16 | Stop reason: HOSPADM

## 2020-01-16 RX ORDER — DIPHENHYDRAMINE HYDROCHLORIDE 50 MG/ML
12.5 INJECTION, SOLUTION INTRAMUSCULAR; INTRAVENOUS
Status: DISCONTINUED | OUTPATIENT
Start: 2020-01-16 | End: 2020-01-17 | Stop reason: HOSPADM

## 2020-01-16 RX ORDER — ONDANSETRON 4 MG/1
4 TABLET, FILM COATED ORAL
Qty: 20 TAB | Refills: 1 | Status: SHIPPED | OUTPATIENT
Start: 2020-01-16 | End: 2020-05-29

## 2020-01-16 RX ORDER — FAMOTIDINE 20 MG/1
20 TABLET, FILM COATED ORAL 2 TIMES DAILY
Status: DISCONTINUED | OUTPATIENT
Start: 2020-01-16 | End: 2020-01-17 | Stop reason: HOSPADM

## 2020-01-16 RX ORDER — SODIUM CHLORIDE 0.9 % (FLUSH) 0.9 %
5-40 SYRINGE (ML) INJECTION EVERY 8 HOURS
Status: DISCONTINUED | OUTPATIENT
Start: 2020-01-16 | End: 2020-01-16 | Stop reason: HOSPADM

## 2020-01-16 RX ORDER — TAMSULOSIN HYDROCHLORIDE 0.4 MG/1
0.4 CAPSULE ORAL DAILY
Status: DISCONTINUED | OUTPATIENT
Start: 2020-01-17 | End: 2020-01-17 | Stop reason: HOSPADM

## 2020-01-16 RX ORDER — FENTANYL CITRATE 50 UG/ML
INJECTION, SOLUTION INTRAMUSCULAR; INTRAVENOUS AS NEEDED
Status: DISCONTINUED | OUTPATIENT
Start: 2020-01-16 | End: 2020-01-16 | Stop reason: HOSPADM

## 2020-01-16 RX ORDER — FENTANYL CITRATE 50 UG/ML
25 INJECTION, SOLUTION INTRAMUSCULAR; INTRAVENOUS
Status: DISCONTINUED | OUTPATIENT
Start: 2020-01-16 | End: 2020-01-16 | Stop reason: HOSPADM

## 2020-01-16 RX ORDER — DILTIAZEM HYDROCHLORIDE 120 MG/1
120 CAPSULE, EXTENDED RELEASE ORAL DAILY
Status: DISCONTINUED | OUTPATIENT
Start: 2020-01-17 | End: 2020-01-16 | Stop reason: SDUPTHER

## 2020-01-16 RX ORDER — ONDANSETRON 2 MG/ML
4 INJECTION INTRAMUSCULAR; INTRAVENOUS
Status: DISCONTINUED | OUTPATIENT
Start: 2020-01-16 | End: 2020-01-17 | Stop reason: HOSPADM

## 2020-01-16 RX ORDER — BUPIVACAINE HYDROCHLORIDE 2.5 MG/ML
INJECTION, SOLUTION EPIDURAL; INFILTRATION; INTRACAUDAL AS NEEDED
Status: DISCONTINUED | OUTPATIENT
Start: 2020-01-16 | End: 2020-01-16 | Stop reason: HOSPADM

## 2020-01-16 RX ORDER — SODIUM CHLORIDE 0.9 % (FLUSH) 0.9 %
5-40 SYRINGE (ML) INJECTION EVERY 8 HOURS
Status: DISCONTINUED | OUTPATIENT
Start: 2020-01-16 | End: 2020-01-17 | Stop reason: HOSPADM

## 2020-01-16 RX ORDER — ONDANSETRON 2 MG/ML
4 INJECTION INTRAMUSCULAR; INTRAVENOUS AS NEEDED
Status: DISCONTINUED | OUTPATIENT
Start: 2020-01-16 | End: 2020-01-16 | Stop reason: HOSPADM

## 2020-01-16 RX ORDER — POLYETHYLENE GLYCOL 3350 17 G/17G
17 POWDER, FOR SOLUTION ORAL DAILY
Status: DISCONTINUED | OUTPATIENT
Start: 2020-01-17 | End: 2020-01-17 | Stop reason: HOSPADM

## 2020-01-16 RX ORDER — ALBUTEROL SULFATE 90 UG/1
AEROSOL, METERED RESPIRATORY (INHALATION) AS NEEDED
Status: DISCONTINUED | OUTPATIENT
Start: 2020-01-16 | End: 2020-01-16 | Stop reason: HOSPADM

## 2020-01-16 RX ORDER — SODIUM CHLORIDE 9 MG/ML
100 INJECTION, SOLUTION INTRAVENOUS CONTINUOUS
Status: DISCONTINUED | OUTPATIENT
Start: 2020-01-16 | End: 2020-01-17 | Stop reason: HOSPADM

## 2020-01-16 RX ORDER — SODIUM CHLORIDE 0.9 % (FLUSH) 0.9 %
5-40 SYRINGE (ML) INJECTION AS NEEDED
Status: DISCONTINUED | OUTPATIENT
Start: 2020-01-16 | End: 2020-01-16 | Stop reason: HOSPADM

## 2020-01-16 RX ORDER — LIDOCAINE HYDROCHLORIDE 20 MG/ML
INJECTION, SOLUTION EPIDURAL; INFILTRATION; INTRACAUDAL; PERINEURAL AS NEEDED
Status: DISCONTINUED | OUTPATIENT
Start: 2020-01-16 | End: 2020-01-16 | Stop reason: HOSPADM

## 2020-01-16 RX ORDER — HYDROMORPHONE HYDROCHLORIDE 1 MG/ML
.2-.5 INJECTION, SOLUTION INTRAMUSCULAR; INTRAVENOUS; SUBCUTANEOUS
Status: DISCONTINUED | OUTPATIENT
Start: 2020-01-16 | End: 2020-01-16 | Stop reason: HOSPADM

## 2020-01-16 RX ORDER — DIPHENHYDRAMINE HYDROCHLORIDE 50 MG/ML
12.5 INJECTION, SOLUTION INTRAMUSCULAR; INTRAVENOUS AS NEEDED
Status: DISCONTINUED | OUTPATIENT
Start: 2020-01-16 | End: 2020-01-16 | Stop reason: HOSPADM

## 2020-01-16 RX ORDER — BUPIVACAINE HYDROCHLORIDE 5 MG/ML
INJECTION, SOLUTION EPIDURAL; INTRACAUDAL
Status: DISCONTINUED
Start: 2020-01-16 | End: 2020-01-16

## 2020-01-16 RX ORDER — ACETAMINOPHEN 500 MG
1000 TABLET ORAL EVERY 6 HOURS
Status: DISCONTINUED | OUTPATIENT
Start: 2020-01-16 | End: 2020-01-17 | Stop reason: HOSPADM

## 2020-01-16 RX ORDER — LISINOPRIL 20 MG/1
20 TABLET ORAL DAILY
Status: DISCONTINUED | OUTPATIENT
Start: 2020-01-17 | End: 2020-01-17 | Stop reason: HOSPADM

## 2020-01-16 RX ORDER — SODIUM CHLORIDE 0.9 % (FLUSH) 0.9 %
5-40 SYRINGE (ML) INJECTION AS NEEDED
Status: DISCONTINUED | OUTPATIENT
Start: 2020-01-16 | End: 2020-01-17 | Stop reason: HOSPADM

## 2020-01-16 RX ORDER — HYDROMORPHONE HYDROCHLORIDE 2 MG/1
2 TABLET ORAL
Qty: 30 TAB | Refills: 0 | Status: SHIPPED | OUTPATIENT
Start: 2020-01-16 | End: 2020-01-23

## 2020-01-16 RX ORDER — HYDROMORPHONE HYDROCHLORIDE 2 MG/1
2 TABLET ORAL
Status: DISCONTINUED | OUTPATIENT
Start: 2020-01-16 | End: 2020-01-17 | Stop reason: HOSPADM

## 2020-01-16 RX ORDER — MIDAZOLAM HYDROCHLORIDE 1 MG/ML
INJECTION, SOLUTION INTRAMUSCULAR; INTRAVENOUS
Status: COMPLETED
Start: 2020-01-16 | End: 2020-01-16

## 2020-01-16 RX ORDER — SODIUM CHLORIDE, SODIUM LACTATE, POTASSIUM CHLORIDE, CALCIUM CHLORIDE 600; 310; 30; 20 MG/100ML; MG/100ML; MG/100ML; MG/100ML
25 INJECTION, SOLUTION INTRAVENOUS CONTINUOUS
Status: DISCONTINUED | OUTPATIENT
Start: 2020-01-16 | End: 2020-01-16 | Stop reason: HOSPADM

## 2020-01-16 RX ORDER — NALOXONE HYDROCHLORIDE 0.4 MG/ML
0.4 INJECTION, SOLUTION INTRAMUSCULAR; INTRAVENOUS; SUBCUTANEOUS AS NEEDED
Status: DISCONTINUED | OUTPATIENT
Start: 2020-01-16 | End: 2020-01-17 | Stop reason: HOSPADM

## 2020-01-16 RX ORDER — HYDROMORPHONE HYDROCHLORIDE 1 MG/ML
0.5 INJECTION, SOLUTION INTRAMUSCULAR; INTRAVENOUS; SUBCUTANEOUS
Status: DISCONTINUED | OUTPATIENT
Start: 2020-01-16 | End: 2020-01-17 | Stop reason: HOSPADM

## 2020-01-16 RX ORDER — AMOXICILLIN 250 MG
1 CAPSULE ORAL 2 TIMES DAILY
Status: DISCONTINUED | OUTPATIENT
Start: 2020-01-16 | End: 2020-01-17 | Stop reason: HOSPADM

## 2020-01-16 RX ORDER — GLYCOPYRROLATE 0.2 MG/ML
INJECTION INTRAMUSCULAR; INTRAVENOUS AS NEEDED
Status: DISCONTINUED | OUTPATIENT
Start: 2020-01-16 | End: 2020-01-16 | Stop reason: HOSPADM

## 2020-01-16 RX ORDER — DILTIAZEM HYDROCHLORIDE 120 MG/1
120 CAPSULE, COATED, EXTENDED RELEASE ORAL DAILY
Status: DISCONTINUED | OUTPATIENT
Start: 2020-01-17 | End: 2020-01-17 | Stop reason: HOSPADM

## 2020-01-16 RX ORDER — HYDROMORPHONE HYDROCHLORIDE 2 MG/1
4 TABLET ORAL
Status: DISCONTINUED | OUTPATIENT
Start: 2020-01-16 | End: 2020-01-17 | Stop reason: HOSPADM

## 2020-01-16 RX ORDER — DEXAMETHASONE SODIUM PHOSPHATE 4 MG/ML
INJECTION, SOLUTION INTRA-ARTICULAR; INTRALESIONAL; INTRAMUSCULAR; INTRAVENOUS; SOFT TISSUE AS NEEDED
Status: DISCONTINUED | OUTPATIENT
Start: 2020-01-16 | End: 2020-01-16 | Stop reason: HOSPADM

## 2020-01-16 RX ORDER — PROPOFOL 10 MG/ML
INJECTION, EMULSION INTRAVENOUS AS NEEDED
Status: DISCONTINUED | OUTPATIENT
Start: 2020-01-16 | End: 2020-01-16 | Stop reason: HOSPADM

## 2020-01-16 RX ORDER — LIDOCAINE HYDROCHLORIDE AND EPINEPHRINE 10; 10 MG/ML; UG/ML
INJECTION, SOLUTION INFILTRATION; PERINEURAL AS NEEDED
Status: DISCONTINUED | OUTPATIENT
Start: 2020-01-16 | End: 2020-01-16 | Stop reason: HOSPADM

## 2020-01-16 RX ORDER — ONDANSETRON 2 MG/ML
INJECTION INTRAMUSCULAR; INTRAVENOUS AS NEEDED
Status: DISCONTINUED | OUTPATIENT
Start: 2020-01-16 | End: 2020-01-16 | Stop reason: HOSPADM

## 2020-01-16 RX ORDER — MIDAZOLAM HYDROCHLORIDE 1 MG/ML
INJECTION, SOLUTION INTRAMUSCULAR; INTRAVENOUS AS NEEDED
Status: DISCONTINUED | OUTPATIENT
Start: 2020-01-16 | End: 2020-01-16 | Stop reason: HOSPADM

## 2020-01-16 RX ADMIN — LIDOCAINE HYDROCHLORIDE 100 MG: 20 INJECTION, SOLUTION INTRAVENOUS at 13:27

## 2020-01-16 RX ADMIN — BUPIVACAINE HYDROCHLORIDE 10 ML: 2.5 INJECTION, SOLUTION EPIDURAL; INFILTRATION; INTRACAUDAL; PERINEURAL at 12:37

## 2020-01-16 RX ADMIN — DEXAMETHASONE SODIUM PHOSPHATE 8 MG: 4 INJECTION, SOLUTION INTRAMUSCULAR; INTRAVENOUS at 13:43

## 2020-01-16 RX ADMIN — ACETAMINOPHEN 1000 MG: 500 TABLET ORAL at 19:54

## 2020-01-16 RX ADMIN — Medication 3 AMPULE: at 11:34

## 2020-01-16 RX ADMIN — FENTANYL CITRATE 25 MCG: 50 INJECTION, SOLUTION INTRAMUSCULAR; INTRAVENOUS at 13:34

## 2020-01-16 RX ADMIN — MIDAZOLAM HYDROCHLORIDE 2 MG: 1 INJECTION, SOLUTION INTRAMUSCULAR; INTRAVENOUS at 12:31

## 2020-01-16 RX ADMIN — ONDANSETRON HYDROCHLORIDE 4 MG: 2 INJECTION, SOLUTION INTRAMUSCULAR; INTRAVENOUS at 13:43

## 2020-01-16 RX ADMIN — PROPOFOL 200 MG: 10 INJECTION, EMULSION INTRAVENOUS at 13:30

## 2020-01-16 RX ADMIN — FAMOTIDINE 20 MG: 20 TABLET ORAL at 19:54

## 2020-01-16 RX ADMIN — GLYCOPYRROLATE 0.2 MG: 0.2 INJECTION, SOLUTION INTRAMUSCULAR; INTRAVENOUS at 14:08

## 2020-01-16 RX ADMIN — WATER 2 G: 1 INJECTION INTRAMUSCULAR; INTRAVENOUS; SUBCUTANEOUS at 23:08

## 2020-01-16 RX ADMIN — Medication 10 ML: at 23:08

## 2020-01-16 RX ADMIN — PHENYLEPHRINE HYDROCHLORIDE 200 MCG/MIN: 10 INJECTION INTRAVENOUS at 14:24

## 2020-01-16 RX ADMIN — FENTANYL CITRATE 25 MCG: 50 INJECTION, SOLUTION INTRAMUSCULAR; INTRAVENOUS at 14:57

## 2020-01-16 RX ADMIN — SODIUM CHLORIDE 100 ML/HR: 900 INJECTION, SOLUTION INTRAVENOUS at 18:45

## 2020-01-16 RX ADMIN — ALBUTEROL SULFATE 2 PUFF: 90 AEROSOL, METERED RESPIRATORY (INHALATION) at 13:49

## 2020-01-16 RX ADMIN — SENNOSIDES AND DOCUSATE SODIUM 1 TABLET: 8.6; 5 TABLET ORAL at 19:54

## 2020-01-16 RX ADMIN — Medication 10 ML: at 18:45

## 2020-01-16 RX ADMIN — FENTANYL CITRATE 25 MCG: 50 INJECTION, SOLUTION INTRAMUSCULAR; INTRAVENOUS at 14:27

## 2020-01-16 RX ADMIN — FENTANYL CITRATE 25 MCG: 50 INJECTION, SOLUTION INTRAMUSCULAR; INTRAVENOUS at 14:02

## 2020-01-16 RX ADMIN — BUPIVACAINE 10 ML: 13.3 INJECTION, SUSPENSION, LIPOSOMAL INFILTRATION at 12:37

## 2020-01-16 RX ADMIN — GLYCOPYRROLATE 0.2 MG: 0.2 INJECTION, SOLUTION INTRAMUSCULAR; INTRAVENOUS at 13:39

## 2020-01-16 RX ADMIN — SODIUM CHLORIDE, SODIUM LACTATE, POTASSIUM CHLORIDE, AND CALCIUM CHLORIDE 25 ML/HR: 600; 310; 30; 20 INJECTION, SOLUTION INTRAVENOUS at 11:44

## 2020-01-16 RX ADMIN — PHENYLEPHRINE HYDROCHLORIDE 200 MCG/MIN: 10 INJECTION INTRAVENOUS at 13:38

## 2020-01-16 NOTE — ANESTHESIA PROCEDURE NOTES
Peripheral Block    Start time: 1/16/2020 12:30 PM  End time: 1/16/2020 12:44 PM  Performed by: Leyla Saleem MD  Authorized by: Leyla Saleem MD       Pre-procedure: Indications: at surgeon's request and post-op pain management    Preanesthetic Checklist: patient identified, risks and benefits discussed, site marked, timeout performed, anesthesia consent given and patient being monitored      Block Type:   Block Type:   Interscalene  Laterality:  Left  Monitoring:  Standard ASA monitoring, continuous pulse ox, frequent vital sign checks, heart rate, responsive to questions and oxygen  Injection Technique:  Single shot  Procedures: ultrasound guided and nerve stimulator    Patient Position: supine  Prep: DuraPrep    Location:  Interscalene  Needle Type:  Stimuplex  Needle Gauge:  22 G  Needle Localization:  Nerve stimulator and ultrasound guidance  Motor Response: minimal motor response >0.4 mA      Assessment:  Number of attempts:  1  Injection Assessment:  Incremental injection every 5 mL, local visualized surrounding nerve on ultrasound, negative aspiration for CSF, negative aspiration for blood, no paresthesia, no intravascular symptoms and ultrasound image on chart  Patient tolerance:  Patient tolerated the procedure well with no immediate complications

## 2020-01-16 NOTE — ANESTHESIA PREPROCEDURE EVALUATION
Anesthetic History   No history of anesthetic complications            Review of Systems / Medical History  Patient summary reviewed, nursing notes reviewed and pertinent labs reviewed    Pulmonary        Sleep apnea: BiPAP    Asthma        Neuro/Psych   Within defined limits           Cardiovascular    Hypertension        Dysrhythmias : atrial fibrillation  Hyperlipidemia    Exercise tolerance: >4 METS  Comments: EF=70% 2017   GI/Hepatic/Renal  Within defined limits              Endo/Other        Arthritis     Other Findings              Physical Exam    Airway  Mallampati: II  TM Distance: 4 - 6 cm  Neck ROM: normal range of motion   Mouth opening: Normal     Cardiovascular  Regular rate and rhythm,  S1 and S2 normal,  no murmur, click, rub, or gallop             Dental    Dentition: Caps/crowns     Pulmonary  Breath sounds clear to auscultation               Abdominal  GI exam deferred       Other Findings            Anesthetic Plan    ASA: 2  Anesthesia type: general    Monitoring Plan: BIS  Post-op pain plan if not by surgeon: peripheral nerve block single    Induction: Intravenous  Anesthetic plan and risks discussed with: Patient

## 2020-01-16 NOTE — PERIOP NOTES
Dr. Sammy Means performed nerve block in preop using ultrasound machine to LUE. Pt on CM x3, 02 by NC at 2L, patient responsive when spoken to. Able to answer questions appropriately. Pt did receive Versed 2 mg IV given by Dr. Sammy Means for sedation. Pt tolerated procedure well. VSS and will continue to monitor.

## 2020-01-16 NOTE — ANESTHESIA POSTPROCEDURE EVALUATION
Procedure(s):  LEFT SHOULDER REVERSE ARTHROPLASTY, AXILLARY NERVE NEUROPLASTY. general, regional    Anesthesia Post Evaluation        Patient location during evaluation: PACU  Note status: Adequate. Level of consciousness: responsive to verbal stimuli and sleepy but conscious  Pain management: satisfactory to patient  Airway patency: patent  Anesthetic complications: no  Cardiovascular status: acceptable  Respiratory status: acceptable  Hydration status: acceptable  Comments: +Post-Anesthesia Evaluation and Assessment    Patient: Rome Hutton MRN: [id number]  SSN: [id number]   YOB: 1935  Age: 82 y.o. Sex: male      Cardiovascular Function/Vital Signs    /88 (BP 1 Location: Right arm, BP Patient Position: At rest)   Pulse 94   Temp 37 °C (98.6 °F)   Resp 22   Ht 5' 9\" (1.753 m)   Wt 87.5 kg (193 lb)   SpO2 92%   BMI 28.50 kg/m²     Patient is status post Procedure(s):  LEFT SHOULDER REVERSE ARTHROPLASTY, AXILLARY NERVE NEUROPLASTY. Nausea/Vomiting: Controlled. Postoperative hydration reviewed and adequate. Pain:  Pain Scale 1: Numeric (0 - 10) (01/16/20 1545)  Pain Intensity 1: 0 (01/16/20 1545)   Managed. Neurological Status:   Neuro (WDL): Exceptions to WDL (01/16/20 1507)   At baseline. Mental Status and Level of Consciousness: Arousable. Pulmonary Status:   O2 Device: Nasal cannula (01/16/20 1545)   Adequate oxygenation and airway patent. Complications related to anesthesia: None    Post-anesthesia assessment completed. No concerns.     Signed By: Callie Ding MD    1/16/2020  Post anesthesia nausea and vomiting:  controlled      Vitals Value Taken Time   /88 1/16/2020  3:45 PM   Temp 37 °C (98.6 °F) 1/16/2020  3:16 PM   Pulse 94 1/16/2020  3:45 PM   Resp 22 1/16/2020  3:45 PM   SpO2 92 % 1/16/2020  3:45 PM

## 2020-01-16 NOTE — PERIOP NOTES
1507: Patient received to PACU, VSS. Patient anesthetized, dressing to left shoulder intact. 1521: Patient drowsy but arouses to voice, patient has no complaints of pain to shoulder. Patient can move fingers on left hand. 1545: Patient's wife at bedside. Patient awake and alert tolerating liquids and crackers without issue. Discharge instructions given. RX for dilaudid and zofran given to wife. Patient and wife verbalize understanding of instructions and follow up appointment. TRANSFER - OUT REPORT:    Verbal report given to FREDI Hutchins (name) on Mary Menard  being transferred to Ortho(unit) for routine post - op       Report consisted of patients Situation, Background, Assessment and   Recommendations(SBAR). Information from the following report(s) SBAR, Kardex, Intake/Output and MAR was reviewed with the receiving nurse. Lines:   Peripheral IV 01/16/20 Right Forearm (Active)   Site Assessment Clean, dry, & intact 1/16/2020  3:07 PM   Phlebitis Assessment 0 1/16/2020  3:07 PM   Infiltration Assessment 0 1/16/2020  3:07 PM   Dressing Status Clean, dry, & intact 1/16/2020  3:07 PM   Dressing Type Tape;Transparent 1/16/2020  3:07 PM   Hub Color/Line Status Pink; Infusing 1/16/2020  3:07 PM        Opportunity for questions and clarification was provided.       Patient transported with:   Registered Nurses

## 2020-01-16 NOTE — BRIEF OP NOTE
BRIEF OPERATIVE NOTE    Date of Procedure: 1/16/2020   Preoperative Diagnosis: LEFT SHOULDER ROTATOR CUFF TEAR, OSTEOARTHRITIS  Postoperative Diagnosis: LEFT SHOULDER ROTATOR CUFF TEAR, OSTEOARTHRITIS    Procedure(s):  LEFT SHOULDER REVERSE ARTHROPLASTY, AXILLARY NERVE NEUROPLASTY  Surgeon(s) and Role:     * Walt Motley MD - Primary         Surgical Assistant: Corey Dc PA-C  - Assist     Surgical Staff:  Circ-1: David Mcdermott RN  Circ-2: Loni Benavides RN  Physician Assistant: ELADIO Lowry  Scrub Tech-1: Kourtney Ugarte  Scrub Tech-2: Alisha Lobe Surg Asst-1: Yuki Vela  Event Time In Time Out   Incision Start 1349    Incision Close 1456      Anesthesia: General   Estimated Blood Loss: 80cc  Specimens: * No specimens in log *   Findings: see above   Complications: none  Implants:   Implant Name Type Inv.  Item Serial No.  Lot No. LRB No. Used Action   CEMENT BNE COBALT G-HV 40/20GM - SNA  CEMENT BNE COBALT G-HV 40/20GM NA [employer] [id number] Left 2 Implanted   BASEPLT GLENOID REVER 6.5X30MM --  - SNA  BASEPLT GLENOID REVER 6.5X30MM --  NA [employer] [id number] Left 2 Implanted   HEAD GLENOID REVERSE SHOULDER --  - SNA  HEAD GLENOID REVERSE SHOULDER --  NA [employer] [id number] Left 1 Implanted   SCR LCK GLENOID REVERSE BA --  - SNA  SCR LCK GLENOID REVERSE BA --  NA [employer] [id number] Left 1 Implanted   SCR LCK GLENOID REVERSE BA --  - SNA  SCR LCK GLENOID REVERSE BA --  NA [employer] [id number] Left 1 Implanted   SCR LCK GLENOID REVERSE BA --  - SNA  SCR LCK GLENOID REVERSE BA --  NA [employer] [id number] Left 1 Implanted   RSTRCTR CRISTAL BNE BIOABSRB 10MM --  - SNA  RSTRCTR CRISTAL BNE BIOABSRB 10MM --  NA [employer] [id number] Left 1 Implanted   RSP [place]lock Stem   NA [employer] [id number] Left 1 Implanted   RSP HUMERAL SOCKET INSERT   NA [employer] [id number] Left 1 Implanted

## 2020-01-16 NOTE — PERIOP NOTES
Mateus Field  1935  [id number]    Situation:  Verbal report given from: DANIEL Bravo and L. Ferol Lennox, CRNA  Procedure: Procedure(s):  LEFT SHOULDER REVERSE ARTHROPLASTY, AXILLARY NERVE NEUROPLASTY    Background:    Preoperative diagnosis: LEFT SHOULDER ROTATOR CUFF TEAR, OSTEOARTHRITIS    Postoperative diagnosis: LEFT SHOULDER ROTATOR CUFF TEAR, OSTEOARTHRITIS    :  Dr. Rose Mary Knapp    Assistant(s): Circ-1: Jacey Joe RN  Circ-2: Floresita Garibay RN  Physician Assistant: ELADIO Sanabria  Scrub Tech-1: Justin Penchelsi  Scrub Tech-2: Nay Mathis.   Surg Asst-1: Peter Johansen    Specimens: * No specimens in log *    Assessment:  Intra-procedure medications         Anesthesia gave intra-procedure sedation and medications, see anesthesia flow sheet     Intravenous fluids: LR@ KVO     Vital signs stable       Recommendation:    Permission to share finding with wife Sanya Wilson

## 2020-01-17 VITALS
DIASTOLIC BLOOD PRESSURE: 65 MMHG | RESPIRATION RATE: 16 BRPM | HEART RATE: 78 BPM | SYSTOLIC BLOOD PRESSURE: 116 MMHG | OXYGEN SATURATION: 95 % | WEIGHT: 193 LBS | BODY MASS INDEX: 28.58 KG/M2 | TEMPERATURE: 97.8 F | HEIGHT: 69 IN

## 2020-01-17 PROCEDURE — 97165 OT EVAL LOW COMPLEX 30 MIN: CPT

## 2020-01-17 PROCEDURE — 97110 THERAPEUTIC EXERCISES: CPT

## 2020-01-17 PROCEDURE — 74011250637 HC RX REV CODE- 250/637: Performed by: PHYSICIAN ASSISTANT

## 2020-01-17 PROCEDURE — 74011250636 HC RX REV CODE- 250/636: Performed by: PHYSICIAN ASSISTANT

## 2020-01-17 PROCEDURE — 97535 SELF CARE MNGMENT TRAINING: CPT

## 2020-01-17 PROCEDURE — 51798 US URINE CAPACITY MEASURE: CPT

## 2020-01-17 PROCEDURE — 74011000250 HC RX REV CODE- 250: Performed by: PHYSICIAN ASSISTANT

## 2020-01-17 PROCEDURE — 94760 N-INVAS EAR/PLS OXIMETRY 1: CPT

## 2020-01-17 RX ORDER — POLYETHYLENE GLYCOL 3350 17 G/17G
17 POWDER, FOR SOLUTION ORAL
Qty: 15 PACKET | Refills: 0 | Status: SHIPPED | OUTPATIENT
Start: 2020-01-17 | End: 2020-02-01

## 2020-01-17 RX ORDER — AMOXICILLIN 250 MG
1 CAPSULE ORAL DAILY
Qty: 30 TAB | Refills: 0 | Status: SHIPPED | OUTPATIENT
Start: 2020-01-17 | End: 2020-04-20

## 2020-01-17 RX ORDER — ACETAMINOPHEN 500 MG
1000 TABLET ORAL EVERY 6 HOURS
Qty: 112 TAB | Refills: 0 | Status: SHIPPED
Start: 2020-01-17 | End: 2020-01-31

## 2020-01-17 RX ADMIN — WATER 2 G: 1 INJECTION INTRAMUSCULAR; INTRAVENOUS; SUBCUTANEOUS at 05:37

## 2020-01-17 RX ADMIN — SENNOSIDES AND DOCUSATE SODIUM 1 TABLET: 8.6; 5 TABLET ORAL at 08:41

## 2020-01-17 RX ADMIN — Medication 10 ML: at 05:37

## 2020-01-17 RX ADMIN — LISINOPRIL 20 MG: 20 TABLET ORAL at 08:42

## 2020-01-17 RX ADMIN — DILTIAZEM HYDROCHLORIDE 120 MG: 120 CAPSULE, COATED, EXTENDED RELEASE ORAL at 08:42

## 2020-01-17 RX ADMIN — FAMOTIDINE 20 MG: 20 TABLET ORAL at 08:42

## 2020-01-17 RX ADMIN — APIXABAN 2.5 MG: 2.5 TABLET, FILM COATED ORAL at 08:42

## 2020-01-17 RX ADMIN — ACETAMINOPHEN 1000 MG: 500 TABLET ORAL at 03:52

## 2020-01-17 RX ADMIN — POLYETHYLENE GLYCOL 3350 17 G: 17 POWDER, FOR SOLUTION ORAL at 08:41

## 2020-01-17 RX ADMIN — TAMSULOSIN HYDROCHLORIDE 0.4 MG: 0.4 CAPSULE ORAL at 08:42

## 2020-01-17 RX ADMIN — ACETAMINOPHEN 1000 MG: 500 TABLET ORAL at 12:20

## 2020-01-17 NOTE — PROGRESS NOTES
Problem: Self Care Deficits Care Plan (Adult)  Goal: *Acute Goals and Plan of Care (Insert Text)  Description  FUNCTIONAL STATUS PRIOR TO ADMISSION: Patient was independent and active without use of DME. Patient was independent for basic and instrumental ADLs. HOME SUPPORT PRIOR TO ADMISSION: The patient lived with wife but did not require assist.    Occupational Therapy Goals  Initiated 1/17/2020     1. Patient will don/doff arm sling at minimal assistance/contact guard assist level within 7 days. 2.  Patient will perform Left shoulder exercises per physician order with supervision/set-up within 7 days. 3.  Patient will perform upper body ADLs with minimal assistance/contact guard assist  within 7 days. 4. Patient will perform toileting with supervision/set-up within 7 days. 5.  Patient will perform toilet transfers with modified independence within 7 days. 6.  Patient will verbalize/demonstrate shoulder precautions during ADLs with 100% accuracy within 7 days. Outcome: Progressing Towards Goal     OCCUPATIONAL THERAPY EVALUATION  Patient: Dewayne Harper (33 y.o. 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 Patient is POD 1 s/p L reverse TSA and cleared to participate with OT. Patient is pleasant and received with supportive wife at bedside. He is able to perform L UE exercises with overall SBA to CGA, including passive/active-assist forward flexion. Patient and wife receptive to all instruction on shoulder precautions, UE exercises (per MD orders), upper body ADL adaptations, sling management, home safety, and fall prevention. Written handout provided to increase clarity and carryover of all education. All questions answered. Patient and wife without concerns. Will continue to follow in acute setting in the case of delayed discharge, but patient is cleared from OT for discharge. Current Level of Function Impacting Discharge (ADLs/self-care): up to mod A for upper body ADLs (including sling mgmt)    Functional Outcome Measure: The patient scored 70/100 on the Barthel outcome measure which is indicative of minimal impairment in ADLs and mobility. .      Other factors to consider for discharge: shoulder precautions; supportive wife; good safety awareness     Patient will benefit from skilled therapy intervention to address the above noted impairments. PLAN :  Recommendations and Planned Interventions: self care training, therapeutic activities, patient education, home safety training and family training/education    Frequency/Duration: Patient will be followed by occupational therapy 5 times a week to address goals. Recommendation for discharge: (in order for the patient to meet his/her long term goals)  No skilled occupational therapy/ follow up rehabilitation needs identified at this time. Agree with plan for follow up OP PT for shoulder rehabilitation. This discharge recommendation:  Has been made in collaboration with the attending provider and/or case management    IF patient discharges home will need the following DME: patient owns DME required for discharge       SUBJECTIVE:   Patient stated I don't feel any pain right now.     OBJECTIVE DATA SUMMARY:   HISTORY:   Past Medical History:   Diagnosis Date    Appendicitis     2018 underwent appendectomy    Arthritis     DJD    Asthma     As a child     Enlarged prostate     Hyperlipidemia  Hypertension     LINDA treated with BiPAP     PAF (paroxysmal atrial fibrillation) ([facility] [address]     Pre-diabetes     HA1C 6.0 Dec 2016    Shingles      Past Surgical History:   Procedure Laterality Date    HX APPENDECTOMY  2018    HX COLONOSCOPY  2009    normal except for hemorrhoids    HX HEENT      wisdom teeth extraction x2    HX HEENT  1950s    Cyst removed from under tongue     HX KNEE REPLACEMENT      right; Jan. 2017    HX SHOULDER ARTHROSCOPY Right     rt    HX TONSILLECTOMY      HX UROLOGICAL  2018    [employer]        Expanded or extensive additional review of patient history:     Home Situation  Home Environment: Private residence  # Steps to Enter: 0  One/Two Story Residence: Two story, live on 1st floor  Living Alone: No  Support Systems: Family member(s), Spouse/Significant Other/Partner  Patient Expects to be Discharged to[de-identified] Private residence  Current DME Used/Available at Home: Blood pressure cuff  Tub or Shower Type: Shower    Hand dominance: Right    EXAMINATION OF PERFORMANCE DEFICITS:  Cognitive/Behavioral Status:  Neurologic State: Alert; Appropriate for age  Orientation Level: Oriented X4  Cognition: Appropriate decision making; Appropriate for age attention/concentration; Appropriate safety awareness; Follows commands  Perception: Appears intact  Perseveration: No perseveration noted  Safety/Judgement: Awareness of environment; Fall prevention;Good awareness of safety precautions; Home safety; Insight into deficits    Hearing: Auditory  Auditory Impairment: None    Vision/Perceptual:    Diplopia: No    Corrective Lenses: Glasses    Range of Motion:  AROM: Generally decreased, functional(R shoulder with decreased ROM; L shoulder TSA)  PROM: Generally decreased, functional    Strength:  Strength: Generally decreased, functional    Coordination:  Coordination: Within functional limits  Fine Motor Skills-Upper: Left Intact; Right Intact    Gross Motor Skills-Upper: Left Impaired;Right Intact(L impaired 2/2 surgery)    Tone & Sensation:  Tone: Normal  Sensation: Impaired(residual impaired sensation in L UE)    Balance:  Sitting: Intact  Standing: Intact    Functional Mobility and Transfers for ADLs:  Bed Mobility:  Supine to Sit: Stand-by assistance  Sit to Supine: (Pt seated in chair at end of session)  Scooting: Supervision    Transfers:  Sit to Stand: Stand-by assistance  Stand to Sit: Stand-by assistance  Bed to Chair: Stand-by assistance  Bathroom Mobility: Stand-by assistance(infer based on observations)  Toilet Transfer : Stand-by assistance(infer based on observations)    ADL Assessment:  Feeding: Setup(2/2 L UE immobilized in sling)    Oral Facial Hygiene/Grooming: Stand-by assistance    Bathing: Contact guard assistance(infer for standing bathing tasks)    Upper Body Dressing: Moderate assistance(2/2 L TSA and impaired R shoulder ROM; including sling mgmt)    Lower Body Dressing: Stand-by assistance(pt able to tailor sit)    Toileting: Stand by assistance(infer based on observations)    ADL Intervention and task modifications:  Patient instructed and demonstrated shoulder precautions during ADLs with verbal cues. Feeding  Utensil Management: Independent  Food to Mouth: Independent  Drink to Mouth: Independent      Patient instructed and indicated understanding propping surgical arm on surface, can back away from arm in order to access axilla to wash, dry, and deodorize. Patient instructed and indicated understanding dress L UE first/undress last. Patient instructed and indicated understanding of compensatory strategies to don sling. Upper Body Dressing Assistance  Orthotics(Brace): Maximum assistance(to manage sling)  Cues: Physical assistance; Tactile cues provided;Visual cues provided;Verbal cues provided    Lower Body Dressing Assistance  Socks: Supervision(pt able to tailor sit without difficulty)  Leg Crossed Method Used: Yes  Position Performed: Seated edge of bed    Cognitive Retraining  Safety/Judgement: Awareness of environment; Fall prevention;Good awareness of safety precautions; Home safety; Insight into deficits    Therapeutic Exercise:    EXERCISE   Sets   Reps   Active Active Assist   Passive   Comments   Shoulder passive/active-assist forward Flexion 1 10 []                          [x]                          [x]                          Supine in bed; to 90%   Elbow flexion/extension 1 10 [x]                          []                          []                             Wrist flexion/extension 1 10 [x]                          []                          []                             Finger flexion/extension 1 10 [x]                          []                          []                             Dangle (in preparation for [employer]'s Pendulum)     []     []     []     Pendulums not cleared by MD at this time, but instructed patient on dangling for therapeutic benefit and for ADL participation     Patient instructed on use of R UE to move L UE through full ROM as motor components are impaired due to nerve block. Patient educated on gentle dangling; Supine passive forward elevation in plane of scapula (PFE) to 90, 10-20 reps, 2 x day; C-spine AROM. Educated on Cautions:  1. Assure normal neurovascular status  2. No lifting of involved arm  3. Shoulder extension is limited. Elbow not to go behind midline of body    Educated on Goals:   Maintain stable prosthesis  Minimize pain and inflammatory response  Achieve staged ROM goals  Establish stable scapula  Initiate pain free rotator cuff and deltoid strengthening    Functional Measure:  Barthel Index:    Bathin  Bladder: 10  Bowels: 10  Groomin  Dressin  Feedin  Mobility: 15  Stairs: 5  Toilet Use: 5  Transfer (Bed to Chair and Back): 10  Total: 70/100        The Barthel ADL Index: Guidelines  1. The index should be used as a record of what a patient does, not as a record of what a patient could do.   2. The main aim is to establish degree of independence from any help, physical or verbal, however minor and for whatever reason. 3. The need for supervision renders the patient not independent. 4. A patient's performance should be established using the best available evidence. Asking the patient, friends/relatives and nurses are the usual sources, but direct observation and common sense are also important. However direct testing is not needed. 5. Usually the patient's performance over the preceding 24-48 hours is important, but occasionally longer periods will be relevant. 6. Middle categories imply that the patient supplies over 50 per cent of the effort. 7. Use of aids to be independent is allowed. Sheridan Segal., Barthel, D.W. (1121). Functional evaluation: the Barthel Index. [facility] (14)2. Sandria Cogan [place], J.J.M.F, Debora Mayer, Rosa Ag, Rubio [facility]ungSaint [facility] Hospital, [address] [place] [address] (1999). Measuring the change indisability after inpatient rehabilitation; comparison of the responsiveness of the Barthel Index and Functional Blackford Measure. Journal of Neurology, Neurosurgery, and Psychiatry, 66(4), 823-438. Fan Wolf, N.J.A, ARIES Jackson, & Gay Cleveland, MAlvinA. (2004.) Assessment of post-stroke quality of life in cost-effectiveness studies: The usefulness of the Barthel Index and the EuroQoL-5D. Quality of Life Research, 15, 104-50     Occupational Therapy Evaluation Charge Determination   History Examination Decision-Making   LOW Complexity : Brief history review  LOW Complexity : 1-3 performance deficits relating to physical, cognitive , or psychosocial skils that result in activity limitations and / or participation restrictions  MEDIUM Complexity : Patient may present with comorbidities that affect occupational performnce.  Miniml to moderate modification of tasks or assistance (eg, physical or verbal ) with assesment(s) is necessary to enable patient to complete evaluation       Based on the above components, the patient evaluation is determined to be of the following complexity level: LOW   Pain Rating:  Pt denied pain this session    Activity Tolerance:   Good, tolerates ADLs without rest breaks and SpO2 stable on RA  Please refer to the flowsheet for vital signs taken during this treatment. After treatment patient left in no apparent distress:    Sitting in chair, Call bell within reach, Caregiver / family present and RN notified    COMMUNICATION/EDUCATION:   The patients plan of care was discussed with: Physical Therapist and Registered Nurse. Home safety education was provided and the patient/caregiver indicated understanding., Patient/family have participated as able in goal setting and plan of care. and Patient/family agree to work toward stated goals and plan of care. This patients plan of care is appropriate for delegation to \[facility] [place] [facility]\"".     Thank you for this referral.  Eleuterio Coello OT  Time Calculation: 56 mins

## 2020-01-17 NOTE — DISCHARGE SUMMARY
Ortho Discharge Summary    Patient ID:  Radha Mcnair  [id number]  male  82 y.o.  1935    Admit date: 1/16/2020    Discharge date: 1/17/2020    Admitting Physician: Rebeca Martínez MD     Consulting Physician(s):   Treatment Team: Attending Provider: Jasiel Grimaldo MD; Utilization Review: Bettye Michelle RN    Date of Surgery:   1/16/2020     Preoperative Diagnosis:  LEFT SHOULDER ROTATOR CUFF TEAR, OSTEOARTHRITIS    Postoperative Diagnosis:   LEFT SHOULDER ROTATOR CUFF TEAR, OSTEOARTHRITIS    Procedure(s):     LEFT SHOULDER REVERSE ARTHROPLASTY, AXILLARY NERVE NEUROPLASTY     Anesthesia Type:   General     Surgeon: Jasiel Grimaldo MD                            HPI:  Pt is a 82 y.o. male who has a history of LEFT SHOULDER ROTATOR CUFF TEAR, OSTEOARTHRITIS  with pain and limitations of activities of daily living who presents at this time for a left reverse TSA following the failure of conservative management. PMH:   Past Medical History:   Diagnosis Date    Appendicitis     2018 underwent appendectomy    Arthritis     DJD    Asthma     As a child     Enlarged prostate     Hyperlipidemia     Hypertension     LINDA treated with BiPAP     [facility]     Pre-diabetes     HA1C 6.0 Dec 2016    Shingles        Body mass index is 28.5 kg/m². : A BMI > 30 is classified as obesity and > 40 is classified as morbid obesity. Medications upon admission :   Prior to Admission Medications   Prescriptions Last Dose Informant Patient Reported? Taking? SIMBRINZA 1-0.2 % drps 1/15/2020 at pm  Yes Yes   Sig: INSTILL ONE DROP THREE TIMES A DAY INTO THE LEFT EYE AND ONCE A DAY INTO THE RIGHT EYE   apixaban (ELIQUIS) 2.5 mg tablet 1/12/2020 at pm  No Yes   Sig: Take 1 Tab by mouth two (2) times a day. Patient taking differently: Take 1.25 mg by mouth two (2) times a day.  As of 1/7/2020, pt takes 1/2 tab twice/day   clobetasol (TEMOVATE) 0.05 % topical cream Not Taking at Unknown time  No No   Sig: Apply  to affected area two (2) times a day. dilTIAZem XR (DILT-XR) 120 mg XR capsule 1/15/2020 at am  No Yes   Sig: Take 1 Cap by mouth daily. lisinopril (PRINIVIL, ZESTRIL) 20 mg tablet 1/15/2020 at am  No Yes   Sig: TAKE 1 TABLET BY MOUTH EVERY DAY   tamsulosin (FLOMAX) 0.4 mg capsule 1/15/2020 at pm  Yes Yes   Sig: TAKE ONE CAPSULE BY MOUTH EVERYNIGHT      Facility-Administered Medications: None        Allergies: Allergies   Allergen Reactions    Oxycodone Other (comments)     Patient states, \"It made me feel unwell. \"        Hospital Course: The patient underwent surgery. Complications:  None; patient tolerated the procedure well. Was taken to the PACU in stable condition and then transferred to the ortho floor. Perioperative Antibiotics:  Ancef     Postoperative Pain Management:  Oxycodone      DVT Prophylaxis: Eliquis  Postoperative transfusions:    Number of units banked PRBCs =   none     Post Op complications: none    Hemoglobin at discharge:    Lab Results   Component Value Date/Time    HGB 14.7 01/08/2020 08:53 AM    INR 1.0 01/08/2020 08:53 AM       Dressing was changed on POD # 1. Incision - clean, dry and intact. No significant erythema or swelling. Neurovascular exam found to be within normal limits. Wound appears to be healing without any evidence of infection. Bambi Espinosa started following surgery and participated in bed mobility, transfers and ambulation. POUR -pt voiding after griffin removed. Pt reports voiding normal amounts like at home. Continues flomax. He has f/u planned with his urologist in 1-2 weeks. Discharged to: Home. Condition on Discharge:   stable    Discharge instructions:  - Anticoagulate with Eliquis  - Take pain medications as prescribed  - Resume pre hospital diet      - Discharge activity: activity as tolerated  - Ambulate with assistive device as needed.   - Weight bearing status WBAT  - Wound Care Keep wound clean and dry. See discharge instruction sheet. -DISCHARGE MEDICATION LIST     Current Discharge Medication List      START taking these medications    Details   acetaminophen (TYLENOL) 500 mg tablet Take 2 Tabs by mouth every six (6) hours for 14 days. Qty: 112 Tab, Refills: 0      polyethylene glycol (MIRALAX) 17 gram packet Take 1 Packet by mouth daily as needed (constipation) for up to 15 days. Qty: 15 Packet, Refills: 0      senna-docusate (PERICOLACE) 8.6-50 mg per tablet Take 1 Tab by mouth daily. Qty: 30 Tab, Refills: 0      HYDROmorphone (DILAUDID) 2 mg tablet Take 1 Tab by mouth every four (4) hours as needed for Pain for up to 7 days. Max Daily Amount: 12 mg.  Qty: 30 Tab, Refills: 0    Associated Diagnoses: Left rotator cuff tear arthropathy      ondansetron hcl (ZOFRAN) 4 mg tablet Take 1 Tab by mouth every eight (8) hours as needed for Nausea. Qty: 20 Tab, Refills: 1         CONTINUE these medications which have NOT CHANGED    Details   apixaban (ELIQUIS) 2.5 mg tablet Take 1 Tab by mouth two (2) times a day. Qty: 180 Tab, Refills: 1    Comments: Per Dr. Sherine Voss to start taking 2.5 mg twice a day on 10-11-19. tamsulosin (FLOMAX) 0.4 mg capsule TAKE ONE CAPSULE BY MOUTH EVERYNIGHT  Refills: 3      lisinopril (PRINIVIL, ZESTRIL) 20 mg tablet TAKE 1 TABLET BY MOUTH EVERY DAY  Qty: 30 Tab, Refills: 4    Associated Diagnoses: Essential hypertension      SIMBRINZA 1-0.2 % drps INSTILL ONE DROP THREE TIMES A DAY INTO THE LEFT EYE AND ONCE A DAY INTO THE RIGHT EYE  Refills: 0      dilTIAZem XR (DILT-XR) 120 mg XR capsule Take 1 Cap by mouth daily. Qty: 90 Cap, Refills: 3    Associated Diagnoses: Paroxysmal atrial fibrillation (HCC)      clobetasol (TEMOVATE) 0.05 % topical cream Apply  to affected area two (2) times a day. Qty: 15 g, Refills: 0    Associated Diagnoses: Intrinsic eczema          per medical continuation form      -Follow up in office in 2 weeks      Signed:  Jorge Luis Du Christin Cristina, ACNP-BC, ONP-C  Orthopaedic Nurse Practitioner    1/17/2020  1:37 PM

## 2020-01-17 NOTE — PROGRESS NOTES
Discharge paperwork reviewed with patient and wife. All questions were answered and paperwork was signed. Discharge instructions along with paper prescriptions were placed in [facility] folder. IV removed, catheter tip still intact. Volunteers will take patient to front entrance via wheelchair.

## 2020-01-17 NOTE — PROGRESS NOTES
Occupational Therapy:  01/17/20    Orders received, chart reviewed and patient evaluated by occupational therapy. Pending progression with skilled acute occupational therapy, recommend:  No skilled occupational therapy/ follow up rehabilitation needs identified at this time. Agree with plan for follow up OP PT to address shoulder rehabilitation. Recommend with nursing patient to complete as able in order to maintain strength, endurance and independence: OOB to chair 3x/day with 1 person SBA and functional mobility to the bathroom with 1 person SBA. Thank you for your assistance. Full evaluation to follow. Patient is cleared from OT for discharge.      Thank you,  Carmencita Shukla, OTR/L

## 2020-01-17 NOTE — DISCHARGE INSTRUCTIONS
Jhony Jauregui M.D.           Knee & Shoulder Surgery           Sports Medicine             [facility]  [facility]gery: Total Shoulder Replacement  Surgeon:   Geovani Lopez MD  Surgery Date:  1/16/2020      Going Home from the [address] [place] [address], [address] [place] [address] can let your arm hang loosely by your side, but avoid actively lifting the surgical arm. You may perform Codman dangles and shoulder blade pinching exercises as instructed. Do not put weight on the affected arm. Do not lean on it, and do not hold objects with that hand. In general for the first week keep arm in sling, rest, perform simple stretching exercises and ice your shoulder. Common Post-op Concerns      Hand swelling: Adjust sling, squeeze a stress ball, do biceps curls to help pump  the fluid out of your arm. Call the office if severe and we can see you to wrap  your hand/arm and send you for hand therapy. Bruising: Very common and will subside over 2-3 weeks. Nausea: If you have severe nausea call the office and a medicine can be called  in. Often times reducing pain medicine doses may provide relief. Fever: Somewhat common the first 3 days after surgery, take Tylenol    Sling  You should wear the sling most of the time until further instructions at your follow up appointment. You may need to adjust the front shoulder strap so that your hand is slightly above your elbow, this will prevent some of the hand swelling that is common after surgery. You may remove the sling when sitting down and allow your arm to rest in your lap. You may take the sling off to shower. To relieve pain:   Use ice/gel packs.  Put the ice pack directly over the wound, or anywhere you are hurting or swollen.  To control pain and swelling, keep ice on regularly, especially after physical activity.  The packs should stay cold for 3-4 hours.   When it is not cold anymore, rotate with the packs in the freezer.     Rest for at least 20 minutes between activity or exercises. You will be sent home with pain medicines. Typically we use oxycodone unless you have an allergy.  To keep track of your pain medications, write down what you take and when you take it.  The last dose of pain medication you got in the hospital was:       Medication    Dose    Date & Time             Choose your medications based on the pain scale below:     To keep your pain under control, take Tylenol every 6 hours for 14 days - even if you feel like you dont need it.  For mild to moderate pain (1-6 on pain scale), take one pain pill every 3-4 hours as needed.  For severe pain (7-10 on pain scale), take two pain pills every 3-4 hours as needed.  To prevent nausea, take your pain medications with food. Pain Scale                   As your pain lessens:     Slowly start taking less pain medication. You may do this by waiting longer between doses or by taking smaller doses.  Stop using the pain medications as soon as you no longer need it, usually in 2-3 weeks.  Generally after shoulder surgery, ambulation or walking around and being active is the best prevention for blood clots.  If you are at risk for blood clots due to your inability to walk around or have had blood clots in the past you may take Aspirin 325 mg once a day for 2-3 weeks to lower the chance of developing a blood clot.  To prevent stomach upset or bleeding:   Do not take non-steroidal anti-inflammatory medications (Ibuprofen, Advil, Motrin, Naproxen, etc.)    Take Pepcid 20 mg twice a day, or a similar home medication, while you are taking a blood thinner.             OPSITE (Honeycomb dressing)     Keep your clear, waterproof dressing in place for 5-7 days after your leave the hospital.     If you are still having drainage, you will need to change your dressing in 5-7 days. You will be given one extra dressing to use at home.  If there is no more drainage from the wound, you may leave it open to the air.    Your staples will be removed at your 1st postop appointment usually about 10 days after surgery. OPSITE DRESSING INSTRUCTIONS                       To increase and promote healing:   Stop Smoking (or at least cut back on       Smoking).  Eat a well-balanced diet (high in protein       and vitamin C).  If you have a poor appetite, drink Ensure, Glucerna, or         [place] Instant Breakfast for the next 30 days.  If you are diabetic, you should control your blood         sugars to prevent infection and help your wound         to heal.                         To prevent constipation, stay active and drink plenty of fluid.  While using pain medications, you should also take stool softeners and laxatives, such as Pericolace and Miralax.  If you are having too many bowel       Movements, then you may need       to stop taking the laxatives.  You should have a bowel movement 3-4 days        after surgery and then at least every other day while       on pain medication.  Take short walks (in your home)         about every 1-2 hours during the day.  Try to increase how far you walk each day.  NO DRIVING until your surgeon tells you it is ok. Rehabilitation  Healing is the main focus during the early phase of your rehabilitation. This means protecting the shoulder and only performing elbow/hand/wrist exercises to prevent stiffness. · Weeks 0-3:  You should begin dangle exercises the day after surgery. Work at this for 5 repetitions about 5 times a day. You may also begin active-assisted forward flexion at this time by starting to lift your operative arm with your good arm there to help push it up. At this time avoid moving your arm out to the side or externally rotating.      · Weeks 3-6: You may begin active forward flexion at this time and continue using your other arm to help raise the operative arm. You may be set up to see a physical therapist to work on your range of motion at this time. · Weeks 6-12: Focus is on actively lifting your arm and achieving full range of motion and beginning with some light weight lifting. You may now begin externally rotating your arm and gently strengthening. At 2 months weight bearing on the arm is now permitted for the use of walkers, pushing up out of a chair, etc.        Please call your physician immediately if you have:     Constant bleeding from your wound.  Increasing redness or swelling around your wound (Some warmth, bruising and swelling is normal).  Change in wound drainage (increase in amount, color, or bad smell).  Change in mental status (unusual behavior   Temperature over 101.5 degrees Fahrenheit      Pain or redness in the calf (back of your lower leg - see picture)     Increased swelling of the thigh, ankle, calf, or foot.  Emergency: CALL 911 if you have:     Shortness of breath     Chest pain when you cough or taking a deep breath          Follow up with your urologist in 1-2 weeks. A follow up appointment card will be given to you or your family member after the surgery. If you are not aware of your follow up time or lost the card, please call the office at [phone].  If you have questions or concerns during normal business hours, you may reach Dr. Sharla Moreno team at [phone]. If you have immediate concerns or questions you may call the office and reach Dr Sharla Moreno or another [address] [place] [address] provider who is on call.  [phone]

## 2020-01-17 NOTE — PROGRESS NOTES
Orthopedic End of Shift Note     Bedside shift change report given to FREDI Chapman (oncoming nurse) by Bhavana Doyle RN (offgoing nurse). Report included the following information SBAR, Kardex, OR Summary, Procedure Summary, Intake/Output, MAR, Accordion, Recent Results and Med Rec Status.      POD#      Significant issues during shift: none     Issues for Physician to address: none     Activity This Shift  (check all that apply) []? chair  []? dangle    [x]? bathroom  []? bedside commode []? hallway  []? bedrest   Nausea/Vomiting []? yes [x]? no      Voiding Status []? void []? Uriarte []? I&O Cath   Bowel Movements []? yes [x]? no     Foot Pumps or SCD [x]? yes []? no     Ice Pack []? yes    []? no     Incentive Spirometer [x]?  yes []? no Volume:   1750   Telemetry Monitoring   []? yes [x]? no Rhythm:   Supplemental O2 []? yes [x]? no Sat off O2:   93%

## 2020-01-17 NOTE — PROGRESS NOTES
Orthopedic End of Shift Note    Bedside shift change report given to Gia Koenig RN (oncoming nurse) by Robina Romano (offgoing nurse). Report included the following information SBAR, Kardex, OR Summary, Procedure Summary, Intake/Output, MAR, Accordion, Recent Results and Med Rec Status.      POD#     Significant issues during shift: none    Issues for Physician to address: none    Activity This Shift  (check all that apply) [] chair  [] dangle   [x] bathroom  [] bedside commode [] hallway  [] bedrest   Nausea/Vomiting [] yes [x] no     Voiding Status [] void [] Uriarte [] I&O Cath   Bowel Movements [] yes [x] no     Foot Pumps or SCD [x] yes [] no    Ice Pack [] yes    [] no    Incentive Spirometer [x] yes [] no Volume:   1750   Telemetry Monitoring   [] yes [x] no Rhythm:   Supplemental O2 [] yes [x] no Sat off O2:   93%

## 2020-01-18 NOTE — OP NOTES
[address]  OPERATIVE REPORT    Name:  Hernandez Pagan  MR#:  [id number]  :  1935  ACCOUNT #:  [de-identified]  DATE OF SERVICE:  2020    PREOPERATIVE DIAGNOSIS:  Left rotator cuff arthropathy. POSTOPERATIVE DIAGNOSIS:  Left rotator cuff arthropathy. PROCEDURE PERFORMED:  Left reverse total shoulder arthroplasty including glenoid and axillary nerve neuroplasty. SURGEON:  Laly Chicas MD    ASSISTANT:  ELADIO Cutler    ANESTHESIA:  General endotracheal.    COMPLICATIONS:  None. SPECIMENS REMOVED:  None. IMPLANTS:  RSP DJO reverse total shoulder arthroplasty. ESTIMATED BLOOD LOSS:  100 mL. INDICATIONS:  Left rotator cuff arthropathy. This is a complex surgical procedure requiring an assistant, one is used. PROCEDURE:  The patient was brought into the operating theater, given airway, IV antibiotics, preoperative interscalene block, sat in the beach-chair position, prepped and draped. After time-out, I made a deltopectoral incision taking the vein laterally. A subdeltoid retractor was placed revealing full-thickness supraspinatus and infraspinatus cuff tears that were unrepairable. The retractor underneath the bottom of the subscapularis, suture ligated the three vessels at the bottom of the subscapularis and tenodesed the biceps to the pectoralis major tendon and resected the proximal biceps, released the subscapularis off the lesser tuberosity, tagging the rolled edge for later assistance with dissection around the nerve and released the inferior capsule staying on bone at all times protecting the nerve. This allowed my assistant to extend and externally rotate the humeral head exposing the humeral head. A 30-degree retroverted cut was made. Then, put a Fukuda posteroinferiorly and started the glenoid exposure.   I had first put traction on the subscapularis and did a capsular release behind it and released the rotator interval.  Then, went down to the bottom and dissected out the axillary nerve from the bottom of the subscapularis to the posterior humeral shaft. With the nerve exposed, we were able to put Kochers on the inferior capsule and then resected the inferior and anterior capsule keeping an eye on the nerve at all times. This allowed for further glenoid exposure. Then, we did a 360-degree labral resection including the biceps stump. This allowed for full visualization of the glenoid. Then, used the 10-degree up-slanted guide to put the guide pin in the center of the glenoid, used small, medium, and large reamers, put in the baseplate, four locking screws, a 36 neutral glenosphere with the locking screw. Copiously irrigated with bacitracin, saline, and pulsatile lavage, infiltrated Exparel in the posterior capsule. Then, we addressed the humeral side. An 8-mm rigid reamer, 8-mm broach, and then small, medium, and large counter reamers, and then 8 monoblock stem with a 2 mm standard insert were trialed. Range of motion and stability were excellent. Removed the trials. Cemented in place the 8 monoblock stem, final implant, with a 2 mm standard poly, reduced the construct, used a rongeur to remove any impinging lesions. Range of motion was excellent, stability great. Copiously irrigated with bacitracin and saline, infiltrated with Exparel, closed in layers, took the patient to recovery room in stable condition.         Jessica Kelley MD      [id number]  D:  01/17/2020 7:48  T:  01/17/2020 19:28  JOB #:  [id number]

## 2020-01-20 ENCOUNTER — PATIENT OUTREACH (OUTPATIENT)
Dept: INTERNAL MEDICINE CLINIC | Age: 85
End: 2020-01-20

## 2020-01-20 NOTE — PROGRESS NOTES
Hospital Discharge Follow-Up      Date/Time:  2020 4:23 PM  Jennifer Green. Faisal Valle, FREDI  Care Transition Nurse  [address] [place] [address] Ambulatory Care Coordination Team  [phone]    Patient was admitted to [facility] on  and discharged on  for LEFT ROTATOR CUFF TEAR ARTHROPLASTY. The physician discharge summary was available at the time of outreach. Patient was contacted within 2 business days of discharge. Top Challenges reviewed with the provider     Advance Care Planning:   Does patient have an Advance Directive:  not on file        Method of communication with provider :chart routing    Inpatient RRAT score: 10  Was this a readmission? no     Care Transition Nurse (CTN) contacted the patient by telephone to perform post hospital discharge assessment. Verified name and  with patient as identifiers. Provided introduction to self, and explanation of the CTN role. Patient received hospital discharge instructions. CTN reviewed discharge instructions and red flags with patient who verbalized understanding. Patient given an opportunity to ask questions and does not have any further questions or concerns at this time. The patient agrees to contact the PCP office for questions related to their healthcare. CTN provided contact information for future reference. Disease Specific:   N/A    Patients top risk factors for readmission:  NONE    Medication(s):   New Medications at Discharge:   acetaminophen (TYLENOL) 500 mg tablet Take 2 Tabs by mouth every six (6) hours for 14 days. Qty: 112 Tab, Refills: 0       polyethylene glycol (MIRALAX) 17 gram packet Take 1 Packet by mouth daily as needed (constipation) for up to 15 days. Qty: 15 Packet, Refills: 0       senna-docusate (PERICOLACE) 8.6-50 mg per tablet Take 1 Tab by mouth daily. Qty: 30 Tab, Refills: 0       HYDROmorphone (DILAUDID) 2 mg tablet Take 1 Tab by mouth every four (4) hours as needed for Pain for up to 7 days.  Max Daily Amount: 12 mg.  Qty: 30 Tab, Refills: 0     Associated Diagnoses: Left rotator cuff tear arthropathy       ondansetron hcl (ZOFRAN) 4 mg tablet Take 1 Tab by mouth every eight (8) hours as needed for Nausea. Qty: 20 Tab, Refills: 1         Medication reconciliation was performed with patient, who verbalizes understanding of administration of home medications. There were no barriers to obtaining medications identified at this time. Referral to Pharm D needed: no     Current Outpatient Medications   Medication Sig    acetaminophen (TYLENOL) 500 mg tablet Take 2 Tabs by mouth every six (6) hours for 14 days.  polyethylene glycol (MIRALAX) 17 gram packet Take 1 Packet by mouth daily as needed (constipation) for up to 15 days.  senna-docusate (PERICOLACE) 8.6-50 mg per tablet Take 1 Tab by mouth daily.  HYDROmorphone (DILAUDID) 2 mg tablet Take 1 Tab by mouth every four (4) hours as needed for Pain for up to 7 days. Max Daily Amount: 12 mg.    ondansetron hcl (ZOFRAN) 4 mg tablet Take 1 Tab by mouth every eight (8) hours as needed for Nausea.  apixaban (ELIQUIS) 2.5 mg tablet Take 1 Tab by mouth two (2) times a day. (Patient taking differently: Take 1.25 mg by mouth two (2) times a day. As of 1/7/2020, pt takes 1/2 tab twice/day)    tamsulosin (FLOMAX) 0.4 mg capsule TAKE ONE CAPSULE BY MOUTH EVERYNIGHT    lisinopril (PRINIVIL, ZESTRIL) 20 mg tablet TAKE 1 TABLET BY MOUTH EVERY DAY    SIMBRINZA 1-0.2 % drps INSTILL ONE DROP THREE TIMES A DAY INTO THE LEFT EYE AND ONCE A DAY INTO THE RIGHT EYE    dilTIAZem XR (DILT-XR) 120 mg XR capsule Take 1 Cap by mouth daily.  clobetasol (TEMOVATE) 0.05 % topical cream Apply  to affected area two (2) times a day. No current facility-administered medications for this visit.       [facility] follow up appointment(s):   Future Appointments   Date Time Provider Kassy Kumar   4/10/2020 10:00 AM VASCULAR, [address] [address]   4/17/2020  9:40 AM Jania Garcia MD [address]:  information provided as a resource     Goals      Establish PCP relationships and regularly scheduled appointments. 1/20 Patient will attend  follow-up with PCP and specialist as directed, keep a list of his medications he take to bring to f/u appointments. Pt[facility]will [facility]. Pt.will call for f/u with PCP as needed. CTN will f/u with pt.in 1-2 weeks. -[facility]       Understands red flags post discharge. 1/20 Pt.will follow discharge instruction, wash hands before coming in contact with surgical site L shoulder, monitor  for s/sof infection such as; increased pain, swelling, warmth, or redness; streaks leading from the area, pus or fever. Pt.will inform surgeon or PCP office asap. CTN contact informatin provided, pt.verbalizes understanding. CTN will f/u with pt.in 1-2 weeks. -[address]

## 2020-02-02 DIAGNOSIS — I10 ESSENTIAL HYPERTENSION: ICD-10-CM

## 2020-02-02 RX ORDER — LISINOPRIL 20 MG/1
TABLET ORAL
Qty: 30 TAB | Refills: 4 | Status: SHIPPED | OUTPATIENT
Start: 2020-02-02 | End: 2020-09-14

## 2020-02-07 ENCOUNTER — PATIENT OUTREACH (OUTPATIENT)
Dept: INTERNAL MEDICINE CLINIC | Age: 85
End: 2020-02-07

## 2020-02-07 NOTE — PROGRESS NOTES
Goals      Establish PCP relationships and regularly scheduled appointments. 1/20 Patient will attend  follow-up with PCP and specialist as directed, keep a list of his medications he take to bring to f/u appointments. Pt.will f/u with ([facility] on 1/30. Pt.will call for f/u with PCP as needed. CTN will f/u with pt.in 1-2 weeks. -[address]    2/7 Pt.reports he f/u with surg. ([facility] Sulema Amin MD on 1/30, next f/u 3/2; reports he will start out. pt. PT on 2/10. CTN will f/u with pt.progress in 1-2 weeks. -       Understands red flags post discharge. 1/20 Pt.will follow discharge instruction, wash hands before coming in contact with surgical site L shoulder, monitor  for s/sof infection such as; increased pain, swelling, warmth, or redness; streaks leading from the area, pus or fever. Pt.will inform surgeon or PCP office asap. CTN contact informatin provided, pt.verbalizes understanding. CTN will f/u with pt.in 1-2 weeks. -[facility]    2/7 Pt.reports without s/s listed above, will began PT on 2/10. -[address]

## 2020-03-06 ENCOUNTER — HOSPITAL ENCOUNTER (EMERGENCY)
Age: 85
Discharge: HOME | End: 2020-03-06
Attending: EMERGENCY MEDICINE
Payer: MEDICARE

## 2020-03-06 ENCOUNTER — TELEPHONE (OUTPATIENT)
Dept: INTERNAL MEDICINE CLINIC | Age: 85
End: 2020-03-06

## 2020-03-06 VITALS
DIASTOLIC BLOOD PRESSURE: 114 MMHG | TEMPERATURE: 98 F | SYSTOLIC BLOOD PRESSURE: 152 MMHG | HEART RATE: 68 BPM | BODY MASS INDEX: 28.88 KG/M2 | HEIGHT: 69 IN | WEIGHT: 195 LBS | OXYGEN SATURATION: 97 % | RESPIRATION RATE: 18 BRPM

## 2020-03-06 DIAGNOSIS — Z86.79 HISTORY OF ATRIAL FIBRILLATION: ICD-10-CM

## 2020-03-06 DIAGNOSIS — Z92.29 HISTORY OF ANTICOAGULANT USE: ICD-10-CM

## 2020-03-06 DIAGNOSIS — R04.0 EPISTAXIS: Primary | ICD-10-CM

## 2020-03-06 PROCEDURE — 75810000284 HC CNTRL NASAL HEMORHRAGE SIMPLE

## 2020-03-06 PROCEDURE — 74011250637 HC RX REV CODE- 250/637: Performed by: PHYSICIAN ASSISTANT

## 2020-03-06 PROCEDURE — 99283 EMERGENCY DEPT VISIT LOW MDM: CPT

## 2020-03-06 RX ORDER — AMOXICILLIN AND CLAVULANATE POTASSIUM 875; 125 MG/1; MG/1
1 TABLET, FILM COATED ORAL
Status: COMPLETED | OUTPATIENT
Start: 2020-03-06 | End: 2020-03-06

## 2020-03-06 RX ORDER — AMOXICILLIN AND CLAVULANATE POTASSIUM 875; 125 MG/1; MG/1
1 TABLET, FILM COATED ORAL 2 TIMES DAILY
Qty: 14 TAB | Refills: 0 | Status: SHIPPED | OUTPATIENT
Start: 2020-03-06 | End: 2020-04-20 | Stop reason: ALTCHOICE

## 2020-03-06 RX ORDER — OXYMETAZOLINE HCL 0.05 %
2 SPRAY, NON-AEROSOL (ML) NASAL
Status: DISCONTINUED | OUTPATIENT
Start: 2020-03-06 | End: 2020-03-06 | Stop reason: HOSPADM

## 2020-03-06 RX ADMIN — OXYMETAZOLINE HCL 2 SPRAY: 0.05 SPRAY NASAL at 14:15

## 2020-03-06 RX ADMIN — AMOXICILLIN AND CLAVULANATE POTASSIUM 1 TABLET: 875; 125 TABLET, FILM COATED ORAL at 14:15

## 2020-03-06 NOTE — ED PROVIDER NOTES
EMERGENCY DEPARTMENT HISTORY AND PHYSICAL EXAM    Date: 3/6/2020  Patient Name: Danielle Ibarra    History of Presenting Illness     Chief Complaint   Patient presents with    Epistaxis     Bleeding x1 hour; upon entering triage bleeding appears to have stopped; nose clamp placed         History Provided By: Patient    Chief Complaint: Epistaxis   Duration:  Hours  Timing:  Acute  Location: right nostril  Severity: No pain   Modifying Factors: Applying pressure  Associated Symptoms: bleeding      HPI: Danielle Ibarra is a 82 y.o. male with a PMH of hypertension, hyperlipidemia, epistaxis, A. fib. Patient presents to the emergency department with right nostril note. He does state that he has had a history of nosebleeds however they have not persisted as frequent. He does state that he is on blood thinners and takes 1/2 tablet of Eliquis every so often for his A. fib. He denies any chest pain, shortness of breath, extreme blood loss, weakness, syncope, headaches, vision changes at this time. He believes that his last tetanus vaccination is up-to-date within 5 years. He does have history of hypertension has not taken his blood pressure medications this morning. Please note for examination and HPI were completed I did introduce myself as the physician assistant. Please note that this dictation was completed with [employer] the [employer] voice recognition software. Quite often unanticipated grammatical, syntax, homophones, and other interpretive errors are inadvertently transcribed by the computer software. Please disregard these errors. Please excuse any errors that have escaped final proofreading. For further clarification on any chart please contact myself. Thank you.       PCP: Amilcar Dash MD    Current Facility-Administered Medications   Medication Dose Route Frequency Provider Last Rate Last Dose    oxymetazoline (AFRIN) 0.05 % nasal spray 2 Spray  2 Spray Right Nostril BID PRN Deanna Cuevas PA-C 2 Spray at 20 1415     Current Outpatient Medications   Medication Sig Dispense Refill    lisinopril (PRINIVIL, ZESTRIL) 20 mg tablet TAKE 1 TABLET BY MOUTH EVERY DAY 30 Tab 4    senna-docusate (PERICOLACE) 8.6-50 mg per tablet Take 1 Tab by mouth daily. 30 Tab 0    ondansetron hcl (ZOFRAN) 4 mg tablet Take 1 Tab by mouth every eight (8) hours as needed for Nausea. 20 Tab 1    apixaban (ELIQUIS) 2.5 mg tablet Take 1 Tab by mouth two (2) times a day. (Patient taking differently: Take 1.25 mg by mouth two (2) times a day. As of 2020, pt takes 1/2 tab twice/day) 180 Tab 1    tamsulosin (FLOMAX) 0.4 mg capsule TAKE ONE CAPSULE BY MOUTH EVERYNIGHT  3    SIMBRINZA 1-0.2 % drps INSTILL ONE DROP THREE TIMES A DAY INTO THE LEFT EYE AND ONCE A DAY INTO THE RIGHT EYE  0    dilTIAZem XR (DILT-XR) 120 mg XR capsule Take 1 Cap by mouth daily. 90 Cap 3    clobetasol (TEMOVATE) 0.05 % topical cream Apply  to affected area two (2) times a day.  15 g 0       Past History     Past Medical History:  Past Medical History:   Diagnosis Date    Appendicitis     2018 underwent appendectomy    Arthritis     DJD    Asthma     As a child     Enlarged prostate     Hyperlipidemia     Hypertension     LINDA treated with BiPAP     PAF (paroxysmal atrial fibrillation) ([facility] [place] [address]     Pre-diabetes     HA1C 6.0 Dec 2016    Shingles        Past Surgical History:  Past Surgical History:   Procedure Laterality Date    HX APPENDECTOMY  2018    HX COLONOSCOPY  2009    normal except for hemorrhoids    HX HEENT      wisdom teeth extraction x2    HX HEENT  1950s    Cyst removed from under tongue     HX KNEE REPLACEMENT      right; 2017    HX SHOULDER ARTHROSCOPY Right     rt    HX TONSILLECTOMY      HX UROLOGICAL  2018    Urolift        Family History:  Family History   Problem Relation Age of Onset    Coronary Artery Disease Father 47         at [de-identified] of [place]       Social History:  Social History     Tobacco Use    Smoking status: Former Smoker    Smokeless tobacco: Never Used   Substance Use Topics    Alcohol use: Yes     Comment: occasional    Drug use: No       Allergies: Allergies   Allergen Reactions    Oxycodone Other (comments)     Patient states, \"It made me feel unwell. \"         Review of Systems   Review of Systems   HENT:        Nosebleed   All other systems reviewed and are negative. Physical Exam     Vitals:    03/06/20 1308   BP: (!) 152/114   Pulse: 68   Resp: 18   Temp: 98 °F (36.7 °C)   SpO2: 97%   Weight: 88.5 kg (195 lb)   Height: 5' 9\" (1.753 m)     Physical Exam  Vitals signs and nursing note reviewed. Constitutional:       Appearance: He is well-developed. HENT:      Head: Normocephalic and atraumatic. Nose:      Comments: Right nostril epistaxis is appreciated however is controlled with applied pressure. What blood appreciated inside the right nostril. There is no visualization of anterior bleed area. There is no evidence of any postnasal drainage of blood. No deviated septum or septal hematoma. Eyes:      Conjunctiva/sclera: Conjunctivae normal.      Pupils: Pupils are equal, round, and reactive to light. Neck:      Musculoskeletal: Normal range of motion and neck supple. Thyroid: No thyromegaly. Cardiovascular:      Rate and Rhythm: Normal rate and regular rhythm. Heart sounds: Normal heart sounds. No murmur. Pulmonary:      Effort: Pulmonary effort is normal. No respiratory distress. Breath sounds: Normal breath sounds. No stridor. No wheezing. Abdominal:      General: Bowel sounds are normal.      Palpations: Abdomen is soft. Tenderness: There is no abdominal tenderness. Musculoskeletal: Normal range of motion. General: No tenderness. Lymphadenopathy:      Cervical: No cervical adenopathy. Skin:     General: Skin is warm. Neurological:      Mental Status: He is alert and oriented to person, place, and time.       Deep Tendon Reflexes: Reflexes are normal and symmetric. Psychiatric:         Judgment: Judgment normal.           Diagnostic Study Results     Labs -   No results found for this or any previous visit (from the past 12 hour(s)). Radiologic Studies -   No orders to display     CT Results  (Last 48 hours)    None        CXR Results  (Last 48 hours)    None            Medical Decision Making   I am the first provider for this patient. I reviewed the vital signs, available nursing notes, past medical history, past surgical history, family history and social history. Vital Signs-Reviewed the patient's vital signs. Records Reviewed: Nursing Notes            Disposition:  Dispo    DISCHARGE NOTE:         Care plan outlined and precautions discussed. Patient has no new complaints, changes, or physical findings. Results of epistaxis procedure were reviewed with the patient. All medications were reviewed with the patient; will d/c home with Afrin nasal spray. All of pt's questions and concerns were addressed. Patient was instructed and agrees to follow up with PCP, as well as to return to the ED upon further deterioration. Patient is ready to go home. Follow-up Information     Follow up With Specialties Details Why MD [facility]   [address]  [address]  [facility]  [phone]            Current Discharge Medication List          Provider Notes (Medical Decision Making): At this time patient was encouraged to follow-up with primary care specialist return for any new or worsening complications. As patient's blood loss was not mentioned as significant and he did have bleeding that was very well controlled during the duration of his emergency room visit he did elect to go ahead and have Rhino Rocket procedure completed. I did advise that this was important for him as he could have recurring bleed.   We will go ahead and discharge patient stable condition to follow-up on outpatient basis. Procedures:  Epistaxis Management  Date/Time: 3/6/2020 3:30 PM  Performed by: Irma Chatman PA-C  Authorized by: Irma Chatman PA-C     Consent:     Consent obtained:  Verbal    Consent given by:  Patient    Risks discussed:  Bleeding, infection, nasal injury and pain    Alternatives discussed:  Referral, observation and no treatment  Anesthesia (see MAR for exact dosages): Anesthesia method:  None  Procedure details:     Treatment site:  Unable to specify (Right nostril)    Treatment method:  Merocel sponge    Treatment complexity:  Limited    Treatment episode: initial    Post-procedure details:     Assessment:  Bleeding stopped    Patient tolerance of procedure: Tolerated well, no immediate complications  Comments:      Patient will be placed on prophylactic antibiotic therapy. He is up-to-date on tetanus vaccination within 5 years and was advised to follow-up in 3 days for reevaluation and packing removal with primary care, emergency department, or ear nose and throat specialist.        Please note that this dictation was completed with Dragon, computer voice recognition software. Quite often unanticipated grammatical, syntax, homophones, and other interpretive errors are inadvertently transcribed by the computer software. Please disregard these errors. Additionally, please excuse any errors that have escaped final proofreading. Diagnosis     Clinical Impression:   1. Epistaxis    2. History of anticoagulant use    3.  History of atrial fibrillation

## 2020-03-06 NOTE — TELEPHONE ENCOUNTER
Spoke with patients wife Mary Garcia. Two pt identifiers confirmed. Mary Garcia advised that Dr. Elsa Spencer would like for the patient to hold his eliquis tonight only. Patient scheduled for a nurse visit on 03/09/2020 to have packing removed from his nose per Dr. Elsa Spencer. Mary Garcia verbalized understanding of information discussed w/ no further questions at this time.

## 2020-03-06 NOTE — DISCHARGE INSTRUCTIONS
Patient Education        Nosebleeds: Care Instructions  Your Care Instructions    Nosebleeds are common, especially if you have colds or allergies. Many things can cause a nosebleed. Some nosebleeds stop on their own with pressure. Others need packing. Some get cauterized (sealed). If you have gauze or other packing materials in your nose, you will need to follow up with your doctor to have the packing removed. You may need more treatment if you get nosebleeds a lot. The doctor has checked you carefully, but problems can develop later. If you notice any problems or new symptoms, get medical treatment right away. Follow-up care is a key part of your treatment and safety. Be sure to make and go to all appointments, and call your doctor if you are having problems. It's also a good idea to know your test results and keep a list of the medicines you take. How can you care for yourself at home? · If you get another nosebleed:  ? Sit up and tilt your head slightly forward. This keeps blood from going down your throat. ? Use your thumb and index finger to pinch your nose shut for 10 minutes. Use a clock. Do not check to see if the bleeding has stopped before the 10 minutes are up. If the bleeding has not stopped, pinch your nose shut for another 10 minutes. ? When the bleeding has stopped, try not to pick, rub, or blow your nose for 12 hours. Avoiding these things helps keep your nose from bleeding again. · If your doctor prescribed antibiotics, take them as directed. Do not stop taking them just because you feel better. You need to take the full course of antibiotics. To prevent nosebleeds  · Do not blow your nose too hard. · Try not to lift or strain after a nosebleed. · Raise your head on a pillow while you sleep. · Put a thin layer of a saline- or water-based nasal gel, such as NasoGel, inside your nose. Put it on the septum, which divides your nostrils.  This will prevent dryness that can cause nosebleeds. · Use a vaporizer or humidifier to add moisture to your bedroom. Follow the directions for cleaning the machine. · Do not use aspirin, ibuprofen (Advil, Motrin), or naproxen (Aleve) for 36 to 48 hours after a nosebleed unless your doctor tells you to. You can use acetaminophen (Tylenol) for pain relief. · Talk to your doctor about stopping any other medicines you are taking. Some medicines may make you more likely to get a nosebleed. · Do not use cold medicines or nasal sprays without first talking to your doctor. They can make your nose dry. When should you call for help? Call 911 anytime you think you may need emergency care. For example, call if:    · You passed out (lost consciousness).    Call your doctor now or seek immediate medical care if:    · You get another nosebleed and your nose is still bleeding after you have applied pressure 3 times for 10 minutes each time (30 minutes total).     · There is a lot of blood running down the back of your throat even after you pinch your nose and tilt your head forward.     · You have a fever.     · You have sinus pain.    Watch closely for changes in your health, and be sure to contact your doctor if:    · You get nosebleeds often, even if they stop.     · You do not get better as expected. Where can you learn more? Go to http://cammie-derik.info/. Enter S156 in the search box to learn more about \"Nosebleeds: Care Instructions. \"  Current as of: June 26, 2019  Content Version: 12.2  © 3371-3109 [employer]. Care instructions adapted under license by [employer] (which disclaims liability or warranty for this information). If you have questions about a medical condition or this instruction, always ask your healthcare professional. [address] any warranty or liability for your use of this information.

## 2020-03-06 NOTE — TELEPHONE ENCOUNTER
#[phone] wife, Nemo Tijerina states pt is in ED and will need a plug removed from his nose on Monday, 3-9-20 in the morning please as pt has therapy in the afternoon. She stated they told her this could be a nurse visit? ?    Please call Nemo Tijerina back yet today.   Thanks

## 2020-03-06 NOTE — TELEPHONE ENCOUNTER
Patient's wife, Monse Lassiter, states she needs a call back to discuss patient's Frequent Nose Bleeds that patient is currently having one now. Please call Asap to advise.  Thank you

## 2020-03-06 NOTE — TELEPHONE ENCOUNTER
Spoke with patient. Two pt identifiers confirmed. Patient takes eliquis. Patient states that he has been having nose bleeds everyday for over a week. Patient states that the one the he is having today has lasted for over 30 minutes. Patient states that he has had to change the tissue 4-5 times since this nose bleed started. Patient states that he has tried ice, but that is not helping. Patient advised per Dr. Ivett Dumont that he will need to go to the ED for evaluation and possible cauterization. Pt verbalized understanding of information discussed w/ no further questions at this time.

## 2020-03-07 ENCOUNTER — HOSPITAL ENCOUNTER (EMERGENCY)
Age: 85
Discharge: HOME | End: 2020-03-07
Attending: EMERGENCY MEDICINE
Payer: MEDICARE

## 2020-03-07 VITALS
TEMPERATURE: 97.9 F | OXYGEN SATURATION: 99 % | SYSTOLIC BLOOD PRESSURE: 158 MMHG | WEIGHT: 198.41 LBS | HEART RATE: 85 BPM | DIASTOLIC BLOOD PRESSURE: 77 MMHG | RESPIRATION RATE: 16 BRPM | HEIGHT: 69 IN | BODY MASS INDEX: 29.39 KG/M2

## 2020-03-07 DIAGNOSIS — I48.0 PAROXYSMAL ATRIAL FIBRILLATION (HCC): ICD-10-CM

## 2020-03-07 DIAGNOSIS — Z87.898 HISTORY OF EPISTAXIS: ICD-10-CM

## 2020-03-07 DIAGNOSIS — Z48.00 ENCOUNTER FOR REMOVAL OF NASAL PACKING: Primary | ICD-10-CM

## 2020-03-07 PROCEDURE — 99282 EMERGENCY DEPT VISIT SF MDM: CPT

## 2020-03-07 RX ORDER — DILTIAZEM HYDROCHLORIDE 120 MG/1
CAPSULE, EXTENDED RELEASE ORAL
Qty: 90 CAP | Refills: 3 | Status: SHIPPED | OUTPATIENT
Start: 2020-03-07 | End: 2021-03-30

## 2020-03-08 NOTE — ED PROVIDER NOTES
EMERGENCY DEPARTMENT HISTORY AND PHYSICAL EXAM    Date: 3/7/2020  Patient Name: Gene Flores    History of Presenting Illness     Chief Complaint   Patient presents with    Nasal Congestion     he wants the nasal packing removed that was put in yesterday; his primary doctor told him to come to ED today         History Provided By: Patient    Chief Complaint: History of epistaxis wants packing removed  Duration:  Hours  Timing:  Acute  Location: Right nostril  Quality: Pressure  Severity: 7 out of 10  Modifying Factors: None        HPI: Gene Flores is a 82 y.o. male with a PMH of asthma, hypertension, hyperlipidemia, recently diagnosed this taxes yesterday. I personally saw this patient in place a Rhino Rocket in his right nostril. He states that he has been having some pressure and pain ever since which was 1 of the complications that was explained to him before the procedure began. He was advised to leave this rocket in for at least 3 to 2 days however states that he wants it out due to the increasing pressure that he has had. He has not taken any over-the-counter medications for his pain to help deal with the pressure. Please note for examination and HPI were completed I did introduce myself as the physician assistant. Please note that this dictation was completed with [id number], the computer voice recognition software. Quite often unanticipated grammatical, syntax, homophones, and other interpretive errors are inadvertently transcribed by the computer software. Please disregard these errors. Please excuse any errors that have escaped final proofreading. For further clarification on any chart please contact myself. Thank you. PCP: Rosemary Funez MD    Current Outpatient Medications   Medication Sig Dispense Refill    DILT- mg XR capsule TAKE 1 CAPSULE BY MOUTH EVERY DAY 90 Cap 3    amoxicillin-clavulanate (AUGMENTIN) 875-125 mg per tablet Take 1 Tab by mouth two (2) times a day.  15 Tab 0    lisinopril (PRINIVIL, ZESTRIL) 20 mg tablet TAKE 1 TABLET BY MOUTH EVERY DAY 30 Tab 4    senna-docusate (PERICOLACE) 8.6-50 mg per tablet Take 1 Tab by mouth daily. 30 Tab 0    ondansetron hcl (ZOFRAN) 4 mg tablet Take 1 Tab by mouth every eight (8) hours as needed for Nausea. 20 Tab 1    apixaban (ELIQUIS) 2.5 mg tablet Take 1 Tab by mouth two (2) times a day. (Patient taking differently: Take 1.25 mg by mouth two (2) times a day. As of 2020, pt takes 1/2 tab twice/day) 180 Tab 1    tamsulosin (FLOMAX) 0.4 mg capsule TAKE ONE CAPSULE BY MOUTH EVERYNIGHT  3    SIMBRINZA 1-0.2 % drps INSTILL ONE DROP THREE TIMES A DAY INTO THE LEFT EYE AND ONCE A DAY INTO THE RIGHT EYE  0    clobetasol (TEMOVATE) 0.05 % topical cream Apply  to affected area two (2) times a day. 15 g 0       Past History     Past Medical History:  Past Medical History:   Diagnosis Date    Appendicitis     2018 underwent appendectomy    Arthritis     DJD    Asthma     As a child     Enlarged prostate     Hyperlipidemia     Hypertension     LINDA treated with BiPAP     [facility] [address]     Pre-diabetes     HA1C 6.0 Dec 2016    Shingles        Past Surgical History:  Past Surgical History:   Procedure Laterality Date    HX APPENDECTOMY  2018    HX COLONOSCOPY  2009    normal except for hemorrhoids    HX HEENT      wisdom teeth extraction x2    HX HEENT  1950s    Cyst removed from under tongue     HX KNEE REPLACEMENT      right; 2017    HX SHOULDER ARTHROSCOPY Right     rt    HX TONSILLECTOMY      HX UROLOGICAL  2018    Urolift        Family History:  Family History   Problem Relation Age of Onset    Coronary Artery Disease Father 47         at [de-identified] of MI       Social History:  Social History     Tobacco Use    Smoking status: Former Smoker    Smokeless tobacco: Never Used   Substance Use Topics    Alcohol use: Yes     Comment: occasional    Drug use: No       Allergies:   Allergies Allergen Reactions    Oxycodone Other (comments)     Patient states, \"It made me feel unwell. \"         Review of Systems   Review of Systems   HENT: Positive for sinus pain. All other systems reviewed and are negative. Physical Exam     Vitals:    03/07/20 1004   BP: 158/77   Pulse: 85   Resp: 16   Temp: 97.9 °F (36.6 °C)   SpO2: 99%   Weight: 90 kg (198 lb 6.6 oz)   Height: 5' 9\" (1.753 m)     Physical Exam  Vitals signs and nursing note reviewed. Constitutional:       Appearance: He is well-developed. HENT:      Head: Normocephalic and atraumatic. Nose:      Comments: Right nostril Rhino Rocket was removed and there is no evidence of any active bleeding or bleeding in the posterior pharynx. Eyes:      Conjunctiva/sclera: Conjunctivae normal.      Pupils: Pupils are equal, round, and reactive to light. Neck:      Musculoskeletal: Normal range of motion and neck supple. Thyroid: No thyromegaly. Cardiovascular:      Rate and Rhythm: Normal rate and regular rhythm. Heart sounds: Normal heart sounds. No murmur. Pulmonary:      Effort: Pulmonary effort is normal. No respiratory distress. Breath sounds: Normal breath sounds. No stridor. No wheezing. Abdominal:      General: Bowel sounds are normal.      Palpations: Abdomen is soft. Tenderness: There is no abdominal tenderness. Musculoskeletal: Normal range of motion. General: No tenderness. Lymphadenopathy:      Cervical: No cervical adenopathy. Skin:     General: Skin is warm. Neurological:      Mental Status: He is alert and oriented to person, place, and time. Deep Tendon Reflexes: Reflexes are normal and symmetric. Psychiatric:         Judgment: Judgment normal.           Diagnostic Study Results     Labs -   No results found for this or any previous visit (from the past 12 hour(s)).     Radiologic Studies -   No orders to display     CT Results  (Last 48 hours)    None        CXR Results  (Last 48 hours)    None            Medical Decision Making   I am the first provider for this patient. I reviewed the vital signs, available nursing notes, past medical history, past surgical history, family history and social history. Vital Signs-Reviewed the patient's vital signs. Records Reviewed: Nursing Notes            Disposition:  Dispo     DISCHARGE NOTE:         Care plan outlined and precautions discussed. Patient has no new complaints, changes, or physical findings. All of pt's questions and concerns were addressed. Patient was instructed and agrees to follow up with Augmentin due to prevention of nasal infection, as well as to return to the ED upon further deterioration. Patient is ready to go home. Follow-up Information     Follow up With Specialties Details Why Kari Knutson MD Internal Medicine   [address] [place] [address] [address] [place], [place] [id number]            Discharge Medication List as of 3/7/2020 10:51 AM          Provider Notes (Medical Decision Making):     Procedures:  Foreign Body Removal  Date/Time: 3/8/2020 7:27 PM  Performed by: Albert Jane PA-C  Authorized by: Albert Jane PA-C     Consent:     Consent obtained:  Verbal    Consent given by:  Patient    Risks discussed:  Bleeding, incomplete removal, nerve damage, infection, pain, poor cosmetic result and worsening of condition    Alternatives discussed:  No treatment and referral  Location:     Location: Nasal packing right nostril. Tendon involvement:  None  Post-procedure details:     Neurovascular status: intact      Confirmation:  No additional foreign bodies on visualization    Patient tolerance of procedure: Tolerated well, no immediate complications        Please note that this dictation was completed with Dragon, computer voice recognition software.   Quite often unanticipated grammatical, syntax, homophones, and other interpretive errors are inadvertently transcribed by the computer software. Please disregard these errors. Additionally, please excuse any errors that have escaped final proofreading. Diagnosis     Clinical Impression:   1. Encounter for removal of nasal packing    2.  History of epistaxis

## 2020-03-11 DIAGNOSIS — I48.0 PAF (PAROXYSMAL ATRIAL FIBRILLATION) (HCC): Primary | ICD-10-CM

## 2020-03-12 RX ORDER — APIXABAN 5 MG/1
TABLET, FILM COATED ORAL
Qty: 180 TAB | Refills: 3 | OUTPATIENT
Start: 2020-03-12

## 2020-03-12 NOTE — TELEPHONE ENCOUNTER
Request for Eliquis 2.5mg BID. Last office visit 10-11-19, next office visit 4-17-20.  Refills per verbal order from Dr. Letty Hardin.

## 2020-04-08 ENCOUNTER — TELEPHONE (OUTPATIENT)
Dept: INTERNAL MEDICINE CLINIC | Age: 85
End: 2020-04-08

## 2020-04-09 NOTE — TELEPHONE ENCOUNTER
Spoke with patient. Two pt identifiers confirmed. Patient advised that Dr. Denisa Sears would like for him to schedule an appointment for a virtual visit either with her tomorrow or Dr. Cheo Brunner today. Patient states that he would like to schedule an appointment with Dr. Denisa Sears on 04/10/2020. Appointment scheduled. Patient advised if anything changes or if unable to keep this appointment to call the office  Pt verbalized understanding of information discussed w/ no further questions at this time.

## 2020-04-10 ENCOUNTER — VIRTUAL VISIT (OUTPATIENT)
Dept: INTERNAL MEDICINE CLINIC | Age: 85
End: 2020-04-10

## 2020-04-10 VITALS — TEMPERATURE: 97.5 F | DIASTOLIC BLOOD PRESSURE: 81 MMHG | SYSTOLIC BLOOD PRESSURE: 147 MMHG

## 2020-04-10 DIAGNOSIS — J40 BRONCHITIS: Primary | ICD-10-CM

## 2020-04-10 RX ORDER — GUAIFENESIN 600 MG/1
600 TABLET, EXTENDED RELEASE ORAL 2 TIMES DAILY
Qty: 14 TAB | Refills: 0 | Status: SHIPPED | OUTPATIENT
Start: 2020-04-10 | End: 2021-04-09 | Stop reason: ALTCHOICE

## 2020-04-10 RX ORDER — DOXYCYCLINE 100 MG/1
100 CAPSULE ORAL 2 TIMES DAILY
Qty: 14 CAP | Refills: 0 | Status: SHIPPED | OUTPATIENT
Start: 2020-04-10 | End: 2020-04-20 | Stop reason: ALTCHOICE

## 2020-04-10 NOTE — PROGRESS NOTES
CC:   Chief Complaint   Patient presents with    Cough         HPI:    Beto Finn is a 82 y.o. male who complains of URI symptoms for 5  days. The patient  reports Sinus congestion  Denies , chills, ear pain, sinus pain. He is having brown phlegm with cough. Endorses chest congestion  Denies wheezing  Denies sick contacts  Has been taking corsydin     BP checked 147/81  Pulse 77           Past Medical History:   Diagnosis Date    Appendicitis     2018 underwent appendectomy    Arthritis     DJD    Asthma     As a child     Enlarged prostate     Hyperlipidemia     Hypertension     LINDA treated with BiPAP     PAF (paroxysmal atrial fibrillation) (HCC)     Pre-diabetes     HA1C 6.0 Dec 2016    Shingles        Review of Systems    Constitutional: negative for fevers, chills, anorexia and weight loss  Eyes:   negative for visual disturbance and irritation  ENT:   negative for tinnitus, positive for sore throat and nasal congestion  Negative for ear pain   Respiratory:  Positive  for cough, negative for hemoptysis, dyspnea,wheezing  CV:   negative for chest pain, palpitations, lower extremity edema  GI:   negative for nausea, vomiting, diarrhea, abdominal pain,melena  Genitourinary: negative for frequency, dysuria and hematuria, vaginal discharge, lesions  Musculoskel: negative for myalgias, arthralgias, back pain, muscle weakness, joint pain  Neurological:  negative for headaches, dizziness, focal weakness, numbness  Psych:         : negative for depression, anxiety     Objective:     Visit Vitals  /81   Temp 97.5 °F (36.4 °C) (Oral)     Gen: Mildly ill appearing male alert and oriented X4  HEENT:   PERRL,normal conjunctiva.  External ear normal  Resp:  Speaking in full sentences , not in respiratory distress  Extrem:  No visible edema         Lab Results   Component Value Date/Time    WBC 8.6 01/08/2020 08:53 AM    HGB 14.7 01/08/2020 08:53 AM    HCT 44.8 01/08/2020 08:53 AM    PLATELET 596 01/08/2020 08:53 AM    MCV 83.9 01/08/2020 08:53 AM     Lab Results   Component Value Date/Time    Sodium 140 01/08/2020 08:53 AM    Potassium 4.2 01/08/2020 08:53 AM    Chloride 107 01/08/2020 08:53 AM    CO2 26 01/08/2020 08:53 AM    Anion gap 7 01/08/2020 08:53 AM    Glucose 98 01/08/2020 08:53 AM    BUN 17 01/08/2020 08:53 AM    Creatinine 1.13 01/08/2020 08:53 AM    BUN/Creatinine ratio 15 01/08/2020 08:53 AM    GFR est AA >60 01/08/2020 08:53 AM    GFR est non-AA >60 01/08/2020 08:53 AM    Calcium 9.0 01/08/2020 08:53 AM         Assessment/Plan:     1. Bronchitis  Symptoms for 5 days and progressing, no fever, since weekend approaching and he has a hx of recurrent bronchitis. Will go ahead and prescribe antibiotic. Given hx of pre diabetes avoid steroids  - doxycycline (MONODOX) 100 mg capsule; Take 1 Cap by mouth two (2) times a day. Dispense: 14 Cap; Refill: 0  - guaiFENesin ER (MUCINEX) 600 mg ER tablet; Take 1 Tab by mouth two (2) times a day. Dispense: 14 Tab; Refill: 0  He is to call if symptoms worsen  He is staying home and knows to self quarentine             This is an established visit conducted via telemedicine real time video and audio for 14 minutes . The patient has been instructed that this meets HIPAA criteria and acknowledges and agrees to this method of visitation.     Mitali Thomas MD  04/10/20  1:32 PM    Mitali Thomas MD

## 2020-04-10 NOTE — PROGRESS NOTES
Reviewed record in preparation for visit and have obtained necessary documentation. Identified pt with two pt identifiers(name and ). Chief Complaint   Patient presents with    Cough       Health Maintenance Due   Topic Date Due    Annual Well Visit  2019    Glaucoma Screening   2019    Shingles Vaccine (2 of 2) 2020       Mr. Chalino Ball has a reminder for a \"due or due soon\" health maintenance. I have asked that he discuss this further with his primary care provider for follow-up on this health maintenance. Coordination of Care Questionnaire:  :     1) Have you been to an emergency room, urgent care clinic since your last visit? no   Hospitalized since your last visit? no             2) Have you seen or consulted any other health care providers outside of [address] [place] [address] since your last visit? no  (Include any pap smears or colon screenings in this section.)    3) In the event something were to happen to you and you were unable to speak on your behalf, do you have an Advance Directive/ Living Will in place stating your wishes? NO    Do you have an Advance Directive on file? no    4) Are you interested in receiving information on Advance Directives? NO    Patient is accompanied by self I have received verbal consent from Tania Astorga to discuss any/all medical information while they are present in the room.

## 2020-04-20 ENCOUNTER — VIRTUAL VISIT (OUTPATIENT)
Dept: CARDIOLOGY CLINIC | Age: 85
End: 2020-04-20

## 2020-04-20 VITALS
DIASTOLIC BLOOD PRESSURE: 82 MMHG | WEIGHT: 192 LBS | HEART RATE: 66 BPM | HEIGHT: 69 IN | BODY MASS INDEX: 28.44 KG/M2 | SYSTOLIC BLOOD PRESSURE: 137 MMHG

## 2020-04-20 DIAGNOSIS — I10 ESSENTIAL HYPERTENSION: ICD-10-CM

## 2020-04-20 DIAGNOSIS — I48.0 PAF (PAROXYSMAL ATRIAL FIBRILLATION) (HCC): Primary | ICD-10-CM

## 2020-04-20 NOTE — PROGRESS NOTES
Vickey Alvarez     1935       Magaly De La Torre MD, [facility]  Date of Visit-4/20/2020   PCP is Surjit Welch MD   [facility] [place]  [facility]  Virtual Visit  HPI:  Vickey Alvarez is a 82 y.o. male   who was seen by synchronous (real-time) audio-video technology on 4/20/2020. Fu of PAF, with HTN  Doing well   Has some epistaxis now resolved  Denies chest pain, edema, syncope or shortness of breath at rest   Has no tachycardia , palpitations or sense of arrythmia   reviewed ER visits since last OV, x 3  bp at home <140/84  Appears well  No fevers, staying in mostly, doing proper social distancing. Assessment/Plan:     1. PAF (paroxysmal atrial fibrillation) (HCC)  Stable  No clinical events  Mild epistaxis on low dose Eliquis at 2.5 mg bid  Continue diltiazem  No changes    2. Essential hypertension  Fair control in past her and at home  BP Readings from Last 6 Encounters:   04/20/20 137/82   04/10/20 147/81   03/07/20 158/77   03/06/20 (!) 152/114   01/17/20 116/65   01/08/20 157/72               This note was created using voice recognition software. Despite editing, there may be syntax errors. Impression:   1. PAF (paroxysmal atrial fibrillation) ([facility] [address]    2. Essential hypertension         Key CAD CHF Meds             apixaban (ELIQUIS) 2.5 mg tablet (Taking) Take 1 Tab by mouth two (2) times a day. DILT- mg XR capsule (Taking) TAKE 1 CAPSULE BY MOUTH EVERY DAY    lisinopril (PRINIVIL, ZESTRIL) 20 mg tablet (Taking) TAKE 1 TABLET BY MOUTH EVERY DAY           Cardiac History:   No specialty comments available. ROS-except as noted above. . A complete cardiac and respiratory are reviewed and negative except as above ; Resp-denies wheezing  or productive cough,.  Const- No unusual weight loss or fever; Neuro-no recent seizure or CVA ; GI- No BRBPR, abdom pain, bloating ; - no  hematuria     Past Medical History:   Diagnosis Date    Appendicitis     2018 underwent appendectomy    Arthritis     DJD    Asthma     As a child     Enlarged prostate     Hyperlipidemia     Hypertension     LINDA treated with BiPAP     PAF ([facility]oxysmal atrial fibrillation) ([facility] [address]     Pre-diabetes     HA1C 6.0 Dec 2016    Shingles         Social Hx= reports that he has quit smoking. He has never used smokeless tobacco. He reports current alcohol use. He reports that he does not use drugs. Due to this being a TeleHealth evaluation, many elements of the physical examination are unable to be assessed. General: Well developed, in no acute distress, cooperative and alert  HEENT: Pupils equal/round. No marked JVD visible on video. Respiratory: No audible wheezing, no signs of respiratory distress, lips non cyanotic  Extremities:  No edema  Neuro: A&Ox3, speech clear, no facial droop, answering questions appropriately  Skin: Skin color is normal. No rashes or lesions.  Non diaphoretic on visible skin during exam      Lab Results   Component Value Date/Time    Cholesterol, total 189 01/30/2019 08:12 AM    HDL Cholesterol 30 (L) 01/30/2019 08:12 AM    LDL, calculated 123 (H) 01/30/2019 08:12 AM    Triglyceride 179 (H) 01/30/2019 08:12 AM     Lab Results   Component Value Date/Time    Sodium 140 01/08/2020 08:53 AM    Potassium 4.2 01/08/2020 08:53 AM    Chloride 107 01/08/2020 08:53 AM    CO2 26 01/08/2020 08:53 AM    Anion gap 7 01/08/2020 08:53 AM    Glucose 98 01/08/2020 08:53 AM    BUN 17 01/08/2020 08:53 AM    Creatinine 1.13 01/08/2020 08:53 AM    BUN/Creatinine ratio 15 01/08/2020 08:53 AM    GFR est AA >60 01/08/2020 08:53 AM    GFR est non-AA >60 01/08/2020 08:53 AM    Calcium 9.0 01/08/2020 08:53 AM      Wt Readings from Last 3 Encounters:   04/20/20 192 lb (87.1 kg)   03/07/20 198 lb 6.6 oz (90 kg)   03/06/20 195 lb (88.5 kg)      BP Readings from Last 3 Encounters:   04/20/20 137/82   04/10/20 147/81   03/07/20 158/77        Current Outpatient Medications   Medication Sig    guaiFENesin ER (MUCINEX) 600 mg ER tablet Take 1 Tab by mouth two (2) times a day.  apixaban (ELIQUIS) 2.5 mg tablet Take 1 Tab by mouth two (2) times a day.  DILT- mg XR capsule TAKE 1 CAPSULE BY MOUTH EVERY DAY    lisinopril (PRINIVIL, ZESTRIL) 20 mg tablet TAKE 1 TABLET BY MOUTH EVERY DAY    tamsulosin (FLOMAX) 0.4 mg capsule TAKE ONE CAPSULE BY MOUTH EVERYNIGHT    SIMBRINZA 1-0.2 % drps INSTILL ONE DROP THREE TIMES A DAY INTO THE LEFT EYE AND ONCE A DAY INTO THE RIGHT EYE    clobetasol (TEMOVATE) 0.05 % topical cream Apply  to affected area two (2) times a day.  ondansetron hcl (ZOFRAN) 4 mg tablet Take 1 Tab by mouth every eight (8) hours as needed for Nausea. No current facility-administered medications for this visit. Impression see above. VIRTUAL VISIT DOCUMENTATION     Pursuant to the emergency declaration under the [facility], [facility] waiver authority and the [email or web address] and [employer] Act, this Virtual  Visit was conducted, with patient's consent, to reduce the patient's risk of exposure to COVID-19 and provide continuity of care for an established patient. Services were provided through a video synchronous discussion virtually to substitute for in-person clinic visit. We discussed the expected course, resolution and complications of the diagnosis(es) in detail. Medication risks, benefits, costs, interactions, and alternatives were discussed as indicated. I advised him to contact the office if his condition worsens, changes or fails to improve as anticipated. He expressed understanding with the diagnosis(es) and plan    I have reviewed the nurses notes, vitals, problem list, allergy list, medical history, family, social history and medications.          FOLLOW-UP            Patient was made aware and verbalized understanding that an appointment will be scheduled for them for a virtual visit and/or office visit within the above time frame. Patient understanding his/her responsibility to call and change time/date if he/she so chooses. Bonnie Almanza MD    [address], [address] [place] [address]  [phone] / [phone] Fax  [phone] / [phone] Fax        Greater than 20 minutes was spent in direct video patient care, planning and chart review. This visit was conducted using [facility] telemedicine services.

## 2020-04-20 NOTE — PROGRESS NOTES
Visit Vitals  /82 (BP 1 Location: Left arm, BP Patient Position: Sitting)   Pulse 66   Ht 5' 9\" (1.753 m)   Wt 192 lb (87.1 kg)   BMI 28.35 kg/m²

## 2020-05-11 ENCOUNTER — VIRTUAL VISIT (OUTPATIENT)
Dept: INTERNAL MEDICINE CLINIC | Age: 85
End: 2020-05-11

## 2020-05-11 ENCOUNTER — TELEPHONE (OUTPATIENT)
Dept: INTERNAL MEDICINE CLINIC | Age: 85
End: 2020-05-11

## 2020-05-11 DIAGNOSIS — Z00.00 MEDICARE ANNUAL WELLNESS VISIT, SUBSEQUENT: ICD-10-CM

## 2020-05-11 DIAGNOSIS — J40 BRONCHITIS: Primary | ICD-10-CM

## 2020-05-11 RX ORDER — DOXYCYCLINE 100 MG/1
100 CAPSULE ORAL 2 TIMES DAILY
Qty: 14 CAP | Refills: 0 | Status: SHIPPED | OUTPATIENT
Start: 2020-05-11 | End: 2020-05-29

## 2020-05-11 RX ORDER — PREDNISONE 20 MG/1
40 TABLET ORAL
Qty: 10 TAB | Refills: 0 | Status: SHIPPED | OUTPATIENT
Start: 2020-05-11 | End: 2020-05-29

## 2020-05-11 NOTE — PROGRESS NOTES
CC: Cough      HPI:    He is a 82 y.o. male with a hx of LINDA ( does not use CPAP), HTN, Afib who presents for evaluation of cough    Last Friday started to have cough and bringing up brown phlegm     Temperature 96.7  Checking pulse ox and 92/93%    Wife is well     Some nasal congestion  Does feel more winded  Has been taking wife to Physical therapy at [facility]    Eye visit was due in June and postponed    December 2019 last visit       This is an established visit conducted via telemedicine with video. The patient has been instructed that this meets HIPAA criteria and acknowledges and agrees to this method of visitation. Pursuant to the emergency declaration under the [facility], 1135 waiver authority and the [employer] Act, this Virtual Visit was conducted, with patient's consent, to reduce the patient's risk of exposure to COVID-19 and provide continuity of care for an established patient. Services were provided through a video synchronous discussion virtually to substitute for in-person clinic visit. ROS:  Constitutional: negative for fevers, chills, anorexia and weight loss  10 systems reviewed and negative other than HPI    Past Medical History:   Diagnosis Date    Appendicitis     2018 underwent appendectomy    Arthritis     DJD    Asthma     As a child     Enlarged prostate     Hyperlipidemia     Hypertension     LINDA treated with BiPAP     PAF (paroxysmal atrial fibrillation) (HCC)     Pre-diabetes     HA1C 6.0 Dec 2016    Shingles        Current Outpatient Medications on File Prior to Visit   Medication Sig Dispense Refill    guaiFENesin ER (MUCINEX) 600 mg ER tablet Take 1 Tab by mouth two (2) times a day. 14 Tab 0    apixaban (ELIQUIS) 2.5 mg tablet Take 1 Tab by mouth two (2) times a day.  180 Tab 3    DILT- mg XR capsule TAKE 1 CAPSULE BY MOUTH EVERY DAY 90 Cap 3    lisinopril (PRINIVIL, ZESTRIL) 20 mg tablet TAKE 1 TABLET BY MOUTH EVERY DAY 30 Tab 4    ondansetron hcl (ZOFRAN) 4 mg tablet Take 1 Tab by mouth every eight (8) hours as needed for Nausea. 20 Tab 1    tamsulosin (FLOMAX) 0.4 mg capsule TAKE ONE CAPSULE BY MOUTH EVERYNIGHT  3    SIMBRINZA 1-0.2 % drps INSTILL ONE DROP THREE TIMES A DAY INTO THE LEFT EYE AND ONCE A DAY INTO THE RIGHT EYE  0    clobetasol (TEMOVATE) 0.05 % topical cream Apply  to affected area two (2) times a day. 15 g 0     No current facility-administered medications on file prior to visit.         Past Surgical History:   Procedure Laterality Date    HX APPENDECTOMY  2018    HX COLONOSCOPY  2009    normal except for hemorrhoids    HX HEENT      wisdom teeth extraction x2    HX HEENT  1950s    Cyst removed from under tongue     HX KNEE REPLACEMENT      right; 2017    HX SHOULDER ARTHROSCOPY Right     rt    HX TONSILLECTOMY      HX UROLOGICAL  2018    Urolift        Family History   Problem Relation Age of Onset    Coronary Artery Disease Father 47         at [de-identified] of MI     Reviewed and no changes     Social History     Socioeconomic History    Marital status:      Spouse name: Not on file    Number of children: Not on file    Years of education: Not on file    Highest education level: Not on file   Occupational History    Not on file   Social Needs    Financial resource strain: Not on file    Food insecurity     Worry: Not on file     Inability: Not on file   [employer] needs     Medical: Not on file     Non-medical: Not on file   Tobacco Use    Smoking status: Former Smoker    Smokeless tobacco: Never Used   Substance and Sexual Activity    Alcohol use: Yes     Comment: occasional    Drug use: No    Sexual activity: Not Currently   Lifestyle    Physical activity     Days per week: Not on file     Minutes per session: Not on file    Stress: Not on file   Relationships    Social connections     Talks on phone: Not on file     Gets together: Not on file     Attends [employer] service: Not on file     Active member of club or organization: Not on file     Attends meetings of clubs or organizations: Not on file     Relationship status: Not on file    Intimate partner violence     Fear of current or ex partner: Not on file     Emotionally abused: Not on file     Physically abused: Not on file     Forced sexual activity: Not on file   Other Topics Concern    Not on file   Social History Narrative    Not on file          There were no vitals taken for this visit. Physical Examination:   Gen: well appearing male  HEENT: normal conjunctiva, no audible congestion, patient does not see oral erythema, has MMM  Neck: patient does not feel enlarged or tender LAD or masses  Resp: normal respiratory effort, no audible wheezing. CV: patient does not feel palpitations or heart irregularity  Abd: patient does not feel abdominal tenderness or mass, patient does not notice distension  Extrem: patient does not see swelling in ankles or joints.    Neuro: Alert and oriented, able to answer questions without difficulty, able to move all extremities and walk normally          Lab Results   Component Value Date/Time    WBC 8.6 01/08/2020 08:53 AM    HGB 14.7 01/08/2020 08:53 AM    HCT 44.8 01/08/2020 08:53 AM    PLATELET 897 50/97/1876 08:53 AM    MCV 83.9 01/08/2020 08:53 AM     Lab Results   Component Value Date/Time    Sodium 140 01/08/2020 08:53 AM    Potassium 4.2 01/08/2020 08:53 AM    Chloride 107 01/08/2020 08:53 AM    CO2 26 01/08/2020 08:53 AM    Anion gap 7 01/08/2020 08:53 AM    Glucose 98 01/08/2020 08:53 AM    BUN 17 01/08/2020 08:53 AM    Creatinine 1.13 01/08/2020 08:53 AM    BUN/Creatinine ratio 15 01/08/2020 08:53 AM    GFR est AA >60 01/08/2020 08:53 AM    GFR est non-AA >60 01/08/2020 08:53 AM    Calcium 9.0 01/08/2020 08:53 AM     Lab Results   Component Value Date/Time    Cholesterol, total 189 01/30/2019 08:12 AM    HDL Cholesterol 30 (L) 01/30/2019 08:12 AM    LDL, calculated 123 (H) 01/30/2019 08:12 AM    VLDL, calculated 36 01/30/2019 08:12 AM    Triglyceride 179 (H) 01/30/2019 08:12 AM     No results found for: TSH, TSH2, TSH3, TSHP, TSHEXT, TSHEXT  Lab Results   Component Value Date/Time    Prostate Specific Ag 4.2 (H) 02/09/2017 12:07 PM    % Free PSA 29.8 02/09/2017 12:07 PM     Lab Results   Component Value Date/Time    Hemoglobin A1c 5.9 (H) 01/08/2020 08:53 AM     No results found for: Kilo Sanchez, VD3RIA    Lab Results   Component Value Date/Time    ALT (SGPT) 23 01/08/2020 08:53 AM    AST (SGOT) 12 (L) 01/08/2020 08:53 AM    Alk. phosphatase 73 01/08/2020 08:53 AM    Bilirubin, direct 0.11 02/09/2017 12:07 PM    Bilirubin, total 0.5 01/08/2020 08:53 AM           Assessment/Plan:    1. Bronchitis  He does have a hx of recurrent bronchitis. No fever. Mild dyspnea. No known covid contacts. Patient to call if symptoms worsen  - predniSONE (DELTASONE) 20 mg tablet; Take 40 mg by mouth daily (with breakfast). Dispense: 10 Tab; Refill: 0  - doxycycline (MONODOX) 100 mg capsule; Take 1 Cap by mouth two (2) times a day. Dispense: 14 Cap; Refill: 0    2. Medicare annual wellness visit, subsequent              Millicent Tolentino MD    This is an established visit conducted via real time video and audio telemedicine. The patient has been instructed that this meets HIPAA criteria and acknowledges and agrees to this method of visitation. This is the Subsequent Medicare Annual Wellness Exam, performed 12 months or more after the Initial AWV or the last Subsequent AWV    Consent: Riri Berry, who was seen by synchronous (real-time) audio-video technology, and/or his healthcare decision maker, is aware that this patient-initiated, Telehealth encounter on 5/11/2020 is a billable service.  While AWVs are fully covered by Medicare, any services rendered on this date that are not included in an AWV are subject to additional billing, with coverage as determined by his insurance carrier. He is aware that he may receive a bill for any such additional services and has provided verbal consent to proceed: Yes. I have reviewed the patient's medical history in detail and updated the computerized patient record. 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     Past Medical History:   Diagnosis Date    Appendicitis     2018 underwent appendectomy    Arthritis     DJD    Asthma     As a child     Enlarged prostate     Hyperlipidemia     Hypertension     LINDA treated with BiPAP     PAF (paroxysmal atrial fibrillation) ([facility] [address]     Pre-diabetes     HA1C 6.0 Dec 2016    Shingles       Past Surgical History:   Procedure Laterality Date    HX APPENDECTOMY  2018    HX COLONOSCOPY  2009    normal except for hemorrhoids    HX HEENT      wisdom teeth extraction x2    HX HEENT  1950s    Cyst removed from under tongue     HX KNEE REPLACEMENT      right; Jan. 2017    HX SHOULDER ARTHROSCOPY Right     rt    HX TONSILLECTOMY      HX UROLOGICAL  2018    Urolift      Current Outpatient Medications   Medication Sig Dispense Refill    predniSONE (DELTASONE) 20 mg tablet Take 40 mg by mouth daily (with breakfast). 10 Tab 0    doxycycline (MONODOX) 100 mg capsule Take 1 Cap by mouth two (2) times a day. 14 Cap 0    guaiFENesin ER (MUCINEX) 600 mg ER tablet Take 1 Tab by mouth two (2) times a day.  14 Tab 0    apixaban (ELIQUIS) 2.5 mg tablet Take 1 Tab by mouth two (2) times a day. 180 Tab 3    DILT- mg XR capsule TAKE 1 CAPSULE BY MOUTH EVERY DAY 90 Cap 3    lisinopril (PRINIVIL, ZESTRIL) 20 mg tablet TAKE 1 TABLET BY MOUTH EVERY DAY 30 Tab 4    ondansetron hcl (ZOFRAN) 4 mg tablet Take 1 Tab by mouth every eight (8) hours as needed for Nausea. 20 Tab 1    tamsulosin (FLOMAX) 0.4 mg capsule TAKE ONE CAPSULE BY MOUTH EVERYNIGHT  3    SIMBRINZA 1-0.2 % drps INSTILL ONE DROP THREE TIMES A DAY INTO THE LEFT EYE AND ONCE A DAY INTO THE RIGHT EYE  0    clobetasol (TEMOVATE) 0.05 % topical cream Apply  to affected area two (2) times a day. 15 g 0     Allergies   Allergen Reactions    Oxycodone Other (comments)     Patient states, \"It made me feel unwell. \"       Family History   Problem Relation Age of Onset    Coronary Artery Disease Father 47         at [de-identified] of MI     Social History     Tobacco Use    Smoking status: Former Smoker    Smokeless tobacco: Never Used   Substance Use Topics    Alcohol use: Yes     Comment: occasional       Depression Risk Factor Screening:     3 most recent PHQ Screens 2019   Little interest or pleasure in doing things Not at all   Feeling down, depressed, irritable, or hopeless Not at all   Total Score PHQ 2 0       Alcohol Risk Factor Screening (MALE > 65): Do you average more 1 drink per night or more than 7 drinks a week: No    In the past three months have you have had more than 4 drinks containing alcohol on one occasion: No      Functional Ability and Level of Safety:   Hearing: Hearing is good. Activities of Daily Living: The home contains: no safety equipment. Patient does total self care    Ambulation: with no difficulty    Fall Risk:  Fall Risk Assessment, last 12 mths 2019   Able to walk? Yes   Fall in past 12 months? Yes   Fall with injury?  Yes   Number of falls in past 12 months 1   Fall Risk Score 2       Abuse Screen:  Patient is not abused    Cognitive Screening   Has your family/caregiver stated any concerns about your memory: no  Cognitive Screening: Normal - Verbal Fluency Test    Patient Care Team   Patient Care Team:  Kieran Robins MD as PCP - General (Internal Medicine)  Kieran Robins MD as PCP - [facility] Provider  Anne Hubbard MD (Cardiology)  Rose Mary Sylvester MD as Physician (Sleep Medicine)  Rachelle Baig MD as Surgeon (General Surgery)    Assessment/Plan   Education and counseling provided:  Are appropriate based on today's review and evaluation    Diagnoses and all orders for this visit:    1. Bronchitis  -     predniSONE (DELTASONE) 20 mg tablet; Take 40 mg by mouth daily (with breakfast). -     doxycycline (MONODOX) 100 mg capsule; Take 1 Cap by mouth two (2) times a day. 2. Medicare annual wellness visit, subsequent        Health Maintenance Due   Topic Date Due    Medicare Yearly Exam  08/31/2019    Shingrix Vaccine Age 50> (2 of 2) 02/06/2020       Jackye Heimlich is a 82 y.o. male who was evaluated by an audio-video encounter for concerns as above. Patient identification was verified prior to start of the visit. A caregiver was present when appropriate. Due to this being a TeleHealth encounter (During NXVON-76 public health emergency), evaluation of the following organ systems was limited: Vitals/Constitutional/EENT/Resp/CV/GI//MS/Neuro/Skin/Heme-Lymph-Imm. Pursuant to the emergency declaration under the [facility], 1135 waiver authority and the [employer] Act, this Virtual Visit was conducted, with patient's (and/or legal guardian's) consent, to reduce the patient's risk of exposure to COVID-19 and provide necessary medical care. Services were provided through a synchronous discussion virtually to substitute for in-person clinic visit. I was  In the office. The patient was at home.       Garrett Hall MD

## 2020-05-11 NOTE — PATIENT INSTRUCTIONS
Medicare Wellness Visit, Male The best way to live healthy is to have a lifestyle where you eat a well-balanced diet, exercise regularly, limit alcohol use, and quit all forms of tobacco/nicotine, if applicable. Regular preventive services are another way to keep healthy. Preventive services (vaccines, screening tests, monitoring & exams) can help personalize your care plan, which helps you manage your own care. Screening tests can find health problems at the earliest stages, when they are easiest to treat. [facility] follows the current, evidence-based guidelines published by the [facility] ([facility]) when recommending preventive services for our patients. Because we follow these guidelines, sometimes recommendations change over time as research supports it. (For example, a prostate screening blood test is no longer routinely recommended for men with no symptoms). Of course, you and your doctor may decide to screen more often for some diseases, based on your risk and co-morbidities (chronic disease you are already diagnosed with). Preventive services for you include: - Medicare offers their members a free annual wellness visit, which is time for you and your primary care provider to discuss and plan for your preventive service needs. Take advantage of this benefit every year! 
-All adults over age 72 should receive the recommended pneumonia vaccines. Current USPSTF guidelines recommend a series of two vaccines for the best pneumonia protection.  
-All adults should have a flu vaccine yearly and tetanus vaccine every 10 years. 
-All adults age 48 and older should receive the shingles vaccines (series of two vaccines).       
-All adults age 38-68 who are overweight should have a diabetes screening test once every three years.  
-Other screening tests & preventive services for persons with diabetes include: an eye exam to screen for diabetic retinopathy, a kidney function test, a foot exam, and stricter control over your cholesterol.  
-Cardiovascular screening for adults with routine risk involves an electrocardiogram (ECG) at intervals determined by the provider.  
-Colorectal cancer screening should be done for adults age 54-65 with no increased risk factors for colorectal cancer. There are a number of acceptable methods of screening for this type of cancer. Each test has its own benefits and drawbacks. Discuss with your provider what is most appropriate for you during your annual wellness visit. The different tests include: colonoscopy (considered the best screening method), a fecal occult blood test, a fecal DNA test, and sigmoidoscopy. 
-All adults born between [facility] should be screened once for Hepatitis C. 
-An Abdominal Aortic Aneurysm (AAA) Screening is recommended for men age 73-68 who has ever smoked in their lifetime. Here is a list of your current Health Maintenance items (your personalized list of preventive services) with a due date: 
Health Maintenance Due Topic Date Due  
 Annual Well Visit  08/31/2019  Shingles Vaccine (2 of 2) 02/06/2020

## 2020-05-11 NOTE — PROGRESS NOTES
Reviewed record in preparation for visit and have obtained necessary documentation. Identified pt with two pt identifiers(name and ). Chief Complaint   Patient presents with    Cough       Health Maintenance Due   Topic Date Due    Annual Well Visit  2019    Glaucoma Screening   2019    Shingles Vaccine (2 of 2) 2020       Mr. Cielo Castañeda has a reminder for a \"due or due soon\" health maintenance. I have asked that he discuss this further with his primary care provider for follow-up on this health maintenance. Coordination of Care Questionnaire:  :     1) Have you been to an emergency room, urgent care clinic since your last visit? no   Hospitalized since your last visit? no             2) Have you seen or consulted any other health care providers outside of [address] [place] [address] since your last visit? no  (Include any pap smears or colon screenings in this section.)    3) In the event something were to happen to you and you were unable to speak on your behalf, do you have an Advance Directive/ Living Will in place stating your wishes? NO    Do you have an Advance Directive on file? no    4) Are you interested in receiving information on Advance Directives? NO    Patient is accompanied by self I have received verbal consent from Amandeep Campos to discuss any/all medical information while they are present in the room.

## 2020-05-29 ENCOUNTER — HOSPITAL ENCOUNTER (OUTPATIENT)
Dept: GENERAL RADIOLOGY | Age: 85
Discharge: HOME | End: 2020-05-29
Payer: MEDICARE

## 2020-05-29 ENCOUNTER — VIRTUAL VISIT (OUTPATIENT)
Dept: INTERNAL MEDICINE CLINIC | Age: 85
End: 2020-05-29

## 2020-05-29 ENCOUNTER — TELEPHONE (OUTPATIENT)
Dept: INTERNAL MEDICINE CLINIC | Age: 85
End: 2020-05-29

## 2020-05-29 DIAGNOSIS — R05.9 COUGH: ICD-10-CM

## 2020-05-29 DIAGNOSIS — R05.9 COUGH: Primary | ICD-10-CM

## 2020-05-29 PROCEDURE — 71046 X-RAY EXAM CHEST 2 VIEWS: CPT

## 2020-05-29 RX ORDER — LEVOFLOXACIN 750 MG/1
750 TABLET ORAL DAILY
Qty: 7 TAB | Refills: 0 | Status: SHIPPED | OUTPATIENT
Start: 2020-05-29 | End: 2020-06-05

## 2020-05-29 NOTE — PROGRESS NOTES
Please call the patient and let the patient know that his test result(s) is/are normal.  Thanks. Jordan Castle

## 2020-05-29 NOTE — PROGRESS NOTES
Kris Au is a 82 y.o. male who was seen by synchronous (real-time) audio-video technology on 5/29/2020. Consent: Kris Au, who was seen by synchronous (real-time) audio-video technology, and/or his healthcare decision maker, is aware that this patient-initiated, Telehealth encounter on 5/29/2020 is a billable service, with coverage as determined by his insurance carrier. He is aware that he may receive a bill and has provided verbal consent to proceed: Yes. Assessment & Plan:     1. Cough: Pneumonia vs. Acute Bronchitis. -     levoFLOXacin (LEVAQUIN) 750 mg tablet; Take 1 Tab by mouth daily for 7 days. -     XR CHEST PA LAT; Future    Follow-up and Dispositions    · Return if symptoms worsen or fail to improve. Subjective:   Kris Au is a 82 y.o. male who was seen for URI (pt c.o cough, congestion, and brown phlegm since yesterday )    Chief Complaint   Patient presents with    URI     pt c.o cough, congestion, and brown phlegm since yesterday      He was put on doxycycline most recently 5/11; His symptoms     Past Medical History:   Diagnosis Date    Appendicitis     2018 underwent appendectomy    Arthritis     DJD    Asthma     As a child     Enlarged prostate     Hyperlipidemia     Hypertension     LINDA treated with BiPAP     [facility])     Pre-diabetes     HA1C 6.0 Dec 2016    Shingles          Objective:   Vital Signs: (As obtained by patient/caregiver at home)  There were no vitals taken for this visit.      [INSTRUCTIONS:  \"[x]\" Indicates a positive item  \"[]\" Indicates a negative item  -- DELETE ALL ITEMS NOT EXAMINED]    Constitutional: [x] Appears well-developed and well-nourished [x] No apparent distress      [] Abnormal -     Mental status: [x] Alert and awake  [x] Oriented to person/place/time [x] Able to follow commands    [] Abnormal -     Eyes:   EOM    [x]  Normal    [] Abnormal -   Sclera  [x]  Normal    [] Abnormal -          Discharge [x]  None visible   [] Abnormal -     HENT: [x] Normocephalic, atraumatic  [] Abnormal -   [x] Mouth/Throat: Mucous membranes are moist    External Ears [x] Normal  [] Abnormal -    Neck: [x] No visualized mass [] Abnormal -     Pulmonary/Chest: [x] Respiratory effort normal   [x] No visualized signs of difficulty breathing or respiratory distress        [] Abnormal -      Musculoskeletal:   [x] Normal gait with no signs of ataxia         [x] Normal range of motion of neck        [] Abnormal -     Neurological:        [x] No Facial Asymmetry (Cranial nerve 7 motor function) (limited exam due to video visit)          [x] No gaze palsy        [] Abnormal -          Skin:        [x] No significant exanthematous lesions or discoloration noted on facial skin         [] Abnormal -            Psychiatric:       [x] Normal Affect [] Abnormal -        [x] No Hallucinations    Other pertinent observable physical exam findings:-        We discussed the expected course, resolution and complications of the diagnosis(es) in detail. Medication risks, benefits, costs, interactions, and alternatives were discussed as indicated. I advised him to contact the office if his condition worsens, changes or fails to improve as anticipated. He expressed understanding with the diagnosis(es) and plan. Isabelle Guerrier is a 82 y.o. male who was evaluated by a video visit encounter for concerns as above. Patient identification was verified prior to start of the visit. A caregiver was present when appropriate. Due to this being a TeleHealth encounter (During YZNZF-06 public health emergency), evaluation of the following organ systems was limited: Vitals/Constitutional/EENT/Resp/CV/GI//MS/Neuro/Skin/Heme-Lymph-Imm.   Pursuant to the emergency declaration under the [facility], [address] [place] [address] authority and the [employer] and [employer], this Virtual  Visit was conducted, with patient's (and/or legal guardian's) consent, to reduce the patient's risk of exposure to COVID-19 and provide necessary medical care. Services were provided through a video synchronous discussion virtually to substitute for in-person clinic visit. Patient and provider were located at their individual homes.       Abdullahi Taveras MD

## 2020-06-01 ENCOUNTER — TELEPHONE (OUTPATIENT)
Dept: INTERNAL MEDICINE CLINIC | Age: 85
End: 2020-06-01

## 2020-06-01 DIAGNOSIS — J40 BRONCHITIS: Primary | ICD-10-CM

## 2020-06-01 RX ORDER — PREDNISONE 10 MG/1
10 TABLET ORAL SEE ADMIN INSTRUCTIONS
Qty: 21 TAB | Refills: 0 | Status: SHIPPED | OUTPATIENT
Start: 2020-06-01 | End: 2020-12-16 | Stop reason: ALTCHOICE

## 2020-06-01 NOTE — TELEPHONE ENCOUNTER
He has contacted us to let us know he is doing no better. Still coughing up brown sputum. He is SOB, can't sleep. He is sleeping in a chair at night. CXR normal on Friday, 5/29. I'll try a course of steroid. Continue the antibiotic. Refer to pulmonology for recurring/persisting bronchitis. If this worsens any further, then ER.      BJF

## 2020-06-29 NOTE — IP AVS SNAPSHOT
Chart reviewed and screened for possible needs at discharge. Pt was admitted for chest pain. Pt states lives at home with her children and was independent PTA. Pt has PCP and insurance to help w/ RX's. Discharge plan is to return home with no needs. CM available for possible needs at discharge. [address] [facility]. 
[phone] Patient: Ella Hall MRN: [id number] :1935 A check daniele indicates which time of day the medication should be taken. My Medications START taking these medications Instructions Each Dose to Equal  
 Morning Noon Evening Bedtime HYDROcodone-acetaminophen 5-325 mg per tablet Commonly known as:  Neda  Take 1-2 Tabs by mouth every four (4) hours as needed for Pain. Max Daily Amount: 12 Tabs. 1-2 Tab CONTINUE taking these medications Instructions Each Dose to Equal  
 Morning Noon Evening Bedtime ALPHAGAN P 0.1 % ophthalmic solution Generic drug:  brimonidine Administer 1 Drop to both eyes daily. Both eyes PM and lt eye in am also 1 Drop  
    
  
   
   
   
  
 apixaban 5 mg tablet Commonly known as:  Chucho Arm Take 5 mg by mouth daily. 5 mg  
    
   
   
   
  
 betaxolol 0.5 % ophthalmic suspension Commonly known as:  BETOPTIC INSTILL 1 DROP INTO LEFT EYE TWICE A DAY  
     
   
   
   
  
 clobetasol 0.05 % topical cream  
Commonly known as:  Barrera Cruel Apply  to affected area two (2) times a day. doxazosin 4 mg tablet Commonly known as:  CARDURA Take 1 Tab by mouth daily. 4 mg FISH OIL PO Take 1,000 mg by mouth daily. 1000 mg  
    
   
   
   
  
 fluorouracil 5 % chemo cream  
Commonly known as:  EFUDEX  
   
 1 APPLICATION APPLY ON THE SKIN TWICE A DAY APPLY TWICE DAILY FOR 14 DAYS  
     
   
   
   
  
 furosemide 40 mg tablet Commonly known as:  LASIX Take 1 Tab by mouth daily as needed. As needed for swelling 40 mg  
    
   
   
   
  
 lisinopril 20 mg tablet Commonly known as:  Kristy Keen Take 1 Tab by mouth daily.  May resume medication when b/p greater than 120/80.  
 20 mg  
    
 LUMIGAN 0.01 % ophthalmic drops Generic drug:  bimatoprost  
   
 INSTILL 1 DROP INTO EACH EYE EVERY NIGHT AT BEDTIME  
     
   
   
   
  
 omega-3 acid ethyl esters 1 gram capsule Commonly known as:  Michele Webster Take 2 Caps by mouth two (2) times daily (with meals). 2 g Where to Get Your Medications Information on where to get these meds will be given to you by the nurse or doctor. ! Ask your nurse or doctor about these medications HYDROcodone-acetaminophen 5-325 mg per tablet

## 2020-09-14 DIAGNOSIS — I10 ESSENTIAL HYPERTENSION: ICD-10-CM

## 2020-09-14 RX ORDER — LISINOPRIL 20 MG/1
TABLET ORAL
Qty: 90 TAB | Refills: 1 | Status: SHIPPED | OUTPATIENT
Start: 2020-09-14 | End: 2021-03-08

## 2020-09-21 ENCOUNTER — OFFICE VISIT (OUTPATIENT)
Dept: INTERNAL MEDICINE CLINIC | Age: 85
End: 2020-09-21
Payer: MEDICARE

## 2020-09-21 ENCOUNTER — HOSPITAL ENCOUNTER (OUTPATIENT)
Dept: LAB | Age: 85
Discharge: HOME | End: 2020-09-21
Payer: MEDICARE

## 2020-09-21 VITALS
SYSTOLIC BLOOD PRESSURE: 148 MMHG | HEIGHT: 69 IN | TEMPERATURE: 96.1 F | OXYGEN SATURATION: 96 % | BODY MASS INDEX: 29 KG/M2 | WEIGHT: 195.8 LBS | RESPIRATION RATE: 20 BRPM | HEART RATE: 66 BPM | DIASTOLIC BLOOD PRESSURE: 80 MMHG

## 2020-09-21 DIAGNOSIS — I48.0 PAROXYSMAL ATRIAL FIBRILLATION (HCC): ICD-10-CM

## 2020-09-21 DIAGNOSIS — R73.9 BORDERLINE HYPERGLYCEMIA: ICD-10-CM

## 2020-09-21 DIAGNOSIS — E78.00 HIGH CHOLESTEROL: ICD-10-CM

## 2020-09-21 DIAGNOSIS — Z23 NEEDS FLU SHOT: ICD-10-CM

## 2020-09-21 DIAGNOSIS — I10 ESSENTIAL HYPERTENSION: Primary | ICD-10-CM

## 2020-09-21 PROCEDURE — G8419 CALC BMI OUT NRM PARAM NOF/U: HCPCS | Performed by: INTERNAL MEDICINE

## 2020-09-21 PROCEDURE — G8753 SYS BP > OR = 140: HCPCS | Performed by: INTERNAL MEDICINE

## 2020-09-21 PROCEDURE — G8427 DOCREV CUR MEDS BY ELIG CLIN: HCPCS | Performed by: INTERNAL MEDICINE

## 2020-09-21 PROCEDURE — 83036 HEMOGLOBIN GLYCOSYLATED A1C: CPT

## 2020-09-21 PROCEDURE — G0463 HOSPITAL OUTPT CLINIC VISIT: HCPCS | Performed by: INTERNAL MEDICINE

## 2020-09-21 PROCEDURE — 1101F PT FALLS ASSESS-DOCD LE1/YR: CPT | Performed by: INTERNAL MEDICINE

## 2020-09-21 PROCEDURE — G8432 DEP SCR NOT DOC, RNG: HCPCS | Performed by: INTERNAL MEDICINE

## 2020-09-21 PROCEDURE — G8754 DIAS BP LESS 90: HCPCS | Performed by: INTERNAL MEDICINE

## 2020-09-21 PROCEDURE — 36415 COLL VENOUS BLD VENIPUNCTURE: CPT

## 2020-09-21 PROCEDURE — 99214 OFFICE O/P EST MOD 30 MIN: CPT | Performed by: INTERNAL MEDICINE

## 2020-09-21 PROCEDURE — 80061 LIPID PANEL: CPT

## 2020-09-21 PROCEDURE — G8536 NO DOC ELDER MAL SCRN: HCPCS | Performed by: INTERNAL MEDICINE

## 2020-09-21 PROCEDURE — 90694 VACC AIIV4 NO PRSRV 0.5ML IM: CPT

## 2020-09-21 PROCEDURE — 80053 COMPREHEN METABOLIC PANEL: CPT

## 2020-09-21 PROCEDURE — 85025 COMPLETE CBC W/AUTO DIFF WBC: CPT

## 2020-09-21 NOTE — PROGRESS NOTES
SUBJECTIVE:   Mr. Paul Carolina is a 82 y.o. male who is here for follow up of routine medical issues. Chief Complaint   Patient presents with    Cholesterol Problem     Routine f.u     Immunization/Injection     pt wants flu shot       He has no complaints. No CP, SOB, etc.   He sees Dr. Davin Garcia end of October. He is seeing a new urologist. Had a \"urolift\" in [place] a couple of years ago. LINDA: He is off CPAP. \"I can't use it. \" Uncomfortable and keeps him awake. At this time, he is otherwise doing well and has brought no other complaints to my attention today. For a list of the medical issues addressed today, see the assessment and plan below. PMH:   Past Medical History:   Diagnosis Date    Appendicitis     2018 underwent appendectomy    Arthritis     DJD    Asthma     As a child     Enlarged prostate     Hyperlipidemia     Hypertension     LINDA treated with BiPAP     PAF [facility]oxysmal [facility] fibrillation) ([facility] [address]     Pre-diabetes     HA1C 6.0 Dec 2016    Shingles        Past Surgical History:   Procedure Laterality Date    HX APPENDECTOMY  2018    HX COLONOSCOPY  2009    normal except for hemorrhoids    HX HEENT      wisdom teeth extraction x2    HX HEENT  1950s    Cyst removed from under tongue     HX KNEE REPLACEMENT      right; Jan. 2017    HX SHOULDER ARTHROSCOPY Right     rt    HX TONSILLECTOMY      HX UROLOGICAL  2018    Urolift        All: He is allergic to oxycodone. Current Outpatient Medications   Medication Sig    lisinopriL (PRINIVIL, ZESTRIL) 20 mg tablet TAKE 1 TABLET BY MOUTH EVERY DAY    guaiFENesin ER (MUCINEX) 600 mg ER tablet Take 1 Tab by mouth two (2) times a day.  apixaban (ELIQUIS) 2.5 mg tablet Take 1 Tab by mouth two (2) times a day.     DILT- mg XR capsule TAKE 1 CAPSULE BY MOUTH EVERY DAY    tamsulosin (FLOMAX) 0.4 mg capsule TAKE ONE CAPSULE BY MOUTH EVERYNIGHT    SIMBRINZA 1-0.2 % drps INSTILL ONE DROP THREE TIMES A DAY INTO THE LEFT EYE AND ONCE A DAY INTO THE RIGHT EYE    clobetasol (TEMOVATE) 0.05 % topical cream Apply  to affected area two (2) times a day.  predniSONE (STERAPRED DS) 10 mg dose pack Take 1 Tab by mouth See Admin Instructions. See administration instruction per 10mg dose pack     No current facility-administered medications for this visit. FH: His family history includes Coronary Artery Disease (age of onset: 47) in his father. SH: He reports that he has quit smoking. He has never used smokeless tobacco. He reports current alcohol use. He reports that he does not use drugs. ROS: See above; Complete ROS otherwise negative. OBJECTIVE:   Vitals:   Visit Vitals  BP (!) 148/80 (BP 1 Location: Left arm, BP Patient Position: Sitting)   Pulse 66   Temp (!) 96.1 °F (35.6 °C) (Temporal)   Resp 20   Ht 5' 9\" (1.753 m)   Wt 195 lb 12.8 oz (88.8 kg)   SpO2 96%   BMI 28.91 kg/m²      Gen: Pleasant 82 y.o.  male in NAD. HEENT: PERRLA. EOMI. OP moist and pink. Neck: Supple. No LAD. HEART: RRR, No M/G/R.    LUNGS: CTAB No W/R. ABDOMEN: S, NT, ND, BS+. EXTREMITIES: Warm. No C/C/E.  MUSCULOSKELETAL: Normal ROM, muscle strength 5/5 all groups. NEURO: Alert and oriented x 3. Cranial nerves grossly intact. No focal sensory or motor deficits noted. SKIN: Warm. Dry. No rashes or other lesions noted. Lab Results   Component Value Date/Time    Sodium 140 01/08/2020 08:53 AM    Potassium 4.2 01/08/2020 08:53 AM    Chloride 107 01/08/2020 08:53 AM    CO2 26 01/08/2020 08:53 AM    Anion gap 7 01/08/2020 08:53 AM    Glucose 98 01/08/2020 08:53 AM    BUN 17 01/08/2020 08:53 AM    Creatinine 1.13 01/08/2020 08:53 AM    BUN/Creatinine ratio 15 01/08/2020 08:53 AM    GFR est AA >60 01/08/2020 08:53 AM    GFR est non-AA >60 01/08/2020 08:53 AM    Calcium 9.0 01/08/2020 08:53 AM    Bilirubin, total 0.5 01/08/2020 08:53 AM    ALT (SGPT) 23 01/08/2020 08:53 AM    Alk.  phosphatase 73 01/08/2020 08:53 AM    Protein, total 7.5 01/08/2020 08:53 AM    Albumin 3.7 01/08/2020 08:53 AM    Globulin 3.8 01/08/2020 08:53 AM    A-G Ratio 1.0 (L) 01/08/2020 08:53 AM       Lab Results   Component Value Date/Time    Cholesterol, total 189 01/30/2019 08:12 AM    HDL Cholesterol 30 (L) 01/30/2019 08:12 AM    LDL, calculated 123 (H) 01/30/2019 08:12 AM    Triglyceride 179 (H) 01/30/2019 08:12 AM        Lab Results   Component Value Date/Time    Hemoglobin A1c 5.9 (H) 01/08/2020 08:53 AM       Lab Results   Component Value Date/Time    WBC 8.6 01/08/2020 08:53 AM    HGB 14.7 01/08/2020 08:53 AM    HCT 44.8 01/08/2020 08:53 AM    PLATELET 619 26/75/3712 08:53 AM    MCV 83.9 01/08/2020 08:53 AM         ASSESSMENT/ PLAN: Diagnoses and all orders for this visit:    1. Essential hypertension: Has been borderline or a little high lately. We'll advise lifestyle measures for now. Could easily increase dilt as a next step. -     LIPID PANEL  -     METABOLIC PANEL, COMPREHENSIVE  -     CBC WITH AUTOMATED DIFF    2. Needs flu shot:  -     FLU (FLUAD QUAD INFLUENZA VACCINE,QUAD,ADJUVANTED)    3. [facility]ox[facility] ([facility] F/u as per cardiologist, Dr. Urmila Lind.     4. High cholesterol: Recheck. 5. Borderline hyperglycemia  -     HEMOGLOBIN A1C WITH EAG        Follow-up and Dispositions    · Return in about 6 months (around 3/21/2021) for HTN; Dr. Marlys Sanchez. I have reviewed the patient's medications and risks/side effects/benefits were discussed. Diagnosis(-es) explained to patient and questions answered. Literature provided where appropriate.

## 2020-09-21 NOTE — PATIENT INSTRUCTIONS
Office Policies Phone calls/patient messages: Please allow up to 24 hours for someone in the office to contact you about your call or message. Be mindful your provider may be out of the office or your message may require further review. We encourage you to use [employer] for your messages as this is a faster, more efficient way to communicate with our office Medication Refills: 
         
Prescription medications require 48-72 business hours to process. We encourage you to use [employer] for your refills. For controlled medications: Please allow 72 business hours to process. Certain medications may require you to  a written prescription at our office. NO narcotic/controlled medications will be prescribed after 4pm Monday through Friday or on weekends Form/Paperwork Completion: 
         
Please note a $25 fee may incur for all paperwork for completed by our providers. We ask that you allow 7-10 business days. Pre-payment is due prior to picking up/faxing the completed form. You may also download your forms to [employer] to have your doctor print off. 
 
 
1. Have you been to the ER, urgent care clinic since your last visit? Hospitalized since your last visit?no 2. Have you seen or consulted any other health care providers outside of the [address] [place] [address] since your last visit? Include any pap smears or colon screening.  no

## 2020-09-22 LAB
ALBUMIN SERPL-MCNC: 4.3 G/DL (ref 3.6–4.6)
ALBUMIN/GLOB SERPL: 1.7 {RATIO} (ref 1.2–2.2)
ALP SERPL-CCNC: 83 IU/L (ref 39–117)
ALT SERPL-CCNC: 11 IU/L (ref 0–44)
AST SERPL-CCNC: 12 IU/L (ref 0–40)
BASOPHILS # BLD AUTO: 0.1 X10E3/UL (ref 0–0.2)
BASOPHILS NFR BLD AUTO: 1 %
BILIRUB SERPL-MCNC: 0.4 MG/DL (ref 0–1.2)
BUN SERPL-MCNC: 15 MG/DL (ref 8–27)
BUN/CREAT SERPL: 11 (ref 10–24)
CALCIUM SERPL-MCNC: 8.9 MG/DL (ref 8.6–10.2)
CHLORIDE SERPL-SCNC: 104 MMOL/L (ref 96–106)
CHOLEST SERPL-MCNC: 193 MG/DL (ref 100–199)
CO2 SERPL-SCNC: 24 MMOL/L (ref 20–29)
CREAT SERPL-MCNC: 1.34 MG/DL (ref 0.76–1.27)
EOSINOPHIL # BLD AUTO: 0.2 X10E3/UL (ref 0–0.4)
EOSINOPHIL NFR BLD AUTO: 2 %
ERYTHROCYTE [DISTWIDTH] IN BLOOD BY AUTOMATED COUNT: 13.8 % (ref 11.6–15.4)
EST. AVERAGE GLUCOSE BLD GHB EST-MCNC: 126 MG/DL
GLOBULIN SER CALC-MCNC: 2.5 G/DL (ref 1.5–4.5)
GLUCOSE SERPL-MCNC: 91 MG/DL (ref 65–99)
HBA1C MFR BLD: 6 % (ref 4.8–5.6)
HCT VFR BLD AUTO: 44 % (ref 37.5–51)
HDLC SERPL-MCNC: 28 MG/DL
HGB BLD-MCNC: 14.4 G/DL (ref 13–17.7)
IMM GRANULOCYTES # BLD AUTO: 0 X10E3/UL (ref 0–0.1)
IMM GRANULOCYTES NFR BLD AUTO: 0 %
LDLC SERPL CALC-MCNC: 130 MG/DL (ref 0–99)
LYMPHOCYTES # BLD AUTO: 3.6 X10E3/UL (ref 0.7–3.1)
LYMPHOCYTES NFR BLD AUTO: 31 %
MCH RBC QN AUTO: 26.1 PG (ref 26.6–33)
MCHC RBC AUTO-ENTMCNC: 32.7 G/DL (ref 31.5–35.7)
MCV RBC AUTO: 80 FL (ref 79–97)
MONOCYTES # BLD AUTO: 0.5 X10E3/UL (ref 0.1–0.9)
MONOCYTES NFR BLD AUTO: 4 %
NEUTROPHILS # BLD AUTO: 7.1 X10E3/UL (ref 1.4–7)
NEUTROPHILS NFR BLD AUTO: 62 %
PLATELET # BLD AUTO: 182 X10E3/UL (ref 150–450)
POTASSIUM SERPL-SCNC: 4 MMOL/L (ref 3.5–5.2)
PROT SERPL-MCNC: 6.8 G/DL (ref 6–8.5)
RBC # BLD AUTO: 5.51 X10E6/UL (ref 4.14–5.8)
SODIUM SERPL-SCNC: 141 MMOL/L (ref 134–144)
TRIGL SERPL-MCNC: 197 MG/DL (ref 0–149)
VLDLC SERPL CALC-MCNC: 35 MG/DL (ref 5–40)
WBC # BLD AUTO: 11.5 X10E3/UL (ref 3.4–10.8)

## 2020-10-05 ENCOUNTER — OFFICE VISIT (OUTPATIENT)
Dept: CARDIOLOGY CLINIC | Age: 85
End: 2020-10-05
Payer: MEDICARE

## 2020-10-05 VITALS
DIASTOLIC BLOOD PRESSURE: 78 MMHG | SYSTOLIC BLOOD PRESSURE: 134 MMHG | HEART RATE: 78 BPM | BODY MASS INDEX: 28.88 KG/M2 | RESPIRATION RATE: 16 BRPM | HEIGHT: 69 IN | WEIGHT: 195 LBS

## 2020-10-05 DIAGNOSIS — I10 ESSENTIAL HYPERTENSION: ICD-10-CM

## 2020-10-05 DIAGNOSIS — I48.0 PAF (PAROXYSMAL ATRIAL FIBRILLATION) (HCC): Primary | ICD-10-CM

## 2020-10-05 DIAGNOSIS — E78.00 PURE HYPERCHOLESTEROLEMIA: ICD-10-CM

## 2020-10-05 PROCEDURE — G8536 NO DOC ELDER MAL SCRN: HCPCS | Performed by: SPECIALIST

## 2020-10-05 PROCEDURE — G8427 DOCREV CUR MEDS BY ELIG CLIN: HCPCS | Performed by: SPECIALIST

## 2020-10-05 PROCEDURE — 1101F PT FALLS ASSESS-DOCD LE1/YR: CPT | Performed by: SPECIALIST

## 2020-10-05 PROCEDURE — G0463 HOSPITAL OUTPT CLINIC VISIT: HCPCS | Performed by: SPECIALIST

## 2020-10-05 PROCEDURE — 99213 OFFICE O/P EST LOW 20 MIN: CPT | Performed by: SPECIALIST

## 2020-10-05 PROCEDURE — G8432 DEP SCR NOT DOC, RNG: HCPCS | Performed by: SPECIALIST

## 2020-10-05 PROCEDURE — G8752 SYS BP LESS 140: HCPCS | Performed by: SPECIALIST

## 2020-10-05 PROCEDURE — G8419 CALC BMI OUT NRM PARAM NOF/U: HCPCS | Performed by: SPECIALIST

## 2020-10-05 PROCEDURE — G8754 DIAS BP LESS 90: HCPCS | Performed by: SPECIALIST

## 2020-10-05 NOTE — Clinical Note
10/5/20 Patient: Candance Ok YOB: 1935 Date of Visit: 10/5/2020 Dewayne Whitfield MD 
[address] [place] [address] VIA In Basket Dear Dewayne Whitfield MD, Thank you for referring Mr. Isak Penny to [facility] for evaluation. My notes for this consultation are attached. If you have questions, please do not hesitate to call me. I look forward to following your patient along with you.  
 
 
Sincerely, 
 
Jacob Tomlinson MD

## 2020-10-05 NOTE — PROGRESS NOTES
Visit Vitals  /78 (BP 1 Location: Left arm, BP Patient Position: Sitting)   Pulse 78   Resp 16   Ht 5' 9\" (1.753 m)   Wt 195 lb (88.5 kg)   BMI 28.80 kg/m²

## 2020-10-05 NOTE — PROGRESS NOTES
Min Restrepo     1935       Magaly Le MD, [facility]  Date of Visit-10/5/2020   PCP is Surjit Tate MD   [facility]  [facility]  HPI:  Min Restrepo is a 82 y.o. male   · F/u of PAF with HTN. · Normal echo. · On Eliquis. Had VV on 4/20/20. Overall the pt states he is doing well. Pt has not felt any arrhythmia. He denies any lightheadedness/dizziness. Denies chest pain, edema, syncope or shortness of breath at rest, has no tachycardia, palpitations or sense of arrhythmia. Assessment/Plan:     Patient Instructions   We will see you back for an annual follow up. Future Appointments   Date Time Provider Kassy Kumar   3/22/2021 11:45 AM Surjit Tate MD [facility] BS AMB   10/4/2021 10:00 AM KIRTI PALACIO BS AMB   10/4/2021 11:00 AM Magaly Garcia MD [facility] BS AMB        1. PAF (paroxysmal atrial fibrillation) (HCC)  No clinical events. Continues on low dose Eliquis and Diltiazem. He had his first episode of AF in 2016 while in [place] and had a normal echo there. Will check echo at f/u visit. 2. Essential hypertension  Well controlled. At goal , meds and possible side effects reviewed and patient denies  Key CAD CHF Meds             lisinopriL (PRINIVIL, ZESTRIL) 20 mg tablet (Taking) TAKE 1 TABLET BY MOUTH EVERY DAY    apixaban (ELIQUIS) 2.5 mg tablet (Taking) Take 1 Tab by mouth two (2) times a day. DILT- mg XR capsule (Taking) TAKE 1 CAPSULE BY MOUTH EVERY DAY         BP Readings from Last 6 Encounters:   10/05/20 134/78   09/21/20 (!) 148/80   04/20/20 137/82   04/10/20 147/81   03/07/20 158/77   03/06/20 (!) 152/114          3. XOL  I did talk to him about his LDL of 130. He is asymptomatic. I don't think a calcium score would help him at this age because he is likely to have a high calcium score anyway. I told him to work on diet and exercise.       F/u in 1 year with marisol echo     Impression:   1. PAF (paroxysmal atrial fibrillation) ([facility] [address]    2. Essential hypertension    3. Pure hypercholesterolemia       Cardiac History:   No specialty comments available. ROS-except as noted above. . A complete cardiac and respiratory are reviewed and negative except as above ; Resp-denies wheezing  or productive cough,. Const- No unusual weight loss or fever; Neuro-no recent seizure or CVA ; GI- No BRBPR, abdom pain, bloating ; - no  hematuria   see supplement sheet, initialed and to be scanned by staff  Past Medical History:   Diagnosis Date    Appendicitis     2018 underwent appendectomy    Arthritis     DJD    Asthma     As a child     Enlarged prostate     Hyperlipidemia     Hypertension     LINDA treated with BiPAP     PAF (paroxysmal atrial fibrillation) ([facility] [address]     Pre-diabetes     HA1C 6.0 Dec 2016    Shingles       Social Hx= reports that he has quit smoking. He has never used smokeless tobacco. He reports current alcohol use. He reports that he does not use drugs. Exam and Labs:  /78 (BP 1 Location: Left arm, BP Patient Position: Sitting)   Pulse 78   Resp 16   Ht 5' 9\" (1.753 m)   Wt 195 lb (88.5 kg)   BMI 28.80 kg/m² Constitutional:  NAD, comfortable  Head: NC,AT. Eyes: No scleral icterus. Neck:  Neck supple. No JVD present. Throat: moist mucous membranes. Chest: Effort normal & normal respiratory excursion . Neurological: alert, conversant and oriented . Skin: Skin is not cold. No obvious systemic rash noted. Not diaphoretic. No erythema. Psychiatric:  Grossly normal mood and affect. Behavior appears normal. Extremities:  no clubbing or cyanosis. Abdomen: non distended    Lungs:breath sounds normal. No stridor. distress, wheezes or  Rales. Heart: normal rate, regular rhythm, normal S1, S2, no murmurs, rubs, clicks or gallops , PMI non displaced. Edema: Edema is none.   Lab Results   Component Value Date/Time    Cholesterol, total 193 09/21/2020 03:14 PM HDL Cholesterol 28 (L) 09/21/2020 03:14 PM    LDL, calculated 123 (H) 01/30/2019 08:12 AM    LDL Chol Calc (NIH) 130 (H) 09/21/2020 03:14 PM    Triglyceride 197 (H) 09/21/2020 03:14 PM     Lab Results   Component Value Date/Time    Sodium 141 09/21/2020 03:14 PM    Potassium 4.0 09/21/2020 03:14 PM    Chloride 104 09/21/2020 03:14 PM    CO2 24 09/21/2020 03:14 PM    Anion gap 7 01/08/2020 08:53 AM    Glucose 91 09/21/2020 03:14 PM    BUN 15 09/21/2020 03:14 PM    Creatinine 1.34 (H) 09/21/2020 03:14 PM    BUN/Creatinine ratio 11 09/21/2020 03:14 PM    GFR est AA 57 (L) 09/21/2020 03:14 PM    GFR est non-AA 49 (L) 09/21/2020 03:14 PM    Calcium 8.9 09/21/2020 03:14 PM      Wt Readings from Last 3 Encounters:   10/05/20 195 lb (88.5 kg)   09/21/20 195 lb 12.8 oz (88.8 kg)   04/20/20 192 lb (87.1 kg)      BP Readings from Last 3 Encounters:   10/05/20 134/78   09/21/20 (!) 148/80   04/20/20 137/82      Current Outpatient Medications   Medication Sig    lisinopriL (PRINIVIL, ZESTRIL) 20 mg tablet TAKE 1 TABLET BY MOUTH EVERY DAY    apixaban (ELIQUIS) 2.5 mg tablet Take 1 Tab by mouth two (2) times a day.  DILT- mg XR capsule TAKE 1 CAPSULE BY MOUTH EVERY DAY    tamsulosin (FLOMAX) 0.4 mg capsule TAKE ONE CAPSULE BY MOUTH EVERYNIGHT    SIMBRINZA 1-0.2 % drps INSTILL ONE DROP THREE TIMES A DAY INTO THE LEFT EYE AND ONCE A DAY INTO THE RIGHT EYE    clobetasol (TEMOVATE) 0.05 % topical cream Apply  to affected area two (2) times a day.  predniSONE (STERAPRED DS) 10 mg dose pack Take 1 Tab by mouth See Admin Instructions. See administration instruction per 10mg dose pack    guaiFENesin ER (MUCINEX) 600 mg ER tablet Take 1 Tab by mouth two (2) times a day. No current facility-administered medications for this visit. Impression see above.       Written by Ann Bryant, as dictated by Catalina Cisneros MD.

## 2020-10-23 ENCOUNTER — TELEPHONE (OUTPATIENT)
Dept: INTERNAL MEDICINE CLINIC | Age: 85
End: 2020-10-23

## 2020-10-23 NOTE — TELEPHONE ENCOUNTER
----- Message from Seth Mullen sent at 10/23/2020 10:26 AM EDT -----  Regarding: Dr. Mao Hayes  Appointment not available    Caller's first and last name and relationship to patient (if not the patient): Pt      Best contact number: [phone]      Preferred date and time: Before 10/30      Scheduled appointment date and time: N/A      Reason for appointment: Pre-op CPE      Details to clarify the request: Pt is having cataract surgery on 11/19 and needs a pre-op appt before then. Message from Alivia                  .

## 2020-10-26 NOTE — TELEPHONE ENCOUNTER
Spoke with patient. Two pt identifiers confirmed. Patient offered an appointment with Dr. Karen Gray 11/09/2020 for a pre-op exam.  Appointment accepted. Patient advised if anything changes or if unable to keep this appointment to call the office  Pt verbalized understanding of information discussed w/ no further questions at this time.

## 2020-11-02 ENCOUNTER — TELEPHONE (OUTPATIENT)
Dept: INTERNAL MEDICINE CLINIC | Age: 85
End: 2020-11-02

## 2020-11-02 ENCOUNTER — NURSE TRIAGE (OUTPATIENT)
Dept: OTHER | Facility: CLINIC | Age: 85
End: 2020-11-02

## 2020-11-02 NOTE — TELEPHONE ENCOUNTER
Received pt's call from the [facility] office. Reason for Disposition   Patient wants to be seen    Answer Assessment - Initial Assessment Questions  1. ONSET: \"When did the cough begin? \"       7-10 days ago    2. SEVERITY: \"How bad is the cough today? \"       Ongoing dry cough    3. RESPIRATORY DISTRESS: \"Describe your breathing. \"       Normal breathing per pt    4. FEVER: \"Do you have a fever? \" If so, ask: \"What is your temperature, how was it measured, and when did it start? \"      Denies     5. HEMOPTYSIS: \"Are you coughing up any blood? \" If so ask: \"How much? \" (flecks, streaks, tablespoons, etc.)      Denies     6. TREATMENT: \"What have you done so far to treat the cough? \" (e.g., meds, fluids, humidifier)      Pt has not tried anything OTC yet    7. CARDIAC HISTORY: \"Do you have any history of heart disease? \" (e.g., heart attack, congestive heart failure)       High blood pressure, A fib    8. LUNG HISTORY: \"Do you have any history of lung disease? \"  (e.g., pulmonary embolus, asthma, emphysema)     Asthma     9. PE RISK FACTORS: \"Do you have a history of blood clots? \" (or: recent major surgery, recent prolonged travel, bedridden)      Denies     10. OTHER SYMPTOMS: \"Do you have any other symptoms? (e.g., runny nose, wheezing, chest pain)        Denies     11. PREGNANCY: \"Is there any chance you are pregnant? \" \"When was your last menstrual period? \"        N/a     12. TRAVEL: \"Have you traveled out of the country in the last month? \" (e.g., travel history, exposures)        Denies    Protocols used: COUGH-ADULT-[place]    Pt is wanting to be seen. Provided pt with care advice. Transferred pt's call to Ana Bailon with [employer] to schedule an appointment.

## 2020-11-02 NOTE — TELEPHONE ENCOUNTER
Spoke with patient. Two pt identifiers confirmed. Patient offered an appointment for a virtual visit with Dr. Colleen Baugh on 11/03/2020. Appointment accepted. Patient advised if anything changes or if unable to keep this appointment to call the office  . Pt verbalized understanding of information discussed w/ no further questions at this time.

## 2020-11-02 NOTE — TELEPHONE ENCOUNTER
Appointment not available     Caller's first and last name and relationship to patient (if not the patient):       Best contact [phone]       Preferred date and time:virtual visit       Scheduled appointment date and time:       Reason for appointment:dry cough       Details to clarify the request:Current patient of [facility]

## 2020-11-02 NOTE — TELEPHONE ENCOUNTER
----- Message from Elisa Lewis sent at 11/2/2020 11:07 AM EST -----  Regarding: Dr Andreas Rubio first and last name: self  Reason for call: persistent cough  Callback required yes/no and why: yes  Best contact number(s): [phone]  Details to clarify the request: Transferred to nurse triage, Pt is requesting a visit or chest x ray because they are prone to getting bronchitis.      Message from [facility]

## 2020-11-03 ENCOUNTER — VIRTUAL VISIT (OUTPATIENT)
Dept: INTERNAL MEDICINE CLINIC | Age: 85
End: 2020-11-03
Payer: MEDICARE

## 2020-11-03 DIAGNOSIS — R05.9 COUGH: Primary | ICD-10-CM

## 2020-11-03 PROCEDURE — G8756 NO BP MEASURE DOC: HCPCS | Performed by: FAMILY MEDICINE

## 2020-11-03 PROCEDURE — 99213 OFFICE O/P EST LOW 20 MIN: CPT | Performed by: FAMILY MEDICINE

## 2020-11-03 PROCEDURE — 1101F PT FALLS ASSESS-DOCD LE1/YR: CPT | Performed by: FAMILY MEDICINE

## 2020-11-03 PROCEDURE — G8536 NO DOC ELDER MAL SCRN: HCPCS | Performed by: FAMILY MEDICINE

## 2020-11-03 PROCEDURE — G8432 DEP SCR NOT DOC, RNG: HCPCS | Performed by: FAMILY MEDICINE

## 2020-11-03 PROCEDURE — G0463 HOSPITAL OUTPT CLINIC VISIT: HCPCS | Performed by: FAMILY MEDICINE

## 2020-11-03 PROCEDURE — G8427 DOCREV CUR MEDS BY ELIG CLIN: HCPCS | Performed by: FAMILY MEDICINE

## 2020-11-03 PROCEDURE — G8419 CALC BMI OUT NRM PARAM NOF/U: HCPCS | Performed by: FAMILY MEDICINE

## 2020-11-03 RX ORDER — BENZONATATE 200 MG/1
200 CAPSULE ORAL
Qty: 30 CAP | Refills: 1 | Status: SHIPPED | OUTPATIENT
Start: 2020-11-03 | End: 2021-10-07

## 2020-11-03 NOTE — PROGRESS NOTES
Obi Denise is a 82 y.o. male patient of Dr Delmy Paez who presents with concern of a cough. Onset 10 days ago. Cough was productive of brown mucous. Now, cough is not productive. No chest congestion. No wheezing. Has asthma as child, occasional asthma symptoms as an adult. No fever. Some sinus congestion. No ear or sinus pain. For the cough, taking otc cough medication. This is an established visit conducted via telemedicine with video. The patient has been instructed that this meets HIPAA criteria and acknowledges and agrees to this method of visitation. Pursuant to the emergency declaration under the [address] [place] [address] waiver authority and the [employer] Act, this Virtual Visit was conducted, with patient's consent, to reduce the patient's risk of exposure to COVID-19 and provide continuity of care for an established patient. Services were provided through a video synchronous discussion virtually to substitute for in-person clinic visit.         Past Medical History:   Diagnosis Date    Appendicitis      underwent appendectomy    Arthritis     DJD    Asthma     As a child     Enlarged prostate     Hyperlipidemia     Hypertension     LINDA treated with BiPAP     [facility]ox[facility] fibrillation) ([facility]     Pre-diabetes     HA1C 6.0 Dec 2016    Shingles        Family History   Problem Relation Age of Onset    Coronary Artery Disease Father 47         at [de-identified] of MI       Social History     Socioeconomic History    Marital status:      Spouse name: Not on file    Number of children: Not on file    Years of education: Not on file    Highest education level: Not on file   Occupational History    Not on file   Social Needs    Financial resource strain: Not on file    Food insecurity     Worry: Not on file     Inability: Not on file    Transportation needs     Medical: Not on file Non-medical: Not on file   Tobacco Use    Smoking status: Former Smoker    Smokeless tobacco: Never Used   Substance and Sexual Activity    Alcohol use: Yes     Comment: occasional    Drug use: No    Sexual activity: Not Currently   Lifestyle    Physical activity     Days per week: Not on file     Minutes per session: Not on file    Stress: Not on file   Relationships    Social connections     Talks on phone: Not on file     Gets together: Not on file     Attends Rastafarian service: Not on file     Active member of club or organization: Not on file     Attends meetings of clubs or organizations: Not on file     Relationship status: Not on file    Intimate partner violence     Fear of current or ex partner: Not on file     Emotionally abused: Not on file     Physically abused: Not on file     Forced sexual activity: Not on file   Other Topics Concern    Not on file   Social History Narrative    Not on file       Current Outpatient Medications on File Prior to Visit   Medication Sig Dispense Refill    lisinopriL (PRINIVIL, ZESTRIL) 20 mg tablet TAKE 1 TABLET BY MOUTH EVERY DAY 90 Tab 1    apixaban (ELIQUIS) 2.5 mg tablet Take 1 Tab by mouth two (2) times a day. 180 Tab 3    DILT- mg XR capsule TAKE 1 CAPSULE BY MOUTH EVERY DAY 90 Cap 3    tamsulosin (FLOMAX) 0.4 mg capsule TAKE ONE CAPSULE BY MOUTH EVERYNIGHT  3    SIMBRINZA 1-0.2 % drps INSTILL ONE DROP THREE TIMES A DAY INTO THE LEFT EYE AND ONCE A DAY INTO THE RIGHT EYE  0    clobetasol (TEMOVATE) 0.05 % topical cream Apply  to affected area two (2) times a day. 15 g 0    predniSONE (STERAPRED DS) 10 mg dose pack Take 1 Tab by mouth See Admin Instructions. See administration instruction per 10mg dose pack 21 Tab 0    guaiFENesin ER (MUCINEX) 600 mg ER tablet Take 1 Tab by mouth two (2) times a day. 14 Tab 0     No current facility-administered medications on file prior to visit.         Review of Systems  Pertinent items are noted in HPI.    Objective:     Gen: well appearing male  HEENT: normal conjunctiva, no audible congestion, patient does not see oral erythema, has MMM  Neck: patient does not feel enlarged or tender LAD or masses  Resp: normal respiratory effort, no audible wheezing. CV: patient does not feel palpitations or heart irregularity  Abd: patient does not feel abdominal tenderness or mass, patient does not notice distension  Extrem: patient does not see swelling in ankles or joints. Neuro: Alert and oriented, able to answer questions without difficulty    Assessment/Plan:       ICD-10-CM ICD-9-CM    1. Cough  R05 786.2 benzonatate (TESSALON) 200 mg capsule   add zyrtec 10mg daily. Tessalon perles. This was a telemedicine visit with video. Isabella Rodriguez MD    Follow-up and Dispositions    · Return if symptoms worsen or fail to improve.

## 2020-11-09 ENCOUNTER — OFFICE VISIT (OUTPATIENT)
Dept: INTERNAL MEDICINE CLINIC | Age: 85
End: 2020-11-09
Payer: MEDICARE

## 2020-11-09 VITALS
HEIGHT: 69 IN | BODY MASS INDEX: 28.41 KG/M2 | HEART RATE: 71 BPM | SYSTOLIC BLOOD PRESSURE: 143 MMHG | WEIGHT: 191.8 LBS | TEMPERATURE: 96.4 F | DIASTOLIC BLOOD PRESSURE: 73 MMHG | RESPIRATION RATE: 16 BRPM | OXYGEN SATURATION: 95 %

## 2020-11-09 DIAGNOSIS — H54.7 DIMINISHED VISION: Primary | ICD-10-CM

## 2020-11-09 DIAGNOSIS — H26.9 CATARACT OF LEFT EYE, UNSPECIFIED CATARACT TYPE: ICD-10-CM

## 2020-11-09 PROCEDURE — G8427 DOCREV CUR MEDS BY ELIG CLIN: HCPCS | Performed by: INTERNAL MEDICINE

## 2020-11-09 PROCEDURE — 99213 OFFICE O/P EST LOW 20 MIN: CPT | Performed by: INTERNAL MEDICINE

## 2020-11-09 PROCEDURE — G8753 SYS BP > OR = 140: HCPCS | Performed by: INTERNAL MEDICINE

## 2020-11-09 PROCEDURE — G8754 DIAS BP LESS 90: HCPCS | Performed by: INTERNAL MEDICINE

## 2020-11-09 PROCEDURE — G8536 NO DOC ELDER MAL SCRN: HCPCS | Performed by: INTERNAL MEDICINE

## 2020-11-09 PROCEDURE — G8419 CALC BMI OUT NRM PARAM NOF/U: HCPCS | Performed by: INTERNAL MEDICINE

## 2020-11-09 PROCEDURE — G8432 DEP SCR NOT DOC, RNG: HCPCS | Performed by: INTERNAL MEDICINE

## 2020-11-09 PROCEDURE — 1101F PT FALLS ASSESS-DOCD LE1/YR: CPT | Performed by: INTERNAL MEDICINE

## 2020-11-09 PROCEDURE — G0463 HOSPITAL OUTPT CLINIC VISIT: HCPCS | Performed by: INTERNAL MEDICINE

## 2020-11-09 NOTE — PATIENT INSTRUCTIONS
Office Policies Phone calls/patient messages: Please allow up to 24 hours for someone in the office to contact you about your call or message. Be mindful your provider may be out of the office or your message may require further review. We encourage you to use [employer] for your messages as this is a faster, more efficient way to communicate with our office Medication Refills: 
         
Prescription medications require 48-72 business hours to process. We encourage you to use [employer] for your refills. For controlled medications: Please allow 72 business hours to process. Certain medications may require you to  a written prescription at our office. NO narcotic/controlled medications will be prescribed after 4pm Monday through Friday or on weekends Form/Paperwork Completion: 
         
Please note a $25 fee may incur for all paperwork for completed by our providers. We ask that you allow 7-10 business days. Pre-payment is due prior to picking up/faxing the completed form. You may also download your forms to [employer] to have your doctor print off. 
 
 
1. Have you been to the ER, urgent care clinic since your last visit? Hospitalized since your last visit?no 2. Have you seen or consulted any other health care providers outside of the [address] [place] [address] since your last visit? Include any pap smears or colon screening.  no

## 2020-11-09 NOTE — PROGRESS NOTES
SUBJECTIVE  Mr. Shankar Gomez is a patient of Graeme Cerda MD and he presents today acutely for the following:     Chief Complaint   Patient presents with    Pre-op Exam     cataract surg on 11/19/20       He has had decreased vision in L eye due to cataract. He will be getting surgery by Dr. Kayla Cunningham in [place]. No acute illnesses. No SOB/CP. No acute neurologic changes. Past Medical History:   Diagnosis Date    Appendicitis     2018 underwent appendectomy    Arthritis     DJD    Asthma     As a child     Enlarged prostate     Hyperlipidemia     Hypertension     LINDA treated with BiPAP     PA[facility]ox[facility] fibrillation) ([facility] [address]     Pre-diabetes     HA1C 6.0 Dec 2016    Shingles      Past Surgical History:   Procedure Laterality Date    HX APPENDECTOMY  2018    HX COLONOSCOPY  2009    normal except for hemorrhoids    HX HEENT      wisdom teeth extraction x2    HX HEENT  1950s    Cyst removed from under tongue     HX KNEE REPLACEMENT      right; Jan. 2017    HX SHOULDER ARTHROSCOPY Right     rt    HX TONSILLECTOMY      HX UROLOGICAL  2018    Urolift      All: He is allergic to oxycodone. Current Outpatient Medications on File Prior to Visit   Medication Sig Dispense Refill    benzonatate (TESSALON) 200 mg capsule Take 1 Cap by mouth three (3) times daily as needed for Cough. 30 Cap 1    guaiFENesin ER (MUCINEX) 600 mg ER tablet Take 1 Tab by mouth two (2) times a day. 14 Tab 0    apixaban (ELIQUIS) 2.5 mg tablet Take 1 Tab by mouth two (2) times a day. 180 Tab 3    DILT- mg XR capsule TAKE 1 CAPSULE BY MOUTH EVERY DAY 90 Cap 3    tamsulosin (FLOMAX) 0.4 mg capsule TAKE ONE CAPSULE BY MOUTH EVERYNIGHT  3    SIMBRINZA 1-0.2 % drps INSTILL ONE DROP THREE TIMES A DAY INTO THE LEFT EYE AND ONCE A DAY INTO THE RIGHT EYE  0    clobetasol (TEMOVATE) 0.05 % topical cream Apply  to affected area two (2) times a day.  15 g 0    lisinopriL (PRINIVIL, ZESTRIL) 20 mg tablet TAKE 1 TABLET BY MOUTH EVERY DAY 90 Tab 1    predniSONE (STERAPRED DS) 10 mg dose pack Take 1 Tab by mouth See Admin Instructions. See administration instruction per 10mg dose pack 21 Tab 0     No current facility-administered medications on file prior to visit. SH:  reports that he has quit smoking. He has never used smokeless tobacco. He reports current alcohol use. He reports that he does not use drugs. ROS: Complete review of systems was performed and is otherwise unremarkable except as noted elsewhere. OBJECTIVE  Visit Vitals  BP (!) 143/73 (BP 1 Location: Left arm, BP Patient Position: Sitting)   Pulse 71   Temp (!) 96.4 °F (35.8 °C) (Temporal)   Resp 16   Ht 5' 9\" (1.753 m)   Wt 191 lb 12.8 oz (87 kg)   SpO2 95%   BMI 28.32 kg/m²     Gen: Pleasant 82 y.o.  male in NAD.   HEENT: PERRLA. EOMI. OP moist and pink.  Neck: Supple.  No LAD.  HEART: RRR, No M/G/R.   LUNGS: CTAB No W/R.   ABDOMEN: S, NT, ND, BS+.   EXTREMITIES: Warm. No C/C/E. MUSCULOSKELETAL: Normal ROM, muscle strength 5/5 all groups. NEURO: Alert and oriented x 3.  Cranial nerves grossly intact.  No focal sensory or motor deficits noted. SKIN: Warm. Dry. No rashes or other lesions noted. ASSESSMENT   Vision loss L eye due to cataract. PLAN  Stable co-morbidities. Okay to proceed as planned based on current guidelines. I have reviewed with the patient details of the assessment and plan and all questions were answered. Relevant patient education was performed.     Follow up as planned with Lesley Buenrostro MD

## 2020-11-18 ENCOUNTER — TELEPHONE (OUTPATIENT)
Dept: INTERNAL MEDICINE CLINIC | Age: 85
End: 2020-11-18

## 2020-11-18 NOTE — TELEPHONE ENCOUNTER
Vicki//[facility] States patient is having Cataract surg tomorrow morning & Surgical Clearance forms/Documentation needs to be faxed Asap. Please call if any questions.  Thank you      Fax# is [phone] ATTN: Rom Duque

## 2020-11-18 NOTE — TELEPHONE ENCOUNTER
#[phone]  [facility] with [facility] states pt's surgery is tomorrow, 11-19-20 and they need pre op/EKG/labs faxed to them in order to do the surgery.     This needs to get to them as soon as possible this afternoon     New fax #[phone]  ATTN: Fred Hoyos

## 2020-12-10 ENCOUNTER — TELEPHONE (OUTPATIENT)
Dept: INTERNAL MEDICINE CLINIC | Age: 85
End: 2020-12-10

## 2020-12-10 ENCOUNTER — NURSE TRIAGE (OUTPATIENT)
Dept: OTHER | Facility: CLINIC | Age: 85
End: 2020-12-10

## 2020-12-10 NOTE — TELEPHONE ENCOUNTER
----- Message from Prabhu Zarate sent at 12/10/2020 12:15 PM EST -----  Regarding: Dr. Gwen Garibay General Message/Vendor Calls    Caller's first and last name:      Reason for call: Regarding scheduling VV cough, runny nose.        Call back required yes/no and why: Yes       Best contact number(s): [phone]      Details to clarify the request:      Prabhu Zarate

## 2020-12-10 NOTE — TELEPHONE ENCOUNTER
Received call from [facility]. Call soft transferred to [facility] to schedule appointment. Attention Provider: Thank you for allowing me to participate in the care of your patient. The  patient was connected to triage in response to information provided to the [facility]. Please do not respond through this encounter as the response is not directed to a shared pool. Reason for Disposition   [1] Continuous (nonstop) coughing interferes with work or school AND [2] no improvement using cough treatment per protocol    Answer Assessment - Initial Assessment Questions  1. COVID-19 DIAGNOSIS: \"Who made your Coronavirus (COVID-19) diagnosis? \" \"Was it confirmed by a positive lab test?\" If not diagnosed by a HCP, ask \"Are there lots of cases (community spread) where you live? \" (See public health department website, if unsure)      Denies     2. COVID-19 EXPOSURE: \"Was there any known exposure to COVID before the symptoms began? \" CDC Definition of close contact: within 6 feet (2 meters) for a total of 15 minutes or more over a 24-hour period. Denies     3. ONSET: \"When did the COVID-19 symptoms start?\"       1 week ago     4. WORST SYMPTOM: \"What is your worst symptom? \" (e.g., cough, fever, shortness of breath, muscle aches)      Runny nose    5. COUGH: \"Do you have a cough? \" If so, ask: \"How bad is the cough? \"        Yes, productive cough, brownish sputum     6. FEVER: \"Do you have a fever? \" If so, ask: \"What is your temperature, how was it measured, and when did it start? \"      Denies     7. RESPIRATORY STATUS: \"Describe your breathing? \" (e.g., shortness of breath, wheezing, unable to speak)       Denies     8. BETTER-SAME-WORSE: Colby Butler you getting better, staying the same or getting worse compared to yesterday? \"  If getting worse, ask, \"In what way? \"      Same     9. HIGH RISK DISEASE: \"Do you have any chronic medical problems? \" (e.g., asthma, heart or lung disease, weak immune system, etc.)      Asthma, afib history, takes Eliquis     10. PREGNANCY: \"Is there any chance you are pregnant? \" \"When was your last menstrual period? \"        N/a     11. OTHER SYMPTOMS: \"Do you have any other symptoms? \"  (e.g., chills, fatigue, headache, loss of smell or taste, muscle pain, sore throat)        Runny nose, cough, red eyes    Protocols used: CORONAVIRUS (COVID-19)  DIAGNOSED OR SUSPECTED-ADULT-[place]

## 2020-12-16 ENCOUNTER — VIRTUAL VISIT (OUTPATIENT)
Dept: INTERNAL MEDICINE CLINIC | Age: 85
End: 2020-12-16
Payer: MEDICARE

## 2020-12-16 DIAGNOSIS — J06.9 UPPER RESPIRATORY TRACT INFECTION, UNSPECIFIED TYPE: Primary | ICD-10-CM

## 2020-12-16 PROCEDURE — G0463 HOSPITAL OUTPT CLINIC VISIT: HCPCS | Performed by: FAMILY MEDICINE

## 2020-12-16 PROCEDURE — 99213 OFFICE O/P EST LOW 20 MIN: CPT | Performed by: FAMILY MEDICINE

## 2020-12-16 RX ORDER — CETIRIZINE HCL 10 MG
10 TABLET ORAL
Qty: 30 TAB | Refills: 2 | Status: SHIPPED | OUTPATIENT
Start: 2020-12-16 | End: 2021-09-07

## 2020-12-16 NOTE — PROGRESS NOTES
Reviewed record in preparation for visit and have obtained necessary documentation. Identified pt with two pt identifiers(name and ). Chief Complaint   Patient presents with    Cough    Cold       Health Maintenance Due   Topic Date Due    Shingles Vaccine (2 of 2) 2020       Mr. Adalberto Beckford has a reminder for a \"due or due soon\" health maintenance. I have asked that he discuss this further with his primary care provider for follow-up on this health maintenance. Coordination of Care Questionnaire:  :     1) Have you been to an emergency room, urgent care clinic since your last visit? no   Hospitalized since your last visit? no             2) Have you seen or consulted any other health care providers outside of [address] [place] [address] since your last visit? no  (Include any pap smears or colon screenings in this section.)    3) In the event something were to happen to you and you were unable to speak on your behalf, do you have an Advance Directive/ Living Will in place stating your wishes? NO    Do you have an Advance Directive on file? no    4) Are you interested in receiving information on Advance Directives? NO    Patient is accompanied by self I have received verbal consent from Candance Ok to discuss any/all medical information while they are present in the room.

## 2020-12-16 NOTE — PROGRESS NOTES
Camryn Mayes is a 82 y.o. male who was seen by synchronous (real-time) audio-video technology on 12/16/2020 for Cough and Cold    Mr. Carolina calls in with complaints of persistent cough and sinus congestion. He has tried Tylenol Cold and sinus but continues to have symptoms. He denies any sinus pain or pressure. He denies any shortness of breath. His symptoms started about 2 weeks ago. She was given some Tessalon in early November but reports that did not relieve his symptoms. Assessment & Plan:   Diagnoses and all orders for this visit:    1. Upper respiratory tract infection, unspecified type  -     cetirizine (ZYRTEC) 10 mg tablet; Take 1 Tab by mouth nightly. This patient was advised to try different antihistamine. I will send this to his pharmacy. He can also use  saline nasal spray. If the symptoms persist he will need to go to urgent care and have a chest x-ray and Covid screen. Subjective:       Prior to Admission medications    Medication Sig Start Date End Date Taking? Authorizing Provider   cetirizine (ZYRTEC) 10 mg tablet Take 1 Tab by mouth nightly. 12/16/20  Yes Isidoro Quinn MD   benzonatate (TESSALON) 200 mg capsule Take 1 Cap by mouth three (3) times daily as needed for Cough. 11/3/20  Yes Ximena Kilgore MD   lisinopriL (PRINIVIL, ZESTRIL) 20 mg tablet TAKE 1 TABLET BY MOUTH EVERY DAY 9/14/20  Yes Maria Luisa Orantes MD   guaiFENesin ER (MUCINEX) 600 mg ER tablet Take 1 Tab by mouth two (2) times a day. 4/10/20  Yes Surjit Amaya MD   apixaban (ELIQUIS) 2.5 mg tablet Take 1 Tab by mouth two (2) times a day.  3/12/20  Yes Gerardo Reeves MD   DILT- mg XR capsule TAKE 1 CAPSULE BY MOUTH EVERY DAY 3/7/20  Yes Maria Luisa Orantes MD   tamsulosin (FLOMAX) 0.4 mg capsule TAKE ONE CAPSULE BY MOUTH EVERYNIGHT 10/4/19  Yes Provider, Historical   SIMBRINZA 1-0.2 % drps INSTILL ONE DROP THREE TIMES A DAY INTO THE LEFT EYE AND ONCE A DAY INTO THE RIGHT EYE 4/3/19 Yes Provider, Historical   clobetasol (TEMOVATE) 0.05 % topical cream Apply  to affected area two (2) times a day. 5/19/17  Yes MD RC Cool    Objective:   No flowsheet data found. [INSTRUCTIONS:  \"[x]\" Indicates a positive item  \"[]\" Indicates a negative item  -- DELETE ALL ITEMS NOT EXAMINED]    Constitutional: [x] Appears well-developed and well-nourished [x] No apparent distress      [] Abnormal -     Mental status: [x] Alert and awake  [x] Oriented to person/place/time [x] Able to follow commands    [] Abnormal -     Eyes:   EOM    [x]  Normal    [] Abnormal -   Sclera  [x]  Normal    [] Abnormal -          Discharge [x]  None visible   [] Abnormal -     HENT: [x] Normocephalic, atraumatic  [] Abnormal -   [x] Mouth/Throat: Mucous membranes are moist    External Ears [x] Normal  [] Abnormal -    Neck: [x] No visualized mass [] Abnormal -     Pulmonary/Chest: [x] Respiratory effort normal   [x] No visualized signs of difficulty breathing or respiratory distress        [] Abnormal -      Musculoskeletal:   [] Normal gait with no signs of ataxia         [x] Normal range of motion of neck        [] Abnormal -     Neurological:        [x] No Facial Asymmetry (Cranial nerve 7 motor function) (limited exam due to video visit)          [x] No gaze palsy        [] Abnormal -          Skin:        [x] No significant exanthematous lesions or discoloration noted on facial skin         [] Abnormal -            Psychiatric:       [x] Normal Affect [] Abnormal -        [x] No Hallucinations    Other pertinent observable physical exam findings:-        We discussed the expected course, resolution and complications of the diagnosis(es) in detail. Medication risks, benefits, costs, interactions, and alternatives were discussed as indicated. I advised him to contact the office if his condition worsens, changes or fails to improve as anticipated.  He expressed understanding with the diagnosis(es) and plan.       Liza Cantu, who was evaluated through a patient-initiated, synchronous (real-time) audio-video encounter, and/or his healthcare decision maker, is aware that it is a billable service, with coverage as determined by his insurance carrier. He provided verbal consent to proceed: Yes, and patient identification was verified. It was conducted pursuant to the emergency declaration under the [address] [place] [address] and the [employer] Act. A caregiver was present when appropriate. Ability to conduct physical exam was limited. I was at home. The patient was at home.       Vivien Hook MD

## 2020-12-16 NOTE — Clinical Note
Please contact the patient to see how he responds to using antihistamine. If he continues to have the cough he will need to go to urgent care and had a chest x-ray and a Covid screen.

## 2021-03-07 DIAGNOSIS — I10 ESSENTIAL HYPERTENSION: ICD-10-CM

## 2021-03-08 RX ORDER — LISINOPRIL 20 MG/1
TABLET ORAL
Qty: 90 TAB | Refills: 1 | Status: SHIPPED | OUTPATIENT
Start: 2021-03-08 | End: 2021-09-07

## 2021-03-08 NOTE — TELEPHONE ENCOUNTER
Future Appointments:  Future Appointments   Date Time Provider Kassy Pattersoni   3/22/2021 11:45 AM Surjit Riley MD [facility] BS AMB   10/4/2021 10:00 AM KIRTI PALACIO BS AMB   10/4/2021 11:00 AM Magaly Garcia MD [facility] AMB        Last Appointment With Me:  Visit date not found     Requested Prescriptions     Pending Prescriptions Disp Refills    lisinopriL (PRINIVIL, ZESTRIL) 20 mg tablet [Pharmacy Med Name: LISINOPRIL 20 MG TABLET] 90 Tab 1     Sig: TAKE 1 TABLET BY MOUTH EVERY DAY

## 2021-03-22 ENCOUNTER — VIRTUAL VISIT (OUTPATIENT)
Dept: INTERNAL MEDICINE CLINIC | Age: 86
End: 2021-03-22

## 2021-03-30 DIAGNOSIS — I48.0 PAROXYSMAL ATRIAL FIBRILLATION (HCC): ICD-10-CM

## 2021-03-30 RX ORDER — DILTIAZEM HYDROCHLORIDE 120 MG/1
CAPSULE, EXTENDED RELEASE ORAL
Qty: 90 CAP | Refills: 3 | Status: SHIPPED | OUTPATIENT
Start: 2021-03-30 | End: 2022-03-18

## 2021-03-31 DIAGNOSIS — I48.0 PAF (PAROXYSMAL ATRIAL FIBRILLATION) (HCC): ICD-10-CM

## 2021-04-09 ENCOUNTER — OFFICE VISIT (OUTPATIENT)
Dept: INTERNAL MEDICINE CLINIC | Age: 86
End: 2021-04-09
Payer: MEDICARE

## 2021-04-09 VITALS
BODY MASS INDEX: 28.88 KG/M2 | DIASTOLIC BLOOD PRESSURE: 70 MMHG | TEMPERATURE: 97.5 F | WEIGHT: 195 LBS | SYSTOLIC BLOOD PRESSURE: 130 MMHG | HEIGHT: 69 IN | HEART RATE: 86 BPM | OXYGEN SATURATION: 99 % | RESPIRATION RATE: 18 BRPM

## 2021-04-09 DIAGNOSIS — E78.00 HIGH CHOLESTEROL: ICD-10-CM

## 2021-04-09 DIAGNOSIS — R73.03 PRE-DIABETES: ICD-10-CM

## 2021-04-09 DIAGNOSIS — I48.0 PAROXYSMAL ATRIAL FIBRILLATION (HCC): ICD-10-CM

## 2021-04-09 DIAGNOSIS — I10 ESSENTIAL HYPERTENSION: Primary | ICD-10-CM

## 2021-04-09 DIAGNOSIS — G47.33 OSA (OBSTRUCTIVE SLEEP APNEA): ICD-10-CM

## 2021-04-09 LAB
ALBUMIN SERPL-MCNC: 3.7 G/DL (ref 3.5–5)
ALBUMIN/GLOB SERPL: 1.1 {RATIO} (ref 1.1–2.2)
ALP SERPL-CCNC: 78 U/L (ref 45–117)
ALT SERPL-CCNC: 18 U/L (ref 12–78)
ANION GAP SERPL CALC-SCNC: 5 MMOL/L (ref 5–15)
AST SERPL-CCNC: 15 U/L (ref 15–37)
BASOPHILS # BLD: 0.1 K/UL (ref 0–0.1)
BASOPHILS NFR BLD: 1 % (ref 0–1)
BILIRUB SERPL-MCNC: 0.5 MG/DL (ref 0.2–1)
BUN SERPL-MCNC: 14 MG/DL (ref 6–20)
BUN/CREAT SERPL: 12 (ref 12–20)
CALCIUM SERPL-MCNC: 8.9 MG/DL (ref 8.5–10.1)
CHLORIDE SERPL-SCNC: 107 MMOL/L (ref 97–108)
CHOLEST SERPL-MCNC: 178 MG/DL
CO2 SERPL-SCNC: 27 MMOL/L (ref 21–32)
CREAT SERPL-MCNC: 1.13 MG/DL (ref 0.7–1.3)
DIFFERENTIAL METHOD BLD: NORMAL
EOSINOPHIL # BLD: 0.2 K/UL (ref 0–0.4)
EOSINOPHIL NFR BLD: 2 % (ref 0–7)
ERYTHROCYTE [DISTWIDTH] IN BLOOD BY AUTOMATED COUNT: 14.1 % (ref 11.5–14.5)
EST. AVERAGE GLUCOSE BLD GHB EST-MCNC: 117 MG/DL
GLOBULIN SER CALC-MCNC: 3.3 G/DL (ref 2–4)
GLUCOSE SERPL-MCNC: 94 MG/DL (ref 65–100)
HBA1C MFR BLD: 5.7 % (ref 4–5.6)
HCT VFR BLD AUTO: 43.6 % (ref 36.6–50.3)
HDLC SERPL-MCNC: 31 MG/DL
HDLC SERPL: 5.7 {RATIO} (ref 0–5)
HGB BLD-MCNC: 14.3 G/DL (ref 12.1–17)
IMM GRANULOCYTES # BLD AUTO: 0 K/UL (ref 0–0.04)
IMM GRANULOCYTES NFR BLD AUTO: 0 % (ref 0–0.5)
LDLC SERPL CALC-MCNC: 116.6 MG/DL (ref 0–100)
LIPID PROFILE,FLP: ABNORMAL
LYMPHOCYTES # BLD: 3.2 K/UL (ref 0.8–3.5)
LYMPHOCYTES NFR BLD: 35 % (ref 12–49)
MCH RBC QN AUTO: 26.7 PG (ref 26–34)
MCHC RBC AUTO-ENTMCNC: 32.8 G/DL (ref 30–36.5)
MCV RBC AUTO: 81.3 FL (ref 80–99)
MONOCYTES # BLD: 0.6 K/UL (ref 0–1)
MONOCYTES NFR BLD: 7 % (ref 5–13)
NEUTS SEG # BLD: 4.9 K/UL (ref 1.8–8)
NEUTS SEG NFR BLD: 55 % (ref 32–75)
NRBC # BLD: 0 K/UL (ref 0–0.01)
NRBC BLD-RTO: 0 PER 100 WBC
PLATELET # BLD AUTO: 163 K/UL (ref 150–400)
PMV BLD AUTO: 10.4 FL (ref 8.9–12.9)
POTASSIUM SERPL-SCNC: 4.7 MMOL/L (ref 3.5–5.1)
PROT SERPL-MCNC: 7 G/DL (ref 6.4–8.2)
RBC # BLD AUTO: 5.36 M/UL (ref 4.1–5.7)
SODIUM SERPL-SCNC: 139 MMOL/L (ref 136–145)
TRIGL SERPL-MCNC: 152 MG/DL (ref ?–150)
VLDLC SERPL CALC-MCNC: 30.4 MG/DL
WBC # BLD AUTO: 9 K/UL (ref 4.1–11.1)

## 2021-04-09 PROCEDURE — G8754 DIAS BP LESS 90: HCPCS | Performed by: INTERNAL MEDICINE

## 2021-04-09 PROCEDURE — G8432 DEP SCR NOT DOC, RNG: HCPCS | Performed by: INTERNAL MEDICINE

## 2021-04-09 PROCEDURE — G8427 DOCREV CUR MEDS BY ELIG CLIN: HCPCS | Performed by: INTERNAL MEDICINE

## 2021-04-09 PROCEDURE — 1101F PT FALLS ASSESS-DOCD LE1/YR: CPT | Performed by: INTERNAL MEDICINE

## 2021-04-09 PROCEDURE — G8419 CALC BMI OUT NRM PARAM NOF/U: HCPCS | Performed by: INTERNAL MEDICINE

## 2021-04-09 PROCEDURE — G8752 SYS BP LESS 140: HCPCS | Performed by: INTERNAL MEDICINE

## 2021-04-09 PROCEDURE — 99214 OFFICE O/P EST MOD 30 MIN: CPT | Performed by: INTERNAL MEDICINE

## 2021-04-09 PROCEDURE — G8536 NO DOC ELDER MAL SCRN: HCPCS | Performed by: INTERNAL MEDICINE

## 2021-04-09 PROCEDURE — G0463 HOSPITAL OUTPT CLINIC VISIT: HCPCS | Performed by: INTERNAL MEDICINE

## 2021-04-09 RX ORDER — NETARSUDIL AND LATANOPROST OPHTHALMIC SOLUTION, 0.02%/0.005% .2; .05 MG/ML; MG/ML
SOLUTION/ DROPS OPHTHALMIC; TOPICAL
COMMUNITY
Start: 2021-03-29

## 2021-04-09 NOTE — PROGRESS NOTES
Reviewed record in preparation for visit and have obtained necessary documentation. Identified pt with two pt identifiers(name and ). Chief Complaint   Patient presents with    Hypertension       Health Maintenance Due   Topic Date Due    COVID-19 Vaccine (1) Never done    Shingles Vaccine (2 of 2) 2020       Mr. Evgeny Crandall has a reminder for a \"due or due soon\" health maintenance. I have asked that he discuss this further with his primary care provider for follow-up on this health maintenance. Coordination of Care Questionnaire:  :     1) Have you been to an emergency room, urgent care clinic since your last visit? no   Hospitalized since your last visit? no             2) Have you seen or consulted any other health care providers outside of [address] [place] [address] since your last visit? no  (Include any pap smears or colon screenings in this section.)    3) In the event something were to happen to you and you were unable to speak on your behalf, do you have an Advance Directive/ Living Will in place stating your wishes? NO    Do you have an Advance Directive on file? no    4) Are you interested in receiving information on Advance Directives? NO    Patient is accompanied by wife I have received verbal consent from Vandana Millan to discuss any/all medical information while they are present in the room.

## 2021-04-09 NOTE — PROGRESS NOTES
Prediabetes is stable and actually slightly better  Normal kidney liver function  Normal blood count  Cholesterol is better than previous with an LDL of 116.   At this time no cholesterol medications indicated  Work on weight loss mass   message sent on my chart

## 2021-04-09 NOTE — PROGRESS NOTES
Mr. Mary Batista is presenting to follow up     CC:  Hypertension       HPI:        Essential HTN: currently on lisinopril 20mg and dilt 120mg long acting    Hx of glaucoma : recent change in eye drops, eye doctor in [address]    Atrial fibrillation : taking the diltiazem and and Eliquis. Mr. Susi Cleveland is taking eliquis once a day due to cost - I told him he is at high risk of stroke as the medication only lasts 12 hours. He sees Dr Kian Segal and was resumed on diltiazem. BP is excellent today  Denies CP and dyspnea        he had a uro-lift procedure and he is off of Cardura. Frequent urination is better     He is not compliant with CPAP therapy -did not tolerate CPAP.     Review of systems:  Constitutional: negative for fever, chills, weight loss, night sweats   10 systems reviewed and negative other than HPI       Past Medical History:   Diagnosis Date    Appendicitis     2018 underwent appendectomy    Arthritis     DJD    Asthma     As a child     Enlarged prostate     Hyperlipidemia     Hypertension     LINDA treated with BiPAP     PAF (paroxysmal atrial fibrillation) ([facility] [address]     Pre-diabetes     HA1C 6.0 Dec 2016    Shingles         Past Surgical History:   Procedure Laterality Date    HX APPENDECTOMY  2018    HX COLONOSCOPY  2009    normal except for hemorrhoids    HX HEENT      wisdom teeth extraction x2    HX HEENT  1950s    Cyst removed from under tongue     HX KNEE REPLACEMENT      right; Jan. 2017    HX SHOULDER ARTHROSCOPY Right     rt    HX TONSILLECTOMY      HX UROLOGICAL  2018    Urolift        Allergies   Allergen Reactions    Oxycodone Other (comments)     Patient states, \"It made me feel unwell. \"       Current Outpatient Medications on File Prior to Visit   Medication Sig Dispense Refill    apixaban (Eliquis) 2.5 mg tablet Take 1 Tab by mouth every twelve (12) hours.  90 Tab 4    DILT- mg capsule TAKE 1 CAPSULE BY MOUTH EVERY DAY 90 Cap 3    lisinopriL (PRINIVIL, ZESTRIL) 20 mg tablet TAKE 1 TABLET BY MOUTH EVERY DAY 90 Tab 1    cetirizine (ZYRTEC) 10 mg tablet Take 1 Tab by mouth nightly. 30 Tab 2    benzonatate (TESSALON) 200 mg capsule Take 1 Cap by mouth three (3) times daily as needed for Cough. 30 Cap 1    tamsulosin (FLOMAX) 0.4 mg capsule TAKE ONE CAPSULE BY MOUTH EVERYNIGHT  3    SIMBRINZA 1-0.2 % drps INSTILL ONE DROP THREE TIMES A DAY INTO THE LEFT EYE AND ONCE A DAY INTO THE RIGHT EYE  0    clobetasol (TEMOVATE) 0.05 % topical cream Apply  to affected area two (2) times a day. 15 g 0    Rocklatan 0.02-0.005 % drop INSTILL 1 DROP INTO AFFECTED EYE(S) ONCE DAILY AT BEDTIME       No current facility-administered medications on file prior to visit. family history includes Coronary Artery Disease (age of onset: 47) in his father.     Social History     Socioeconomic History    Marital status:      Spouse name: Not on file    Number of children: Not on file    Years of education: Not on file    Highest education level: Not on file   Occupational History    Not on file   Social Needs    Financial resource strain: Not on file    Food insecurity     Worry: Not on file     Inability: Not on file    Transportation needs     Medical: Not on file     Non-medical: Not on file   Tobacco Use    Smoking status: Former Smoker    Smokeless tobacco: Never Used   Substance and Sexual Activity    Alcohol use: Yes     Comment: occasional    Drug use: No    Sexual activity: Not Currently   Lifestyle    Physical activity     Days per week: Not on file     Minutes per session: Not on file    Stress: Not on file   Relationships    Social connections     Talks on phone: Not on file     Gets together: Not on file     Attends [employer] service: Not on file     Active member of club or organization: Not on file     Attends meetings of clubs or organizations: Not on file     Relationship status: Not on file    Intimate partner violence     Fear of current or ex partner: Not on file     Emotionally abused: Not on file     Physically abused: Not on file     Forced sexual activity: Not on file   Other Topics Concern    Not on file   Social History Narrative    Not on file       Visit Vitals  /70   Pulse 86   Temp 97.5 °F (36.4 °C) (Axillary)   Resp 18   Ht 5' 9\" (1.753 m)   Wt 195 lb (88.5 kg)   SpO2 99%   BMI 28.80 kg/m²     General:  Well appearing male no acute distress  HEENT:   PERRL,normal conjunctiva. External ear and canals normal, TMs normal.  Hearing normal to voice. Nose without edema or discharge, normal septum. Lips, teeth, gums normal.  Oropharynx: no erythema, no exudates, no lesions, normal tongue. Neck:  Supple. Thyroid normal size, nontender, without nodules. No carotid bruit. No masses or lymphadenopathy  Respiratory: no respiratory distress,  no wheezing, no rhonchi, no rales. No chest wall tenderness. Cardiovascular:  RRR, normal S1S2, no murmur. Gastrointestinal: normal bowel sounds, soft, nontender, without masses. No hepatosplenomegaly. Extremities +2 pulses, no edema, normal sensation   Musculoskeletal:  Normal gait. Normal digits and nails. Normal strength and tone, no atrophy, and no abnormal movement. Skin:  No rash, no lesions, no ulcers. Skin warm, normal turgor, without induration or nodules. Neuro:  A and OX4, fluent speech, cranial nerves normal 2-12. Sensation normal to light touch.   DTR symmetrical  Psych:  Normal affect      Lab Results   Component Value Date/Time    WBC 11.5 (H) 09/21/2020 03:14 PM    HGB 14.4 09/21/2020 03:14 PM    HCT 44.0 09/21/2020 03:14 PM    PLATELET 579 66/76/7013 03:14 PM    MCV 80 09/21/2020 03:14 PM     Lab Results   Component Value Date/Time    Sodium 141 09/21/2020 03:14 PM    Potassium 4.0 09/21/2020 03:14 PM    Chloride 104 09/21/2020 03:14 PM    CO2 24 09/21/2020 03:14 PM    Anion gap 7 01/08/2020 08:53 AM    Glucose 91 09/21/2020 03:14 PM    BUN 15 09/21/2020 03:14 PM    Creatinine 1.34 (H) 09/21/2020 03:14 PM    BUN/Creatinine ratio 11 09/21/2020 03:14 PM    GFR est AA 57 (L) 09/21/2020 03:14 PM    GFR est non-AA 49 (L) 09/21/2020 03:14 PM    Calcium 8.9 09/21/2020 03:14 PM     Lab Results   Component Value Date/Time    Cholesterol, total 193 09/21/2020 03:14 PM    HDL Cholesterol 28 (L) 09/21/2020 03:14 PM    LDL, calculated 130 (H) 09/21/2020 03:14 PM    LDL, calculated 123 (H) 01/30/2019 08:12 AM    VLDL, calculated 35 09/21/2020 03:14 PM    VLDL, calculated 36 01/30/2019 08:12 AM    Triglyceride 197 (H) 09/21/2020 03:14 PM     No results found for: TSH, TSH2, TSH3, TSHP, TSHEXT, TSHEXT  Lab Results   Component Value Date/Time    Hemoglobin A1c 6.0 (H) 09/21/2020 03:14 PM     No results found for: VITD3, XQVID2, XQVID3, XQVID, VD3RIA                Assessment and Plan:     1. Essential hypertension  Blood pressure is well controlled on current regimen lisinopril 20 mg and dilt   - CBC WITH AUTOMATED DIFF; Future  - METABOLIC PANEL, COMPREHENSIVE; Future  - METABOLIC PANEL, COMPREHENSIVE  - CBC WITH AUTOMATED DIFF    2. Paroxysmal atrial fibrillation (HCC)   taking the diltiazem and and Eliquis. Mr. Keyana Smith is taking eliquis once a day due to cost - I told him he is at high risk of stroke as the medication only lasts 12 hours. He sees Dr Antonia Serrato and was resumed on diltiazem. BP is excellent today  Denies CP and dyspnea  - CBC WITH AUTOMATED DIFF; Future  - METABOLIC PANEL, COMPREHENSIVE; Future  - METABOLIC PANEL, COMPREHENSIVE  - CBC WITH AUTOMATED DIFF    3. High cholesterol  Not on medication if trending up will consider   - LIPID PANEL; Future      4. LINDA (obstructive sleep apnea)  Discuss considering CPAP therapy, having increased daytime sleepiness, discussed risks of not treating including sudden death    5. BPH per urologist    6. Essential tremor    7.  Pre diabetes : diet modification  HA1C today      Follow-up and Dispositions    · Return in about 6 months (around 10/9/2021).           Yamileth Mendez MD

## 2021-07-18 ENCOUNTER — HOSPITAL ENCOUNTER (EMERGENCY)
Age: 86
Discharge: HOME | End: 2021-07-18
Attending: EMERGENCY MEDICINE
Payer: MEDICARE

## 2021-07-18 VITALS
TEMPERATURE: 98.7 F | DIASTOLIC BLOOD PRESSURE: 93 MMHG | WEIGHT: 189.6 LBS | SYSTOLIC BLOOD PRESSURE: 176 MMHG | OXYGEN SATURATION: 100 % | BODY MASS INDEX: 28.73 KG/M2 | RESPIRATION RATE: 18 BRPM | HEIGHT: 68 IN | HEART RATE: 87 BPM

## 2021-07-18 DIAGNOSIS — R33.9 URINARY RETENTION: Primary | ICD-10-CM

## 2021-07-18 LAB
APPEARANCE UR: CLEAR
BACTERIA URNS QL MICRO: NEGATIVE /HPF
BILIRUB UR QL: NEGATIVE
COLOR UR: ABNORMAL
EPITH CASTS URNS QL MICRO: ABNORMAL /LPF
GLUCOSE UR STRIP.AUTO-MCNC: NEGATIVE MG/DL
HGB UR QL STRIP: ABNORMAL
HYALINE CASTS URNS QL MICRO: ABNORMAL /LPF (ref 0–5)
KETONES UR QL STRIP.AUTO: NEGATIVE MG/DL
LEUKOCYTE ESTERASE UR QL STRIP.AUTO: NEGATIVE
NITRITE UR QL STRIP.AUTO: NEGATIVE
PH UR STRIP: 6 [PH] (ref 5–8)
PROT UR STRIP-MCNC: NEGATIVE MG/DL
RBC #/AREA URNS HPF: ABNORMAL /HPF (ref 0–5)
SP GR UR REFRACTOMETRY: 1.02 (ref 1–1.03)
UA: UC IF INDICATED,UAUC: ABNORMAL
UROBILINOGEN UR QL STRIP.AUTO: 0.2 EU/DL (ref 0.2–1)
WBC URNS QL MICRO: ABNORMAL /HPF (ref 0–4)

## 2021-07-18 PROCEDURE — 99282 EMERGENCY DEPT VISIT SF MDM: CPT

## 2021-07-18 PROCEDURE — 81001 URINALYSIS AUTO W/SCOPE: CPT

## 2021-07-18 PROCEDURE — 51702 INSERT TEMP BLADDER CATH: CPT

## 2021-07-18 NOTE — DISCHARGE INSTRUCTIONS
You were evaluated in the emergency department for lower abdominal discomfort and urinary retention. Your examination was reassuring as was your work-up including urinalysis. You have a griffin catheter to remove the urine from your bladder. It will be important for you to follow-up with Dr. Anatoly Dodd in 1-3 days. If you develop worsening symptoms such as fevers, worsening abdominal pain, or if you notice the griffin catheter is not draining urine like it is supposed to, please return to the emergency department immediately. It was a pleasure taking care of you in our Emergency Department today. We know that when you come to [facility], you are entrusting us with your health, comfort, and safety. Our physicians and nurses honor that trust, and truly appreciate the opportunity to care for you and your loved ones. We also value your feedback. If you receive a survey about your Emergency Department experience today, please fill it out. We care about our patients' feedback, and we listen to what you have to say. Thank you!

## 2021-07-18 NOTE — ED NOTES
Assumed care of pt from triage. Pt is A&O x 4. Pt reports CC of urinary rentention for the last two days. Pt reports this morning he was able to dribble but none since then. Bladder scanned the pt and found 605ml in the bladder. MD Jon Keen gave a verbal order for a griffin. Pt reports he has a history of urinary rentention. 1130- MD Jon Keen at bedside to evaluate the pt.     1135- Provided the pt with a warm blanket    1210- Educated pt on how and when to switch to a leg bag. Switched griffin to a leg bag.     1222- Pt discharged by Marcella Perez. Pt provided with discharge instructions Rx and instructions on follow up care. Pt out of ED ambulatory accompanied by family.

## 2021-07-18 NOTE — ED PROVIDER NOTES
EMERGENCY DEPARTMENT HISTORY AND PHYSICAL EXAM      Date: 7/18/2021  Patient Name: Christianne Dunn    History of Presenting Illness     Chief Complaint   Patient presents with    Urinary Retention     Ambulatory into the ED with c/o urinary retention x several days - no output at all today, but dribbles the last 2 days. History Provided By: Patient and Patient's Wife    HPI: Christianne Dunn, 82 y.o. male with history of hypertension, hyperlipidemia, proximal atrial fibrillation, BPH presents to the ED with cc of urinary retention. Patient has had very minimal urinary output and only when he strains significantly. This has been gradually worsening over the past several days to the point where he has not been able to have any significant urine output today. He is not able to remember the last time he had significant urinary but believes it was yesterday or possibly even the day before. Denies fevers, chills, or recent illness. Endorses pain and pressure to the lower abdomen as well as area of urgency. Denies any hematuria. He has required Uriarte catheters in the past for urinary retention and follows with Dr. Natasha Gaines of [place] [facility]. There are no other complaints, changes, or physical findings at this time. PCP: Trina Foster MD    No current facility-administered medications on file prior to encounter. Current Outpatient Medications on File Prior to Encounter   Medication Sig Dispense Refill    Rocklatan 0.02-0.005 % drop INSTILL 1 DROP INTO AFFECTED EYE(S) ONCE DAILY AT BEDTIME      apixaban (Eliquis) 2.5 mg tablet Take 1 Tab by mouth every twelve (12) hours. 90 Tab 4    DILT- mg capsule TAKE 1 CAPSULE BY MOUTH EVERY DAY 90 Cap 3    lisinopriL (PRINIVIL, ZESTRIL) 20 mg tablet TAKE 1 TABLET BY MOUTH EVERY DAY 90 Tab 1    cetirizine (ZYRTEC) 10 mg tablet Take 1 Tab by mouth nightly.  30 Tab 2    benzonatate (TESSALON) 200 mg capsule Take 1 Cap by mouth three (3) times daily as needed for Cough. 30 Cap 1    tamsulosin (FLOMAX) 0.4 mg capsule TAKE ONE CAPSULE BY MOUTH EVERYNIGHT  3    SIMBRINZA 1-0.2 % drps INSTILL ONE DROP THREE TIMES A DAY INTO THE LEFT EYE AND ONCE A DAY INTO THE RIGHT EYE  0    clobetasol (TEMOVATE) 0.05 % topical cream Apply  to affected area two (2) times a day. 15 g 0       Past History     Past Medical History:  Past Medical History:   Diagnosis Date    Appendicitis     2018 underwent appendectomy    Arthritis     DJD    Asthma     As a child     Enlarged prostate     Hyperlipidemia     Hypertension     LINDA treated with BiPAP     PAF [facility]ox[facility] fibrillation) ([facility] [address]     Pre-diabetes     HA1C 6.0 Dec 2016    Shingles        Past Surgical History:  Past Surgical History:   Procedure Laterality Date    HX APPENDECTOMY  2018    HX COLONOSCOPY  2009    normal except for hemorrhoids    HX HEENT      wisdom teeth extraction x2    HX HEENT  1950s    Cyst removed from under tongue     HX KNEE REPLACEMENT      right; 2017    HX SHOULDER ARTHROSCOPY Right     rt    HX TONSILLECTOMY      HX UROLOGICAL  2018    Urolift        Family History:  Family History   Problem Relation Age of Onset    Coronary Artery Disease Father 47         at [de-identified] of [place]       Social History:  Social History     Tobacco Use    Smoking status: Former Smoker    Smokeless tobacco: Never Used   Substance Use Topics    Alcohol use: Yes     Comment: occasional    Drug use: No       Allergies: Allergies   Allergen Reactions    Oxycodone Other (comments)     Patient states, \"It made me feel unwell. \"         Review of Systems   Review of Systems   Constitutional: Negative for chills and fever. Gastrointestinal: Positive for abdominal pain. Negative for constipation, diarrhea, nausea and vomiting. Genitourinary: Positive for difficulty urinating and urgency. Negative for dysuria, flank pain and hematuria. Musculoskeletal: Negative for back pain and gait problem.    Skin: Negative for color change and rash. All other systems reviewed and are negative. Physical Exam   Physical Exam  Vitals and nursing note reviewed. Constitutional:       Appearance: Normal appearance. He is not diaphoretic. Comments: Appears uncomfortable due to pain   HENT:      Head: Normocephalic and atraumatic. Cardiovascular:      Rate and Rhythm: Normal rate and regular rhythm. Heart sounds: Normal heart sounds. No murmur heard. Pulmonary:      Effort: Pulmonary effort is normal. No respiratory distress. Breath sounds: Normal breath sounds. No wheezing. Abdominal:      Palpations: Abdomen is soft. Tenderness: There is abdominal tenderness. There is no guarding or rebound. Comments: Lower abdominal fullness with TTP particularly over suprapubic region, no guarding or rebound   Skin:     General: Skin is warm and dry. Findings: No erythema or rash. Neurological:      General: No focal deficit present. Mental Status: He is alert and oriented to person, place, and time. Diagnostic Study Results     Labs -     Labs Reviewed   URINALYSIS W/ REFLEX CULTURE - Abnormal; Notable for the following components:       Result Value    Blood MODERATE (*)     All other components within normal limits       Radiologic Studies -   No orders to display     CT Results  (Last 48 hours)    None        CXR Results  (Last 48 hours)    None          Medical Decision Making   I am the first provider for this patient. I reviewed the vital signs, available nursing notes, past medical history, past surgical history, family history and social history. Vital Signs-Reviewed the patient's vital signs.   Visit Vitals  BP (!) 176/93 (BP 1 Location: Left arm, BP Patient Position: At rest)   Pulse 87   Temp 98.7 °F (37.1 °C)   Resp 18   Ht 5' 8\" (1.727 m)   Wt 86 kg (189 lb 9.5 oz)   SpO2 100%   BMI 28.83 kg/m²     Records Reviewed: Nursing Notes    Provider Notes (Medical Decision Making):   82-year-old male here with urinary retention, he does have history of large prostate. He is noted to have over 700 mL of retained urine in his bladder on bladder scan. Uriarte catheter placed by ED RN and on reassessment patient feels much improved, no longer has abdominal pain, pressure, or urgency to urinate. He feels back to baseline at this time. He is noted to have significant amount of clear yellow urine in Uriarte bag. We will send this off looking for infection, no sign gross hematuria that would be causing the obstruction. Patient encouraged to keep Uriarte catheter in place until he can follow-up with urology over the next 2 to 3 days. Given strict return ED precautions, questions answered, and he agrees plan as above. ED Course:   Initial assessment performed. The patients presenting problems have been discussed, and they are in agreement with the care plan formulated and outlined with them. I have encouraged them to ask questions as they arise throughout their visit. Discharge Note:  The patient has been re-evaluated and is ready for discharge. Reviewed available results with patient. Counseled patient on diagnosis and care plan. Patient has expressed understanding, and all questions have been answered. Patient agrees with plan and agrees to follow up as recommended, or to return to the ED if their symptoms worsen. Discharge instructions have been provided and explained to the patient, along with reasons to return to the ED. Disposition:  Discharge, close urology follow-up      DISCHARGE PLAN:  1. Discharge Medication List as of 7/18/2021 11:53 AM        2.    Follow-up Information     Follow up With Specialties Details Why Contact Info    Janessa Webber MD Urology Schedule an appointment as soon as possible for a visit   [address]  [phone]      hospitals EMERGENCY DEPT Emergency Medicine Go to  As needed, If symptoms worsen [address] [place] [address]  [phone]        3. Return to ED if worse     Diagnosis     Clinical Impression:   1. Urinary retention        Attestations:  I am the first and primary provider of record for this patient's ED encounter. I personally performed the services described above in this documentation. Alisa Rutherford MD    Please note that this dictation was completed with [employer], the computer voice recognition software. Quite often unanticipated grammatical, syntax, homophones, and other interpretive errors are inadvertently transcribed by the computer software. Please disregard these errors. Please excuse any errors that have escaped final proofreading. Thank you.

## 2021-09-03 ENCOUNTER — CLINICAL SUPPORT (OUTPATIENT)
Dept: INTERNAL MEDICINE CLINIC | Age: 86
End: 2021-09-03
Payer: MEDICARE

## 2021-09-03 DIAGNOSIS — Z23 ENCOUNTER FOR IMMUNIZATION: ICD-10-CM

## 2021-09-03 DIAGNOSIS — Z23 NEEDS FLU SHOT: Primary | ICD-10-CM

## 2021-09-03 PROCEDURE — 90471 IMMUNIZATION ADMIN: CPT | Performed by: INTERNAL MEDICINE

## 2021-09-03 PROCEDURE — G0008 ADMIN INFLUENZA VIRUS VAC: HCPCS | Performed by: INTERNAL MEDICINE

## 2021-09-07 DIAGNOSIS — I10 ESSENTIAL HYPERTENSION: ICD-10-CM

## 2021-09-07 DIAGNOSIS — J06.9 UPPER RESPIRATORY TRACT INFECTION, UNSPECIFIED TYPE: ICD-10-CM

## 2021-09-07 RX ORDER — CETIRIZINE HCL 10 MG
TABLET ORAL
Qty: 90 TABLET | Refills: 1 | Status: SHIPPED | OUTPATIENT
Start: 2021-09-07 | End: 2021-10-07

## 2021-09-07 RX ORDER — LISINOPRIL 20 MG/1
TABLET ORAL
Qty: 90 TABLET | Refills: 1 | Status: SHIPPED | OUTPATIENT
Start: 2021-09-07 | End: 2022-03-08

## 2021-10-04 ENCOUNTER — ANCILLARY PROCEDURE (OUTPATIENT)
Dept: CARDIOLOGY CLINIC | Age: 86
End: 2021-10-04
Payer: MEDICARE

## 2021-10-04 VITALS — HEIGHT: 68 IN | WEIGHT: 189 LBS | BODY MASS INDEX: 28.64 KG/M2

## 2021-10-04 DIAGNOSIS — I48.0 PAF (PAROXYSMAL ATRIAL FIBRILLATION) (HCC): ICD-10-CM

## 2021-10-04 PROCEDURE — 93306 TTE W/DOPPLER COMPLETE: CPT | Performed by: SPECIALIST

## 2021-10-07 ENCOUNTER — OFFICE VISIT (OUTPATIENT)
Dept: CARDIOLOGY CLINIC | Age: 86
End: 2021-10-07
Payer: MEDICARE

## 2021-10-07 VITALS
HEART RATE: 56 BPM | HEIGHT: 69 IN | OXYGEN SATURATION: 97 % | WEIGHT: 194.4 LBS | BODY MASS INDEX: 28.79 KG/M2 | SYSTOLIC BLOOD PRESSURE: 110 MMHG | RESPIRATION RATE: 18 BRPM | DIASTOLIC BLOOD PRESSURE: 60 MMHG

## 2021-10-07 DIAGNOSIS — I48.0 PAF (PAROXYSMAL ATRIAL FIBRILLATION) (HCC): Primary | ICD-10-CM

## 2021-10-07 DIAGNOSIS — I10 ESSENTIAL HYPERTENSION: ICD-10-CM

## 2021-10-07 DIAGNOSIS — E78.00 PURE HYPERCHOLESTEROLEMIA: ICD-10-CM

## 2021-10-07 PROCEDURE — G0463 HOSPITAL OUTPT CLINIC VISIT: HCPCS | Performed by: SPECIALIST

## 2021-10-07 PROCEDURE — 93010 ELECTROCARDIOGRAM REPORT: CPT | Performed by: SPECIALIST

## 2021-10-07 PROCEDURE — G8536 NO DOC ELDER MAL SCRN: HCPCS | Performed by: SPECIALIST

## 2021-10-07 PROCEDURE — G8419 CALC BMI OUT NRM PARAM NOF/U: HCPCS | Performed by: SPECIALIST

## 2021-10-07 PROCEDURE — 99213 OFFICE O/P EST LOW 20 MIN: CPT | Performed by: SPECIALIST

## 2021-10-07 PROCEDURE — G8754 DIAS BP LESS 90: HCPCS | Performed by: SPECIALIST

## 2021-10-07 PROCEDURE — G8427 DOCREV CUR MEDS BY ELIG CLIN: HCPCS | Performed by: SPECIALIST

## 2021-10-07 PROCEDURE — 1101F PT FALLS ASSESS-DOCD LE1/YR: CPT | Performed by: SPECIALIST

## 2021-10-07 PROCEDURE — G8510 SCR DEP NEG, NO PLAN REQD: HCPCS | Performed by: SPECIALIST

## 2021-10-07 PROCEDURE — G8752 SYS BP LESS 140: HCPCS | Performed by: SPECIALIST

## 2021-10-07 PROCEDURE — 93005 ELECTROCARDIOGRAM TRACING: CPT | Performed by: SPECIALIST

## 2021-10-07 NOTE — Clinical Note
10/7/2021    Patient: Isabelle Guerrier   YOB: 1935   Date of Visit: 10/7/2021     Paola Turk MD  [address]    Dear Paola Turk MD,      Thank you for referring Mr. Varsha Lennon to [facility] for evaluation. My notes for this consultation are attached. If you have questions, please do not hesitate to call me. I look forward to following your patient along with you.       Sincerely,    Taty Silveira MD

## 2021-10-09 LAB
ECHO AO ASC DIAM: 2.92 CM
ECHO AO ROOT DIAM: 3.11 CM
ECHO AV AREA PEAK VELOCITY: 2.84 CM2
ECHO AV AREA VTI: 2.99 CM2
ECHO AV AREA/BSA PEAK VELOCITY: 1.4 CM2/M2
ECHO AV AREA/BSA VTI: 1.5 CM2/M2
ECHO AV MEAN GRADIENT: 5.36 MMHG
ECHO AV PEAK GRADIENT: 10.41 MMHG
ECHO AV PEAK VELOCITY: 161.35 CM/S
ECHO AV VTI: 30.31 CM
ECHO LA AREA 4C: 22.91 CM2
ECHO LA MAJOR AXIS: 3.42 CM
ECHO LA MINOR AXIS: 1.71 CM
ECHO LA VOL 2C: 38.5 ML (ref 18–58)
ECHO LA VOL 4C: 69.21 ML (ref 18–58)
ECHO LA VOL BP: 56.94 ML (ref 18–58)
ECHO LA VOL/BSA BIPLANE: 28.47 ML/M2 (ref 16–28)
ECHO LA VOLUME INDEX A2C: 19.25 ML/M2 (ref 16–28)
ECHO LA VOLUME INDEX A4C: 34.61 ML/M2 (ref 16–28)
ECHO LV E' LATERAL VELOCITY: 10.47 CM/S
ECHO LV E' SEPTAL VELOCITY: 8.69 CM/S
ECHO LV EDV A2C: 67.83 ML
ECHO LV EDV A4C: 57.43 ML
ECHO LV EDV BP: 63.46 ML (ref 67–155)
ECHO LV EDV INDEX A4C: 28.7 ML/M2
ECHO LV EDV INDEX BP: 31.7 ML/M2
ECHO LV EDV NDEX A2C: 33.9 ML/M2
ECHO LV EJECTION FRACTION A2C: 64 PERCENT
ECHO LV EJECTION FRACTION A4C: 57 PERCENT
ECHO LV EJECTION FRACTION BIPLANE: 59.1 PERCENT (ref 55–100)
ECHO LV ESV A2C: 24.75 ML
ECHO LV ESV A4C: 24.96 ML
ECHO LV ESV BP: 25.97 ML (ref 22–58)
ECHO LV ESV INDEX A2C: 12.4 ML/M2
ECHO LV ESV INDEX A4C: 12.5 ML/M2
ECHO LV ESV INDEX BP: 13 ML/M2
ECHO LV INTERNAL DIMENSION DIASTOLIC: 3.83 CM (ref 4.2–5.9)
ECHO LV INTERNAL DIMENSION SYSTOLIC: 2.45 CM
ECHO LV IVSD: 1.26 CM (ref 0.6–1)
ECHO LV MASS 2D: 165.9 G (ref 88–224)
ECHO LV MASS INDEX 2D: 82.9 G/M2 (ref 49–115)
ECHO LV POSTERIOR WALL DIASTOLIC: 1.25 CM (ref 0.6–1)
ECHO LVOT DIAM: 1.97 CM
ECHO LVOT PEAK GRADIENT: 9.07 MMHG
ECHO LVOT PEAK VELOCITY: 150.57 CM/S
ECHO LVOT SV: 90.6 ML
ECHO LVOT VTI: 29.72 CM
ECHO MV A VELOCITY: 102.74 CM/S
ECHO MV E DECELERATION TIME (DT): 243.76 MS
ECHO MV E VELOCITY: 97.98 CM/S
ECHO MV E/A RATIO: 0.95
ECHO MV E/E' LATERAL: 9.36
ECHO MV E/E' RATIO (AVERAGED): 10.32
ECHO MV E/E' SEPTAL: 11.28
ECHO PV MAX VELOCITY: 159.17 CM/S
ECHO PV PEAK INSTANTANEOUS GRADIENT SYSTOLIC: 10.16 MMHG
ECHO PV REGURGITANT MAX VELOCITY: 105.55 CM/S
ECHO RA MINOR AXIS: 4 CM
ECHO RV INTERNAL DIMENSION: 4.18 CM
ECHO RV TAPSE: 2.22 CM (ref 1.5–2)
ECHO TV REGURGITANT MAX VELOCITY: 300.84 CM/S
ECHO TV REGURGITANT PEAK GRADIENT: 36.2 MMHG
LA VOL DISK BP: 53.2 ML (ref 18–58)

## 2021-10-09 PROCEDURE — 93306 TTE W/DOPPLER COMPLETE: CPT | Performed by: SPECIALIST

## 2021-12-28 ENCOUNTER — TELEPHONE (OUTPATIENT)
Dept: CARDIOLOGY CLINIC | Age: 86
End: 2021-12-28

## 2021-12-28 NOTE — TELEPHONE ENCOUNTER
Patient stated his refill for eliquis 2.5 mg will be over $275, he would like samples, notified nurse, patient will be in on 12/29/21 to  samples, please advise

## 2021-12-28 NOTE — TELEPHONE ENCOUNTER
Verified patient with two types of identifiers. Let patient know he may  samples at his convenience as well as BMS PAF form. Patient verbalized understanding and appreciation.

## 2022-01-25 DIAGNOSIS — R73.03 PRE-DIABETES: ICD-10-CM

## 2022-01-25 DIAGNOSIS — I10 ESSENTIAL HYPERTENSION: Primary | ICD-10-CM

## 2022-01-25 DIAGNOSIS — E78.00 HIGH CHOLESTEROL: ICD-10-CM

## 2022-01-25 DIAGNOSIS — G47.33 OSA (OBSTRUCTIVE SLEEP APNEA): ICD-10-CM

## 2022-01-27 ENCOUNTER — LAB ONLY (OUTPATIENT)
Dept: INTERNAL MEDICINE CLINIC | Age: 87
End: 2022-01-27

## 2022-01-27 DIAGNOSIS — I10 ESSENTIAL HYPERTENSION: ICD-10-CM

## 2022-01-27 DIAGNOSIS — E78.00 HIGH CHOLESTEROL: ICD-10-CM

## 2022-01-27 DIAGNOSIS — R73.03 PRE-DIABETES: ICD-10-CM

## 2022-01-28 LAB
ALBUMIN SERPL-MCNC: 3.7 G/DL (ref 3.5–5)
ALBUMIN/GLOB SERPL: 1.1 {RATIO} (ref 1.1–2.2)
ALP SERPL-CCNC: 83 U/L (ref 45–117)
ALT SERPL-CCNC: 20 U/L (ref 12–78)
ANION GAP SERPL CALC-SCNC: 2 MMOL/L (ref 5–15)
AST SERPL-CCNC: 12 U/L (ref 15–37)
BASOPHILS # BLD: 0.1 K/UL (ref 0–0.1)
BASOPHILS NFR BLD: 1 % (ref 0–1)
BILIRUB SERPL-MCNC: 0.3 MG/DL (ref 0.2–1)
BUN SERPL-MCNC: 19 MG/DL (ref 6–20)
BUN/CREAT SERPL: 16 (ref 12–20)
CALCIUM SERPL-MCNC: 9.2 MG/DL (ref 8.5–10.1)
CHLORIDE SERPL-SCNC: 109 MMOL/L (ref 97–108)
CHOLEST SERPL-MCNC: 201 MG/DL
CO2 SERPL-SCNC: 30 MMOL/L (ref 21–32)
CREAT SERPL-MCNC: 1.19 MG/DL (ref 0.7–1.3)
DIFFERENTIAL METHOD BLD: NORMAL
EOSINOPHIL # BLD: 0.2 K/UL (ref 0–0.4)
EOSINOPHIL NFR BLD: 2 % (ref 0–7)
ERYTHROCYTE [DISTWIDTH] IN BLOOD BY AUTOMATED COUNT: 14.1 % (ref 11.5–14.5)
EST. AVERAGE GLUCOSE BLD GHB EST-MCNC: 126 MG/DL
GLOBULIN SER CALC-MCNC: 3.3 G/DL (ref 2–4)
GLUCOSE SERPL-MCNC: 96 MG/DL (ref 65–100)
HBA1C MFR BLD: 6 % (ref 4–5.6)
HCT VFR BLD AUTO: 47.9 % (ref 36.6–50.3)
HDLC SERPL-MCNC: 34 MG/DL
HDLC SERPL: 5.9 {RATIO} (ref 0–5)
HGB BLD-MCNC: 15.1 G/DL (ref 12.1–17)
IMM GRANULOCYTES # BLD AUTO: 0 K/UL (ref 0–0.04)
IMM GRANULOCYTES NFR BLD AUTO: 0 % (ref 0–0.5)
LDLC SERPL CALC-MCNC: 130.6 MG/DL (ref 0–100)
LYMPHOCYTES # BLD: 3 K/UL (ref 0.8–3.5)
LYMPHOCYTES NFR BLD: 39 % (ref 12–49)
MCH RBC QN AUTO: 27.3 PG (ref 26–34)
MCHC RBC AUTO-ENTMCNC: 31.5 G/DL (ref 30–36.5)
MCV RBC AUTO: 86.6 FL (ref 80–99)
MONOCYTES # BLD: 0.4 K/UL (ref 0–1)
MONOCYTES NFR BLD: 5 % (ref 5–13)
NEUTS SEG # BLD: 4.2 K/UL (ref 1.8–8)
NEUTS SEG NFR BLD: 53 % (ref 32–75)
NRBC # BLD: 0 K/UL (ref 0–0.01)
NRBC BLD-RTO: 0 PER 100 WBC
PLATELET # BLD AUTO: 168 K/UL (ref 150–400)
PMV BLD AUTO: 10 FL (ref 8.9–12.9)
POTASSIUM SERPL-SCNC: 4.4 MMOL/L (ref 3.5–5.1)
PROT SERPL-MCNC: 7 G/DL (ref 6.4–8.2)
RBC # BLD AUTO: 5.53 M/UL (ref 4.1–5.7)
SODIUM SERPL-SCNC: 141 MMOL/L (ref 136–145)
TRIGL SERPL-MCNC: 182 MG/DL (ref ?–150)
VLDLC SERPL CALC-MCNC: 36.4 MG/DL
WBC # BLD AUTO: 7.9 K/UL (ref 4.1–11.1)

## 2022-02-09 NOTE — PROGRESS NOTES
Blood sugar remains in pre diabetic range  Normal blood count    Normal kidney and liver function   Cholesterol is modestly elevated higher than previous work on low fat diet with lots of vegetables, fiber and healthy grains such as oatmeal

## 2022-02-14 ENCOUNTER — PATIENT MESSAGE (OUTPATIENT)
Dept: INTERNAL MEDICINE CLINIC | Age: 87
End: 2022-02-14

## 2022-03-18 DIAGNOSIS — I48.0 PAROXYSMAL ATRIAL FIBRILLATION (HCC): ICD-10-CM

## 2022-03-18 DIAGNOSIS — I10 ESSENTIAL HYPERTENSION: Primary | ICD-10-CM

## 2022-03-18 PROBLEM — M79.89 RIGHT LEG SWELLING: Status: ACTIVE | Noted: 2017-02-27

## 2022-03-19 PROBLEM — M19.012 OSTEOARTHRITIS OF LEFT SHOULDER: Status: ACTIVE | Noted: 2020-01-16

## 2022-03-19 PROBLEM — M12.812 LEFT ROTATOR CUFF TEAR ARTHROPATHY: Status: ACTIVE | Noted: 2020-01-15

## 2022-03-19 PROBLEM — M75.102 LEFT ROTATOR CUFF TEAR ARTHROPATHY: Status: ACTIVE | Noted: 2020-01-15

## 2022-03-19 PROBLEM — G47.33 OSA TREATED WITH BIPAP: Status: ACTIVE | Noted: 2017-07-06

## 2022-03-19 PROBLEM — K35.80 ACUTE APPENDICITIS: Status: ACTIVE | Noted: 2018-02-08

## 2022-03-20 PROBLEM — R60.0 BILATERAL EDEMA OF LOWER EXTREMITY: Status: ACTIVE | Noted: 2017-02-27

## 2022-03-20 PROBLEM — Z96.619 STATUS POST REVERSE ARTHROPLASTY OF SHOULDER: Status: ACTIVE | Noted: 2020-01-16

## 2022-03-20 PROBLEM — M17.9 DJD (DEGENERATIVE JOINT DISEASE) OF KNEE: Status: ACTIVE | Noted: 2017-01-09

## 2022-03-21 RX ORDER — DILTIAZEM HYDROCHLORIDE 120 MG/1
120 CAPSULE, EXTENDED RELEASE ORAL DAILY
Qty: 90 CAPSULE | Refills: 3 | Status: SHIPPED | OUTPATIENT
Start: 2022-03-21

## 2022-03-21 NOTE — TELEPHONE ENCOUNTER
Per VO by MD.    Future Appointments   Date Time Provider [facility]   4/14/2022 10:00 AM Sabharwal, Lajuan Bamberger, MD [facility]   10/6/2022 10:00 AM Magaly Garcia MD [facility]

## 2022-04-14 ENCOUNTER — CLINICAL SUPPORT (OUTPATIENT)
Dept: CARDIOLOGY CLINIC | Age: 87
End: 2022-04-14
Payer: MEDICARE

## 2022-04-14 VITALS
SYSTOLIC BLOOD PRESSURE: 120 MMHG | DIASTOLIC BLOOD PRESSURE: 62 MMHG | BODY MASS INDEX: 28.73 KG/M2 | HEIGHT: 69 IN | HEART RATE: 66 BPM | RESPIRATION RATE: 17 BRPM | OXYGEN SATURATION: 99 % | WEIGHT: 194 LBS

## 2022-04-14 DIAGNOSIS — I48.0 PAF (PAROXYSMAL ATRIAL FIBRILLATION) (HCC): Primary | ICD-10-CM

## 2022-04-14 PROCEDURE — 93010 ELECTROCARDIOGRAM REPORT: CPT | Performed by: SPECIALIST

## 2022-04-14 PROCEDURE — 93005 ELECTROCARDIOGRAM TRACING: CPT | Performed by: SPECIALIST

## 2022-05-02 ENCOUNTER — OFFICE VISIT (OUTPATIENT)
Dept: INTERNAL MEDICINE CLINIC | Age: 87
End: 2022-05-02
Payer: MEDICARE

## 2022-05-02 VITALS
TEMPERATURE: 97.6 F | BODY MASS INDEX: 29.03 KG/M2 | OXYGEN SATURATION: 94 % | RESPIRATION RATE: 16 BRPM | HEIGHT: 69 IN | WEIGHT: 196 LBS | HEART RATE: 72 BPM | SYSTOLIC BLOOD PRESSURE: 124 MMHG | DIASTOLIC BLOOD PRESSURE: 62 MMHG

## 2022-05-02 DIAGNOSIS — E78.00 HIGH CHOLESTEROL: ICD-10-CM

## 2022-05-02 DIAGNOSIS — I10 ESSENTIAL HYPERTENSION: ICD-10-CM

## 2022-05-02 DIAGNOSIS — G25.0 ESSENTIAL TREMOR: ICD-10-CM

## 2022-05-02 DIAGNOSIS — I48.0 PAROXYSMAL ATRIAL FIBRILLATION (HCC): Primary | ICD-10-CM

## 2022-05-02 DIAGNOSIS — G47.33 OSA (OBSTRUCTIVE SLEEP APNEA): ICD-10-CM

## 2022-05-02 DIAGNOSIS — Z00.00 MEDICARE ANNUAL WELLNESS VISIT, SUBSEQUENT: ICD-10-CM

## 2022-05-02 DIAGNOSIS — R73.03 PRE-DIABETES: ICD-10-CM

## 2022-05-02 LAB — HBA1C MFR BLD HPLC: 5.7 %

## 2022-05-02 PROCEDURE — G8536 NO DOC ELDER MAL SCRN: HCPCS | Performed by: INTERNAL MEDICINE

## 2022-05-02 PROCEDURE — G8752 SYS BP LESS 140: HCPCS | Performed by: INTERNAL MEDICINE

## 2022-05-02 PROCEDURE — G8427 DOCREV CUR MEDS BY ELIG CLIN: HCPCS | Performed by: INTERNAL MEDICINE

## 2022-05-02 PROCEDURE — 1101F PT FALLS ASSESS-DOCD LE1/YR: CPT | Performed by: INTERNAL MEDICINE

## 2022-05-02 PROCEDURE — G8754 DIAS BP LESS 90: HCPCS | Performed by: INTERNAL MEDICINE

## 2022-05-02 PROCEDURE — G8510 SCR DEP NEG, NO PLAN REQD: HCPCS | Performed by: INTERNAL MEDICINE

## 2022-05-02 PROCEDURE — G8419 CALC BMI OUT NRM PARAM NOF/U: HCPCS | Performed by: INTERNAL MEDICINE

## 2022-05-02 PROCEDURE — G0439 PPPS, SUBSEQ VISIT: HCPCS | Performed by: INTERNAL MEDICINE

## 2022-05-02 PROCEDURE — 83036 HEMOGLOBIN GLYCOSYLATED A1C: CPT | Performed by: INTERNAL MEDICINE

## 2022-05-02 NOTE — PATIENT INSTRUCTIONS
Work on exercise and healthy diet     Medicare Wellness Visit, Male    The best way to live healthy is to have a lifestyle where you eat a well-balanced diet, exercise regularly, limit alcohol use, and quit all forms of tobacco/nicotine, if applicable. Regular preventive services are another way to keep healthy. Preventive services (vaccines, screening tests, monitoring & exams) can help personalize your care plan, which helps you manage your own care. Screening tests can find health problems at the earliest stages, when they are easiest to treat. [facility] follows the current, evidence-based guidelines published by the [facility] (RUSTSTF) when recommending preventive services for our patients. Because we follow these guidelines, sometimes recommendations change over time as research supports it. (For example, a prostate screening blood test is no longer routinely recommended for men with no symptoms). Of course, you and your doctor may decide to screen more often for some diseases, based on your risk and co-morbidities (chronic disease you are already diagnosed with). Preventive services for you include:  - Medicare offers their members a free annual wellness visit, which is time for you and your primary care provider to discuss and plan for your preventive service needs. Take advantage of this benefit every year!  -All adults over age 72 should receive the recommended pneumonia vaccines. Current USPSTF guidelines recommend a series of two vaccines for the best pneumonia protection.   -All adults should have a flu vaccine yearly and tetanus vaccine every 10 years.  -All adults age 48 and older should receive the shingles vaccines (series of two vaccines).        -All adults age 38-68 who are overweight should have a diabetes screening test once every three years.   -Other screening tests & preventive services for persons with diabetes include: an eye exam to screen for diabetic retinopathy, a kidney function test, a foot exam, and stricter control over your cholesterol.   -Cardiovascular screening for adults with routine risk involves an electrocardiogram (ECG) at intervals determined by the provider.   -Colorectal cancer screening should be done for adults age 54-65 with no increased risk factors for colorectal cancer. There are a number of acceptable methods of screening for this type of cancer. Each test has its own benefits and drawbacks. Discuss with your provider what is most appropriate for you during your annual wellness visit. The different tests include: colonoscopy (considered the best screening method), a fecal occult blood test, a fecal DNA test, and sigmoidoscopy.  -All adults born between [facility] should be screened once for Hepatitis C.  -An Abdominal Aortic Aneurysm (AAA) Screening is recommended for men age 73-68 who has ever smoked in their lifetime.      Here is a list of your current Health Maintenance items (your personalized list of preventive services) with a due date:

## 2022-05-02 NOTE — PROGRESS NOTES
1. \"Have you been to the ER, urgent care clinic since your last visit? Hospitalized since your last visit? \" No    2. \"Have you seen or consulted any other health care providers outside of the [address] [place] [address] since your last visit? \" Yes Reason for visit: Jose Hdez     3. For patients aged 39-70: Has the patient had a colonoscopy / FIT/ Cologuard? NA - based on age      If the patient is female:    4. For patients aged 41-77: Has the patient had a mammogram within the past 2 years? NA - based on age or sex      11. For patients aged 21-65: Has the patient had a pap smear?  NA - based on age or sex

## 2022-05-02 NOTE — PROGRESS NOTES
Mr. Diana Panchal is presenting to follow up     CC:  Follow-up and Hypertension       HPI:      Essential HTN: currently on lisinopril 20mg and dilt 120mg long acting    Hx of glaucoma : He is followed by an ophthalmologist    Atrial fibrillation : taking the diltiazem and and Eliquis. He sees Dr Good Harrison and was resumed on diltiazem. BP is excellent today  Denies CP and dyspnea        he had a uro-lift procedure and he is off of Cardura. Frequent urination is better     He is not compliant with CPAP therapy -did not tolerate CPAP. Has not followed up with sleep doctor. Discussed at length importance of compliance with CPAP therapy and consequences of untreated obstructive sleep apnea including heart failure and sudden death     Review of systems:  Constitutional: negative for fever, chills, weight loss, night sweats   10 systems reviewed and negative other than HPI       Past Medical History:   Diagnosis Date    Appendicitis     2018 underwent appendectomy    Arthritis     DJD    Asthma     As a child     Enlarged prostate     Hyperlipidemia     Hypertension     LINDA treated with BiPAP     PAF (paroxysmal atrial fibrillation) ([facility] [address]     Pre-diabetes     HA1C 6.0 Dec 2016    Shingles         Past Surgical History:   Procedure Laterality Date    HX APPENDECTOMY  2018    HX COLONOSCOPY  2009    normal except for hemorrhoids    HX HEENT      wisdom teeth extraction x2    HX HEENT  1950s    Cyst removed from under tongue     HX KNEE REPLACEMENT      right; Jan. 2017    HX SHOULDER ARTHROSCOPY Right     rt    HX TONSILLECTOMY      HX UROLOGICAL  2018    Urolift        Allergies   Allergen Reactions    Oxycodone Other (comments)     Patient states, \"It made me feel unwell. \"       Current Outpatient Medications on File Prior to Visit   Medication Sig Dispense Refill    dilTIAZem ER (DILT-XR) 120 mg capsule Take 1 Capsule by mouth daily.  90 Capsule 3    lisinopriL (PRINIVIL, ZESTRIL) 20 mg tablet TAKE 1 TABLET BY MOUTH EVERY DAY 90 Tablet 0    apixaban (Eliquis) 2.5 mg tablet Take 1 Tablet by mouth every twelve (12) hours. 180 Tablet 3    Rocklatan 0.02-0.005 % drop INSTILL 1 DROP INTO AFFECTED EYE(S) ONCE DAILY AT BEDTIME      tamsulosin (FLOMAX) 0.4 mg capsule TAKE ONE CAPSULE BY MOUTH EVERYNIGHT  3    SIMBRINZA 1-0.2 % drps INSTILL ONE DROP THREE TIMES A DAY INTO THE LEFT EYE AND ONCE A DAY INTO THE RIGHT EYE  0     No current facility-administered medications on file prior to visit. family history includes Coronary Art Dis (age of onset: 47) in his father. Social History     Socioeconomic History    Marital status:      Spouse name: Not on file    Number of children: Not on file    Years of education: Not on file    Highest education level: Not on file   Occupational History    Not on file   Tobacco Use    Smoking status: Former Smoker    Smokeless tobacco: Never Used   Vaping Use    Vaping Use: Never used   Substance and Sexual Activity    Alcohol use: Yes     Comment: occasional    Drug use: No    Sexual activity: Not Currently   Other Topics Concern    Not on file   Social History Narrative    Not on file     Social Determinants of Health     Financial Resource Strain:     Difficulty of Paying Living Expenses: Not on file   Food Insecurity:     Worried About [address] in the Last Year: Not on file    [place] of Food in the Last Year: Not on file   Transportation Needs:     Lack of Transportation (Medical): Not on file    Lack of Transportation (Non-Medical):  Not on file   Physical Activity:     Days of Exercise per Week: Not on file    Minutes of Exercise per Session: Not on file   Stress:     Feeling of Stress : Not on file   Social Connections:     Frequency of Communication with Friends and Family: Not on file    Frequency of Social Gatherings with Friends and Family: Not on file    Attends Scientologist Services: Not on file   [employer]s or Organizations: Not on file    Attends Club or Organization Meetings: Not on file    Marital Status: Not on file   Intimate Partner Violence:     Fear of Current or Ex-Partner: Not on file    Emotionally Abused: Not on file    Physically Abused: Not on file    Sexually Abused: Not on file   Housing Stability:     Unable to Pay for Housing in the Last Year: Not on file    Number of [place] in the Last Year: Not on file    Unstable Housing in the Last Year: Not on file       Visit Vitals  /62   Pulse 72   Temp 97.6 °F (36.4 °C) (Temporal)   Resp 16   Ht 5' 9\" (1.753 m)   Wt 196 lb (88.9 kg)   SpO2 94%   BMI 28.94 kg/m²     General:  Well appearing male no acute distress  HEENT:   PERRL,normal conjunctiva. External ear and canals normal, TMs normal.  Hearing normal to voice. Nose without edema or discharge, normal septum. Lips, teeth, gums normal.  Oropharynx: no erythema, no exudates, no lesions, normal tongue. Neck:  Supple. Thyroid normal size, nontender, without nodules. No carotid bruit. No masses or lymphadenopathy  Respiratory: no respiratory distress,  no wheezing, no rhonchi, no rales. No chest wall tenderness. Cardiovascular:  RRR, normal S1S2, no murmur. Gastrointestinal: normal bowel sounds, soft, nontender, without masses. No hepatosplenomegaly. Extremities +2 pulses, no edema, normal sensation   Musculoskeletal:  Normal gait. Normal digits and nails. Normal strength and tone, no atrophy, and no abnormal movement. Skin:  No rash, no lesions, no ulcers. Skin warm, normal turgor, without induration or nodules. Neuro:  A and OX4, fluent speech, cranial nerves normal 2-12. Sensation normal to light touch.   DTR symmetrical  Psych:  Normal affect      Lab Results   Component Value Date/Time    WBC 7.9 01/27/2022 11:31 AM    HGB 15.1 01/27/2022 11:31 AM    HCT 47.9 01/27/2022 11:31 AM    PLATELET 190 86/33/2821 11:31 AM    MCV 86.6 01/27/2022 11:31 AM     Lab Results Component Value Date/Time    Sodium 141 01/27/2022 11:31 AM    Potassium 4.4 01/27/2022 11:31 AM    Chloride 109 (H) 01/27/2022 11:31 AM    CO2 30 01/27/2022 11:31 AM    Anion gap 2 (L) 01/27/2022 11:31 AM    Glucose 96 01/27/2022 11:31 AM    BUN 19 01/27/2022 11:31 AM    Creatinine 1.19 01/27/2022 11:31 AM    BUN/Creatinine ratio 16 01/27/2022 11:31 AM    GFR est AA >60 01/27/2022 11:31 AM    GFR est non-AA 58 (L) 01/27/2022 11:31 AM    Calcium 9.2 01/27/2022 11:31 AM     Lab Results   Component Value Date/Time    Cholesterol, total 201 (H) 01/27/2022 11:31 AM    HDL Cholesterol 34 01/27/2022 11:31 AM    LDL, calculated 130.6 (H) 01/27/2022 11:31 AM    VLDL, calculated 36.4 01/27/2022 11:31 AM    Triglyceride 182 (H) 01/27/2022 11:31 AM    CHOL/HDL Ratio 5.9 (H) 01/27/2022 11:31 AM     No results found for: TSH, TSH2, TSH3, TSHP, TSHEXT, TSHEXT  Lab Results   Component Value Date/Time    Hemoglobin A1c 6.0 (H) 01/27/2022 11:31 AM    Hemoglobin A1c (POC) 5.7 05/02/2022 12:10 PM     No results found for: VITD3, XQVID2, XQVID3, XQVID, VD3RIA                Assessment and Plan:     1. Essential hypertension  Blood pressure is well controlled on current regimen lisinopril 20 mg and diltiazem  -recommend checking blood pressure with goal of less than  130/85 on average. Follow lo sodium diet. Given DASH diet guidelines. Recommend cardiovascular exercise regularly. Work on weight reduction. Call with blood pressure readings. 2. Paroxysmal atrial fibrillation (HCC)   taking the diltiazem and and Eliquis. He sees Dr Conor Richard       3. High cholesterol  Diet modification       4. LINDA (obstructive sleep apnea)  Discuss considering CPAP therapy, having increased daytime sleepiness, discussed risks of not treating including sudden death  Referral to sleep medicine     5. BPH per urologist    6. Essential tremor: advise against medication not bothersome to patient    7.  Pre diabetes : diet modification    HA1C today 5.7      Follow-up and Dispositions    · Return in about 6 months (around 11/2/2022). Adeline Cheadle, MD  This is the Subsequent Medicare Annual Wellness Exam, performed 12 months or more after the Initial AWV or the last Subsequent AWV    I have reviewed the patient's medical history in detail and updated the computerized patient record. Assessment/Plan   Education and counseling provided:  Are appropriate based on today's review and evaluation    1. Paroxysmal atrial fibrillation (HCC)  2. Essential hypertension  3. Pre-diabetes  -     AMB POC HEMOGLOBIN A1C  4. High cholesterol  5. LINDA (obstructive sleep apnea)  -     SLEEP MEDICINE REFERRAL  6. Essential tremor  7. Medicare annual wellness visit, subsequent       Depression Risk Factor Screening     3 most recent PHQ Screens 5/2/2022   Little interest or pleasure in doing things Not at all   Feeling down, depressed, irritable, or hopeless Not at all   Total Score PHQ 2 0       Alcohol & Drug Abuse Risk Screen    Do you average more than 1 drink per night or more than 7 drinks a week: No    In the past three months have you have had more than 4 drinks containing alcohol on one occasion: No          Functional Ability and Level of Safety    Hearing: Hearing is good. Activities of Daily Living: The home contains: no safety equipment. Patient does total self care      Ambulation: with no difficulty     Fall Risk:  Fall Risk Assessment, last 12 mths 5/2/2022   Able to walk? Yes   Fall in past 12 months? 0   Do you feel unsteady? 0   Are you worried about falling 0   Number of falls in past 12 months -   Fall with injury?  -      Abuse Screen:  Patient is not abused       Cognitive Screening    Has your family/caregiver stated any concerns about your memory: no     Cognitive Screening: Normal - Verbal Fluency Test    Health Maintenance Due     Health Maintenance Due   Topic Date Due    Shingrix Vaccine Age 49> (2 of 2) 02/26/2020 Patient Care Team   Patient Care Team:  Rodriguez Malave MD as PCP - General (Internal Medicine)  Rodriguez Malave MD as PCP - [address] [place] [address] [facility]  Adal Monzon MD (Cardiology)  Leslie Espana MD as Physician (Sleep Medicine)  Brandy Nelson MD as Surgeon (General Surgery)  Maia Parsons MD

## 2022-05-28 DIAGNOSIS — I10 ESSENTIAL HYPERTENSION: ICD-10-CM

## 2022-05-30 RX ORDER — LISINOPRIL 20 MG/1
TABLET ORAL
Qty: 90 TABLET | Refills: 0 | Status: SHIPPED | OUTPATIENT
Start: 2022-05-30 | End: 2022-08-25

## 2022-07-20 NOTE — TELEPHONE ENCOUNTER
Patient states he needs a call back to discuss what can be done for persistent cough with mucus. Please call to advise.  Thank you Class II - visualization of the soft palate, fauces, and uvula

## 2022-08-23 ENCOUNTER — TELEPHONE (OUTPATIENT)
Dept: INTERNAL MEDICINE CLINIC | Age: 87
End: 2022-08-23

## 2022-08-23 DIAGNOSIS — Z23 ENCOUNTER FOR IMMUNIZATION: Primary | ICD-10-CM

## 2022-08-23 NOTE — TELEPHONE ENCOUNTER
The patient would like a Rx for Shingles vaccine.    Signed By: Princess Shukla LPN     August 23, 9209

## 2022-08-25 DIAGNOSIS — I10 ESSENTIAL HYPERTENSION: ICD-10-CM

## 2022-08-25 RX ORDER — LISINOPRIL 20 MG/1
TABLET ORAL
Qty: 90 TABLET | Refills: 0 | Status: SHIPPED | OUTPATIENT
Start: 2022-08-25

## 2022-09-07 ENCOUNTER — TRANSCRIBE ORDER (OUTPATIENT)
Dept: SCHEDULING | Age: 87
End: 2022-09-07

## 2022-09-07 DIAGNOSIS — M75.101 ROTATOR CUFF TEAR ARTHROPATHY OF RIGHT SHOULDER: Primary | ICD-10-CM

## 2022-09-07 DIAGNOSIS — M12.811 ROTATOR CUFF TEAR ARTHROPATHY OF RIGHT SHOULDER: Primary | ICD-10-CM

## 2022-10-14 ENCOUNTER — CLINICAL SUPPORT (OUTPATIENT)
Dept: INTERNAL MEDICINE CLINIC | Age: 87
End: 2022-10-14
Payer: MEDICARE

## 2022-10-14 VITALS
RESPIRATION RATE: 16 BRPM | HEART RATE: 63 BPM | TEMPERATURE: 97.4 F | DIASTOLIC BLOOD PRESSURE: 71 MMHG | SYSTOLIC BLOOD PRESSURE: 143 MMHG

## 2022-10-14 DIAGNOSIS — Z23 NEEDS FLU SHOT: Primary | ICD-10-CM

## 2022-10-14 PROCEDURE — 99211 OFF/OP EST MAY X REQ PHY/QHP: CPT | Performed by: INTERNAL MEDICINE

## 2022-10-14 PROCEDURE — 90694 VACC AIIV4 NO PRSRV 0.5ML IM: CPT | Performed by: INTERNAL MEDICINE

## 2022-10-14 NOTE — PROGRESS NOTES
Identified pt with two pt identifiers(name and ). Reviewed record in preparation for visit and have obtained necessary documentation. Chief Complaint   Patient presents with    Immunization/Injection        Vitals:    10/14/22 1129   BP: (!) 143/71   Pulse: 63   Resp: 16   Temp: 97.4 °F (36.3 °C)   TempSrc: Temporal     The patient denied pain at the injection site.      [facility] Hospitaljohn [address] [place] [address]  [address]  W: [phone]  F: [phone]

## 2022-12-06 ENCOUNTER — OFFICE VISIT (OUTPATIENT)
Dept: INTERNAL MEDICINE CLINIC | Age: 87
End: 2022-12-06
Payer: MEDICARE

## 2022-12-06 VITALS
DIASTOLIC BLOOD PRESSURE: 70 MMHG | HEIGHT: 69 IN | RESPIRATION RATE: 16 BRPM | HEART RATE: 70 BPM | BODY MASS INDEX: 29.33 KG/M2 | WEIGHT: 198 LBS | SYSTOLIC BLOOD PRESSURE: 130 MMHG | OXYGEN SATURATION: 100 % | TEMPERATURE: 97.7 F

## 2022-12-06 DIAGNOSIS — R73.03 PRE-DIABETES: ICD-10-CM

## 2022-12-06 DIAGNOSIS — I10 ESSENTIAL HYPERTENSION: ICD-10-CM

## 2022-12-06 DIAGNOSIS — I48.0 PAROXYSMAL ATRIAL FIBRILLATION (HCC): Primary | ICD-10-CM

## 2022-12-06 DIAGNOSIS — G47.33 OSA (OBSTRUCTIVE SLEEP APNEA): ICD-10-CM

## 2022-12-06 DIAGNOSIS — E78.00 HIGH CHOLESTEROL: ICD-10-CM

## 2022-12-06 PROCEDURE — G0463 HOSPITAL OUTPT CLINIC VISIT: HCPCS | Performed by: INTERNAL MEDICINE

## 2022-12-06 PROCEDURE — 99214 OFFICE O/P EST MOD 30 MIN: CPT | Performed by: INTERNAL MEDICINE

## 2022-12-06 PROCEDURE — G8432 DEP SCR NOT DOC, RNG: HCPCS | Performed by: INTERNAL MEDICINE

## 2022-12-06 PROCEDURE — G8417 CALC BMI ABV UP PARAM F/U: HCPCS | Performed by: INTERNAL MEDICINE

## 2022-12-06 PROCEDURE — G8752 SYS BP LESS 140: HCPCS | Performed by: INTERNAL MEDICINE

## 2022-12-06 PROCEDURE — G8754 DIAS BP LESS 90: HCPCS | Performed by: INTERNAL MEDICINE

## 2022-12-06 PROCEDURE — G8536 NO DOC ELDER MAL SCRN: HCPCS | Performed by: INTERNAL MEDICINE

## 2022-12-06 PROCEDURE — G8427 DOCREV CUR MEDS BY ELIG CLIN: HCPCS | Performed by: INTERNAL MEDICINE

## 2022-12-06 PROCEDURE — 1101F PT FALLS ASSESS-DOCD LE1/YR: CPT | Performed by: INTERNAL MEDICINE

## 2022-12-06 RX ORDER — LISINOPRIL 20 MG/1
20 TABLET ORAL DAILY
Qty: 90 TABLET | Refills: 1 | Status: SHIPPED | OUTPATIENT
Start: 2022-12-06

## 2022-12-06 NOTE — PROGRESS NOTES
1. \"Have you been to the ER, urgent care clinic since your last visit? Hospitalized since your last visit? \" No    2. \"Have you seen or consulted any other health care providers outside of the [address] [place] [address] since your last visit? \" No     3. For patients aged 39-70: Has the patient had a colonoscopy / FIT/ Cologuard? NA - based on age      If the patient is female:    4. For patients aged 41-77: Has the patient had a mammogram within the past 2 years? NA - based on age or sex      11. For patients aged 21-65: Has the patient had a pap smear?  NA - based on age or sex Telephone Encounter by Jesus Ruelas MD at 04/10/18 05:40 PM     Author:  Jesus Ruelas MD Service:  (none) Author Type:  Physician     Filed:  04/10/18 05:41 PM Encounter Date:  4/10/2018 Status:  Signed     :  Jesus Ruelas MD (Physician)            Can prescribe medrol dose keerthi, take as directed. Finish her dexamethasone nasal spray, then start Rhinocort 2 sprays each nostril daily. Can also refer to allergy for allergy testing as this has not been done previously.     Thanks,    Jesus Ruelas[DS1.1M]      Revision History        User Key Date/Time User Provider Type Action    > DS1.1 04/10/18 05:41 PM Jesus Ruelas MD Physician Sign    M - Manual

## 2022-12-06 NOTE — PROGRESS NOTES
Mr. Raphael Chaves is presenting to follow up     CC:  Follow-up, Hypertension, and Medication Refill       HPI:      Essential HTN: currently on lisinopril 20mg and dilt 120mg long acting. Initially his blood pressure was elevated during visit repeat is 130/70 he is not doing home monitoring he has a sedentary lifestyle. He denies chest pain shortness of breath. He does not follow a low-salt diet. Hx of glaucoma : He is followed by an ophthalmologist    Atrial fibrillation : taking the diltiazem and and Eliquis. He sees Dr Laila Faye and was resumed on diltiazem. Denies CP and dyspnea  He admits to taking Eliquis only once a day to save money. Discussed at length need to take it twice a day     he had a uro-lift procedure and he is off of Cardura. Frequent urination is better     He is not compliant with CPAP therapy -did not tolerate CPAP. Has not followed up with sleep doctor.   Discussed at length importance of compliance with CPAP therapy and consequences of untreated obstructive sleep apnea including heart failure and sudden death     Review of systems:  Constitutional: negative for fever, chills, weight loss, night sweats   10 systems reviewed and negative other than HPI       Past Medical History:   Diagnosis Date    Appendicitis     2018 underwent appendectomy    Arthritis     DJD    Asthma     As a child     Enlarged prostate     Hyperlipidemia     Hypertension     LINDA treated with BiPAP     PAF (paroxysmal atrial fibrillation) ([facility] [address]     Pre-diabetes     HA1C 6.0 Dec 2016    Shingles         Past Surgical History:   Procedure Laterality Date    HX APPENDECTOMY  2018    HX COLONOSCOPY  2009    normal except for hemorrhoids    HX HEENT      wisdom teeth extraction x2    HX HEENT  1950s    Cyst removed from under tongue     HX KNEE REPLACEMENT      right; Jan. 2017    HX SHOULDER ARTHROSCOPY Right     rt    HX TONSILLECTOMY      HX UROLOGICAL  2018    Urolift        Allergies   Allergen Reactions Oxycodone Other (comments)     Patient states, \"It made me feel unwell. \"       Current Outpatient Medications on File Prior to Visit   Medication Sig Dispense Refill    lisinopriL (PRINIVIL, ZESTRIL) 20 mg tablet TAKE 1 TABLET BY MOUTH EVERY DAY 90 Tablet 0    dilTIAZem ER (DILT-XR) 120 mg capsule Take 1 Capsule by mouth daily. 90 Capsule 3    apixaban (Eliquis) 2.5 mg tablet Take 1 Tablet by mouth every twelve (12) hours. 180 Tablet 3    Rocklatan 0.02-0.005 % drop INSTILL 1 DROP INTO AFFECTED EYE(S) ONCE DAILY AT BEDTIME      tamsulosin (FLOMAX) 0.4 mg capsule TAKE ONE CAPSULE BY MOUTH EVERYNIGHT  3    SIMBRINZA 1-0.2 % drps INSTILL ONE DROP THREE TIMES A DAY INTO THE LEFT EYE AND ONCE A DAY INTO THE RIGHT EYE  0     No current facility-administered medications on file prior to visit. family history includes Coronary Art Dis (age of onset: 47) in his father.     Social History     Socioeconomic History    Marital status:      Spouse name: Not on file    Number of children: Not on file    Years of education: Not on file    Highest education level: Not on file   Occupational History    Not on file   Tobacco Use    Smoking status: Former    Smokeless tobacco: Never   Vaping Use    Vaping Use: Never used   Substance and Sexual Activity    Alcohol use: Yes     Comment: occasional    Drug use: No    Sexual activity: Not Currently   Other Topics Concern    Not on file   Social History Narrative    Not on file     Social Determinants of Health     Financial Resource Strain: Not on file   Food Insecurity: Not on file   Transportation Needs: Not on file   Physical Activity: Not on file   Stress: Not on file   Social Connections: Not on file   Intimate Partner Violence: Not on file   Housing Stability: Not on file       Visit Vitals  BP (!) 153/65   Pulse 70   Temp 97.7 °F (36.5 °C) (Temporal)   Resp 16   Ht 5' 9\" (1.753 m)   Wt 198 lb (89.8 kg)   SpO2 100%   BMI 29.24 kg/m²     General:  Well appearing male no acute distress  HEENT:   PERRL,normal conjunctiva. External ear and canals normal, TMs normal.  Hearing normal to voice. Nose without edema or discharge, normal septum. Lips, teeth, gums normal.  Oropharynx: no erythema, no exudates, no lesions, normal tongue. Neck:  Supple. Thyroid normal size, nontender, without nodules. No carotid bruit. No masses or lymphadenopathy  Respiratory: no respiratory distress,  no wheezing, no rhonchi, no rales. No chest wall tenderness. Cardiovascular:  RRR, normal S1S2, no murmur. Gastrointestinal: normal bowel sounds, soft, nontender, without masses. No hepatosplenomegaly. Extremities +2 pulses, no edema, normal sensation   Musculoskeletal:  Normal gait. Normal digits and nails. Normal strength and tone, no atrophy, and no abnormal movement. Skin:  No rash, no lesions, no ulcers. Skin warm, normal turgor, without induration or nodules. Neuro:  A and OX4, fluent speech, cranial nerves normal 2-12.     Psych:  Normal affect      Lab Results   Component Value Date/Time    WBC 7.9 01/27/2022 11:31 AM    HGB 15.1 01/27/2022 11:31 AM    HCT 47.9 01/27/2022 11:31 AM    PLATELET 509 16/84/3390 11:31 AM    MCV 86.6 01/27/2022 11:31 AM     Lab Results   Component Value Date/Time    Sodium 141 01/27/2022 11:31 AM    Potassium 4.4 01/27/2022 11:31 AM    Chloride 109 (H) 01/27/2022 11:31 AM    CO2 30 01/27/2022 11:31 AM    Anion gap 2 (L) 01/27/2022 11:31 AM    Glucose 96 01/27/2022 11:31 AM    BUN 19 01/27/2022 11:31 AM    Creatinine 1.19 01/27/2022 11:31 AM    BUN/Creatinine ratio 16 01/27/2022 11:31 AM    GFR est AA >60 01/27/2022 11:31 AM    GFR est non-AA 58 (L) 01/27/2022 11:31 AM    Calcium 9.2 01/27/2022 11:31 AM     Lab Results   Component Value Date/Time    Cholesterol, total 201 (H) 01/27/2022 11:31 AM    HDL Cholesterol 34 01/27/2022 11:31 AM    LDL, calculated 130.6 (H) 01/27/2022 11:31 AM    VLDL, calculated 36.4 01/27/2022 11:31 AM    Triglyceride 182 (H) 01/27/2022 11:31 AM    CHOL/HDL Ratio 5.9 (H) 01/27/2022 11:31 AM     No results found for: TSH, TSH2, TSH3, TSHP, TSHEXT, TSHEXT  Lab Results   Component Value Date/Time    Hemoglobin A1c 6.0 (H) 01/27/2022 11:31 AM    Hemoglobin A1c (POC) 5.7 05/02/2022 12:10 PM     No results found for: [place], XQVID3, XQVID, VD3RIA                Assessment and Plan:     1. Essential hypertension  Blood pressure is well controlled on current regimen lisinopril 20 mg and diltiazem 120 mg extended release  -recommend checking blood pressure with goal of less than  130/85 on average. Follow lo sodium diet. Given DASH diet guidelines. Recommend cardiovascular exercise regularly. Work on weight reduction. Call with blood pressure readings. 2. Paroxysmal atrial fibrillation (HCC)   taking the diltiazem and Eliquis. Often missing a second dose of Eliquis to save money. Discussed at length he needs to take it twice a day. He sees Dr Jamial Turner       3. High cholesterol  Diet modification       4. LINDA (obstructive sleep apnea)  Discuss considering CPAP therapy, having increased daytime sleepiness, discussed risks of not treating including sudden death  Referral to sleep medicine done in the past    5. BPH per urologist/better since he had a UroLift procedure    6. Essential tremor: advise against medication not bothersome to patient    7. Pre diabetes : diet modification    Orders Placed This Encounter    METABOLIC PANEL, COMPREHENSIVE     Standing Status:   Future     Number of Occurrences:   1     Standing Expiration Date:   12/6/2023    CBC WITH AUTOMATED DIFF     Standing Status:   Future     Number of Occurrences:   1     Standing Expiration Date:   12/6/2023    LIPID PANEL     Standing Status:   Future     Number of Occurrences:   1     Standing Expiration Date:   12/6/2023    lisinopriL (PRINIVIL, ZESTRIL) 20 mg tablet     Sig: Take 1 Tablet by mouth daily.      Dispense:  90 Tablet     Refill:  1

## 2022-12-07 LAB
ALBUMIN SERPL-MCNC: 3.8 G/DL (ref 3.5–5)
ALBUMIN/GLOB SERPL: 1.3 {RATIO} (ref 1.1–2.2)
ALP SERPL-CCNC: 72 U/L (ref 45–117)
ALT SERPL-CCNC: 22 U/L (ref 12–78)
ANION GAP SERPL CALC-SCNC: 4 MMOL/L (ref 5–15)
AST SERPL-CCNC: 14 U/L (ref 15–37)
BASOPHILS # BLD: 0.1 K/UL (ref 0–0.1)
BASOPHILS NFR BLD: 1 % (ref 0–1)
BILIRUB SERPL-MCNC: 0.4 MG/DL (ref 0.2–1)
BUN SERPL-MCNC: 17 MG/DL (ref 6–20)
BUN/CREAT SERPL: 13 (ref 12–20)
CALCIUM SERPL-MCNC: 8.6 MG/DL (ref 8.5–10.1)
CHLORIDE SERPL-SCNC: 108 MMOL/L (ref 97–108)
CHOLEST SERPL-MCNC: 190 MG/DL
CO2 SERPL-SCNC: 29 MMOL/L (ref 21–32)
CREAT SERPL-MCNC: 1.26 MG/DL (ref 0.7–1.3)
DIFFERENTIAL METHOD BLD: ABNORMAL
EOSINOPHIL # BLD: 0.2 K/UL (ref 0–0.4)
EOSINOPHIL NFR BLD: 2 % (ref 0–7)
ERYTHROCYTE [DISTWIDTH] IN BLOOD BY AUTOMATED COUNT: 13.8 % (ref 11.5–14.5)
GLOBULIN SER CALC-MCNC: 3 G/DL (ref 2–4)
GLUCOSE SERPL-MCNC: 86 MG/DL (ref 65–100)
HCT VFR BLD AUTO: 46.2 % (ref 36.6–50.3)
HDLC SERPL-MCNC: 29 MG/DL
HDLC SERPL: 6.6 {RATIO} (ref 0–5)
HGB BLD-MCNC: 14.6 G/DL (ref 12.1–17)
IMM GRANULOCYTES # BLD AUTO: 0 K/UL (ref 0–0.04)
IMM GRANULOCYTES NFR BLD AUTO: 0 % (ref 0–0.5)
LDLC SERPL CALC-MCNC: 109.2 MG/DL (ref 0–100)
LYMPHOCYTES # BLD: 3.4 K/UL (ref 0.8–3.5)
LYMPHOCYTES NFR BLD: 40 % (ref 12–49)
MCH RBC QN AUTO: 26.9 PG (ref 26–34)
MCHC RBC AUTO-ENTMCNC: 31.6 G/DL (ref 30–36.5)
MCV RBC AUTO: 85.1 FL (ref 80–99)
MONOCYTES # BLD: 0.5 K/UL (ref 0–1)
MONOCYTES NFR BLD: 6 % (ref 5–13)
NEUTS SEG # BLD: 4.2 K/UL (ref 1.8–8)
NEUTS SEG NFR BLD: 51 % (ref 32–75)
NRBC # BLD: 0.02 K/UL (ref 0–0.01)
NRBC BLD-RTO: 0.2 PER 100 WBC
PLATELET # BLD AUTO: 163 K/UL (ref 150–400)
PMV BLD AUTO: 10 FL (ref 8.9–12.9)
POTASSIUM SERPL-SCNC: 4.4 MMOL/L (ref 3.5–5.1)
PROT SERPL-MCNC: 6.8 G/DL (ref 6.4–8.2)
RBC # BLD AUTO: 5.43 M/UL (ref 4.1–5.7)
SODIUM SERPL-SCNC: 141 MMOL/L (ref 136–145)
TRIGL SERPL-MCNC: 259 MG/DL (ref ?–150)
VLDLC SERPL CALC-MCNC: 51.8 MG/DL
WBC # BLD AUTO: 8.3 K/UL (ref 4.1–11.1)

## 2022-12-12 ENCOUNTER — OFFICE VISIT (OUTPATIENT)
Dept: CARDIOLOGY CLINIC | Age: 87
End: 2022-12-12
Payer: MEDICARE

## 2022-12-12 VITALS
WEIGHT: 192 LBS | RESPIRATION RATE: 18 BRPM | DIASTOLIC BLOOD PRESSURE: 80 MMHG | HEIGHT: 69 IN | BODY MASS INDEX: 28.44 KG/M2 | SYSTOLIC BLOOD PRESSURE: 132 MMHG | OXYGEN SATURATION: 96 % | HEART RATE: 73 BPM

## 2022-12-12 DIAGNOSIS — E78.00 PURE HYPERCHOLESTEROLEMIA: ICD-10-CM

## 2022-12-12 DIAGNOSIS — I10 ESSENTIAL HYPERTENSION: ICD-10-CM

## 2022-12-12 DIAGNOSIS — I48.0 PAF (PAROXYSMAL ATRIAL FIBRILLATION) (HCC): Primary | ICD-10-CM

## 2022-12-12 DIAGNOSIS — G47.33 OSA TREATED WITH BIPAP: ICD-10-CM

## 2022-12-12 PROCEDURE — 1101F PT FALLS ASSESS-DOCD LE1/YR: CPT | Performed by: SPECIALIST

## 2022-12-12 PROCEDURE — G8427 DOCREV CUR MEDS BY ELIG CLIN: HCPCS | Performed by: SPECIALIST

## 2022-12-12 PROCEDURE — G8417 CALC BMI ABV UP PARAM F/U: HCPCS | Performed by: SPECIALIST

## 2022-12-12 PROCEDURE — G8536 NO DOC ELDER MAL SCRN: HCPCS | Performed by: SPECIALIST

## 2022-12-12 PROCEDURE — 3078F DIAST BP <80 MM HG: CPT | Performed by: SPECIALIST

## 2022-12-12 PROCEDURE — 1123F ACP DISCUSS/DSCN MKR DOCD: CPT | Performed by: SPECIALIST

## 2022-12-12 PROCEDURE — 3074F SYST BP LT 130 MM HG: CPT | Performed by: SPECIALIST

## 2022-12-12 PROCEDURE — G8752 SYS BP LESS 140: HCPCS | Performed by: SPECIALIST

## 2022-12-12 PROCEDURE — G8432 DEP SCR NOT DOC, RNG: HCPCS | Performed by: SPECIALIST

## 2022-12-12 PROCEDURE — 99214 OFFICE O/P EST MOD 30 MIN: CPT | Performed by: SPECIALIST

## 2022-12-12 PROCEDURE — G8754 DIAS BP LESS 90: HCPCS | Performed by: SPECIALIST

## 2022-12-12 RX ORDER — BETAMETHASONE DIPROPIONATE 0.5 MG/G
OINTMENT TOPICAL
COMMUNITY
Start: 2022-11-17

## 2022-12-12 NOTE — PROGRESS NOTES
Bhavya Alston     1935       Magaly Delaney MD, [facility] [place]  Date of Visit-12/12/2022   PCP is Surjit Matias MD   [facility] [place]  [facility]  HPI:  Bhavya Alston is a 82 y.o. male   One year follow up    PAF with HTN/HCVD with LVH  Echo 10/14/2021 EF 60 to 65% mild LVH mild AI mild TR PASP 39  On Eliquis. EKG 4/14/2022 sinus rhythm with minor ST findings  Today. .. Generally well occasional leg pains no cardiac symptoms  Denies chest pain, edema, syncope or shortness of breath at rest   Has no tachycardia , palpitations or sense of arrythmia    Saw PCP few weeks ago and said he is taking [address] once a day to save money, repeat BP was 130 there and not tolerating CPAP   Seeing Surjit Matias MD about every 6 months    Assessment/Plan:       Patient Instructions   We will see you back for an annual echo and office visit with [facility] CHRIS MELENDREZ NP.      1. PAF (paroxysmal atrial fibrillation) ([facility] [address]  No clinical events. Continue Eliquis and Diltiazem. First episode of Afib in [place]. Normal EF, some LVH  Follow-up 1 year with an echo he will see the nurse practitioner at that time I will see him back the year after and will alternate. We will follow-up on the valve regurgitation and the level of LVH. He knows to take the Eliquis full dose twice a day as the best option. He could also change to warfarin though I think it would be difficult. 2. Essential hypertension  White coat HTN No edema. LVH recheck blood pressure in office  meds and possible side effects reviewed and patient denies  Key CAD CHF Meds               lisinopriL (PRINIVIL, ZESTRIL) 20 mg tablet (Taking) Take 1 Tablet by mouth daily. dilTIAZem ER (DILT-XR) 120 mg capsule (Taking) Take 1 Capsule by mouth daily. apixaban (Eliquis) 2.5 mg tablet (Taking) Take 1 Tablet by mouth every twelve (12) hours.            BP Readings from Last 6 Encounters:   12/12/22 132/80 22 130/70   10/14/22 (!) 143/71   22 124/62   22 120/62   10/07/21 110/60        3. Pure hypercholesterolemia  Advised to work on diet and exercise due to LDL of 130. Now 109  At goal , denies excess muscle aches or new liver issues  Key Antihyperlipidemia Meds       The patient is on no antihyperlipidemia meds. Lab Results   Component Value Date/Time    LDL, calculated 109.2 (H) 2022 12:04 PM       4. LINDA on CPAP -not tolerating     Impression:   1. PAF (paroxysmal atrial fibrillation) ([address]    2. Essential hypertension    3. Pure hypercholesterolemia    4. LINDA treated with BiPAP         Cardiac History:   2016 AFIB in [facility] [place], normal stress test, moved to [place] 2016   right TKR 2017 [facility] and Family History: non smoker, father  at [de-identified] of CAD, had MI at 47  Medical History:  Knee replacement, [facility] 17. No prior cath, blood transfusions or GI bleeding. Social History:  Current nonsmoker. Rare alcohol. . Likes woodworking, fishing, golfing. Has two children. Family History: Mother  of cancer at 80. Father  of heart attack at [de-identified] with previous coronary artery disease at 47. Other half brother with Parkinson's disease,  at 80. Future Appointments   Date Time Provider Kassy Kumar   2023 10:00 AM KIRTI LEE AMB   2023 11:00 AM Balbir Kiser, NP [facility] AMB          ROS-except as noted above. . A complete cardiac and respiratory are reviewed and negative except as above ; Resp-denies wheezing  or productive cough,.  Const- No unusual weight loss or fever; Neuro-no recent seizure or CVA ; GI- No BRBPR, abdom pain, bloating ; - no  hematuria   see supplement sheet, initialed and to be scanned by staff  Past Medical History:   Diagnosis Date    Appendicitis      underwent appendectomy    Arthritis     DJD    Asthma     As a child     Enlarged prostate Hyperlipidemia     Hypertension     LINDA treated with BiPAP     PAF (paroxysmal atrial fibrillation) ([facility] [address]     Pre-diabetes     HA1C 6.0 Dec 2016    Shingles       Social Hx= reports that he has quit smoking. He has never used smokeless tobacco. He reports that he does not currently use alcohol. He reports that he does not use drugs. Exam and Labs:  /80 (BP 1 Location: Right arm, BP Patient Position: Sitting)   Pulse 73   Resp 18   Ht 5' 9\" (1.753 m)   Wt 192 lb (87.1 kg)   SpO2 96%   BMI 28.35 kg/m² Constitutional:  NAD, comfortable  Head: NC,AT. Eyes: No scleral icterus. Neck:  Neck supple. No JVD present. Throat: moist mucous membranes. Chest: Effort normal & normal respiratory excursion . Neurological: alert, conversant and oriented . Skin: Skin is not cold. No obvious systemic rash noted. Not diaphoretic. No erythema. Psychiatric:  Grossly normal mood and affect. Behavior appears normal. Extremities:  no clubbing or cyanosis. Abdomen: non distended    Lungs:breath sounds normal. No stridor. distress, wheezes or  Rales. Heart: normal rate, regular rhythm, normal S1, S2, no murmurs, rubs, clicks or gallops , PMI non displaced. Edema: Edema is none.   Lab Results   Component Value Date/Time    Cholesterol, total 190 12/06/2022 12:04 PM    HDL Cholesterol 29 12/06/2022 12:04 PM    LDL, calculated 109.2 (H) 12/06/2022 12:04 PM    Triglyceride 259 (H) 12/06/2022 12:04 PM    CHOL/HDL Ratio 6.6 (H) 12/06/2022 12:04 PM     Lab Results   Component Value Date/Time    Sodium 141 12/06/2022 12:04 PM    Potassium 4.4 12/06/2022 12:04 PM    Chloride 108 12/06/2022 12:04 PM    CO2 29 12/06/2022 12:04 PM    Anion gap 4 (L) 12/06/2022 12:04 PM    Glucose 86 12/06/2022 12:04 PM    BUN 17 12/06/2022 12:04 PM    Creatinine 1.26 12/06/2022 12:04 PM    BUN/Creatinine ratio 13 12/06/2022 12:04 PM    GFR est AA >60 01/27/2022 11:31 AM    GFR est non-AA 58 (L) 01/27/2022 11:31 AM    Calcium 8.6 12/06/2022 12:04 PM Wt Readings from Last 3 Encounters:   12/12/22 192 lb (87.1 kg)   12/06/22 198 lb (89.8 kg)   05/02/22 196 lb (88.9 kg)      BP Readings from Last 3 Encounters:   12/12/22 132/80   12/06/22 130/70   10/14/22 (!) 143/71      Current Outpatient Medications   Medication Sig    augmented betamethasone dipropionate (DIPROLENE-AF) 0.05 % ointment     lisinopriL (PRINIVIL, ZESTRIL) 20 mg tablet Take 1 Tablet by mouth daily. dilTIAZem ER (DILT-XR) 120 mg capsule Take 1 Capsule by mouth daily. apixaban (Eliquis) 2.5 mg tablet Take 1 Tablet by mouth every twelve (12) hours. Rocklatan 0.02-0.005 % drop INSTILL 1 DROP INTO AFFECTED EYE(S) ONCE DAILY AT BEDTIME    tamsulosin (FLOMAX) 0.4 mg capsule TAKE ONE CAPSULE BY MOUTH EVERYNIGHT    SIMBRINZA 1-0.2 % drps INSTILL ONE DROP THREE TIMES A DAY INTO THE LEFT EYE AND ONCE A DAY INTO THE RIGHT EYE     No current facility-administered medications for this visit. Impression see above. This note was created using voice recognition software. Despite editing, there may be syntax errors.

## 2022-12-12 NOTE — Clinical Note
12/12/2022    Patient: Alise Jacobo   YOB: 1935   Date of Visit: 12/12/2022     Vandana Riggs MD  [address]    Dear Vandana Riggs MD,      Thank you for referring Mr. Gem Garcia to [facility] for evaluation. My notes for this consultation are attached. If you have questions, please do not hesitate to call me. I look forward to following your patient along with you.       Sincerely,    Salma Romeo MD

## 2022-12-19 ENCOUNTER — TELEPHONE (OUTPATIENT)
Dept: INTERNAL MEDICINE CLINIC | Age: 87
End: 2022-12-19

## 2022-12-19 DIAGNOSIS — J40 BRONCHITIS: Primary | ICD-10-CM

## 2022-12-19 RX ORDER — PREDNISONE 20 MG/1
40 TABLET ORAL
Qty: 4 TABLET | Refills: 0 | Status: SHIPPED | OUTPATIENT
Start: 2022-12-19

## 2022-12-19 RX ORDER — AZITHROMYCIN 250 MG/1
250 TABLET, FILM COATED ORAL SEE ADMIN INSTRUCTIONS
Qty: 6 TABLET | Refills: 0 | Status: SHIPPED | OUTPATIENT
Start: 2022-12-19 | End: 2022-12-23 | Stop reason: ALTCHOICE

## 2022-12-19 NOTE — TELEPHONE ENCOUNTER
Patients wife called and states that patient has been coughing and not feeling well. She does not want it to get in his lungs. She wants to know if we can call something in to the pharmacy to help.

## 2022-12-23 ENCOUNTER — TELEPHONE (OUTPATIENT)
Dept: INTERNAL MEDICINE CLINIC | Age: 87
End: 2022-12-23

## 2022-12-23 ENCOUNTER — VIRTUAL VISIT (OUTPATIENT)
Dept: INTERNAL MEDICINE CLINIC | Age: 87
End: 2022-12-23
Payer: MEDICARE

## 2022-12-23 DIAGNOSIS — J40 BRONCHITIS: Primary | ICD-10-CM

## 2022-12-23 RX ORDER — ALBUTEROL SULFATE 90 UG/1
1 AEROSOL, METERED RESPIRATORY (INHALATION)
Qty: 18 G | Refills: 1 | Status: SHIPPED | OUTPATIENT
Start: 2022-12-23

## 2022-12-23 RX ORDER — ALBUTEROL SULFATE 90 UG/1
1 AEROSOL, METERED RESPIRATORY (INHALATION)
Qty: 18 G | Refills: 0 | Status: SHIPPED | OUTPATIENT
Start: 2022-12-23

## 2022-12-23 NOTE — PROGRESS NOTES
CC: Cold Symptoms      HPI:    He is a 82 y.o. male who presents for evaluation of ongoing cough   Started 6 days ago with cough and chest congestion  Prescribed zpack and steroids   And currently has ongoing thick cough   Taking corsidin   No shortness of breath     Advised to repeat covid test   Afebrile   No dyspnea      This is an established visit conducted via telemedicine with video. The patient has been instructed that this meets HIPAA criteria and acknowledges and agrees to this method of visitation. Pursuant to the emergency declaration under the [facility] access hospital waiver authority and the [employer] Act, this Virtual Visit was conducted, with patient's consent, to reduce the patient's risk of exposure to COVID-19 and provide continuity of care for an established patient. Services were provided through a video synchronous discussion virtually to substitute for in-person clinic visit. ROS:  Constitutional: negative for fevers, chills, anorexia and weight loss  10 systems reviewed and negative other then HPI     Past Medical History:   Diagnosis Date    Appendicitis     2018 underwent appendectomy    Arthritis     DJD    Asthma     As a child     Enlarged prostate     Hyperlipidemia     Hypertension     LINDA treated with BiPAP     PAF (paroxysmal atrial fibrillation) ([facility] [address]     Pre-diabetes     HA1C 6.0 Dec 2016    Shingles        Current Outpatient Medications on File Prior to Visit   Medication Sig Dispense Refill    predniSONE (DELTASONE) 20 mg tablet Take 2 Tablets by mouth daily (with breakfast). 4 Tablet 0    augmented betamethasone dipropionate (DIPROLENE-AF) 0.05 % ointment       lisinopriL (PRINIVIL, ZESTRIL) 20 mg tablet Take 1 Tablet by mouth daily. 90 Tablet 1    dilTIAZem ER (DILT-XR) 120 mg capsule Take 1 Capsule by mouth daily.  90 Capsule 3    apixaban (Eliquis) 2.5 mg tablet Take 1 Tablet by mouth every twelve (12) hours. 180 Tablet 3    Rocklatan 0.02-0.005 % drop INSTILL 1 DROP INTO AFFECTED EYE(S) ONCE DAILY AT BEDTIME      tamsulosin (FLOMAX) 0.4 mg capsule TAKE ONE CAPSULE BY MOUTH EVERYNIGHT  3    SIMBRINZA 1-0.2 % drps INSTILL ONE DROP THREE TIMES A DAY INTO THE LEFT EYE AND ONCE A DAY INTO THE RIGHT EYE  0     No current facility-administered medications on file prior to visit. Past Surgical History:   Procedure Laterality Date    HX APPENDECTOMY  2018    HX COLONOSCOPY  2009    normal except for hemorrhoids    HX HEENT      wisdom teeth extraction x2    HX HEENT  1950s    Cyst removed from under tongue     HX KNEE REPLACEMENT      right; 2017    HX SHOULDER ARTHROSCOPY Right     rt    HX TONSILLECTOMY      HX UROLOGICAL  2018    Urolift        Family History   Problem Relation Age of Onset    Coronary Art Dis Father 47         at [de-identified] of MI     Reviewed and no changes     Social History     Socioeconomic History    Marital status:      Spouse name: Not on file    Number of children: Not on file    Years of education: Not on file    Highest education level: Not on file   Occupational History    Not on file   Tobacco Use    Smoking status: Former    Smokeless tobacco: Never   Vaping Use    Vaping Use: Never used   Substance and Sexual Activity    Alcohol use: Not Currently     Comment: occasional    Drug use: No    Sexual activity: Not Currently   Other Topics Concern    Not on file   Social History Narrative    Not on file     Social Determinants of Health     Financial Resource Strain: Not on file   Food Insecurity: Not on file   Transportation Needs: Not on file   Physical Activity: Not on file   Stress: Not on file   Social Connections: Not on file   Intimate Partner Violence: Not on file   Housing Stability: Not on file          There were no vitals taken for this visit.     Physical Examination:   Gen: well appearing male  HEENT: normal conjunctiva, no audible congestion, patient does not see oral erythema, has MMM  Neck: patient does not feel enlarged or tender LAD or masses  Resp: normal respiratory effort, no audible wheezing. CV: patient does not feel palpitations or heart irregularity  Abd: patient does not feel abdominal tenderness or mass, patient does not notice distension  Extrem: patient does not see swelling in ankles or joints.    Neuro: Alert and oriented, able to answer questions without difficulty, able to move all extremities and walk normally          Lab Results   Component Value Date/Time    WBC 8.3 12/06/2022 12:04 PM    HGB 14.6 12/06/2022 12:04 PM    HCT 46.2 12/06/2022 12:04 PM    PLATELET 817 66/52/7893 12:04 PM    MCV 85.1 12/06/2022 12:04 PM     Lab Results   Component Value Date/Time    Sodium 141 12/06/2022 12:04 PM    Potassium 4.4 12/06/2022 12:04 PM    Chloride 108 12/06/2022 12:04 PM    CO2 29 12/06/2022 12:04 PM    Anion gap 4 (L) 12/06/2022 12:04 PM    Glucose 86 12/06/2022 12:04 PM    BUN 17 12/06/2022 12:04 PM    Creatinine 1.26 12/06/2022 12:04 PM    BUN/Creatinine ratio 13 12/06/2022 12:04 PM    GFR est AA >60 01/27/2022 11:31 AM    GFR est non-AA 58 (L) 01/27/2022 11:31 AM    Calcium 8.6 12/06/2022 12:04 PM     Lab Results   Component Value Date/Time    Cholesterol, total 190 12/06/2022 12:04 PM    HDL Cholesterol 29 12/06/2022 12:04 PM    LDL, calculated 109.2 (H) 12/06/2022 12:04 PM    VLDL, calculated 51.8 12/06/2022 12:04 PM    Triglyceride 259 (H) 12/06/2022 12:04 PM    CHOL/HDL Ratio 6.6 (H) 12/06/2022 12:04 PM     No results found for: TSH, TSH2, TSH3, TSHP, TSHEXT  Lab Results   Component Value Date/Time    Prostate Specific Ag 4.2 (H) 02/09/2017 12:07 PM    % Free PSA 29.8 02/09/2017 12:07 PM     Lab Results   Component Value Date/Time    Hemoglobin A1c 6.0 (H) 01/27/2022 11:31 AM    Hemoglobin A1c (POC) 5.7 05/02/2022 12:10 PM     No results found for: RIVER [place]    Lab Results   Component Value Date/Time ALT (SGPT) 22 12/06/2022 12:04 PM    Alk. phosphatase 72 12/06/2022 12:04 PM    Bilirubin, direct 0.11 02/09/2017 12:07 PM    Bilirubin, total 0.4 12/06/2022 12:04 PM           Assessment/Plan:  1.  Bronchitis  Finished z pack and prednisone has lingering symptoms   Will send inhaler albuterol  Test for covid 19  Continue corsidin  Chest x ray           Nithin Maloney MD

## 2022-12-23 NOTE — TELEPHONE ENCOUNTER
Patient is still not feeling bad. [facility] states that he is very congested . He has completed his medication.

## 2023-02-24 NOTE — TELEPHONE ENCOUNTER
I talked to Mr dominguez advised that form is filled out but requires audiology and vision testing that needs to be done by audiologist and optometrist respectively
Speaking Coherently

## 2023-03-23 DIAGNOSIS — I48.0 PAROXYSMAL ATRIAL FIBRILLATION (HCC): ICD-10-CM

## 2023-03-23 DIAGNOSIS — I10 ESSENTIAL HYPERTENSION: ICD-10-CM

## 2023-03-24 RX ORDER — DILTIAZEM HYDROCHLORIDE 120 MG/1
CAPSULE, EXTENDED RELEASE ORAL
Qty: 90 CAPSULE | Refills: 3 | Status: SHIPPED | OUTPATIENT
Start: 2023-03-24

## 2023-03-24 NOTE — TELEPHONE ENCOUNTER
Per VO by MD.    Future Appointments   Date Time Provider Kassy Kumar   12/12/2023 10:00 AM KIRTI LEE AMB   12/12/2023 11:00 AM Chuyita Kiser NP [facility] AMB

## 2023-03-30 DIAGNOSIS — I48.0 PAF (PAROXYSMAL ATRIAL FIBRILLATION) (HCC): ICD-10-CM

## 2023-03-31 DIAGNOSIS — I48.0 PAF (PAROXYSMAL ATRIAL FIBRILLATION) (HCC): ICD-10-CM

## 2023-03-31 NOTE — TELEPHONE ENCOUNTER
Cardiologist: Dr. Shraddha Chambers    Last appt: 12/12/2022  Future Appointments   Date Time Provider Kassy Stacy   12/12/2023 10:00 AM KIRTI LEE   12/12/2023 11:00 AM Landry Kiser, [facility]STOS CHEN AMB       Requested Prescriptions     Signed Prescriptions Disp Refills    apixaban (Eliquis) 2.5 mg tablet 180 Tablet 3     Sig: Take 1 Tablet by mouth two (2) times a day.      Authorizing Provider: Aliza Bobby     Ordering User: MARCO MILLS VO per Dr. Shraddha Chambers.

## 2023-04-21 DIAGNOSIS — M12.811 ROTATOR CUFF TEAR ARTHROPATHY OF RIGHT SHOULDER: Primary | ICD-10-CM

## 2023-04-21 DIAGNOSIS — M75.101 ROTATOR CUFF TEAR ARTHROPATHY OF RIGHT SHOULDER: Primary | ICD-10-CM

## 2023-05-09 RX ORDER — LISINOPRIL 20 MG/1
TABLET ORAL
Qty: 90 TABLET | Refills: 1 | Status: SHIPPED | OUTPATIENT
Start: 2023-05-09

## 2023-05-09 NOTE — TELEPHONE ENCOUNTER
PCP: Daylin Alexander MD    Last appt: [unfilled]  Future Appointments   Date Time Provider Stanislav Salinasi   12/12/2023 10:00 AM [facility] ECHO 1 [facility]   12/12/2023 11:00 AM CRISS Dominguez - NP [facility]       Requested Prescriptions     Pending Prescriptions Disp Refills    lisinopril (PRINIVIL;ZESTRIL) 20 MG tablet [Pharmacy Med Name: LISINOPRIL 20 MG TAB[*]] 90 tablet 1     Sig: TAKE ONE TABLET BY MOUTH ONE TIME DAILY

## 2023-08-21 ENCOUNTER — TELEPHONE (OUTPATIENT)
Age: 88
End: 2023-08-21

## 2023-08-21 NOTE — TELEPHONE ENCOUNTER
Called patient. Two patient identifiers verified. Spoke to patient wife. Changed the appt . Patient has been feeling very tired and is not hisself.

## 2023-08-21 NOTE — TELEPHONE ENCOUNTER
Received call from pt wife      Caller States:  Symptoms/concern:   \"Not himself\"  No energy  Onset:   \"A while\"      APPOINTMENTS:  Appointment offers during call:  Soonest with pcp (only wanted pcp): VV 7/29  Accepted and scheduled    Date of last appointment:    12/23/2022       REQUEST:  Lab orders ahead of visit  Call to advise care plan if anything should be changed or not, wants to hear from nurse

## 2023-08-22 ENCOUNTER — OFFICE VISIT (OUTPATIENT)
Age: 88
End: 2023-08-22
Payer: MEDICARE

## 2023-08-22 VITALS
HEIGHT: 69 IN | TEMPERATURE: 97.7 F | HEART RATE: 70 BPM | DIASTOLIC BLOOD PRESSURE: 64 MMHG | BODY MASS INDEX: 29 KG/M2 | WEIGHT: 195.8 LBS | RESPIRATION RATE: 16 BRPM | SYSTOLIC BLOOD PRESSURE: 134 MMHG

## 2023-08-22 DIAGNOSIS — I48.0 PAROXYSMAL ATRIAL FIBRILLATION (HCC): ICD-10-CM

## 2023-08-22 DIAGNOSIS — R53.82 CHRONIC FATIGUE: Primary | ICD-10-CM

## 2023-08-22 DIAGNOSIS — I10 ESSENTIAL (PRIMARY) HYPERTENSION: ICD-10-CM

## 2023-08-22 DIAGNOSIS — G47.33 OSA (OBSTRUCTIVE SLEEP APNEA): ICD-10-CM

## 2023-08-22 PROCEDURE — 99214 OFFICE O/P EST MOD 30 MIN: CPT | Performed by: INTERNAL MEDICINE

## 2023-08-22 PROCEDURE — 3078F DIAST BP <80 MM HG: CPT | Performed by: INTERNAL MEDICINE

## 2023-08-22 PROCEDURE — G8427 DOCREV CUR MEDS BY ELIG CLIN: HCPCS | Performed by: INTERNAL MEDICINE

## 2023-08-22 PROCEDURE — 1123F ACP DISCUSS/DSCN MKR DOCD: CPT | Performed by: INTERNAL MEDICINE

## 2023-08-22 PROCEDURE — 1036F TOBACCO NON-USER: CPT | Performed by: INTERNAL MEDICINE

## 2023-08-22 PROCEDURE — G8419 CALC BMI OUT NRM PARAM NOF/U: HCPCS | Performed by: INTERNAL MEDICINE

## 2023-08-22 PROCEDURE — 3075F SYST BP GE 130 - 139MM HG: CPT | Performed by: INTERNAL MEDICINE

## 2023-08-22 SDOH — ECONOMIC STABILITY: FOOD INSECURITY: WITHIN THE PAST 12 MONTHS, THE FOOD YOU BOUGHT JUST DIDN'T LAST AND YOU DIDN'T HAVE MONEY TO GET MORE.: NEVER TRUE

## 2023-08-22 SDOH — ECONOMIC STABILITY: FOOD INSECURITY: WITHIN THE PAST 12 MONTHS, YOU WORRIED THAT YOUR FOOD WOULD RUN OUT BEFORE YOU GOT MONEY TO BUY MORE.: NEVER TRUE

## 2023-08-22 ASSESSMENT — PATIENT HEALTH QUESTIONNAIRE - PHQ9
SUM OF ALL RESPONSES TO PHQ QUESTIONS 1-9: 0
SUM OF ALL RESPONSES TO PHQ QUESTIONS 1-9: 0
SUM OF ALL RESPONSES TO PHQ9 QUESTIONS 1 & 2: 0
1. LITTLE INTEREST OR PLEASURE IN DOING THINGS: 0
2. FEELING DOWN, DEPRESSED OR HOPELESS: 0
SUM OF ALL RESPONSES TO PHQ QUESTIONS 1-9: 0
SUM OF ALL RESPONSES TO PHQ QUESTIONS 1-9: 0

## 2023-08-22 ASSESSMENT — SOCIAL DETERMINANTS OF HEALTH (SDOH): HOW HARD IS IT FOR YOU TO PAY FOR THE VERY BASICS LIKE FOOD, HOUSING, MEDICAL CARE, AND HEATING?: NOT HARD AT ALL

## 2023-08-22 NOTE — PATIENT INSTRUCTIONS
I suspect the fatigue is due to untreated sleep apnea which causes you to never have a full cycle of sleep and chronic fatigue  Recommend you see Dr Jelena Mendoza to discuss sleep apnea

## 2023-08-23 LAB
ALBUMIN SERPL-MCNC: 3.7 G/DL (ref 3.5–5)
ALBUMIN/GLOB SERPL: 1.2 (ref 1.1–2.2)
ALP SERPL-CCNC: 80 U/L (ref 45–117)
ALT SERPL-CCNC: 20 U/L (ref 12–78)
ANION GAP SERPL CALC-SCNC: 6 MMOL/L (ref 5–15)
AST SERPL-CCNC: 14 U/L (ref 15–37)
BASOPHILS # BLD: 0.1 K/UL (ref 0–0.1)
BASOPHILS NFR BLD: 1 % (ref 0–1)
BILIRUB SERPL-MCNC: 0.5 MG/DL (ref 0.2–1)
BUN SERPL-MCNC: 18 MG/DL (ref 6–20)
BUN/CREAT SERPL: 15 (ref 12–20)
CALCIUM SERPL-MCNC: 8.5 MG/DL (ref 8.5–10.1)
CHLORIDE SERPL-SCNC: 111 MMOL/L (ref 97–108)
CO2 SERPL-SCNC: 27 MMOL/L (ref 21–32)
CREAT SERPL-MCNC: 1.24 MG/DL (ref 0.7–1.3)
DIFFERENTIAL METHOD BLD: ABNORMAL
EOSINOPHIL # BLD: 0.1 K/UL (ref 0–0.4)
EOSINOPHIL NFR BLD: 1 % (ref 0–7)
ERYTHROCYTE [DISTWIDTH] IN BLOOD BY AUTOMATED COUNT: 13.6 % (ref 11.5–14.5)
GLOBULIN SER CALC-MCNC: 3.1 G/DL (ref 2–4)
GLUCOSE SERPL-MCNC: 86 MG/DL (ref 65–100)
HCT VFR BLD AUTO: 43 % (ref 36.6–50.3)
HGB BLD-MCNC: 13.6 G/DL (ref 12.1–17)
IMM GRANULOCYTES # BLD AUTO: 0 K/UL (ref 0–0.04)
IMM GRANULOCYTES NFR BLD AUTO: 0 % (ref 0–0.5)
LYMPHOCYTES # BLD: 3.4 K/UL (ref 0.8–3.5)
LYMPHOCYTES NFR BLD: 38 % (ref 12–49)
MCH RBC QN AUTO: 27.1 PG (ref 26–34)
MCHC RBC AUTO-ENTMCNC: 31.6 G/DL (ref 30–36.5)
MCV RBC AUTO: 85.8 FL (ref 80–99)
MONOCYTES # BLD: 0.4 K/UL (ref 0–1)
MONOCYTES NFR BLD: 5 % (ref 5–13)
NEUTS SEG # BLD: 4.9 K/UL (ref 1.8–8)
NEUTS SEG NFR BLD: 55 % (ref 32–75)
NRBC # BLD: 0 K/UL (ref 0–0.01)
NRBC BLD-RTO: 0 PER 100 WBC
PLATELET # BLD AUTO: 145 K/UL (ref 150–400)
PMV BLD AUTO: 10.2 FL (ref 8.9–12.9)
POTASSIUM SERPL-SCNC: 4.1 MMOL/L (ref 3.5–5.1)
PROT SERPL-MCNC: 6.8 G/DL (ref 6.4–8.2)
RBC # BLD AUTO: 5.01 M/UL (ref 4.1–5.7)
SODIUM SERPL-SCNC: 144 MMOL/L (ref 136–145)
TSH SERPL DL<=0.05 MIU/L-ACNC: 2.22 UIU/ML (ref 0.36–3.74)
WBC # BLD AUTO: 8.9 K/UL (ref 4.1–11.1)

## 2023-11-20 RX ORDER — LISINOPRIL 20 MG/1
TABLET ORAL
Qty: 90 TABLET | Refills: 1 | Status: SHIPPED | OUTPATIENT
Start: 2023-11-20

## 2023-11-30 ENCOUNTER — TELEPHONE (OUTPATIENT)
Age: 88
End: 2023-11-30

## 2024-01-02 RX ORDER — DILTIAZEM HYDROCHLORIDE 120 MG/1
120 CAPSULE, EXTENDED RELEASE ORAL DAILY
Qty: 30 CAPSULE | Refills: 1 | Status: SHIPPED | OUTPATIENT
Start: 2024-01-02 | End: 2024-01-05 | Stop reason: SDUPTHER

## 2024-01-02 NOTE — TELEPHONE ENCOUNTER
Per VO of MD    LOV: 12/12/2022     Future Appointments   Date Time Provider Department Center   1/5/2024  1:00 PM Lorrie Krueger MD [facility] AMB       Requested Prescriptions     Pending Prescriptions Disp Refills    dilTIAZem (DILACOR XR) 120 MG extended release capsule [Pharmacy Med Name: DILTIAZEM  MG CAP] 90 capsule 0     Sig: Take 1 capsule by mouth daily Please schedule a follow up appointment in order to receive future medication refills.

## 2024-01-05 ENCOUNTER — HOSPITAL ENCOUNTER (OUTPATIENT)
Facility: HOSPITAL | Age: 89
End: 2024-01-05
Payer: MEDICARE

## 2024-01-05 ENCOUNTER — OFFICE VISIT (OUTPATIENT)
Age: 89
End: 2024-01-05
Payer: MEDICARE

## 2024-01-05 VITALS
HEART RATE: 66 BPM | OXYGEN SATURATION: 99 % | SYSTOLIC BLOOD PRESSURE: 156 MMHG | DIASTOLIC BLOOD PRESSURE: 73 MMHG | BODY MASS INDEX: 31.37 KG/M2 | RESPIRATION RATE: 18 BRPM | TEMPERATURE: 97.8 F | HEIGHT: 66 IN | WEIGHT: 195.2 LBS

## 2024-01-05 DIAGNOSIS — E78.00 HIGH CHOLESTEROL: ICD-10-CM

## 2024-01-05 DIAGNOSIS — Z00.00 MEDICARE ANNUAL WELLNESS VISIT, SUBSEQUENT: Primary | ICD-10-CM

## 2024-01-05 DIAGNOSIS — J40 BRONCHITIS: ICD-10-CM

## 2024-01-05 DIAGNOSIS — I48.0 PAROXYSMAL ATRIAL FIBRILLATION (HCC): ICD-10-CM

## 2024-01-05 DIAGNOSIS — R41.3 MEMORY IMPAIRMENT: ICD-10-CM

## 2024-01-05 DIAGNOSIS — G47.33 OSA (OBSTRUCTIVE SLEEP APNEA): ICD-10-CM

## 2024-01-05 DIAGNOSIS — J98.6 ACQUIRED ELEVATED DIAPHRAGM: ICD-10-CM

## 2024-01-05 DIAGNOSIS — I10 ESSENTIAL (PRIMARY) HYPERTENSION: ICD-10-CM

## 2024-01-05 DIAGNOSIS — R73.03 PRE-DIABETES: ICD-10-CM

## 2024-01-05 PROCEDURE — G8484 FLU IMMUNIZE NO ADMIN: HCPCS | Performed by: INTERNAL MEDICINE

## 2024-01-05 PROCEDURE — 71046 X-RAY EXAM CHEST 2 VIEWS: CPT

## 2024-01-05 PROCEDURE — 1123F ACP DISCUSS/DSCN MKR DOCD: CPT | Performed by: INTERNAL MEDICINE

## 2024-01-05 PROCEDURE — G0439 PPPS, SUBSEQ VISIT: HCPCS | Performed by: INTERNAL MEDICINE

## 2024-01-05 RX ORDER — PREDNISONE 20 MG/1
40 TABLET ORAL DAILY
Qty: 10 TABLET | Refills: 0 | Status: SHIPPED | OUTPATIENT
Start: 2024-01-05 | End: 2024-01-10

## 2024-01-05 SDOH — ECONOMIC STABILITY: FOOD INSECURITY: WITHIN THE PAST 12 MONTHS, THE FOOD YOU BOUGHT JUST DIDN'T LAST AND YOU DIDN'T HAVE MONEY TO GET MORE.: NEVER TRUE

## 2024-01-05 SDOH — ECONOMIC STABILITY: INCOME INSECURITY: HOW HARD IS IT FOR YOU TO PAY FOR THE VERY BASICS LIKE FOOD, HOUSING, MEDICAL CARE, AND HEATING?: NOT HARD AT ALL

## 2024-01-05 SDOH — ECONOMIC STABILITY: FOOD INSECURITY: WITHIN THE PAST 12 MONTHS, YOU WORRIED THAT YOUR FOOD WOULD RUN OUT BEFORE YOU GOT MONEY TO BUY MORE.: NEVER TRUE

## 2024-01-05 SDOH — ECONOMIC STABILITY: HOUSING INSECURITY
IN THE LAST 12 MONTHS, WAS THERE A TIME WHEN YOU DID NOT HAVE A STEADY PLACE TO SLEEP OR SLEPT IN A SHELTER (INCLUDING NOW)?: NO

## 2024-01-05 ASSESSMENT — PATIENT HEALTH QUESTIONNAIRE - PHQ9
SUM OF ALL RESPONSES TO PHQ QUESTIONS 1-9: 0
1. LITTLE INTEREST OR PLEASURE IN DOING THINGS: 0
SUM OF ALL RESPONSES TO PHQ QUESTIONS 1-9: 0
SUM OF ALL RESPONSES TO PHQ9 QUESTIONS 1 & 2: 0
SUM OF ALL RESPONSES TO PHQ QUESTIONS 1-9: 0
SUM OF ALL RESPONSES TO PHQ QUESTIONS 1-9: 0
2. FEELING DOWN, DEPRESSED OR HOPELESS: 0

## 2024-01-05 NOTE — PATIENT INSTRUCTIONS
both UVA and UVB radiation with an SPF of 30 or greater. Reapply every 2 to 3 hours or after sweating, drying off with a towel, or swimming.  Always wear a seat belt when traveling in a car. Always wear a helmet when riding a bicycle or motorcycle.

## 2024-01-05 NOTE — PROGRESS NOTES
Medicare Annual Wellness Visit    Jt Nielsen is here for Medicare AWV    Assessment & Plan   Medicare annual wellness visit, subsequent  -     [facility] Terra Bradshaw MD, Sleep Medicine, [place]  Memory impairment reported by family, passed mini cog screening today. But given complaints and on my eval flat affect , admits to anhedonia I suspect possible depression but patient denies   Would benefit from neuropsych eval   -     [facility] - Berna Israel PsyD, NeuropsychologyK[place]  -     Comprehensive Metabolic Panel; Future  -     CBC with Auto Differential; Future  -     Hemoglobin A1C; Future  Paroxysmal atrial fibrillation (HCC) sinus on eliquis and diltiazem  Essential (primary) hypertension  -     Comprehensive Metabolic Panel; Future  -     CBC with Auto Differential; Future  ANIBAL (obstructive sleep apnea) given referral to sleep medicine non compliant with CPAP   High cholesterol  -     Lipid Panel; Future  Pre-diabetes: low sugar diet recommended  -     Hemoglobin A1C; Future  Bronchitis  -     XR CHEST (2 VIEWS); Future  -     predniSONE (DELTASONE) 20 MG tablet; Take 2 tablets by mouth daily for 5 days, Disp-10 tablet, R-0Normal  Recommendations for Preventive Services Due: see orders and patient instructions/AVS.  Recommended screening schedule for the next 5-10 years is provided to the patient in written form: see Patient Instructions/AVS.     Return in 6 months (on 7/5/2024).     Subjective   Mr. Jt Nielsen   is a 87 y.o. male with a hx of hypertension,  atrial fibrillation, obstructive sleep apnea      Today he feels congestion in his chest   History of asthma  He uses albuterol as needed  Symptoms for one week   Cough is not productive  Denies body aches  No fever    Family is concerned he sleeps a lot during the day and is forgetfull   He has sleep apnea but refuses to use CPAP  Discussed long term issues with not using CPAP    Essential HTN: currently on lisinopril 20mg and

## 2024-03-28 RX ORDER — DILTIAZEM HYDROCHLORIDE 120 MG/1
120 CAPSULE, EXTENDED RELEASE ORAL DAILY
Qty: 30 CAPSULE | Refills: 0 | Status: SHIPPED | OUTPATIENT
Start: 2024-03-28

## 2024-04-13 DIAGNOSIS — I48.0 PAF (PAROXYSMAL ATRIAL FIBRILLATION) (HCC): Primary | ICD-10-CM

## 2024-04-15 RX ORDER — APIXABAN 2.5 MG/1
2.5 TABLET, FILM COATED ORAL 2 TIMES DAILY
Qty: 180 TABLET | Refills: 3 | Status: SHIPPED | OUTPATIENT
Start: 2024-04-15

## 2024-04-25 ENCOUNTER — TELEPHONE (OUTPATIENT)
Age: 89
End: 2024-04-25

## 2024-04-25 ENCOUNTER — OFFICE VISIT (OUTPATIENT)
Age: 89
End: 2024-04-25
Payer: MEDICARE

## 2024-04-25 VITALS
DIASTOLIC BLOOD PRESSURE: 68 MMHG | SYSTOLIC BLOOD PRESSURE: 142 MMHG | HEART RATE: 56 BPM | WEIGHT: 194 LBS | HEIGHT: 69 IN | BODY MASS INDEX: 28.73 KG/M2

## 2024-04-25 DIAGNOSIS — I48.0 PAF (PAROXYSMAL ATRIAL FIBRILLATION) (HCC): Primary | ICD-10-CM

## 2024-04-25 DIAGNOSIS — G47.33 OSA (OBSTRUCTIVE SLEEP APNEA): ICD-10-CM

## 2024-04-25 DIAGNOSIS — I10 ESSENTIAL (PRIMARY) HYPERTENSION: ICD-10-CM

## 2024-04-25 DIAGNOSIS — I35.8 AORTIC VALVE SCLEROSIS: ICD-10-CM

## 2024-04-25 DIAGNOSIS — I51.7 LVH (LEFT VENTRICULAR HYPERTROPHY): ICD-10-CM

## 2024-04-25 PROCEDURE — 93010 ELECTROCARDIOGRAM REPORT: CPT | Performed by: NURSE PRACTITIONER

## 2024-04-25 PROCEDURE — 93005 ELECTROCARDIOGRAM TRACING: CPT | Performed by: NURSE PRACTITIONER

## 2024-04-25 PROCEDURE — 1036F TOBACCO NON-USER: CPT | Performed by: NURSE PRACTITIONER

## 2024-04-25 PROCEDURE — G8427 DOCREV CUR MEDS BY ELIG CLIN: HCPCS | Performed by: NURSE PRACTITIONER

## 2024-04-25 PROCEDURE — G8417 CALC BMI ABV UP PARAM F/U: HCPCS | Performed by: NURSE PRACTITIONER

## 2024-04-25 PROCEDURE — 1123F ACP DISCUSS/DSCN MKR DOCD: CPT | Performed by: NURSE PRACTITIONER

## 2024-04-25 PROCEDURE — 99214 OFFICE O/P EST MOD 30 MIN: CPT | Performed by: NURSE PRACTITIONER

## 2024-04-25 NOTE — PROGRESS NOTES
Jt Nielsen     1935         \  PCP is Lorrie Krueger MD   Cardiologist: Kendra Orellana MD, [facility]  HPI:  Jt Nielsen is a 88 y.o. male     PAF with HTN/HCVD with LVH  Echo 10/14/2021 EF 60 to 65% mild LVH mild AI mild TR PASP 39  On Eliquis for OAC    EKG today showed sinus bradycardia VR 56 bpm      Today...  Generally well no cardiac symptoms  Denies chest pain, edema, shortness of breath    Has no palpitations or dizziness  Now taking eliquis 2.5mg tablet but 1/2 tablet twice daily   Discussed how this was not the correct dose  Hx of ANIBAL diagnosed 6-7 year ago but not wearing CPAP, has not tried any alternative therapies, willing to see sleep medicine to address it again     Assessment/Plan:       1. PAF (paroxysmal atrial fibrillation) (HCC)  No clinical events. Continue Diltiazem CD  First episode of Afib in [place]  Echo showed normal EF, some LVH 12/23  Has follow up echo scheduled for later this year with follow up with Dr. Orellana  Discussed OAC dosing with Dr. Orellana, will rx Eliquis 5mg tablets but advised him to take Eliquis 2.5mg BID to help with cost     2. Essential hypertension  White coat HTN  LVH on echo, will reassess with repeat echo later this year  BP well controlled at home, discussed SBP < 140mmHg  Possible side effects of meds reviewed and patient denies    Key CAD CHF Meds               lisinopriL (PRINIVIL, ZESTRIL) 20 mg tablet (Taking) Take 1 Tablet by mouth daily.    dilTIAZem ER (DILT-XR) 120 mg capsule (Taking) Take 1 Capsule by mouth daily.    apixaban (Eliquis) 2.5 mg tablet (Taking) Take 1 Tablet by mouth every twelve (12) hours.           3. Pure hypercholesterolemia  Advised to work on diet and exercise   LDL down to 109, lipids followed by PCP  No excess muscle aches or new liver issues      4. ANIBAL: stopped CPAP years ago because he couldn't tolerate it, will refer to sleep medicine to address treatment options again

## 2024-04-25 NOTE — TELEPHONE ENCOUNTER
Phoned the  patient to schedule a sleep consult per Meghann Drummond APRN - NP for PAF.  No answer, and no voice mail.

## 2024-05-06 ENCOUNTER — TELEPHONE (OUTPATIENT)
Age: 89
End: 2024-05-06

## 2024-05-14 ENCOUNTER — TELEPHONE (OUTPATIENT)
Age: 89
End: 2024-05-14

## 2024-05-14 ENCOUNTER — HOSPITAL ENCOUNTER (EMERGENCY)
Facility: HOSPITAL | Age: 89
Discharge: HOME | End: 2024-05-15
Attending: EMERGENCY MEDICINE
Payer: MEDICARE

## 2024-05-14 VITALS
RESPIRATION RATE: 19 BRPM | TEMPERATURE: 99 F | WEIGHT: 190.7 LBS | HEIGHT: 69 IN | DIASTOLIC BLOOD PRESSURE: 79 MMHG | BODY MASS INDEX: 28.24 KG/M2 | SYSTOLIC BLOOD PRESSURE: 184 MMHG | HEART RATE: 59 BPM | OXYGEN SATURATION: 94 %

## 2024-05-14 DIAGNOSIS — R33.8 ACUTE URINARY RETENTION: Primary | ICD-10-CM

## 2024-05-14 LAB
ALBUMIN SERPL-MCNC: 3.7 G/DL (ref 3.5–5)
ALBUMIN/GLOB SERPL: 1.1 (ref 1.1–2.2)
ALP SERPL-CCNC: 72 U/L (ref 45–117)
ALT SERPL-CCNC: 21 U/L (ref 12–78)
ANION GAP SERPL CALC-SCNC: 8 MMOL/L (ref 5–15)
APPEARANCE UR: CLEAR
AST SERPL-CCNC: 16 U/L (ref 15–37)
BACTERIA URNS QL MICRO: NEGATIVE /HPF
BASOPHILS # BLD: 0 K/UL (ref 0–0.1)
BASOPHILS NFR BLD: 0 % (ref 0–1)
BILIRUB SERPL-MCNC: 0.7 MG/DL (ref 0.2–1)
BILIRUB UR QL: NEGATIVE
BUN SERPL-MCNC: 18 MG/DL (ref 6–20)
BUN/CREAT SERPL: 14 (ref 12–20)
CALCIUM SERPL-MCNC: 9.1 MG/DL (ref 8.5–10.1)
CHLORIDE SERPL-SCNC: 107 MMOL/L (ref 97–108)
CO2 SERPL-SCNC: 26 MMOL/L (ref 21–32)
COLOR UR: ABNORMAL
COMMENT:: NORMAL
CREAT SERPL-MCNC: 1.33 MG/DL (ref 0.7–1.3)
DIFFERENTIAL METHOD BLD: ABNORMAL
EOSINOPHIL # BLD: 0.3 K/UL (ref 0–0.4)
EOSINOPHIL NFR BLD: 3 % (ref 0–7)
EPITH CASTS URNS QL MICRO: ABNORMAL /LPF
ERYTHROCYTE [DISTWIDTH] IN BLOOD BY AUTOMATED COUNT: 13.9 % (ref 11.5–14.5)
GLOBULIN SER CALC-MCNC: 3.5 G/DL (ref 2–4)
GLUCOSE SERPL-MCNC: 107 MG/DL (ref 65–100)
GLUCOSE UR STRIP.AUTO-MCNC: NEGATIVE MG/DL
HCT VFR BLD AUTO: 41.8 % (ref 36.6–50.3)
HGB BLD-MCNC: 13.7 G/DL (ref 12.1–17)
HGB UR QL STRIP: ABNORMAL
IMM GRANULOCYTES # BLD AUTO: 0 K/UL (ref 0–0.04)
IMM GRANULOCYTES NFR BLD AUTO: 0 % (ref 0–0.5)
KETONES UR QL STRIP.AUTO: NEGATIVE MG/DL
LEUKOCYTE ESTERASE UR QL STRIP.AUTO: NEGATIVE
LYMPHOCYTES # BLD: 3.5 K/UL (ref 0.8–3.5)
LYMPHOCYTES NFR BLD: 32 % (ref 12–49)
MCH RBC QN AUTO: 27.7 PG (ref 26–34)
MCHC RBC AUTO-ENTMCNC: 32.8 G/DL (ref 30–36.5)
MCV RBC AUTO: 84.4 FL (ref 80–99)
MONOCYTES # BLD: 1.4 K/UL (ref 0–1)
MONOCYTES NFR BLD: 13 % (ref 5–13)
NEUTS SEG # BLD: 5.7 K/UL (ref 1.8–8)
NEUTS SEG NFR BLD: 52 % (ref 32–75)
NITRITE UR QL STRIP.AUTO: NEGATIVE
NRBC # BLD: 0 K/UL (ref 0–0.01)
NRBC BLD-RTO: 0 PER 100 WBC
PH UR STRIP: 5.5 (ref 5–8)
PLATELET # BLD AUTO: 144 K/UL (ref 150–400)
PMV BLD AUTO: 9.5 FL (ref 8.9–12.9)
POTASSIUM SERPL-SCNC: 3.7 MMOL/L (ref 3.5–5.1)
PROT SERPL-MCNC: 7.2 G/DL (ref 6.4–8.2)
PROT UR STRIP-MCNC: 100 MG/DL
RBC # BLD AUTO: 4.95 M/UL (ref 4.1–5.7)
RBC #/AREA URNS HPF: ABNORMAL /HPF (ref 0–5)
RBC MORPH BLD: ABNORMAL
SODIUM SERPL-SCNC: 141 MMOL/L (ref 136–145)
SP GR UR REFRACTOMETRY: 1.02
SPECIMEN HOLD: NORMAL
URINE CULTURE IF INDICATED: ABNORMAL
UROBILINOGEN UR QL STRIP.AUTO: 0.2 EU/DL (ref 0.2–1)
WBC # BLD AUTO: 10.9 K/UL (ref 4.1–11.1)
WBC URNS QL MICRO: ABNORMAL /HPF (ref 0–4)

## 2024-05-14 PROCEDURE — 51702 INSERT TEMP BLADDER CATH: CPT

## 2024-05-14 PROCEDURE — 81001 URINALYSIS AUTO W/SCOPE: CPT

## 2024-05-14 PROCEDURE — 80053 COMPREHEN METABOLIC PANEL: CPT

## 2024-05-14 PROCEDURE — 99284 EMERGENCY DEPT VISIT MOD MDM: CPT

## 2024-05-14 PROCEDURE — 85025 COMPLETE CBC W/AUTO DIFF WBC: CPT

## 2024-05-14 PROCEDURE — 36415 COLL VENOUS BLD VENIPUNCTURE: CPT

## 2024-05-14 RX ORDER — TAMSULOSIN HYDROCHLORIDE 0.4 MG/1
CAPSULE ORAL
Qty: 30 CAPSULE | Refills: 0 | Status: SHIPPED | OUTPATIENT
Start: 2024-05-14

## 2024-05-14 ASSESSMENT — LIFESTYLE VARIABLES
HOW MANY STANDARD DRINKS CONTAINING ALCOHOL DO YOU HAVE ON A TYPICAL DAY: PATIENT DOES NOT DRINK
HOW OFTEN DO YOU HAVE A DRINK CONTAINING ALCOHOL: NEVER

## 2024-05-14 NOTE — TELEPHONE ENCOUNTER
Spoke to Keli and informed her that the appointment on 5/16 is the soonest appointment that the office has with any provider. Patient states will come in on Thursday.

## 2024-05-14 NOTE — TELEPHONE ENCOUNTER
Keli states that pt has stomach problems and diarrhea along with memory problems    She would like pt seen by Dr. Hinds.    Keli is asking for April to call right away

## 2024-05-14 NOTE — TELEPHONE ENCOUNTER
Pt does not want to wait until Thursday.  Pt has to be seen sooner Keli carvajal.     Pt wants to be seen on Wed, 5-15-24    Pt will see anyone but there are no appts.   Please call Keli back.

## 2024-05-15 LAB
EKG ATRIAL RATE: 73 BPM
EKG DIAGNOSIS: NORMAL
EKG P AXIS: 97 DEGREES
EKG P-R INTERVAL: 198 MS
EKG Q-T INTERVAL: 396 MS
EKG QRS DURATION: 98 MS
EKG QTC CALCULATION (BAZETT): 436 MS
EKG R AXIS: 90 DEGREES
EKG T AXIS: 62 DEGREES
EKG VENTRICULAR RATE: 73 BPM

## 2024-05-15 NOTE — ED NOTES
0005: Patient discharged by Bren MCNEAL / - pt sent to the front lobby,  no acute distress noted at time of discharge - Discharge information / home RX / and reasons to return to the ED were reviewed by the ED provider.

## 2024-05-15 NOTE — DISCHARGE INSTRUCTIONS
You were evaluated in the emergency department for urinary retention requiring a Truong catheter.  Your examination was reassuring as was your work-up including urinalysis and lab work.  It will be important for you to follow-up with your primary care physician as well as  in 2-3 days.  If you develop worsening symptoms such as worsening urinary retention, problem with the Truong catheter, fevers, abdominal pain, or vomiting, please return to the emergency department immediately.

## 2024-05-15 NOTE — ED PROVIDER NOTES
[facility] EMERGENCY DEPT  EMERGENCY DEPARTMENT ENCOUNTER       Pt Name: Jt Nielsen  MRN: [id number]  Birthdate 1935  Date of evaluation: 5/14/2024  Provider: Jarad Correia MD   PCP: Lorrie Krueger MD  Note Started: 1:52 PM 5/15/24     CHIEF COMPLAINT       Chief Complaint   Patient presents with    Urinary Frequency     Pt ambulatory into triage with complaints of urinary frequency x3 days    Diarrhea     Pt reports diarrhea for a week. Denies abdominal pain         HISTORY OF PRESENT ILLNESS: 1 or more elements      History From: Patient, History limited by: No limitations     Jt Nielsen is a 88 y.o. male who presents with chief complaint of urinary frequency, inability to fully empty bladder.  Additionally complains of diarrhea over the past 1 week.  Patient last able to empty his bladder a few days ago, since then has had constant dribbling with urinary frequency.  Denies any significant suprapubic pain, fevers, chills, nausea, vomiting, flank pain.  He follows with [facility], no longer taking tamsulosin.     Nursing Notes were all reviewed and agreed with or any disagreements were addressed in the HPI.     REVIEW OF SYSTEMS        Positives and Pertinent negatives as per HPI.    PAST HISTORY     Past Medical History:  Past Medical History:   Diagnosis Date    Appendicitis     2018 underwent appendectomy    Arthritis     DJD    Asthma     As a child     Enlarged prostate     Hyperlipidemia     Hypertension     ANIBAL treated with BiPAP     PAF (paroxysmal atrial fibrillation) (HCC)     Pre-diabetes     HA1C 6.0 Dec 2016    Shingles        Past Surgical History:  Past Surgical History:   Procedure Laterality Date    APPENDECTOMY  2018    COLONOSCOPY  2009    normal except for hemorrhoids    HEENT      wisdom teeth extraction x2    HEENT  1950s    Cyst removed from under tongue     SHOULDER ARTHROSCOPY Right     rt    TONSILLECTOMY      TOTAL KNEE ARTHROPLASTY      right; Jan. 2017    UROLOGICAL

## 2024-06-04 ENCOUNTER — NURSE ONLY (OUTPATIENT)
Age: 89
End: 2024-06-04

## 2024-06-04 ENCOUNTER — TELEPHONE (OUTPATIENT)
Age: 89
End: 2024-06-04

## 2024-06-04 DIAGNOSIS — R31.9 HEMATURIA, UNSPECIFIED TYPE: ICD-10-CM

## 2024-06-04 DIAGNOSIS — R31.9 HEMATURIA, UNSPECIFIED TYPE: Primary | ICD-10-CM

## 2024-06-04 DIAGNOSIS — I10 ESSENTIAL (PRIMARY) HYPERTENSION: ICD-10-CM

## 2024-06-04 DIAGNOSIS — R73.03 PRE-DIABETES: ICD-10-CM

## 2024-06-04 DIAGNOSIS — E78.00 HIGH CHOLESTEROL: ICD-10-CM

## 2024-06-04 DIAGNOSIS — R41.3 MEMORY IMPAIRMENT: ICD-10-CM

## 2024-06-04 NOTE — TELEPHONE ENCOUNTER
Patient has been having bleeding for 3 weeks in his urine. Dr. Hinds spoke to patients wife and informed her to stop his Eliquis. He can start it once the bleeding stops.

## 2024-06-05 ENCOUNTER — TELEPHONE (OUTPATIENT)
Age: 89
End: 2024-06-05

## 2024-06-05 DIAGNOSIS — N17.9 ACUTE KIDNEY INJURY (NONTRAUMATIC) (HCC): Primary | ICD-10-CM

## 2024-06-05 DIAGNOSIS — N17.9 ACUTE KIDNEY INJURY (HCC): Primary | ICD-10-CM

## 2024-06-05 LAB
ALBUMIN SERPL-MCNC: 3.1 G/DL (ref 3.5–5)
ALBUMIN/GLOB SERPL: 1 (ref 1.1–2.2)
ALP SERPL-CCNC: 69 U/L (ref 45–117)
ALT SERPL-CCNC: 20 U/L (ref 12–78)
ANION GAP SERPL CALC-SCNC: 6 MMOL/L (ref 5–15)
APPEARANCE UR: ABNORMAL
AST SERPL-CCNC: 18 U/L (ref 15–37)
BACTERIA URNS QL MICRO: NEGATIVE /HPF
BASOPHILS # BLD: 0 K/UL (ref 0–0.1)
BASOPHILS NFR BLD: 0 % (ref 0–1)
BILIRUB SERPL-MCNC: 0.4 MG/DL (ref 0.2–1)
BILIRUB UR QL: NEGATIVE
BUN SERPL-MCNC: 25 MG/DL (ref 6–20)
BUN/CREAT SERPL: 18 (ref 12–20)
CALCIUM SERPL-MCNC: 8.4 MG/DL (ref 8.5–10.1)
CHLORIDE SERPL-SCNC: 106 MMOL/L (ref 97–108)
CHOLEST SERPL-MCNC: 146 MG/DL
CO2 SERPL-SCNC: 27 MMOL/L (ref 21–32)
COLOR UR: ABNORMAL
CREAT SERPL-MCNC: 1.41 MG/DL (ref 0.7–1.3)
DIFFERENTIAL METHOD BLD: ABNORMAL
EOSINOPHIL # BLD: 0 K/UL (ref 0–0.4)
EOSINOPHIL NFR BLD: 0 % (ref 0–7)
EPITH CASTS URNS QL MICRO: ABNORMAL /LPF
ERYTHROCYTE [DISTWIDTH] IN BLOOD BY AUTOMATED COUNT: 13.6 % (ref 11.5–14.5)
EST. AVERAGE GLUCOSE BLD GHB EST-MCNC: 117 MG/DL
GLOBULIN SER CALC-MCNC: 3.2 G/DL (ref 2–4)
GLUCOSE SERPL-MCNC: 130 MG/DL (ref 65–100)
GLUCOSE UR STRIP.AUTO-MCNC: NEGATIVE MG/DL
HBA1C MFR BLD: 5.7 % (ref 4–5.6)
HCT VFR BLD AUTO: 40.1 % (ref 36.6–50.3)
HDLC SERPL-MCNC: 35 MG/DL
HDLC SERPL: 4.2 (ref 0–5)
HGB BLD-MCNC: 12.4 G/DL (ref 12.1–17)
HGB UR QL STRIP: ABNORMAL
HYALINE CASTS URNS QL MICRO: ABNORMAL /LPF (ref 0–5)
IMM GRANULOCYTES # BLD AUTO: 0 K/UL
IMM GRANULOCYTES NFR BLD AUTO: 0 %
KETONES UR QL STRIP.AUTO: NEGATIVE MG/DL
LDLC SERPL CALC-MCNC: 91 MG/DL (ref 0–100)
LEUKOCYTE ESTERASE UR QL STRIP.AUTO: ABNORMAL
LYMPHOCYTES # BLD: 4.4 K/UL (ref 0.8–3.5)
LYMPHOCYTES NFR BLD: 52 % (ref 12–49)
MCH RBC QN AUTO: 27.1 PG (ref 26–34)
MCHC RBC AUTO-ENTMCNC: 30.9 G/DL (ref 30–36.5)
MCV RBC AUTO: 87.6 FL (ref 80–99)
MONOCYTES # BLD: 0.3 K/UL (ref 0–1)
MONOCYTES NFR BLD: 3 % (ref 5–13)
NEUTS BAND NFR BLD MANUAL: 4 % (ref 0–6)
NEUTS SEG # BLD: 3.9 K/UL (ref 1.8–8)
NEUTS SEG NFR BLD: 41 % (ref 32–75)
NITRITE UR QL STRIP.AUTO: NEGATIVE
NRBC # BLD: 0 K/UL (ref 0–0.01)
NRBC BLD-RTO: 0 PER 100 WBC
PH UR STRIP: 5.5 (ref 5–8)
PLATELET # BLD AUTO: 140 K/UL (ref 150–400)
PMV BLD AUTO: 10.5 FL (ref 8.9–12.9)
POTASSIUM SERPL-SCNC: 3.7 MMOL/L (ref 3.5–5.1)
PROT SERPL-MCNC: 6.3 G/DL (ref 6.4–8.2)
PROT UR STRIP-MCNC: 100 MG/DL
RBC # BLD AUTO: 4.58 M/UL (ref 4.1–5.7)
RBC #/AREA URNS HPF: >100 /HPF (ref 0–5)
RBC MORPH BLD: ABNORMAL
SODIUM SERPL-SCNC: 139 MMOL/L (ref 136–145)
SP GR UR REFRACTOMETRY: 1.01 (ref 1–1.03)
TRIGL SERPL-MCNC: 100 MG/DL
URINE CULTURE IF INDICATED: ABNORMAL
UROBILINOGEN UR QL STRIP.AUTO: 0.2 EU/DL (ref 0.2–1)
VLDLC SERPL CALC-MCNC: 20 MG/DL
WBC # BLD AUTO: 8.6 K/UL (ref 4.1–11.1)
WBC MORPH BLD: ABNORMAL
WBC URNS QL MICRO: ABNORMAL /HPF (ref 0–4)

## 2024-06-05 NOTE — TELEPHONE ENCOUNTER
Called and spoke to Andrea's wife . She is aware of the results . Andrea is not on a antibiotic at this time. He will go get the US done.

## 2024-06-06 ENCOUNTER — HOSPITAL ENCOUNTER (OUTPATIENT)
Facility: HOSPITAL | Age: 89
Discharge: HOME | End: 2024-06-06
Attending: INTERNAL MEDICINE
Payer: MEDICARE

## 2024-06-06 DIAGNOSIS — N17.9 ACUTE KIDNEY INJURY (NONTRAUMATIC) (HCC): ICD-10-CM

## 2024-06-06 LAB
BACTERIA SPEC CULT: NORMAL
SERVICE CMNT-IMP: NORMAL

## 2024-06-06 PROCEDURE — 76770 US EXAM ABDO BACK WALL COMP: CPT

## 2024-06-07 ENCOUNTER — APPOINTMENT (OUTPATIENT)
Facility: HOSPITAL | Age: 89
End: 2024-06-07
Payer: MEDICARE

## 2024-06-07 ENCOUNTER — TELEPHONE (OUTPATIENT)
Age: 89
End: 2024-06-07

## 2024-06-07 ENCOUNTER — HOSPITAL ENCOUNTER (EMERGENCY)
Facility: HOSPITAL | Age: 89
Discharge: HOME | End: 2024-06-07
Payer: MEDICARE

## 2024-06-07 VITALS
HEART RATE: 88 BPM | TEMPERATURE: 98 F | RESPIRATION RATE: 19 BRPM | DIASTOLIC BLOOD PRESSURE: 80 MMHG | SYSTOLIC BLOOD PRESSURE: 173 MMHG | OXYGEN SATURATION: 97 % | WEIGHT: 188 LBS | BODY MASS INDEX: 27.76 KG/M2

## 2024-06-07 DIAGNOSIS — R03.0 ELEVATED BLOOD PRESSURE READING: ICD-10-CM

## 2024-06-07 DIAGNOSIS — R33.9 URINARY RETENTION: ICD-10-CM

## 2024-06-07 DIAGNOSIS — N39.0 ACUTE UTI: Primary | ICD-10-CM

## 2024-06-07 DIAGNOSIS — N30.01 ACUTE CYSTITIS WITH HEMATURIA: Primary | ICD-10-CM

## 2024-06-07 LAB
ALBUMIN SERPL-MCNC: 3.3 G/DL (ref 3.5–5)
ALBUMIN/GLOB SERPL: 0.8 (ref 1.1–2.2)
ALP SERPL-CCNC: 75 U/L (ref 45–117)
ALT SERPL-CCNC: 21 U/L (ref 12–78)
ANION GAP SERPL CALC-SCNC: 4 MMOL/L (ref 5–15)
APPEARANCE UR: CLEAR
AST SERPL-CCNC: 14 U/L (ref 15–37)
BACTERIA URNS QL MICRO: ABNORMAL /HPF
BASOPHILS # BLD: 0 K/UL (ref 0–0.1)
BASOPHILS NFR BLD: 0 % (ref 0–1)
BILIRUB SERPL-MCNC: 0.8 MG/DL (ref 0.2–1)
BILIRUB UR QL: NEGATIVE
BUN SERPL-MCNC: 15 MG/DL (ref 6–20)
BUN/CREAT SERPL: 10 (ref 12–20)
CALCIUM SERPL-MCNC: 9 MG/DL (ref 8.5–10.1)
CHLORIDE SERPL-SCNC: 106 MMOL/L (ref 97–108)
CO2 SERPL-SCNC: 25 MMOL/L (ref 21–32)
COLOR UR: ABNORMAL
CREAT SERPL-MCNC: 1.44 MG/DL (ref 0.7–1.3)
DIFFERENTIAL METHOD BLD: ABNORMAL
EOSINOPHIL # BLD: 0 K/UL (ref 0–0.4)
EOSINOPHIL NFR BLD: 0 % (ref 0–7)
EPITH CASTS URNS QL MICRO: ABNORMAL /LPF
ERYTHROCYTE [DISTWIDTH] IN BLOOD BY AUTOMATED COUNT: 13.2 % (ref 11.5–14.5)
GLOBULIN SER CALC-MCNC: 4.1 G/DL (ref 2–4)
GLUCOSE SERPL-MCNC: 124 MG/DL (ref 65–100)
GLUCOSE UR STRIP.AUTO-MCNC: NEGATIVE MG/DL
HCT VFR BLD AUTO: 40.4 % (ref 36.6–50.3)
HGB BLD-MCNC: 13.2 G/DL (ref 12.1–17)
HGB UR QL STRIP: ABNORMAL
HYALINE CASTS URNS QL MICRO: ABNORMAL /LPF (ref 0–2)
IMM GRANULOCYTES # BLD AUTO: 0.1 K/UL (ref 0–0.04)
IMM GRANULOCYTES NFR BLD AUTO: 0 % (ref 0–0.5)
KETONES UR QL STRIP.AUTO: NEGATIVE MG/DL
LEUKOCYTE ESTERASE UR QL STRIP.AUTO: ABNORMAL
LYMPHOCYTES # BLD: 2.3 K/UL (ref 0.8–3.5)
LYMPHOCYTES NFR BLD: 17 % (ref 12–49)
MCH RBC QN AUTO: 27.1 PG (ref 26–34)
MCHC RBC AUTO-ENTMCNC: 32.7 G/DL (ref 30–36.5)
MCV RBC AUTO: 83 FL (ref 80–99)
MONOCYTES # BLD: 0.4 K/UL (ref 0–1)
MONOCYTES NFR BLD: 3 % (ref 5–13)
NEUTS SEG # BLD: 10.9 K/UL (ref 1.8–8)
NEUTS SEG NFR BLD: 80 % (ref 32–75)
NITRITE UR QL STRIP.AUTO: NEGATIVE
NRBC # BLD: 0 K/UL (ref 0–0.01)
NRBC BLD-RTO: 0 PER 100 WBC
PH UR STRIP: 6.5 (ref 5–8)
PLATELET # BLD AUTO: 188 K/UL (ref 150–400)
PMV BLD AUTO: 9.3 FL (ref 8.9–12.9)
POTASSIUM SERPL-SCNC: 4 MMOL/L (ref 3.5–5.1)
PROT SERPL-MCNC: 7.4 G/DL (ref 6.4–8.2)
PROT UR STRIP-MCNC: 300 MG/DL
RBC # BLD AUTO: 4.87 M/UL (ref 4.1–5.7)
RBC #/AREA URNS HPF: ABNORMAL /HPF (ref 0–5)
SODIUM SERPL-SCNC: 135 MMOL/L (ref 136–145)
SP GR UR REFRACTOMETRY: 1.02
UROBILINOGEN UR QL STRIP.AUTO: 0.2 EU/DL (ref 0.2–1)
WBC # BLD AUTO: 13.7 K/UL (ref 4.1–11.1)
WBC URNS QL MICRO: ABNORMAL /HPF (ref 0–4)

## 2024-06-07 PROCEDURE — 80053 COMPREHEN METABOLIC PANEL: CPT

## 2024-06-07 PROCEDURE — 85025 COMPLETE CBC W/AUTO DIFF WBC: CPT

## 2024-06-07 PROCEDURE — 70450 CT HEAD/BRAIN W/O DYE: CPT

## 2024-06-07 PROCEDURE — 87088 URINE BACTERIA CULTURE: CPT

## 2024-06-07 PROCEDURE — 2580000003 HC RX 258: Performed by: PHYSICIAN ASSISTANT

## 2024-06-07 PROCEDURE — 93005 ELECTROCARDIOGRAM TRACING: CPT | Performed by: STUDENT IN AN ORGANIZED HEALTH CARE EDUCATION/TRAINING PROGRAM

## 2024-06-07 PROCEDURE — 6370000000 HC RX 637 (ALT 250 FOR IP): Performed by: PHYSICIAN ASSISTANT

## 2024-06-07 PROCEDURE — 51798 US URINE CAPACITY MEASURE: CPT

## 2024-06-07 PROCEDURE — 36415 COLL VENOUS BLD VENIPUNCTURE: CPT

## 2024-06-07 PROCEDURE — 6360000002 HC RX W HCPCS: Performed by: PHYSICIAN ASSISTANT

## 2024-06-07 PROCEDURE — 99284 EMERGENCY DEPT VISIT MOD MDM: CPT

## 2024-06-07 PROCEDURE — 87186 SC STD MICRODIL/AGAR DIL: CPT

## 2024-06-07 PROCEDURE — 96365 THER/PROPH/DIAG IV INF INIT: CPT

## 2024-06-07 PROCEDURE — 51702 INSERT TEMP BLADDER CATH: CPT

## 2024-06-07 PROCEDURE — 81001 URINALYSIS AUTO W/SCOPE: CPT

## 2024-06-07 PROCEDURE — 87086 URINE CULTURE/COLONY COUNT: CPT

## 2024-06-07 RX ORDER — CIPROFLOXACIN 500 MG/1
500 TABLET, FILM COATED ORAL 2 TIMES DAILY
Qty: 14 TABLET | Refills: 0 | Status: SHIPPED | OUTPATIENT
Start: 2024-06-07 | End: 2024-06-13 | Stop reason: ALTCHOICE

## 2024-06-07 RX ORDER — LIDOCAINE HYDROCHLORIDE 20 MG/ML
JELLY TOPICAL PRN
Status: DISCONTINUED | OUTPATIENT
Start: 2024-06-07 | End: 2024-06-07 | Stop reason: HOSPADM

## 2024-06-07 RX ORDER — CEFDINIR 300 MG/1
300 CAPSULE ORAL 2 TIMES DAILY
Qty: 20 CAPSULE | Refills: 0 | Status: SHIPPED | OUTPATIENT
Start: 2024-06-07 | End: 2024-06-13 | Stop reason: ALTCHOICE

## 2024-06-07 RX ADMIN — LIDOCAINE HYDROCHLORIDE: 20 JELLY TOPICAL at 20:14

## 2024-06-07 RX ADMIN — SODIUM CHLORIDE 1000 MG: 900 INJECTION INTRAVENOUS at 20:31

## 2024-06-07 ASSESSMENT — PAIN - FUNCTIONAL ASSESSMENT: PAIN_FUNCTIONAL_ASSESSMENT: NONE - DENIES PAIN

## 2024-06-07 NOTE — TELEPHONE ENCOUNTER
Caller is returning a call from clinical team regarding encounter    Clinical team not available to take the call    Please call pt back.[phone]

## 2024-06-07 NOTE — TELEPHONE ENCOUNTER
Culture did not grow bacteria but since he is having low grade temps start cipro for possible prostatitis  And follow up with dr Steele  Please give me update after visit with Dr Steele

## 2024-06-07 NOTE — ED PROVIDER NOTES
[facility] EMERGENCY DEPT  EMERGENCY DEPARTMENT ENCOUNTER       Pt Name: Jt Nielsen  MRN: [id number]  Birthdate 1935  Date of evaluation: 6/7/2024  Provider: ALEX Simpson   PCP: Lorrie Krueger MD  Note Started: 5:44 PM EDT 6/7/24     CHIEF COMPLAINT       Chief Complaint   Patient presents with    Altered Mental Status     Patient wheeled into triage with wife complaining of altered mental status. Patient wife reports that patient has had a urinary tract infection a month ago and has had a rendon for 3 weeks. Patient wife reports that patient has been harder to wake up, reports \"he's acting strange.\"         HISTORY OF PRESENT ILLNESS: 1 or more elements      History From: Patient and Patient's Wife       Jt Nielsen is a 88 y.o. male with PMHx HTN, paroxysmal A-fib on Eliquis, and additional history as noted below, who presents to the ED for evaluation  accompanied by wife who states he is \"not acting right\" and is concerned for possible UTI.  States he had a Rendon catheter placed on prior ED visit that was removed by Dr. Steele, his urologist.  He was then seen outpatient by his primary care provider yesterday who did an ultrasound and called back and states \"his kidneys looked good\".  Patient states he has been having some dysuria, denies hematuria. Denies fevers, dizziness, HA, changes in vision, CP, SOB, abdominal pain, blood in urine or stool. Patients wife states he is otherwise acting his normal self.      Nursing Notes were all reviewed and agreed with or any disagreements were addressed in the HPI.     REVIEW OF SYSTEMS      Review of Systems   All other systems reviewed and are negative.       Positives and Pertinent negatives as per HPI.    PAST HISTORY     Past Medical History:  Past Medical History:   Diagnosis Date    Appendicitis     2018 underwent appendectomy    Arthritis     DJD    Asthma     As a child     Enlarged prostate     Hyperlipidemia     Hypertension     ANIBAL treated with    Medication List        START taking these medications      cefdinir 300 MG capsule  Commonly known as: OMNICEF  Take 1 capsule by mouth 2 times daily for 10 days            ASK your doctor about these medications      albuterol sulfate  (90 Base) MCG/ACT inhaler  Commonly known as: PROVENTIL;VENTOLIN;PROAIR     apixaban 5 MG Tabs tablet  Commonly known as: Eliquis  Take 1 tablet by mouth 2 times daily     ciprofloxacin 500 MG tablet  Commonly known as: CIPRO  Take 1 tablet by mouth 2 times daily for 7 days     dilTIAZem 120 MG extended release capsule  Commonly known as: DILACOR XR  Take 1 capsule by mouth daily Please call to schedule follow up prior to further refills     lisinopril 20 MG tablet  Commonly known as: PRINIVIL;ZESTRIL  TAKE ONE TABLET BY MOUTH ONE TIME DAILY     Simbrinza 1-0.2 % Susp  Generic drug: brinzolamide-brimonidine     tamsulosin 0.4 MG capsule  Commonly known as: FLOMAX  TAKE ONE CAPSULE BY MOUTH EVERYNIGHT               Where to Get Your Medications        These medications were sent to [id number] [address] [place], [place] - [facility] -  [phone] - F [phone]  [facility] [address]      Phone: [phone]   cefdinir 300 MG capsule           DISCONTINUED MEDICATIONS:  Discharge Medication List as of 6/7/2024  8:32 PM        Case discussed with attending physician who agrees with evaluation and plan of care.       I am the Primary Clinician of Record.   ALEX Simpson (electronically signed)    (Please note that parts of this dictation were completed with voice recognition software. Quite often unanticipated grammatical, syntax, homophones, and other interpretive errors are inadvertently transcribed by the computer software. Please disregards these errors. Please excuse any errors that have escaped final proofreading.)         Yane Xiong PA  06/09/24 [id number]

## 2024-06-07 NOTE — ED NOTES
Bedside report given to JENNIFER Cordova at this time. Pt resting in bed on monitor x2 with call bell in reach, wife at bedside. Per Yane JOHNSON, have pt urinate again and repeat bladder scan. UA sent, Tasha informed of this plan.

## 2024-06-07 NOTE — TELEPHONE ENCOUNTER
Patient's Wife, Keli, states she is returning call. Keli states patient is in ER right Now. Please call to discuss. Thank you

## 2024-06-08 LAB
BACTERIA SPEC CULT: ABNORMAL
CC UR VC: ABNORMAL
EKG ATRIAL RATE: 95 BPM
EKG DIAGNOSIS: NORMAL
EKG P AXIS: 18 DEGREES
EKG P-R INTERVAL: 178 MS
EKG Q-T INTERVAL: 344 MS
EKG QRS DURATION: 92 MS
EKG QTC CALCULATION (BAZETT): 432 MS
EKG R AXIS: 89 DEGREES
EKG T AXIS: 39 DEGREES
EKG VENTRICULAR RATE: 95 BPM
SERVICE CMNT-IMP: ABNORMAL

## 2024-06-08 NOTE — DISCHARGE INSTRUCTIONS
Thank You!    It was a pleasure taking care of you in our Emergency Department today. We know that when you come to [facility], you are entrusting us with your health, comfort, and safety. Our clinicians honor that trust, and truly appreciate the opportunity to care for you and your loved ones.    If you receive a survey about your Emergency Department experience today, please fill it out.  We value your feedback. Thank you.      Yane Xiong PA-C    ___________________________________  I have included a copy of your lab results and/or radiologic studies from today's visit so you can have them easily available at your follow-up visit.   Recent Results (from the past 12 hour(s))   EKG 12 Lead    Collection Time: 06/07/24  4:22 PM   Result Value Ref Range    Ventricular Rate 95 BPM    Atrial Rate 95 BPM    P-R Interval 178 ms    QRS Duration 92 ms    Q-T Interval 344 ms    QTc Calculation (Bazett) 432 ms    P Axis 18 degrees    R Axis 89 degrees    T Axis 39 degrees    Diagnosis       Normal sinus rhythm  Normal ECG  When compared with ECG of 14-MAY-2024 20:46,  Incomplete right bundle branch block is no longer present  T wave amplitude has increased in Lateral leads     CBC with Auto Differential    Collection Time: 06/07/24  5:16 PM   Result Value Ref Range    WBC 13.7 (H) 4.1 - 11.1 K/uL    RBC 4.87 4.10 - 5.70 M/uL    Hemoglobin 13.2 12.1 - 17.0 g/dL    Hematocrit 40.4 36.6 - 50.3 %    MCV 83.0 80.0 - 99.0 FL    MCH 27.1 26.0 - 34.0 PG    MCHC 32.7 30.0 - 36.5 g/dL    RDW 13.2 11.5 - 14.5 %    Platelets 188 150 - 400 K/uL    MPV 9.3 8.9 - 12.9 FL    Nucleated RBCs 0.0 0  WBC    nRBC 0.00 0.00 - 0.01 K/uL    Neutrophils % 80 (H) 32 - 75 %    Lymphocytes % 17 12 - 49 %    Monocytes % 3 (L) 5 - 13 %    Eosinophils % 0 0 - 7 %    Basophils % 0 0 - 1 %    Immature Granulocytes % 0 0.0 - 0.5 %    Neutrophils Absolute 10.9 (H) 1.8 - 8.0 K/UL    Lymphocytes Absolute 2.3 0.8 - 3.5

## 2024-06-08 NOTE — ED NOTES
Discussed and reviewed discharge instructions with patient and wife. Provided opportunity for questions, patient expressed understanding. Reviewed reasons to return to the Emergency Department. IV cannula removed. Patient stable at time of discharge, wheeled by Roldan RN with discharge papers with wife.

## 2024-06-10 ENCOUNTER — TELEPHONE (OUTPATIENT)
Age: 89
End: 2024-06-10

## 2024-06-10 LAB
BACTERIA SPEC CULT: ABNORMAL
CC UR VC: ABNORMAL
SERVICE CMNT-IMP: ABNORMAL

## 2024-06-10 RX ORDER — LEVOFLOXACIN 750 MG/1
750 TABLET, FILM COATED ORAL EVERY OTHER DAY
Qty: 4 TABLET | Refills: 0 | Status: SHIPPED | OUTPATIENT
Start: 2024-06-10 | End: 2024-06-13 | Stop reason: ALTCHOICE

## 2024-06-10 NOTE — TELEPHONE ENCOUNTER
Patient's Wife, Keli states patient needs an appt for [facility] ER 6/7/24 F/U. Please call as No Appts available in time frame needed. Thank you

## 2024-06-12 ENCOUNTER — TELEPHONE (OUTPATIENT)
Age: 89
End: 2024-06-12

## 2024-06-12 NOTE — TELEPHONE ENCOUNTER
Spoke to pt's wife using two pt identifiers.    Pt's wife states pt is experiencing the same mental symptoms he was experiencing last Friday when pt went to ER. Pt's wife states that pt is responding to everything with a blank \"I don't know\" response. Pt's wife states pt seems to be having difficulty with thinking and processing information.Pt's wife is worried and would have taken pt to ER if he did not have an appointment with PCP on Friday.    Pt's wife states she will probably not be coming back with pt during appointment and she wanted to make us aware of pt's altered mental status before appointment.    Pt's wife requesting another urine/ blood test be done on Friday during appointment to make sure nothing else is going on and maybe do some mental status test to see how much pt's mental status is changing.    Just JANI.

## 2024-06-12 NOTE — TELEPHONE ENCOUNTER
----- Message from Reyna Rosario sent at 6/12/2024  2:30 PM EDT -----  Subject: Message to Provider    QUESTIONS  Information for Provider? Wife Keli Nielsen is requesting to speak w/nurse   about [facility]' 6/14 aptmnt, Keli states she has a few concerns, please   contact   ---------------------------------------------------------------------------  --------------  CALL BACK INFO  [phone]; OK to leave message on voicemail  ---------------------------------------------------------------------------  --------------  SCRIPT ANSWERS  Relationship to Patient? Spouse/Partner  Representative Name? Keli Nielsen  Is the representative on the Communication Release of Information (TALON)   form in Epic? Yes

## 2024-06-13 ENCOUNTER — OFFICE VISIT (OUTPATIENT)
Age: 89
End: 2024-06-13
Payer: MEDICARE

## 2024-06-13 VITALS
RESPIRATION RATE: 20 BRPM | SYSTOLIC BLOOD PRESSURE: 166 MMHG | TEMPERATURE: 97.2 F | OXYGEN SATURATION: 96 % | HEART RATE: 85 BPM | WEIGHT: 187.8 LBS | DIASTOLIC BLOOD PRESSURE: 82 MMHG | HEIGHT: 69 IN | BODY MASS INDEX: 27.81 KG/M2

## 2024-06-13 DIAGNOSIS — R41.3 MEMORY IMPAIRMENT: Primary | ICD-10-CM

## 2024-06-13 DIAGNOSIS — N17.9 AKI (ACUTE KIDNEY INJURY) (HCC): ICD-10-CM

## 2024-06-13 DIAGNOSIS — R73.03 PRE-DIABETES: ICD-10-CM

## 2024-06-13 DIAGNOSIS — N39.0 ENTEROCOCCUS UTI: ICD-10-CM

## 2024-06-13 DIAGNOSIS — D72.825 BANDEMIA: ICD-10-CM

## 2024-06-13 DIAGNOSIS — B95.2 ENTEROCOCCUS UTI: ICD-10-CM

## 2024-06-13 DIAGNOSIS — I10 ESSENTIAL (PRIMARY) HYPERTENSION: ICD-10-CM

## 2024-06-13 DIAGNOSIS — N30.01 ACUTE CYSTITIS WITH HEMATURIA: ICD-10-CM

## 2024-06-13 LAB
BILIRUBIN, URINE, POC: NEGATIVE
BLOOD URINE, POC: NORMAL
GLUCOSE URINE, POC: NEGATIVE
KETONES, URINE, POC: NEGATIVE
LEUKOCYTE ESTERASE, URINE, POC: NEGATIVE
NITRITE, URINE, POC: NEGATIVE
PH, URINE, POC: 6 (ref 4.6–8)
PROTEIN,URINE, POC: 30
SPECIFIC GRAVITY, URINE, POC: 1.02 (ref 1–1.03)
URINALYSIS CLARITY, POC: CLEAR
URINALYSIS COLOR, POC: YELLOW
UROBILINOGEN, POC: NORMAL

## 2024-06-13 PROCEDURE — 1036F TOBACCO NON-USER: CPT | Performed by: INTERNAL MEDICINE

## 2024-06-13 PROCEDURE — G8427 DOCREV CUR MEDS BY ELIG CLIN: HCPCS | Performed by: INTERNAL MEDICINE

## 2024-06-13 PROCEDURE — 99215 OFFICE O/P EST HI 40 MIN: CPT | Performed by: INTERNAL MEDICINE

## 2024-06-13 PROCEDURE — 81001 URINALYSIS AUTO W/SCOPE: CPT | Performed by: INTERNAL MEDICINE

## 2024-06-13 PROCEDURE — PBSHW AMB POC URINALYSIS DIP STICK AUTO W/ MICRO: Performed by: INTERNAL MEDICINE

## 2024-06-13 PROCEDURE — 1123F ACP DISCUSS/DSCN MKR DOCD: CPT | Performed by: INTERNAL MEDICINE

## 2024-06-13 PROCEDURE — G8417 CALC BMI ABV UP PARAM F/U: HCPCS | Performed by: INTERNAL MEDICINE

## 2024-06-13 RX ORDER — FINASTERIDE 5 MG/1
5 TABLET, FILM COATED ORAL DAILY
COMMUNITY
Start: 2024-06-02

## 2024-06-13 RX ORDER — AMOXICILLIN 500 MG/1
500 CAPSULE ORAL 3 TIMES DAILY
Qty: 30 CAPSULE | Refills: 0 | Status: SHIPPED | OUTPATIENT
Start: 2024-06-13 | End: 2024-06-23

## 2024-06-13 RX ORDER — DILTIAZEM HYDROCHLORIDE 120 MG/1
120 CAPSULE, EXTENDED RELEASE ORAL DAILY
Qty: 30 CAPSULE | Refills: 4 | Status: SHIPPED | OUTPATIENT
Start: 2024-06-13

## 2024-06-13 NOTE — PATIENT INSTRUCTIONS
For enterococcus UTI best to take the amoxicillin   500mg three times a day   That is the reason for confusion    Follow up in 2 weeks

## 2024-06-13 NOTE — PROGRESS NOTES
\"Have you been to the ER, urgent care clinic since your last visit?  Hospitalized since your last visit?\"    Yes, ER last Friday or altered mental state and UTI    “Have you seen or consulted any other health care providers outside of [facility] since your last visit?”    Dr. Steele for UTI and cath            Click Here for Release of Records Request  
4 times daily. He does not utilize a diaper.      He is alert and answers questions but cannot recall blood in the urine  Review of Systems   10 systems reviewed and negative other then HPI  Reviewed social history, medical history, family history and allergies no changes     Current Outpatient Medications   Medication Sig Dispense Refill    finasteride (PROSCAR) 5 MG tablet Take 1 tablet by mouth daily      amoxicillin (AMOXIL) 500 MG capsule Take 1 capsule by mouth 3 times daily for 10 days 30 capsule 0    dilTIAZem (DILACOR XR) 120 MG extended release capsule Take 1 capsule by mouth daily Please call to schedule follow up prior to further refills 30 capsule 4    tamsulosin (FLOMAX) 0.4 MG capsule TAKE ONE CAPSULE BY MOUTH EVERYNIGHT 30 capsule 0    apixaban (ELIQUIS) 5 MG TABS tablet Take 1 tablet by mouth 2 times daily 60 tablet 5    albuterol sulfate HFA (PROVENTIL;VENTOLIN;PROAIR) 108 (90 Base) MCG/ACT inhaler Inhale 1 puff into the lungs every 6 hours as needed      brinzolamide-brimonidine (SIMBRINZA) 1-0.2 % SUSP INSTILL ONE DROP THREE TIMES A DAY INTO THE LEFT EYE AND ONCE A DAY INTO THE RIGHT EYE (Patient not taking: Reported on 6/13/2024)       No current facility-administered medications for this visit.      Physical exam  General:  Well appearing adult no acute distress  HEENT:   PERRL,normal conjunctiva. External ear and canals normal, TMs normal.  Hearing normal to voice.  Nose without edema or discharge, normal septum.  Lips, teeth, gums normal.  Oropharynx: no erythema, no exudates, no lesions, normal tongue.  Neck:  Supple. Thyroid normal size, nontender, without nodules.  No carotid bruit. No masses or lymphadenopathy  Respiratory: no respiratory distress,  no wheezing, no rhonchi, no rales. No chest wall tenderness.  Cardiovascular:  RRR, normal S1S2, no murmur.    Gastrointestinal: normal bowel sounds, soft, nontender, without masses.  No hepatosplenomegaly.  Extremities +2 pulses, no edema,

## 2024-06-14 ENCOUNTER — TELEPHONE (OUTPATIENT)
Age: 89
End: 2024-06-14

## 2024-06-14 NOTE — TELEPHONE ENCOUNTER
Patient's Wife, Keli states she needs a call back to discuss medications prescribed for Kidney Infection//UTI. Please call prior to weekend. Thank you

## 2024-06-14 NOTE — TELEPHONE ENCOUNTER
When we picked up Andrea's prescriptions yesterday, then call to schedule follow up! He already has an appointment for another .Jt had one for DILT  MG with 5 Tabs - then to call for follow-up! He already has to have another kidney test that date (June 27) and the follow with an appointment to see Dr Hinds the following week!  April, when we picked up prescriptions yesterday, he had one script for: [id number] mg Tab - take 1 TB every other day for 4 days from Debby Kirk (I assume that is Dr. Steele's nurse)! I don't know if he should take that now?!  Sorry for so many questions!!?? Keli

## 2024-06-20 ENCOUNTER — NURSE ONLY (OUTPATIENT)
Age: 89
End: 2024-06-20

## 2024-06-20 DIAGNOSIS — D72.825 BANDEMIA: ICD-10-CM

## 2024-06-20 DIAGNOSIS — N17.9 AKI (ACUTE KIDNEY INJURY) (HCC): ICD-10-CM

## 2024-06-20 DIAGNOSIS — I10 ESSENTIAL (PRIMARY) HYPERTENSION: ICD-10-CM

## 2024-06-21 LAB
ANION GAP SERPL CALC-SCNC: 4 MMOL/L (ref 5–15)
BASOPHILS # BLD: 0.2 K/UL (ref 0–0.1)
BASOPHILS NFR BLD: 2 % (ref 0–1)
BUN SERPL-MCNC: 17 MG/DL (ref 6–20)
BUN/CREAT SERPL: 13 (ref 12–20)
CALCIUM SERPL-MCNC: 8.8 MG/DL (ref 8.5–10.1)
CHLORIDE SERPL-SCNC: 108 MMOL/L (ref 97–108)
CO2 SERPL-SCNC: 27 MMOL/L (ref 21–32)
CREAT SERPL-MCNC: 1.29 MG/DL (ref 0.7–1.3)
DIFFERENTIAL METHOD BLD: ABNORMAL
EOSINOPHIL # BLD: 0.2 K/UL (ref 0–0.4)
EOSINOPHIL NFR BLD: 2 % (ref 0–7)
ERYTHROCYTE [DISTWIDTH] IN BLOOD BY AUTOMATED COUNT: 13.6 % (ref 11.5–14.5)
GLUCOSE SERPL-MCNC: 103 MG/DL (ref 65–100)
HCT VFR BLD AUTO: 39.8 % (ref 36.6–50.3)
HGB BLD-MCNC: 13.1 G/DL (ref 12.1–17)
IMM GRANULOCYTES # BLD AUTO: 0 K/UL
IMM GRANULOCYTES NFR BLD AUTO: 0 %
LYMPHOCYTES # BLD: 4.4 K/UL (ref 0.8–3.5)
LYMPHOCYTES NFR BLD: 45 % (ref 12–49)
MCH RBC QN AUTO: 27.2 PG (ref 26–34)
MCHC RBC AUTO-ENTMCNC: 32.9 G/DL (ref 30–36.5)
MCV RBC AUTO: 82.7 FL (ref 80–99)
MONOCYTES # BLD: 0.3 K/UL (ref 0–1)
MONOCYTES NFR BLD: 3 % (ref 5–13)
NEUTS SEG # BLD: 4.6 K/UL (ref 1.8–8)
NEUTS SEG NFR BLD: 48 % (ref 32–75)
NRBC # BLD: 0 K/UL (ref 0–0.01)
NRBC BLD-RTO: 0 PER 100 WBC
PLATELET # BLD AUTO: 156 K/UL (ref 150–400)
PMV BLD AUTO: 9.4 FL (ref 8.9–12.9)
POTASSIUM SERPL-SCNC: 4.2 MMOL/L (ref 3.5–5.1)
RBC # BLD AUTO: 4.81 M/UL (ref 4.1–5.7)
RBC MORPH BLD: ABNORMAL
SODIUM SERPL-SCNC: 139 MMOL/L (ref 136–145)
WBC # BLD AUTO: 9.7 K/UL (ref 4.1–11.1)
WBC MORPH BLD: ABNORMAL

## 2024-07-02 ENCOUNTER — OFFICE VISIT (OUTPATIENT)
Age: 89
End: 2024-07-02
Payer: MEDICARE

## 2024-07-02 VITALS
SYSTOLIC BLOOD PRESSURE: 176 MMHG | OXYGEN SATURATION: 100 % | WEIGHT: 193 LBS | BODY MASS INDEX: 28.58 KG/M2 | DIASTOLIC BLOOD PRESSURE: 87 MMHG | RESPIRATION RATE: 18 BRPM | HEART RATE: 72 BPM | HEIGHT: 69 IN | TEMPERATURE: 97.2 F

## 2024-07-02 DIAGNOSIS — N17.9 AKI (ACUTE KIDNEY INJURY) (HCC): ICD-10-CM

## 2024-07-02 DIAGNOSIS — N30.01 ACUTE CYSTITIS WITH HEMATURIA: ICD-10-CM

## 2024-07-02 DIAGNOSIS — R41.3 MEMORY IMPAIRMENT: ICD-10-CM

## 2024-07-02 DIAGNOSIS — I10 PRIMARY HYPERTENSION: ICD-10-CM

## 2024-07-02 DIAGNOSIS — I10 ESSENTIAL (PRIMARY) HYPERTENSION: ICD-10-CM

## 2024-07-02 DIAGNOSIS — N17.9 AKI (ACUTE KIDNEY INJURY) (HCC): Primary | ICD-10-CM

## 2024-07-02 PROCEDURE — G8427 DOCREV CUR MEDS BY ELIG CLIN: HCPCS | Performed by: INTERNAL MEDICINE

## 2024-07-02 PROCEDURE — 99214 OFFICE O/P EST MOD 30 MIN: CPT | Performed by: INTERNAL MEDICINE

## 2024-07-02 PROCEDURE — G8417 CALC BMI ABV UP PARAM F/U: HCPCS | Performed by: INTERNAL MEDICINE

## 2024-07-02 PROCEDURE — 1036F TOBACCO NON-USER: CPT | Performed by: INTERNAL MEDICINE

## 2024-07-02 PROCEDURE — 81001 URINALYSIS AUTO W/SCOPE: CPT | Performed by: INTERNAL MEDICINE

## 2024-07-02 PROCEDURE — PBSHW AMB POC URINALYSIS DIP STICK AUTO W/ MICRO: Performed by: INTERNAL MEDICINE

## 2024-07-02 PROCEDURE — 1123F ACP DISCUSS/DSCN MKR DOCD: CPT | Performed by: INTERNAL MEDICINE

## 2024-07-02 RX ORDER — LISINOPRIL 5 MG/1
5 TABLET ORAL DAILY
Qty: 90 TABLET | Refills: 1 | Status: SHIPPED | OUTPATIENT
Start: 2024-07-02

## 2024-07-02 NOTE — PROGRESS NOTES
\"Have you been to the ER, urgent care clinic since your last visit?  Hospitalized since your last visit?\"    NO    “Have you seen or consulted any other health care providers outside of [facility] since your last visit?”    NO            Click Here for Release of Records Request

## 2024-07-02 NOTE — PROGRESS NOTES
Jt Nielsen (:  1935) is a 88 y.o. male, Established patient, here for evaluation of the following chief complaint(s):  Memory Loss (Confused)         Assessment & Plan  1. Urinary tract infection.  The patient's memory impairment has shown improvement- AMS, while his kidney function has shown improvement.  He did not complete the amox as prescribed and I am repeating the UA and culture today  The patient was advised to complete the prescribed course of amoxicillin.    2. Hypertension. BP is higher resume lisinopril 5mg  Lisinopril 5 mg was prescribed.  Continue diltiazem    3. BPH: on flomax   And proscar     4. Afib on diltiazem and eliquis  Results  Laboratory Studies  Kidney function improved.  1. JUDY (acute kidney injury) (HCC)  -     Culture, Urine; Future  2. Memory impairment  -     Culture, Urine; Future  3. Acute cystitis with hematuria  -     Culture, Urine; Future  4. Essential (primary) hypertension  5. Primary hypertension  -     lisinopril (PRINIVIL;ZESTRIL) 5 MG tablet; Take 1 tablet by mouth daily, Disp-90 tablet, R-1Normal    Return in about 6 months (around 2025).       Subjective   History of Present Illness  The patient is an 88-year-old male with atrial fibrillation, on Eliquis, hypertension, and large prostate. He came in with confusion. He had a UTI. We confirmed that it was a urinary tract infection (UTI).    The patient was prescribed amoxicillin 500 mg twice daily for a duration of 10 days, however, he did not complete antibiotic prescribed unsure how many days he took. He stopped once symptoms resolved . He reports no hematuria.    The patient's blood pressure was elevated during today's visit.    Review of Systems   10 systems reviewed and negative other then HPI  Reviewed social history, medical history, family history and allergies no changes     Current Outpatient Medications   Medication Sig Dispense Refill    lisinopril (PRINIVIL;ZESTRIL) 5 MG tablet Take 1

## 2024-07-02 NOTE — PATIENT INSTRUCTIONS
Resume lisinopril low dose 5mg daily since your blood pressure is high today   Finish amoxicillin

## 2024-07-04 LAB
BACTERIA SPEC CULT: NORMAL
CC UR VC: NORMAL
SERVICE CMNT-IMP: NORMAL

## 2024-07-17 ENCOUNTER — TELEPHONE (OUTPATIENT)
Age: 89
End: 2024-07-17

## 2024-07-17 DIAGNOSIS — R41.3 MEMORY CHANGES: Primary | ICD-10-CM

## 2024-07-17 NOTE — TELEPHONE ENCOUNTER
Patients wife called and states that Jt is getting very confused and does not remember details . The way he is dealing with it is that he laughs and just does not say anything. Mrs Nielsen wants to know if there is a medication that can be given to Mr. Morales to help .

## 2024-07-19 ENCOUNTER — TELEPHONE (OUTPATIENT)
Age: 89
End: 2024-07-19

## 2024-07-19 NOTE — TELEPHONE ENCOUNTER
Spoke to [facility] and they are requesting cardiac and medication clearance for Eliquis prior to basal cell Mohs surgery using topical lidocaine and epinephrine.    Fax: [phone]

## 2024-07-19 NOTE — TELEPHONE ENCOUNTER
Returned phone call to pts wife Keli per privacy.   Verified two pt identifiers.   Informed Keli, can not prescribe medication, would need further evaluation.   Informed can refer to neuropsychology, referral placed.   Informed could be Uti that's back, can do repeat urine test.   Wife states pt has an appointment next week with Dr. Steele Urologist and will inform about this, possible UTI.     Wife states will call and schedule an appointment with Neuropsychology.     No further questions and is thankful

## 2024-07-19 NOTE — TELEPHONE ENCOUNTER
Patient wife Keli called requesting cardiac clearance for Basal skin surgery scheduled on 7/23. Patient surgery is scheduled with Dr. Whit Miller with [facility].       # [phone]

## 2024-07-22 NOTE — TELEPHONE ENCOUNTER
Verified patient with two types of identifiers. Sent clearance and updated Mrs. Nielsen.      Meghann Drummond, APRN - NP  You10 minutes ago (2:57 PM)       Ok to proceed with surgery tomorrow.  I would have advised him to hold eliquis x 48 hours prior but we have passed that window.  It should be resumed the day following surgery unless otherwise advised by surgeon.  He is intermediate risk for CV complications due to his advanced age.    Priyanka

## 2024-07-22 NOTE — TELEPHONE ENCOUNTER
Patient wife is requesting to please have an urgent call back to go over phone call from Friday .       Odalis wife # ~ [phone]

## 2024-08-05 ENCOUNTER — TELEPHONE (OUTPATIENT)
Age: 89
End: 2024-08-05

## 2024-08-05 NOTE — TELEPHONE ENCOUNTER
Spoke to pt's wife using two pt identifiers.   Pt's wife states pt has been experiencing difficulty getting to the restroom and has been having issues in general surrounding using the restroom such as frequent UTI's and now awful diarrhea. Pt's wife states she has noticed the pt has been taking multiple showers a day and pt finally admitted this morning the reason for taking so many showers has been due to having bad diarrhea.    Pt's wife states she is unsure how long pt has been having diarrhea but its been at least two weeks since that is when pt's wife noticed the pt taking several showers a day.     Pt's wife denies any knowledge of OTC medication use due pt not being able to communicate effectively. Pt's wife thinks pt should be seen, acute appt offered and accepted.    Appt scheduled for 8/6 acute with [facility] to discuss.

## 2024-08-05 NOTE — TELEPHONE ENCOUNTER
Patient's wife, Keli states she needs a call back to discuss Diarrhea x 2 wks & what to do for patient. Please call. Thank you

## 2024-08-06 ENCOUNTER — OFFICE VISIT (OUTPATIENT)
Age: 89
End: 2024-08-06
Payer: MEDICARE

## 2024-08-06 VITALS
WEIGHT: 196 LBS | RESPIRATION RATE: 16 BRPM | SYSTOLIC BLOOD PRESSURE: 147 MMHG | HEIGHT: 69 IN | BODY MASS INDEX: 29.03 KG/M2 | DIASTOLIC BLOOD PRESSURE: 72 MMHG | OXYGEN SATURATION: 96 % | HEART RATE: 61 BPM | TEMPERATURE: 97.5 F

## 2024-08-06 DIAGNOSIS — R19.7 DIARRHEA, UNSPECIFIED TYPE: Primary | ICD-10-CM

## 2024-08-06 PROCEDURE — 99213 OFFICE O/P EST LOW 20 MIN: CPT | Performed by: INTERNAL MEDICINE

## 2024-08-06 PROCEDURE — G8427 DOCREV CUR MEDS BY ELIG CLIN: HCPCS | Performed by: INTERNAL MEDICINE

## 2024-08-06 PROCEDURE — G8417 CALC BMI ABV UP PARAM F/U: HCPCS | Performed by: INTERNAL MEDICINE

## 2024-08-06 PROCEDURE — 1036F TOBACCO NON-USER: CPT | Performed by: INTERNAL MEDICINE

## 2024-08-06 PROCEDURE — 1123F ACP DISCUSS/DSCN MKR DOCD: CPT | Performed by: INTERNAL MEDICINE

## 2024-08-06 NOTE — PROGRESS NOTES
\"Have you been to the ER, urgent care clinic since your last visit?  Hospitalized since your last visit?\"    NO    “Have you seen or consulted any other health care providers outside of [facility] since your last visit?”    NO            Click Here for Release of Records Request

## 2024-08-06 NOTE — PROGRESS NOTES
PROGRESS NOTE  Name: Jt Nielsen   : 1935       ASSESSMENT/ PLAN:     Jt was seen today for diarrhea.    Diarrhea, unspecified type: Self care--hydration, OTC imodium prn. If doesn't resolve soon, then:   -     Bal Salmeron MD, Gastroenterology, [place]    Return if symptoms worsen or fail to improve.     I have reviewed the patient's medications and risks/side effects/benefits were discussed. Diagnosis(-es) explained to patient and questions answered. Literature provided where appropriate.                       SUBJECTIVE  Mr. Jt Nielsen is a patient of Lorrie Krueger MD and presents today acutely for     Chief Complaint   Patient presents with    Diarrhea       He started having diarrhea about 2 weeks ago. He has 2-4 BM per day, watery, non-bloody. He denies abdominal cramping. No nausea.  No fevers or chills. He has had no recent medication changes. He has not had any recent foreign travel, and has not had any unusual foods, nor consumed any unfiltered or unclean water. He has no known sick contacts.       OBJECTIVE  BP (!) 147/72 (Site: Left Upper Arm, Position: Sitting, Cuff Size: Medium Adult)   Pulse 61   Temp 97.5 °F (36.4 °C) (Temporal)   Resp 16   Ht 1.753 m (5' 9\")   Wt 88.9 kg (196 lb)   SpO2 96%   BMI 28.94 kg/m²   Gen: Pleasant 88 y.o.  male in [facility].   HEENT: PERRLA. EOMI. OP moist and pink.  Neck: Supple.  No LAD.  HEART: RRR, No M/G/R.   LUNGS: CTAB No W/R.   ABDOMEN: S, NT, ND, BS+.   EXTREMITIES: Warm. No C/C/E. MUSCULOSKELETAL: Normal ROM, muscle strength 5/5 all groups. NEURO: Alert and oriented x 3.  Cranial nerves grossly intact.  No focal sensory or motor deficits noted. SKIN: Warm. Dry. No rashes or other lesions noted.

## 2024-10-01 ENCOUNTER — TELEPHONE (OUTPATIENT)
Age: 89
End: 2024-10-01

## 2024-10-01 NOTE — TELEPHONE ENCOUNTER
Please call wife, Keli as she needs to discuss medication that she believes pt is not taking.     Please call back yet today

## 2024-10-01 NOTE — TELEPHONE ENCOUNTER
Spouse called to ask how long should patient be off of eloquis? He's not taking his medications as he should, but will be having an operation on 10/04.     Keli # [phone]

## 2024-10-01 NOTE — TELEPHONE ENCOUNTER
Spoke to pt's wife.  Keli states pt is refusing to take medications as prescribed, refusing to go to neuropsychiatrist and is not having worsening memory changes.    Keli states pt is having continued issues with UTIs and is refusing to take antibiotics to help resolve issues.    Keli states she believes that pt has given up and is wondering if there is anything we can do.    Keli requesting medication for memory as well

## 2024-10-02 NOTE — TELEPHONE ENCOUNTER
Patient called requesting cardiac clearance for a upcoming nose surgery 10/4 . Patient is seeing Dr. Travon Bearden with [facility].    # [phone]  Fax # [phone]

## 2024-10-03 NOTE — TELEPHONE ENCOUNTER
Verified patient with two types of identifiers. Spoke to Mrs. Nielsen. Her  is having difficulty with his memory and many other things right now. She is unsure of what medications he should be taking as he used to do this on his own. She does not think he is being complaint and they have an appointment with PCP soon to discuss this. She also notes he already had surgery and it went well. Confirmed follow up and advised Mrs. Nielsen to let us know if we may help. She was appreciative of the call.     Future Appointments   Date Time Provider Department Center   10/11/2024 11:30 AM Lorrie Krueger MD [facility] DE[facility]   10/28/2024 10:00 AM [facility]   10/28/2024 11:20 AM Kendra Orellana MD [facility]

## 2024-10-11 DIAGNOSIS — I10 ESSENTIAL (PRIMARY) HYPERTENSION: ICD-10-CM

## 2024-10-11 RX ORDER — DILTIAZEM HYDROCHLORIDE 120 MG/1
CAPSULE, EXTENDED RELEASE ORAL
Qty: 30 CAPSULE | Refills: 4 | Status: SHIPPED | OUTPATIENT
Start: 2024-10-11

## 2024-10-18 ENCOUNTER — NURSE ONLY (OUTPATIENT)
Age: 89
End: 2024-10-18
Payer: MEDICARE

## 2024-10-18 DIAGNOSIS — Z23 NEEDS FLU SHOT: Primary | ICD-10-CM

## 2024-10-18 PROCEDURE — 90653 IIV ADJUVANT VACCINE IM: CPT | Performed by: INTERNAL MEDICINE

## 2024-10-18 PROCEDURE — PBSHW INFLUENZA, FLUAD TRIVALENT, (AGE 65 Y+), IM, PRESERVATIVE FREE, 0.5ML: Performed by: INTERNAL MEDICINE

## 2024-10-18 NOTE — PROGRESS NOTES
After obtaining consent, and per verbal order from Lorrie Krueger MD, patient received influenza vaccine given in  Right deltoid  Influenza Vaccine 0.5 mL IM now. Patient was observed for 10 minutes post injection. Patient tolerated injection well.

## 2024-10-22 ENCOUNTER — OFFICE VISIT (OUTPATIENT)
Age: 89
End: 2024-10-22
Payer: MEDICARE

## 2024-10-22 VITALS
HEIGHT: 69 IN | BODY MASS INDEX: 27.64 KG/M2 | RESPIRATION RATE: 20 BRPM | OXYGEN SATURATION: 95 % | WEIGHT: 186.6 LBS | SYSTOLIC BLOOD PRESSURE: 148 MMHG | TEMPERATURE: 97.8 F | HEART RATE: 60 BPM | DIASTOLIC BLOOD PRESSURE: 81 MMHG

## 2024-10-22 DIAGNOSIS — G30.1 MODERATE LATE ONSET ALZHEIMER'S DEMENTIA WITHOUT BEHAVIORAL DISTURBANCE, PSYCHOTIC DISTURBANCE, MOOD DISTURBANCE, OR ANXIETY (HCC): ICD-10-CM

## 2024-10-22 DIAGNOSIS — I10 ESSENTIAL (PRIMARY) HYPERTENSION: ICD-10-CM

## 2024-10-22 DIAGNOSIS — E78.00 HIGH CHOLESTEROL: ICD-10-CM

## 2024-10-22 DIAGNOSIS — Z13.1 SCREENING FOR DIABETES MELLITUS: ICD-10-CM

## 2024-10-22 DIAGNOSIS — R41.3 MEMORY IMPAIRMENT: Primary | ICD-10-CM

## 2024-10-22 DIAGNOSIS — F02.B0 MODERATE LATE ONSET ALZHEIMER'S DEMENTIA WITHOUT BEHAVIORAL DISTURBANCE, PSYCHOTIC DISTURBANCE, MOOD DISTURBANCE, OR ANXIETY (HCC): ICD-10-CM

## 2024-10-22 DIAGNOSIS — R39.81 URINARY INCONTINENCE DUE TO COGNITIVE IMPAIRMENT: ICD-10-CM

## 2024-10-22 LAB
BILIRUBIN, URINE, POC: NEGATIVE
BLOOD URINE, POC: NORMAL
GLUCOSE URINE, POC: NEGATIVE
KETONES, URINE, POC: NEGATIVE
LEUKOCYTE ESTERASE, URINE, POC: NEGATIVE
NITRITE, URINE, POC: NEGATIVE
PH, URINE, POC: 6 (ref 4.6–8)
PROTEIN,URINE, POC: 30
SPECIFIC GRAVITY, URINE, POC: 1.02 (ref 1–1.03)
URINALYSIS CLARITY, POC: CLEAR
URINALYSIS COLOR, POC: YELLOW
UROBILINOGEN, POC: NORMAL MG/DL

## 2024-10-22 PROCEDURE — 81001 URINALYSIS AUTO W/SCOPE: CPT | Performed by: INTERNAL MEDICINE

## 2024-10-22 PROCEDURE — 99214 OFFICE O/P EST MOD 30 MIN: CPT | Performed by: INTERNAL MEDICINE

## 2024-10-22 RX ORDER — DONEPEZIL HYDROCHLORIDE 10 MG/1
10 TABLET, FILM COATED ORAL NIGHTLY
Qty: 30 TABLET | Refills: 1 | Status: SHIPPED | OUTPATIENT
Start: 2024-10-22

## 2024-10-22 NOTE — PATIENT INSTRUCTIONS
You scored 23/30 on MOCA test which is consistent with dementia  I am doing labs  today   MRI of the brain   Referral to neurology  Start aricept at bedtime  No more driving - turn in 's license   Should not be alone/

## 2024-10-23 LAB
ALBUMIN SERPL-MCNC: 3.7 G/DL (ref 3.5–5)
ALBUMIN/GLOB SERPL: 1.3 (ref 1.1–2.2)
ALP SERPL-CCNC: 77 U/L (ref 45–117)
ALT SERPL-CCNC: 17 U/L (ref 12–78)
ANION GAP SERPL CALC-SCNC: 6 MMOL/L (ref 2–12)
AST SERPL-CCNC: 15 U/L (ref 15–37)
BASOPHILS # BLD: 0 K/UL (ref 0–0.1)
BASOPHILS NFR BLD: 0 % (ref 0–1)
BILIRUB SERPL-MCNC: 0.5 MG/DL (ref 0.2–1)
BUN SERPL-MCNC: 22 MG/DL (ref 6–20)
BUN/CREAT SERPL: 14 (ref 12–20)
CALCIUM SERPL-MCNC: 9 MG/DL (ref 8.5–10.1)
CHLORIDE SERPL-SCNC: 109 MMOL/L (ref 97–108)
CO2 SERPL-SCNC: 28 MMOL/L (ref 21–32)
CREAT SERPL-MCNC: 1.53 MG/DL (ref 0.7–1.3)
DIFFERENTIAL METHOD BLD: ABNORMAL
EOSINOPHIL # BLD: 0 K/UL (ref 0–0.4)
EOSINOPHIL NFR BLD: 0 % (ref 0–7)
ERYTHROCYTE [DISTWIDTH] IN BLOOD BY AUTOMATED COUNT: 13.9 % (ref 11.5–14.5)
FOLATE SERPL-MCNC: 10.5 NG/ML (ref 5–21)
GLOBULIN SER CALC-MCNC: 2.9 G/DL (ref 2–4)
GLUCOSE SERPL-MCNC: 97 MG/DL (ref 65–100)
HCT VFR BLD AUTO: 39.8 % (ref 36.6–50.3)
HGB BLD-MCNC: 12.8 G/DL (ref 12.1–17)
IMM GRANULOCYTES # BLD AUTO: 0 K/UL
IMM GRANULOCYTES NFR BLD AUTO: 0 %
LYMPHOCYTES # BLD: 5.1 K/UL (ref 0.8–3.5)
LYMPHOCYTES NFR BLD: 56 % (ref 12–49)
MCH RBC QN AUTO: 27.9 PG (ref 26–34)
MCHC RBC AUTO-ENTMCNC: 32.2 G/DL (ref 30–36.5)
MCV RBC AUTO: 86.9 FL (ref 80–99)
MONOCYTES # BLD: 0.4 K/UL (ref 0–1)
MONOCYTES NFR BLD: 4 % (ref 5–13)
NEUTS SEG # BLD: 3.7 K/UL (ref 1.8–8)
NEUTS SEG NFR BLD: 40 % (ref 32–75)
NRBC # BLD: 0 K/UL (ref 0–0.01)
NRBC BLD-RTO: 0 PER 100 WBC
PLATELET # BLD AUTO: 123 K/UL (ref 150–400)
PMV BLD AUTO: 10.6 FL (ref 8.9–12.9)
POTASSIUM SERPL-SCNC: 4.4 MMOL/L (ref 3.5–5.1)
PROT SERPL-MCNC: 6.6 G/DL (ref 6.4–8.2)
RBC # BLD AUTO: 4.58 M/UL (ref 4.1–5.7)
RBC MORPH BLD: ABNORMAL
RPR SER QL: NONREACTIVE
SODIUM SERPL-SCNC: 143 MMOL/L (ref 136–145)
TSH SERPL DL<=0.05 MIU/L-ACNC: 2.16 UIU/ML (ref 0.36–3.74)
VIT B12 SERPL-MCNC: 129 PG/ML (ref 193–986)
WBC # BLD AUTO: 9.2 K/UL (ref 4.1–11.1)
WBC MORPH BLD: ABNORMAL

## 2024-10-23 NOTE — PROGRESS NOTES
Jt Nielsen (:  1935) is a 89 y.o. male, Established patient, here for evaluation of the following chief complaint(s):  Urinary Tract Infection (Ongoing symptoms- Urination incontinence, confusion, back pain)         Assessment & Plan  1. Dementia.  His MoCA test score of 23 out of 30 aligns with a dementia diagnosis. He does not recall being found in the parking lot two weeks ago at [facility] and struggles with memory recall during the test. He will be started on Aricept 5 mg at bedtime, which may cause stomach upset/ discussed with daughter. An MRI of the brain will be ordered to rule out any hidden stroke/mass although exam is nonfocal. A referral to Neurology will be made. He is advised against driving and living alone. The family is informed about the need for supervision Labs including TSH, CBC, CMP, and B12 will be repeated to check for any correctable causes.    2. Hypertension.  He is currently on lisinopril 5 mg and diltiazem 120 mg extended release. Blood pressure will be monitored, and he is advised to continue his current medications.    3. Atrial Fibrillation.Sinus today  He is on Eliquis 5 mg twice a day and diltiazem 120 mg extended release. He is advised to continue his current medications and follow up with Dr. Koenig.    4. Enlarged Prostate.  He takes finasteride and tamsulosin for symptoms, which are currently controlled. He is advised to continue his current medications.    5. ANIBAL: non compliant with CPAP used, discussed this also contributes to memory issues ( hypoxia at night)  Follow-up  Return in 3 months for follow up.    Results  Laboratory Studies  TSH was 2.2 last year. B12 was 140.    Testing  MOCA test score was 23 out of 30, indicating dementia.  1. Memory impairment  -     AMB POC URINALYSIS DIP STICK AUTO W/ MICRO  -     Comprehensive Metabolic Panel; Future  -     CBC with Auto Differential; Future  -     TSH; Future  -     RPR; Future  -     Vitamin B12 & Folate;

## 2024-10-25 ENCOUNTER — NURSE ONLY (OUTPATIENT)
Age: 89
End: 2024-10-25

## 2024-10-25 DIAGNOSIS — E53.8 B12 DEFICIENCY: Primary | ICD-10-CM

## 2024-10-25 PROCEDURE — 96372 THER/PROPH/DIAG INJ SC/IM: CPT | Performed by: INTERNAL MEDICINE

## 2024-10-25 PROCEDURE — PBSHW PBB SHADOW CHARGE: Performed by: INTERNAL MEDICINE

## 2024-10-25 RX ORDER — CYANOCOBALAMIN 1000 UG/ML
1000 INJECTION, SOLUTION INTRAMUSCULAR; SUBCUTANEOUS ONCE
Status: SHIPPED | OUTPATIENT
Start: 2024-10-25

## 2024-10-25 RX ADMIN — CYANOCOBALAMIN 1000 MCG: 1000 INJECTION, SOLUTION INTRAMUSCULAR; SUBCUTANEOUS at 10:54

## 2024-10-25 NOTE — PROGRESS NOTES
Identified pt with two pt identifiers(name and ). Reviewed record in preparation for visit and have obtained necessary documentation.  Chief Complaint   Patient presents with    Other     B12 shot        There were no vitals filed for this visit.      Lot # [id number]  EXP

## 2024-10-27 ENCOUNTER — HOSPITAL ENCOUNTER (OUTPATIENT)
Facility: HOSPITAL | Age: 89
Discharge: HOME | End: 2024-10-30
Attending: INTERNAL MEDICINE
Payer: MEDICARE

## 2024-10-27 DIAGNOSIS — R41.3 MEMORY IMPAIRMENT: ICD-10-CM

## 2024-10-27 PROCEDURE — A9579 GAD-BASE MR CONTRAST NOS,1ML: HCPCS | Performed by: INTERNAL MEDICINE

## 2024-10-27 PROCEDURE — 70553 MRI BRAIN STEM W/O & W/DYE: CPT

## 2024-10-27 PROCEDURE — 6360000004 HC RX CONTRAST MEDICATION: Performed by: INTERNAL MEDICINE

## 2024-10-27 RX ADMIN — GADOTERIDOL 15 ML: 279.3 INJECTION, SOLUTION INTRAVENOUS at 10:36

## 2024-10-28 ENCOUNTER — TELEPHONE (OUTPATIENT)
Age: 89
End: 2024-10-28

## 2024-10-28 NOTE — TELEPHONE ENCOUNTER
Spoke to pt's wife and used two pt identifiers.  Patient notified of response from Lorrie Krueger MD    States understanding

## 2024-10-28 NOTE — TELEPHONE ENCOUNTER
Spoke with pt, verified two pt identifiers.   Informed pt \"There is mild to moderate temporoparietal predominant cerebral atrophy.   This correlates with the symptoms and diagnosis of dementia.\" per Lorrie Krueger MD, pt verified understanding, no further questions at this time.

## 2024-10-28 NOTE — TELEPHONE ENCOUNTER
pt  called    Regarding:   MRI Results    Request:    Please call to discuss/review and advise on scheduling for referrals        Call wife  [phone]

## 2024-10-28 NOTE — TELEPHONE ENCOUNTER
Pt started taking Aricept nightly but pt's wife is concerned about the side effects he has been experiencing. Walking up in the middle of the night more confused not knowing where he is.    Pt's wife states she will hold medication tonight to see if it helps.    Please advise.

## 2024-10-28 NOTE — TELEPHONE ENCOUNTER
Returned call from psr team to schedule    States please call wife back to schedule        (No availability for this psr to schedule)

## 2024-10-29 DIAGNOSIS — F02.B0 MODERATE LATE ONSET ALZHEIMER'S DEMENTIA WITHOUT BEHAVIORAL DISTURBANCE, PSYCHOTIC DISTURBANCE, MOOD DISTURBANCE, OR ANXIETY (HCC): Primary | ICD-10-CM

## 2024-10-29 DIAGNOSIS — G30.1 MODERATE LATE ONSET ALZHEIMER'S DEMENTIA WITHOUT BEHAVIORAL DISTURBANCE, PSYCHOTIC DISTURBANCE, MOOD DISTURBANCE, OR ANXIETY (HCC): Primary | ICD-10-CM

## 2024-10-30 RX ORDER — MEMANTINE HYDROCHLORIDE 5 MG/1
5 TABLET ORAL 2 TIMES DAILY
Qty: 60 TABLET | Refills: 1 | Status: SHIPPED | OUTPATIENT
Start: 2024-10-30

## 2024-10-30 NOTE — TELEPHONE ENCOUNTER
Spoke to pt's wife and used two pt identifiers.  Patient notified of response from Lorrie Krueger MD    States understanding and medication was pended.  Pharmacy verified.

## 2024-11-01 ENCOUNTER — NURSE ONLY (OUTPATIENT)
Age: 89
End: 2024-11-01
Payer: MEDICARE

## 2024-11-01 DIAGNOSIS — E53.8 B12 DEFICIENCY: Primary | ICD-10-CM

## 2024-11-01 PROCEDURE — PBSHW PBB SHADOW CHARGE: Performed by: INTERNAL MEDICINE

## 2024-11-01 PROCEDURE — 96372 THER/PROPH/DIAG INJ SC/IM: CPT | Performed by: INTERNAL MEDICINE

## 2024-11-01 RX ORDER — CYANOCOBALAMIN 1000 UG/ML
1000 INJECTION, SOLUTION INTRAMUSCULAR; SUBCUTANEOUS ONCE
Status: COMPLETED | OUTPATIENT
Start: 2024-11-01 | End: 2024-11-01

## 2024-11-01 RX ADMIN — CYANOCOBALAMIN 1000 MCG: 1000 INJECTION, SOLUTION INTRAMUSCULAR; SUBCUTANEOUS at 10:53

## 2024-11-08 ENCOUNTER — NURSE ONLY (OUTPATIENT)
Age: 89
End: 2024-11-08
Payer: MEDICARE

## 2024-11-08 DIAGNOSIS — E53.8 B12 DEFICIENCY: Primary | ICD-10-CM

## 2024-11-08 PROCEDURE — PBSHW PBB SHADOW CHARGE: Performed by: INTERNAL MEDICINE

## 2024-11-08 PROCEDURE — 96372 THER/PROPH/DIAG INJ SC/IM: CPT | Performed by: INTERNAL MEDICINE

## 2024-11-08 RX ORDER — CYANOCOBALAMIN 1000 UG/ML
1000 INJECTION, SOLUTION INTRAMUSCULAR; SUBCUTANEOUS ONCE
Status: COMPLETED | OUTPATIENT
Start: 2024-11-08 | End: 2024-11-08

## 2024-11-08 RX ADMIN — CYANOCOBALAMIN 1000 MCG: 1000 INJECTION, SOLUTION INTRAMUSCULAR; SUBCUTANEOUS at 12:33

## 2024-11-12 ENCOUNTER — TELEPHONE (OUTPATIENT)
Age: 89
End: 2024-11-12

## 2024-11-12 NOTE — TELEPHONE ENCOUNTER
Called and left  for Michelle and sent message to patients wife.   Patient will come in at 12 on 11-15

## 2024-11-12 NOTE — TELEPHONE ENCOUNTER
States pt needs B12 injection this Friday  No nurse visit scheduling solutions for Friday.  Prefers around noon    Declines scheduling any day other than Friday.    States is usually approved by clinical lead    Please call to advise when pt can be scheduled for NURSE VISIT: B12 injection    [phone]

## 2024-11-15 ENCOUNTER — NURSE ONLY (OUTPATIENT)
Age: 89
End: 2024-11-15
Payer: MEDICARE

## 2024-11-15 DIAGNOSIS — E53.8 B12 DEFICIENCY: Primary | ICD-10-CM

## 2024-11-15 PROCEDURE — PBSHW PBB SHADOW CHARGE: Performed by: INTERNAL MEDICINE

## 2024-11-15 PROCEDURE — 99211 OFF/OP EST MAY X REQ PHY/QHP: CPT | Performed by: INTERNAL MEDICINE

## 2024-11-15 PROCEDURE — 96372 THER/PROPH/DIAG INJ SC/IM: CPT | Performed by: INTERNAL MEDICINE

## 2024-11-15 RX ORDER — CYANOCOBALAMIN 1000 UG/ML
1000 INJECTION, SOLUTION INTRAMUSCULAR; SUBCUTANEOUS ONCE
Status: COMPLETED | OUTPATIENT
Start: 2024-11-15 | End: 2024-11-15

## 2024-11-15 RX ADMIN — CYANOCOBALAMIN 1000 MCG: 1000 INJECTION, SOLUTION INTRAMUSCULAR; SUBCUTANEOUS at 12:23

## 2024-11-15 NOTE — PROGRESS NOTES
Identified pt with two pt identifiers(name and ). Reviewed record in preparation for visit and have obtained necessary documentation.  Chief Complaint   Patient presents with    Injections     B12      VORB per Lorrie Krueger MD / JOSÉ MIGUEL Lucio LPN     [facility], [place], [facility]  [address], [facility], [address]   [place]  [address]  W: [phone]  F: [phone]

## 2024-11-22 ENCOUNTER — NURSE ONLY (OUTPATIENT)
Age: 89
End: 2024-11-22
Payer: MEDICARE

## 2024-11-22 DIAGNOSIS — E53.8 B12 DEFICIENCY: Primary | ICD-10-CM

## 2024-11-22 PROCEDURE — PBSHW PBB SHADOW CHARGE: Performed by: INTERNAL MEDICINE

## 2024-11-22 PROCEDURE — 96372 THER/PROPH/DIAG INJ SC/IM: CPT | Performed by: INTERNAL MEDICINE

## 2024-11-22 RX ORDER — CYANOCOBALAMIN 1000 UG/ML
1000 INJECTION, SOLUTION INTRAMUSCULAR; SUBCUTANEOUS ONCE
Status: COMPLETED | OUTPATIENT
Start: 2024-11-22 | End: 2024-11-22

## 2024-11-22 RX ADMIN — CYANOCOBALAMIN 1000 MCG: 1000 INJECTION, SOLUTION INTRAMUSCULAR; SUBCUTANEOUS at 12:51

## 2024-11-29 ENCOUNTER — NURSE ONLY (OUTPATIENT)
Age: 89
End: 2024-11-29
Payer: MEDICARE

## 2024-11-29 DIAGNOSIS — E53.8 B12 DEFICIENCY: Primary | ICD-10-CM

## 2024-11-29 PROCEDURE — PBSHW PBB SHADOW CHARGE: Performed by: INTERNAL MEDICINE

## 2024-11-29 PROCEDURE — 96372 THER/PROPH/DIAG INJ SC/IM: CPT | Performed by: INTERNAL MEDICINE

## 2024-11-29 RX ORDER — CYANOCOBALAMIN 1000 UG/ML
1000 INJECTION, SOLUTION INTRAMUSCULAR; SUBCUTANEOUS ONCE
Status: COMPLETED | OUTPATIENT
Start: 2024-11-29 | End: 2024-11-29

## 2024-11-29 RX ADMIN — CYANOCOBALAMIN 1000 MCG: 1000 INJECTION, SOLUTION INTRAMUSCULAR; SUBCUTANEOUS at 11:03

## 2024-12-06 ENCOUNTER — NURSE ONLY (OUTPATIENT)
Age: 89
End: 2024-12-06
Payer: MEDICARE

## 2024-12-06 DIAGNOSIS — E53.8 B12 DEFICIENCY: Primary | ICD-10-CM

## 2024-12-06 PROCEDURE — PBSHW PBB SHADOW CHARGE: Performed by: INTERNAL MEDICINE

## 2024-12-06 PROCEDURE — 96372 THER/PROPH/DIAG INJ SC/IM: CPT | Performed by: INTERNAL MEDICINE

## 2024-12-06 RX ORDER — CYANOCOBALAMIN 1000 UG/ML
1000 INJECTION, SOLUTION INTRAMUSCULAR; SUBCUTANEOUS ONCE
Status: COMPLETED | OUTPATIENT
Start: 2024-12-06 | End: 2024-12-06

## 2024-12-06 RX ADMIN — CYANOCOBALAMIN 1000 MCG: 1000 INJECTION, SOLUTION INTRAMUSCULAR; SUBCUTANEOUS at 12:08

## 2024-12-06 NOTE — PROGRESS NOTES
After obtaining consent, and per  verbal orders of Lorrie Krueger MD, injection of b12 given in Left deltoid by Yane Floyd MA. Patient instructed to remain in clinic for 20 minutes afterwards, and to report any adverse reaction to me immediately.

## 2024-12-16 ENCOUNTER — TELEPHONE (OUTPATIENT)
Age: 89
End: 2024-12-16

## 2024-12-16 DIAGNOSIS — E53.8 B12 DEFICIENCY: Primary | ICD-10-CM

## 2024-12-20 DIAGNOSIS — E53.8 B12 DEFICIENCY: ICD-10-CM

## 2024-12-20 LAB
FOLATE SERPL-MCNC: 11.4 NG/ML (ref 5–21)
VIT B12 SERPL-MCNC: 531 PG/ML (ref 193–986)

## 2025-01-13 ENCOUNTER — TELEPHONE (OUTPATIENT)
Age: 89
End: 2025-01-13

## 2025-01-13 NOTE — TELEPHONE ENCOUNTER
Pt's wife called in states  experiencing cold symptoms and problems with both legs. Pt's wife states pt has been alone since 01/02/25 due to wife being in ED and at [facility] care not sure what symptoms pt is experiencing exactly.     Pt's wife states would like a call from nurse to advise on next steps.

## 2025-01-13 NOTE — TELEPHONE ENCOUNTER
Spoke to pt's wife using two pt identifiers.  Pt's wife states she broke some ribs about 12 days ago and went to [facility] so pt has been with a caregiver during the week and with his daughter on the weekend.  Pt's wife states she was informed yesterday that pt is not feeling well, experiencing cough/runny nose, denies fever but does have a bilateral rash on bottom half of both legs.Pt's wife states she is still at rehab and asked to call caregiver currently with pt.    Called and spoke to joon and caregiver. Pt states not feeling well and has exteremly itchy legs but pt is not a good historian due to memory impairment. Spoke to caregiver who states she doesn't know when symptoms started or any information about pt;s condition due to her not working weekends. Unable to contact pt's daughter due to not being on HIPAA.    No appt's available with PCP.    Pt should be evaluated by [facility] due to unclear information on pt's conditon and due to already having previous health issues it makes him high risk for other issues.    Pt's caregiver and wife were informed to call [facility] and they were provided with the phone number to call.     Pt's wife states understanding, no further questions.

## 2025-01-14 ENCOUNTER — TELEPHONE (OUTPATIENT)
Age: 89
End: 2025-01-14

## 2025-01-14 DIAGNOSIS — R05.2 SUBACUTE COUGH: ICD-10-CM

## 2025-01-14 DIAGNOSIS — U07.1 COVID-19: Primary | ICD-10-CM

## 2025-01-14 NOTE — TELEPHONE ENCOUNTER
Wife is asking for a call back with a suggestion as to what to get or do.  Is there another medication that is less expensive.      Please call as soon as possible.

## 2025-01-14 NOTE — TELEPHONE ENCOUNTER
On-call page:    Patient's wife calling with question about Legevrio which was prescribed for pt by [facility] vs. Paxlovid. All questions answered.     Flakito Fairbanks MD

## 2025-01-14 NOTE — TELEPHONE ENCOUNTER
Spoke to pt using two pt identifiers.  Pt states pt is feeling a little better but not great. Pt states his symptoms started Saturday, a little productive cough but denies fever or diarrhea.      Pt's wife states his confusion is not out of the normal.

## 2025-01-14 NOTE — TELEPHONE ENCOUNTER
Pt wife called    States [facility]  prescribed    States it cost over $1000     Asks if it is really recommended or is there another option      [phone]

## 2025-01-15 NOTE — TELEPHONE ENCOUNTER
Calling back,     States is there med for symptoms then?    Symptoms include:  Congestion  Coughing  Denies fever    Symptoms not worse, but not better.  Onset was Saturday.      Please call pt wife back to advise.              [id number] [place], [place] - [address] [place] - P [phone] - F [phone]

## 2025-01-16 RX ORDER — BENZONATATE 200 MG/1
200 CAPSULE ORAL 3 TIMES DAILY PRN
Qty: 30 CAPSULE | Refills: 0 | Status: SHIPPED | OUTPATIENT
Start: 2025-01-16 | End: 2025-01-23

## 2025-01-17 ENCOUNTER — TELEPHONE (OUTPATIENT)
Age: 89
End: 2025-01-17

## 2025-01-17 NOTE — TELEPHONE ENCOUNTER
Patient's spouse called to ask that the prescription be sent back in the [facility] for a cough.    Please call patient's spouse to let them know the prescription has been sent in.

## 2025-01-17 NOTE — TELEPHONE ENCOUNTER
Spoke to pt and used two pt identifiers.  Patient notified of response from Lorrie Krueger MD    States understanding and states he does not have any one with him or anyone that could get the medication for him.    Spoke to pt's wife and informed her medication was sent in and she states she will try to get someone to get the medication for him.

## 2025-01-24 ENCOUNTER — TELEPHONE (OUTPATIENT)
Age: 89
End: 2025-01-24

## 2025-01-24 NOTE — TELEPHONE ENCOUNTER
Pt's wife called in requesting call from pcp. Pt's wife states pt still not feeling well and going on the 15th day of covid symptoms. Pt experiencing serve cough, fever of 100.8, and nose bleeds. Wife not sure if eliquis is causing the nose bleeds.     Please call to advise on next steps.

## 2025-01-27 ENCOUNTER — TELEPHONE (OUTPATIENT)
Age: 89
End: 2025-01-27

## 2025-01-27 ENCOUNTER — HOSPITAL ENCOUNTER (EMERGENCY)
Facility: HOSPITAL | Age: 89
Discharge: HOME | End: 2025-01-27
Payer: MEDICARE

## 2025-01-27 VITALS
RESPIRATION RATE: 18 BRPM | SYSTOLIC BLOOD PRESSURE: 152 MMHG | HEART RATE: 69 BPM | BODY MASS INDEX: 27.55 KG/M2 | TEMPERATURE: 98.9 F | OXYGEN SATURATION: 93 % | WEIGHT: 186 LBS | DIASTOLIC BLOOD PRESSURE: 64 MMHG | HEIGHT: 69 IN

## 2025-01-27 DIAGNOSIS — R04.0 EPISTAXIS: Primary | ICD-10-CM

## 2025-01-27 PROCEDURE — 99283 EMERGENCY DEPT VISIT LOW MDM: CPT

## 2025-01-27 PROCEDURE — 6370000000 HC RX 637 (ALT 250 FOR IP)

## 2025-01-27 RX ORDER — OXYMETAZOLINE HYDROCHLORIDE 0.05 G/100ML
2 SPRAY NASAL
Status: COMPLETED | OUTPATIENT
Start: 2025-01-27 | End: 2025-01-27

## 2025-01-27 RX ADMIN — OXYMETAZOLINE HYDROCHLORIDE 2 SPRAY: 0.5 SPRAY NASAL at 10:22

## 2025-01-27 ASSESSMENT — PAIN - FUNCTIONAL ASSESSMENT: PAIN_FUNCTIONAL_ASSESSMENT: NONE - DENIES PAIN

## 2025-01-27 NOTE — DISCHARGE INSTRUCTIONS
Continue to monitor for recurrence of bleeding and compress nose with any recurrence.  If bleeding is severe or does not stop after compression, return to ER.  Also return to ER with any other worsening signs or symptoms such as lightheadedness, passing out, chest pain, shortness of breath, or other acute complaints

## 2025-01-27 NOTE — TELEPHONE ENCOUNTER
Pt has had nose bleed since 4 am today.  Has had [facility] twice Sat & Sun.    Please call to advise.

## 2025-01-27 NOTE — ED PROVIDER NOTES
agreed upon. The patient is to follow up as recommended or return to ER should their symptoms worsen.      PATIENT REFERRED TO:  Lorrie Krueger MD  [address]  [place] [address]  [phone]    Call   As needed        DISCHARGE MEDICATIONS:     Medication List        ASK your doctor about these medications      albuterol sulfate  (90 Base) MCG/ACT inhaler  Commonly known as: PROVENTIL;VENTOLIN;PROAIR     apixaban 5 MG Tabs tablet  Commonly known as: Eliquis  Take 1 tablet by mouth 2 times daily     Dilt- MG extended release capsule  Generic drug: dilTIAZem  TAKE ONE CAPSULE BY MOUTH ONE TIME DAILY; [facility] CALL TO SCHEDULE FOLLOW UP PRIOR TO FURTHER REFILLS     donepezil 10 MG tablet  Commonly known as: ARICEPT  Take 1 tablet by mouth nightly     finasteride 5 MG tablet  Commonly known as: PROSCAR     lisinopril 5 MG tablet  Commonly known as: PRINIVIL;ZESTRIL  Take 1 tablet by mouth daily     memantine 5 MG tablet  Commonly known as: NAMENDA  Take 1 tablet by mouth 2 times daily     Simbrinza 1-0.2 % Susp  Generic drug: brinzolamide-brimonidine     tamsulosin 0.4 MG capsule  Commonly known as: FLOMAX  TAKE ONE CAPSULE BY MOUTH EVERYNIGHT                DISCONTINUED MEDICATIONS:  Current Discharge Medication List        I have seen and evaluated the patient autonomously. My supervision physician was on site and available for consultation if needed.    I am the Primary Clinician of Record: Reyna Tena PA-C (electronically signed)    (Please note that parts of this dictation were completed with voice recognition software. Quite often unanticipated grammatical, syntax, homophones, and other interpretive errors are inadvertently transcribed by the computer software. Please disregards these errors. Please excuse any errors that have escaped final proofreading.)      Reyna Tena PA-C  01/27/25 1006       Reyna Tena PA-C  01/27/25 2759

## 2025-01-27 NOTE — TELEPHONE ENCOUNTER
Pts wife called reporting intermittent nose bleed last 3 days. Bleeding again sionce 4 am . Eliquis was held but restarted by [facility] yesterday. Bleeding despite constant pressure to nose. Advised ER today for nose packing

## 2025-01-27 NOTE — ED NOTES
Assumed care of patient. Patient reports epistaxis since 4AM this morning. Patient does endorse being on blood thinners. Patient on the monitor x2 with call bell within reach and side rails x2.

## 2025-01-28 ENCOUNTER — TELEPHONE (OUTPATIENT)
Age: 89
End: 2025-01-28

## 2025-01-28 DIAGNOSIS — R04.0 NASAL BLEEDING: Primary | ICD-10-CM

## 2025-01-28 NOTE — TELEPHONE ENCOUNTER
Pt's wife called in states pt went to ED yesterday (1/27/25) for nose bleed, states nose stopped bleeding last night but now pt has blood clout in nose. Pt's wife unsure how to move forward with care.      Please call to advise on next steps.

## 2025-01-29 NOTE — TELEPHONE ENCOUNTER
Spoke to pt and used two pt identifiers.  Patient notified of response from Lorrie Krueger MD    States understanding

## 2025-02-03 ENCOUNTER — TELEPHONE (OUTPATIENT)
Age: 89
End: 2025-02-03

## 2025-02-03 NOTE — TELEPHONE ENCOUNTER
Patients wife is calling because patient had a heavy nose bleed last week and went ER and then was sent home. The ER stopped the Eliquis and she needs to know when the patient suppose to start back taking the Eliquis. They would like a call back ASA.    Phone # [phone]

## 2025-02-04 ENCOUNTER — HOSPITAL ENCOUNTER (EMERGENCY)
Facility: HOSPITAL | Age: 89
Discharge: LEFT BEFORE DISCHARGE | End: 2025-02-04

## 2025-02-04 VITALS
WEIGHT: 170.86 LBS | BODY MASS INDEX: 25.23 KG/M2 | SYSTOLIC BLOOD PRESSURE: 175 MMHG | HEART RATE: 89 BPM | RESPIRATION RATE: 18 BRPM | TEMPERATURE: 97.5 F | DIASTOLIC BLOOD PRESSURE: 89 MMHG | OXYGEN SATURATION: 95 %

## 2025-02-04 NOTE — TELEPHONE ENCOUNTER
Attempted to reach patient by telephone. A message was left advising OTC saline spray, vaseline in nostrils and humidifier as well as ENT follow up.

## 2025-02-06 NOTE — TELEPHONE ENCOUNTER
Kendra Orellana MD  You2 days ago       Restart now since its been a week  Reassure that nosebleeds look like a lot but usually not very much blood  As long as otherwise asymptomatic , resume OAC and saline nose drops       Verified patient with two types of identifiers. Spoke to Mr. Nielsen and went over recommendations for helping prevent nosebleeds at home. He is feeling well and has not had any issues in the last couple days. We scheduled overdue follow up. He was appreciative of the call.     Future Appointments   Date Time Provider Department Center   4/10/2025  1:00 PM Kendra Orellana MD [facility] AMB

## 2025-02-12 ENCOUNTER — TELEPHONE (OUTPATIENT)
Age: 89
End: 2025-02-12

## 2025-02-12 NOTE — TELEPHONE ENCOUNTER
Pt's son called in states paperwork was sent over for long term care and would like an update.     Please call to discuss.      Jt flores Jr.- [phone]

## 2025-02-13 NOTE — TELEPHONE ENCOUNTER
Spoke to pt's wife (Keli)  Pt's wife was informed we have no received any forms.  Keli was provided with the fax number to resend forms.

## 2025-02-13 NOTE — TELEPHONE ENCOUNTER
Keli is calling and wants update on forms.    She states that her son knows more.  This has to do with long term care.     Please call Jt or Ric back to give the info.

## 2025-02-21 ENCOUNTER — APPOINTMENT (OUTPATIENT)
Facility: HOSPITAL | Age: 89
End: 2025-02-21
Payer: MEDICARE

## 2025-02-21 ENCOUNTER — HOSPITAL ENCOUNTER (OUTPATIENT)
Facility: HOSPITAL | Age: 89
Setting detail: OBSERVATION
Discharge: HOME | End: 2025-02-25
Attending: STUDENT IN AN ORGANIZED HEALTH CARE EDUCATION/TRAINING PROGRAM | Admitting: STUDENT IN AN ORGANIZED HEALTH CARE EDUCATION/TRAINING PROGRAM
Payer: MEDICARE
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
## 2025-02-21 DIAGNOSIS — M79.672 BILATERAL FOOT PAIN: Chronic | ICD-10-CM

## 2025-02-21 DIAGNOSIS — G62.9 PERIPHERAL POLYNEUROPATHY: ICD-10-CM

## 2025-02-21 DIAGNOSIS — R53.1 GENERALIZED WEAKNESS: ICD-10-CM

## 2025-02-21 DIAGNOSIS — N17.9 ACUTE KIDNEY INJURY: ICD-10-CM

## 2025-02-21 DIAGNOSIS — I48.0 PAF (PAROXYSMAL ATRIAL FIBRILLATION) (HCC): ICD-10-CM

## 2025-02-21 DIAGNOSIS — M79.671 BILATERAL FOOT PAIN: Chronic | ICD-10-CM

## 2025-02-21 LAB
ALBUMIN SERPL-MCNC: 2.9 G/DL (ref 3.5–5)
ALBUMIN/GLOB SERPL: 0.8 (ref 1.1–2.2)
ALP SERPL-CCNC: 77 U/L (ref 45–117)
ALT SERPL-CCNC: 12 U/L (ref 12–78)
ANION GAP SERPL CALC-SCNC: 2 MMOL/L (ref 2–12)
AST SERPL-CCNC: 12 U/L (ref 15–37)
BASOPHILS # BLD: 0.11 K/UL (ref 0–0.1)
BASOPHILS NFR BLD: 1 % (ref 0–1)
BILIRUB SERPL-MCNC: 0.9 MG/DL (ref 0.2–1)
BUN SERPL-MCNC: 33 MG/DL (ref 6–20)
BUN/CREAT SERPL: 17 (ref 12–20)
CALCIUM SERPL-MCNC: 8.8 MG/DL (ref 8.5–10.1)
CHLORIDE SERPL-SCNC: 110 MMOL/L (ref 97–108)
CK SERPL-CCNC: 109 U/L (ref 39–308)
CO2 SERPL-SCNC: 29 MMOL/L (ref 21–32)
COMMENT:: NORMAL
CREAT SERPL-MCNC: 1.94 MG/DL (ref 0.7–1.3)
DIFFERENTIAL METHOD BLD: ABNORMAL
EOSINOPHIL # BLD: 0 K/UL (ref 0–0.4)
EOSINOPHIL NFR BLD: 0 % (ref 0–7)
ERYTHROCYTE [DISTWIDTH] IN BLOOD BY AUTOMATED COUNT: 15.2 % (ref 11.5–14.5)
GLOBULIN SER CALC-MCNC: 3.8 G/DL (ref 2–4)
GLUCOSE SERPL-MCNC: 103 MG/DL (ref 65–100)
HCT VFR BLD AUTO: 34.2 % (ref 36.6–50.3)
HGB BLD-MCNC: 10.8 G/DL (ref 12.1–17)
IMM GRANULOCYTES # BLD AUTO: 0 K/UL (ref 0–0.04)
IMM GRANULOCYTES NFR BLD AUTO: 0 % (ref 0–0.5)
INR PPP: 1.1 (ref 0.9–1.1)
LYMPHOCYTES # BLD: 3.29 K/UL (ref 0.8–3.5)
LYMPHOCYTES NFR BLD: 31 % (ref 12–49)
MAGNESIUM SERPL-MCNC: 2.2 MG/DL (ref 1.6–2.4)
MCH RBC QN AUTO: 27.8 PG (ref 26–34)
MCHC RBC AUTO-ENTMCNC: 31.6 G/DL (ref 30–36.5)
MCV RBC AUTO: 87.9 FL (ref 80–99)
MONOCYTES # BLD: 0.32 K/UL (ref 0–1)
MONOCYTES NFR BLD: 3 % (ref 5–13)
NEUTS SEG # BLD: 6.88 K/UL (ref 1.8–8)
NEUTS SEG NFR BLD: 65 % (ref 32–75)
NRBC # BLD: 0 K/UL (ref 0–0.01)
NRBC BLD-RTO: 0 PER 100 WBC
PLATELET # BLD AUTO: 170 K/UL (ref 150–400)
PMV BLD AUTO: 9.3 FL (ref 8.9–12.9)
POTASSIUM SERPL-SCNC: 5.1 MMOL/L (ref 3.5–5.1)
PROT SERPL-MCNC: 6.7 G/DL (ref 6.4–8.2)
PROTHROMBIN TIME: 11.3 SEC (ref 9.2–11.2)
RBC # BLD AUTO: 3.89 M/UL (ref 4.1–5.7)
RBC MORPH BLD: ABNORMAL
SODIUM SERPL-SCNC: 141 MMOL/L (ref 136–145)
SPECIMEN HOLD: NORMAL
WBC # BLD AUTO: 10.6 K/UL (ref 4.1–11.1)

## 2025-02-21 PROCEDURE — 81001 URINALYSIS AUTO W/SCOPE: CPT

## 2025-02-21 PROCEDURE — 99285 EMERGENCY DEPT VISIT HI MDM: CPT

## 2025-02-21 PROCEDURE — 80053 COMPREHEN METABOLIC PANEL: CPT

## 2025-02-21 PROCEDURE — 73630 X-RAY EXAM OF FOOT: CPT

## 2025-02-21 PROCEDURE — 83735 ASSAY OF MAGNESIUM: CPT

## 2025-02-21 PROCEDURE — 85610 PROTHROMBIN TIME: CPT

## 2025-02-21 PROCEDURE — 82550 ASSAY OF CK (CPK): CPT

## 2025-02-21 PROCEDURE — 72170 X-RAY EXAM OF PELVIS: CPT

## 2025-02-21 PROCEDURE — 85025 COMPLETE CBC W/AUTO DIFF WBC: CPT

## 2025-02-21 PROCEDURE — 70450 CT HEAD/BRAIN W/O DYE: CPT

## 2025-02-21 PROCEDURE — 36415 COLL VENOUS BLD VENIPUNCTURE: CPT

## 2025-02-21 PROCEDURE — 72125 CT NECK SPINE W/O DYE: CPT

## 2025-02-21 PROCEDURE — 71045 X-RAY EXAM CHEST 1 VIEW: CPT

## 2025-02-21 RX ORDER — 0.9 % SODIUM CHLORIDE 0.9 %
1000 INTRAVENOUS SOLUTION INTRAVENOUS ONCE
Status: COMPLETED | OUTPATIENT
Start: 2025-02-21 | End: 2025-02-22

## 2025-02-21 ASSESSMENT — PAIN - FUNCTIONAL ASSESSMENT: PAIN_FUNCTIONAL_ASSESSMENT: 0-10

## 2025-02-21 ASSESSMENT — PAIN SCALES - GENERAL: PAINLEVEL_OUTOF10: 0

## 2025-02-22 PROBLEM — N17.9 ACUTE KIDNEY INJURY: Status: ACTIVE | Noted: 2025-02-22

## 2025-02-22 LAB
APPEARANCE UR: CLEAR
BACTERIA URNS QL MICRO: NEGATIVE /HPF
BILIRUB UR QL: NEGATIVE
COLOR UR: ABNORMAL
EPITH CASTS URNS QL MICRO: ABNORMAL /LPF
GLUCOSE UR STRIP.AUTO-MCNC: NEGATIVE MG/DL
HGB UR QL STRIP: ABNORMAL
HYALINE CASTS URNS QL MICRO: ABNORMAL /LPF (ref 0–2)
KETONES UR QL STRIP.AUTO: NEGATIVE MG/DL
LEUKOCYTE ESTERASE UR QL STRIP.AUTO: NEGATIVE
NITRITE UR QL STRIP.AUTO: NEGATIVE
PH UR STRIP: 5.5 (ref 5–8)
PROT UR STRIP-MCNC: 30 MG/DL
RBC #/AREA URNS HPF: ABNORMAL /HPF (ref 0–5)
SP GR UR REFRACTOMETRY: 1.02
URINE CULTURE IF INDICATED: ABNORMAL
UROBILINOGEN UR QL STRIP.AUTO: 0.2 EU/DL (ref 0.2–1)
WBC URNS QL MICRO: ABNORMAL /HPF (ref 0–4)

## 2025-02-22 PROCEDURE — 6370000000 HC RX 637 (ALT 250 FOR IP): Performed by: STUDENT IN AN ORGANIZED HEALTH CARE EDUCATION/TRAINING PROGRAM

## 2025-02-22 PROCEDURE — G0378 HOSPITAL OBSERVATION PER HR: HCPCS

## 2025-02-22 PROCEDURE — 96360 HYDRATION IV INFUSION INIT: CPT

## 2025-02-22 PROCEDURE — 2580000003 HC RX 258: Performed by: STUDENT IN AN ORGANIZED HEALTH CARE EDUCATION/TRAINING PROGRAM

## 2025-02-22 PROCEDURE — 2500000003 HC RX 250 WO HCPCS: Performed by: STUDENT IN AN ORGANIZED HEALTH CARE EDUCATION/TRAINING PROGRAM

## 2025-02-22 RX ORDER — ENOXAPARIN SODIUM 100 MG/ML
30 INJECTION SUBCUTANEOUS DAILY
Status: DISCONTINUED | OUTPATIENT
Start: 2025-02-22 | End: 2025-02-22

## 2025-02-22 RX ORDER — SODIUM CHLORIDE 0.9 % (FLUSH) 0.9 %
5-40 SYRINGE (ML) INJECTION PRN
Status: DISCONTINUED | OUTPATIENT
Start: 2025-02-22 | End: 2025-02-25 | Stop reason: HOSPADM

## 2025-02-22 RX ORDER — SODIUM CHLORIDE 9 MG/ML
INJECTION, SOLUTION INTRAVENOUS PRN
Status: DISCONTINUED | OUTPATIENT
Start: 2025-02-22 | End: 2025-02-25 | Stop reason: HOSPADM

## 2025-02-22 RX ORDER — DILTIAZEM HYDROCHLORIDE 120 MG/1
120 CAPSULE, COATED, EXTENDED RELEASE ORAL DAILY
Status: DISCONTINUED | OUTPATIENT
Start: 2025-02-22 | End: 2025-02-25 | Stop reason: HOSPADM

## 2025-02-22 RX ORDER — ONDANSETRON 2 MG/ML
4 INJECTION INTRAMUSCULAR; INTRAVENOUS EVERY 6 HOURS PRN
Status: DISCONTINUED | OUTPATIENT
Start: 2025-02-22 | End: 2025-02-25 | Stop reason: HOSPADM

## 2025-02-22 RX ORDER — MEMANTINE HYDROCHLORIDE 5 MG/1
5 TABLET ORAL 2 TIMES DAILY
Status: DISCONTINUED | OUTPATIENT
Start: 2025-02-22 | End: 2025-02-25 | Stop reason: HOSPADM

## 2025-02-22 RX ORDER — ACETAMINOPHEN 325 MG/1
650 TABLET ORAL EVERY 4 HOURS PRN
Status: DISCONTINUED | OUTPATIENT
Start: 2025-02-21 | End: 2025-02-24 | Stop reason: SDUPTHER

## 2025-02-22 RX ORDER — SODIUM CHLORIDE 0.9 % (FLUSH) 0.9 %
5-40 SYRINGE (ML) INJECTION EVERY 12 HOURS SCHEDULED
Status: DISCONTINUED | OUTPATIENT
Start: 2025-02-22 | End: 2025-02-25 | Stop reason: HOSPADM

## 2025-02-22 RX ORDER — DONEPEZIL HYDROCHLORIDE 5 MG/1
10 TABLET, FILM COATED ORAL NIGHTLY
Status: DISCONTINUED | OUTPATIENT
Start: 2025-02-22 | End: 2025-02-25 | Stop reason: HOSPADM

## 2025-02-22 RX ORDER — LISINOPRIL 5 MG/1
5 TABLET ORAL DAILY
Status: DISCONTINUED | OUTPATIENT
Start: 2025-02-22 | End: 2025-02-25 | Stop reason: HOSPADM

## 2025-02-22 RX ORDER — ACETAMINOPHEN 650 MG/1
650 SUPPOSITORY RECTAL EVERY 6 HOURS PRN
Status: DISCONTINUED | OUTPATIENT
Start: 2025-02-22 | End: 2025-02-25 | Stop reason: HOSPADM

## 2025-02-22 RX ORDER — ACETAMINOPHEN 325 MG/1
650 TABLET ORAL EVERY 6 HOURS PRN
Status: DISCONTINUED | OUTPATIENT
Start: 2025-02-22 | End: 2025-02-25 | Stop reason: HOSPADM

## 2025-02-22 RX ORDER — TAMSULOSIN HYDROCHLORIDE 0.4 MG/1
0.4 CAPSULE ORAL DAILY
Status: DISCONTINUED | OUTPATIENT
Start: 2025-02-22 | End: 2025-02-25 | Stop reason: HOSPADM

## 2025-02-22 RX ADMIN — APIXABAN 2.5 MG: 2.5 TABLET, FILM COATED ORAL at 20:31

## 2025-02-22 RX ADMIN — APIXABAN 2.5 MG: 2.5 TABLET, FILM COATED ORAL at 10:08

## 2025-02-22 RX ADMIN — DONEPEZIL HYDROCHLORIDE 10 MG: 5 TABLET, FILM COATED ORAL at 20:31

## 2025-02-22 RX ADMIN — MEMANTINE 5 MG: 10 TABLET ORAL at 20:31

## 2025-02-22 RX ADMIN — SODIUM CHLORIDE 1000 ML: 0.9 INJECTION, SOLUTION INTRAVENOUS at 00:06

## 2025-02-22 RX ADMIN — DILTIAZEM HYDROCHLORIDE 120 MG: 120 CAPSULE, COATED, EXTENDED RELEASE ORAL at 10:08

## 2025-02-22 RX ADMIN — MEMANTINE 5 MG: 10 TABLET ORAL at 10:06

## 2025-02-22 RX ADMIN — ACETAMINOPHEN 650 MG: 325 TABLET ORAL at 01:19

## 2025-02-22 RX ADMIN — ACETAMINOPHEN 650 MG: 325 TABLET ORAL at 12:24

## 2025-02-22 RX ADMIN — TAMSULOSIN HYDROCHLORIDE 0.4 MG: 0.4 CAPSULE ORAL at 10:06

## 2025-02-22 RX ADMIN — SODIUM CHLORIDE, PRESERVATIVE FREE 10 ML: 5 INJECTION INTRAVENOUS at 20:35

## 2025-02-22 ASSESSMENT — PAIN SCALES - GENERAL
PAINLEVEL_OUTOF10: 0
PAINLEVEL_OUTOF10: 0
PAINLEVEL_OUTOF10: 8

## 2025-02-22 ASSESSMENT — PAIN DESCRIPTION - ORIENTATION: ORIENTATION: LEFT;RIGHT

## 2025-02-22 ASSESSMENT — PAIN SCALES - PAIN ASSESSMENT IN ADVANCED DEMENTIA (PAINAD)
TOTALSCORE: 0
BODYLANGUAGE: RELAXED
FACIALEXPRESSION: SMILING OR INEXPRESSIVE
BREATHING: NORMAL
CONSOLABILITY: NO NEED TO CONSOLE

## 2025-02-22 ASSESSMENT — PAIN DESCRIPTION - LOCATION: LOCATION: FOOT

## 2025-02-22 ASSESSMENT — PAIN DESCRIPTION - DESCRIPTORS: DESCRIPTORS: SHARP;PINS AND NEEDLES

## 2025-02-22 NOTE — PROGRESS NOTES
Attempted to see pt for OT services and pt is transferring out of the ED.  Will defer but continue to follow.

## 2025-02-22 NOTE — ED NOTES
Assumed care of patient at this time. Report received from JENNIFER Crain. Patient placed on bed alarm att, resting quietly in stretcher lying supine. Bed locked and in low position, side rails x2, monitor x3, call bell within reach. Room light dimmed, door open for direct visualization of pt.

## 2025-02-22 NOTE — PROGRESS NOTES
End of Shift Note    Bedside shift change report given to JENNIFER Abbott (oncoming nurse) by Azalia Barajas LPN (offgoing nurse).  Report included the following information SBAR and MAR    Shift worked:  6705-1726     Shift summary and any significant changes:     Patient is resting in bed. No complaints of pain. No bowel movement.      Concerns for physician to address:  None     Zone phone for oncoming shift:   [phone]       Activity:     Number times ambulated in hallways past shift: 0  Number of times OOB to chair past shift: 0    Cardiac:   Cardiac Monitoring: Yes           Access:  Current line(s): Other No IV access    Genitourinary:   Urinary Status: Voiding, External catheter    Respiratory:   O2 Device: None (Room air)  Chronic home O2 use?: NO  Incentive spirometer at bedside: NO    GI:     Current diet:  ADULT DIET; Regular  Passing flatus: YES    Pain Management:   Patient states pain is manageable on current regimen: YES    Skin:  Ayden Scale Score: 18  Interventions: Wound Offloading (Prevention Methods): Repositioning, Pillows, Turning    Patient Safety:  Fall Risk: Nursing Judgement-Fall Risk High(Add Comments): Yes  Fall Risk Interventions  Nursing Judgement-Fall Risk High(Add Comments): Yes  Toilet Every 2 Hours-In Advance of Need: Yes  Hourly Visual Checks: In bed, Awake  Fall Visual Posted: Socks  Room Door Open: Yes  Alarm On: Bed  Patient Moved Closer to Nursing Station: No    Active Consults:   None    Length of Stay:  Expected LOS: 2  Actual LOS: 0    Azalia Barajas LPN

## 2025-02-22 NOTE — ED NOTES
Pt placed on malewick device at this time, brief changed. Linens remain clean and dry. Bed locked and in low position, call bell within reach, side rails x2, bed alarm engaged.

## 2025-02-22 NOTE — ED NOTES
Report received from JENNIFER Ackerman. Reviewed reason for patient arrival, vitals, labs, medications, orders, procedures, results, pending orders/results and current plan for disposition. Questions were asked and answered prior to departure.

## 2025-02-22 NOTE — ED NOTES
Pt reporting discomfort to BL LE. Patient offloaded with pillows to right side. Additional pillows placed under legs.

## 2025-02-22 NOTE — H&P
Objective:   VITALS:    Patient Vitals for the past 24 hrs:   BP Temp Temp src Pulse Resp SpO2 Height Weight   25 0245 (!) 161/63 -- -- 70 18 90 % -- --   25 0200 120/77 -- -- 75 18 94 % -- --   25 0100 (!) 152/65 -- -- 76 18 92 % -- --   25 0000 135/84 98.5 °F (36.9 °C) Oral 73 18 96 % -- --   25 2315 (!) 164/86 -- -- -- -- 95 % -- --   25 2300 -- -- -- -- -- 94 % -- --   250 (!) 163/73 -- -- -- -- -- -- --   25 (!) 172/80 -- -- -- -- -- -- --   25 (!) 164/79 -- -- -- -- -- -- --   25 (!) 163/82 -- -- -- -- 95 % -- --   25 (!) 149/69 -- -- -- -- 93 % -- --   25 -- -- -- -- -- 95 % -- --   25 (!) 155/83 98.1 °F (36.7 °C) Oral 78 16 95 % 1.702 m (5' 7\") 78.1 kg (172 lb 2.9 oz)       Temp (24hrs), Av.3 °F (36.8 °C), Min:98.1 °F (36.7 °C), Max:98.5 °F (36.9 °C)             Wt Readings from Last 12 Encounters:   25 78.1 kg (172 lb 2.9 oz)   25 77.5 kg (170 lb 13.7 oz)   25 84.4 kg (186 lb)   10/22/24 84.6 kg (186 lb 9.6 oz)   24 88.9 kg (196 lb)   24 87.5 kg (193 lb)   24 85.2 kg (187 lb 12.8 oz)   24 85.3 kg (188 lb)   24 86.5 kg (190 lb 11.2 oz)   24 88 kg (194 lb)   24 88.5 kg (195 lb 3.2 oz)   23 88.5 kg (195 lb)         PHYSICAL EXAM:  Physical Exam  Constitutional:       General: He is not in acute distress.     Appearance: Normal appearance.   HENT:      Nose: Nose normal.      Mouth/Throat:      Mouth: Mucous membranes are dry.   Cardiovascular:      Rate and Rhythm: Normal rate and regular rhythm.      Pulses: Normal pulses.      Heart sounds: Normal heart sounds. No murmur heard.     No friction rub. No gallop.   Pulmonary:      Effort: Pulmonary effort is normal.      Breath sounds: Normal breath sounds. No wheezing, rhonchi or rales.   Abdominal:      General: Abdomen is flat. There is no distension.      Palpations:

## 2025-02-22 NOTE — ED NOTES
Verbal (telephone) report given to Mara (oncoming nurse) by Jennifer (offgoing nurse) for transfer of care to inpatient unit. Report included the following information Nurse Handoff Report, ED Encounter Summary, ED SBAR, Adult Overview, Intake/Output, MAR, Recent Results, and Neuro Assessment, any outstanding orders to be completed.

## 2025-02-22 NOTE — ED PROVIDER NOTES
Tobacco Use: Former     Smokeless Tobacco Use: Former     Passive Exposure: Past   Alcohol Use: Not At Risk (1/27/2025)    AUDIT-C     Frequency of Alcohol Consumption: Never     Average Number of Drinks: Patient does not drink     Frequency of Binge Drinking: Never   Financial Resource Strain: Low Risk  (1/5/2024)    Overall Financial Resource Strain (CARDIA)     Difficulty of Paying Living Expenses: Not hard at all   Food Insecurity: No Food Insecurity (1/5/2024)    Hunger Vital Sign     Worried About Running Out of Food in the Last Year: Never true     Ran Out of Food in the Last Year: Never true   Transportation Needs: Unknown (1/5/2024)    PRAPARE - Transportation     Lack of Transportation (Medical): Not on file     Lack of Transportation (Non-Medical): No   Physical Activity: Inactive (1/5/2024)    Exercise Vital Sign     Days of Exercise per Week: 0 days     Minutes of Exercise per Session: 20 min   Stress: Not on file   Social Connections: Not on file   Intimate Partner Violence: Not on file   Depression: Not at risk (1/5/2024)    PHQ-2     PHQ-2 Score: 0   Housing Stability: Unknown (1/5/2024)    Housing Stability Vital Sign     Unable to Pay for Housing in the Last Year: Not on file     Number of Places Lived in the Last Year: Not on file     Unstable Housing in the Last Year: No   Interpersonal Safety: Not At Risk (1/27/2025)    Interpersonal Safety Domain Source: IP Abuse Screening     Physical abuse: Denies     Verbal abuse: Denies     Emotional abuse: Denies     Financial abuse: Denies     Sexual abuse: Denies   Utilities: Not on file       PHYSICAL EXAM   Physical Exam  Constitutional:       Comments: Awake, GCS 15   HENT:      Head: Normocephalic and atraumatic.      Nose: Nose normal.   Eyes:      Extraocular Movements: Extraocular movements intact.      Pupils: Pupils are equal, round, and reactive to light.   Neck:      Comments: Neck atraumatic, no c-spine TTP, no deformity, no step offs,

## 2025-02-22 NOTE — PROGRESS NOTES
Hospitalist Progress Note    NAME:   Jt Nielsen   : 1935   MRN: [id number]     Date/Time: 2025 12:06 PM  Patient PCP: Lorrie Krueger MD    Estimated discharge date:   Barriers: improvement in JUDY, PT/OT recs       Assessment / Plan:    Acute kidney injury   Presents with JUDY with Cr 1.94 up from prior 1.53   - IVF bolus given in the ED  - Will repeat BMP in AM   - Suspect for oral intake with dementia  - UA not indicative of UTI  - Hold PTA lisinopril   - If Cr not back to baseline, check renal US in AM      Ground level fall PTA   - On anticoagulation at home   - CT head negative  - CT cervical spine negative; right thyroid lobe nodule 1.5cm   - CXR wnl, noted atelectasis on left   - PT/OT evals      Thyroid nodule   - Seen incidentally on imaging today  - Check TSH, pending   - Will need follow up with PCP      Dementia  - Continue PTA Aricept and namenda     History of atrial fibrillation  Hypertension   -Continue Eliquis and diltiazem  -PTA lisinopril on hold with JUDY  -Monitor BP    BPH   -Continue flomax       Medical Decision Making:   I personally reviewed labs: cbc, bmp   I personally reviewed imaging: xr, CT   I personally reviewed EKG:  Toxic drug monitoring:   Discussed case with: RN        Code Status: Full  DVT Prophylaxis: Eliquis  GI Prophylaxis: None    Subjective:     Chief Complaint / Reason for Physician Visit  Patient seen at bedside in ED.  He has a history of dementia and is a very poor historian.  Unable to answer any questions.  No family at the bedside.     Discussed with RN events overnight.     Updated wife via phone. Discussed plan to eval with PT/OT. Possible DC home tomorrow pending recommendations. In agreement with plan of care as above.       Objective:     VITALS:   Last 24hrs VS reviewed since prior progress note. Most recent are:  Patient Vitals for the past 24 hrs:   BP Temp Temp src Pulse Resp SpO2 Height Weight   25 1100 (!) 165/58 -- --

## 2025-02-22 NOTE — ED NOTES
Report given to JENNIFER Crain. Nurse was informed of reason for arrival, vitals, labs, medications, orders, procedures, results, anything left pending and current plan of action. Questions were asked and received prior to departure from the patient.

## 2025-02-23 LAB
ALBUMIN SERPL-MCNC: 2.3 G/DL (ref 3.5–5)
ALBUMIN/GLOB SERPL: 0.6 (ref 1.1–2.2)
ALP SERPL-CCNC: 59 U/L (ref 45–117)
ALT SERPL-CCNC: 10 U/L (ref 12–78)
ANION GAP SERPL CALC-SCNC: 3 MMOL/L (ref 2–12)
AST SERPL-CCNC: 24 U/L (ref 15–37)
BASOPHILS # BLD: 0 K/UL (ref 0–0.1)
BASOPHILS NFR BLD: 0 % (ref 0–1)
BILIRUB SERPL-MCNC: 0.5 MG/DL (ref 0.2–1)
BUN SERPL-MCNC: 25 MG/DL (ref 6–20)
BUN/CREAT SERPL: 17 (ref 12–20)
CALCIUM SERPL-MCNC: 8.1 MG/DL (ref 8.5–10.1)
CHLORIDE SERPL-SCNC: 109 MMOL/L (ref 97–108)
CO2 SERPL-SCNC: 25 MMOL/L (ref 21–32)
CREAT SERPL-MCNC: 1.45 MG/DL (ref 0.7–1.3)
DIFFERENTIAL METHOD BLD: ABNORMAL
EOSINOPHIL # BLD: 0 K/UL (ref 0–0.4)
EOSINOPHIL NFR BLD: 0 % (ref 0–7)
ERYTHROCYTE [DISTWIDTH] IN BLOOD BY AUTOMATED COUNT: 14.8 % (ref 11.5–14.5)
GLOBULIN SER CALC-MCNC: 4 G/DL (ref 2–4)
GLUCOSE SERPL-MCNC: 110 MG/DL (ref 65–100)
HCT VFR BLD AUTO: 29.6 % (ref 36.6–50.3)
HGB BLD-MCNC: 9.2 G/DL (ref 12.1–17)
IMM GRANULOCYTES # BLD AUTO: 0 K/UL (ref 0–0.04)
IMM GRANULOCYTES NFR BLD AUTO: 0 % (ref 0–0.5)
LYMPHOCYTES # BLD: 4.2 K/UL (ref 0.8–3.5)
LYMPHOCYTES NFR BLD: 42 % (ref 12–49)
MCH RBC QN AUTO: 27.5 PG (ref 26–34)
MCHC RBC AUTO-ENTMCNC: 31.1 G/DL (ref 30–36.5)
MCV RBC AUTO: 88.6 FL (ref 80–99)
MONOCYTES # BLD: 0.4 K/UL (ref 0–1)
MONOCYTES NFR BLD: 4 % (ref 5–13)
NEUTS SEG # BLD: 5.4 K/UL (ref 1.8–8)
NEUTS SEG NFR BLD: 54 % (ref 32–75)
NRBC # BLD: 0 K/UL (ref 0–0.01)
NRBC BLD-RTO: 0 PER 100 WBC
PLATELET # BLD AUTO: 156 K/UL (ref 150–400)
PMV BLD AUTO: 9.9 FL (ref 8.9–12.9)
POTASSIUM SERPL-SCNC: 4.6 MMOL/L (ref 3.5–5.1)
PROT SERPL-MCNC: 6.3 G/DL (ref 6.4–8.2)
RBC # BLD AUTO: 3.34 M/UL (ref 4.1–5.7)
RBC MORPH BLD: ABNORMAL
SODIUM SERPL-SCNC: 137 MMOL/L (ref 136–145)
WBC # BLD AUTO: 10 K/UL (ref 4.1–11.1)
WBC MORPH BLD: ABNORMAL

## 2025-02-23 PROCEDURE — 97535 SELF CARE MNGMENT TRAINING: CPT

## 2025-02-23 PROCEDURE — 36415 COLL VENOUS BLD VENIPUNCTURE: CPT

## 2025-02-23 PROCEDURE — 6370000000 HC RX 637 (ALT 250 FOR IP): Performed by: STUDENT IN AN ORGANIZED HEALTH CARE EDUCATION/TRAINING PROGRAM

## 2025-02-23 PROCEDURE — 97165 OT EVAL LOW COMPLEX 30 MIN: CPT

## 2025-02-23 PROCEDURE — 80053 COMPREHEN METABOLIC PANEL: CPT

## 2025-02-23 PROCEDURE — G0378 HOSPITAL OBSERVATION PER HR: HCPCS

## 2025-02-23 PROCEDURE — 2500000003 HC RX 250 WO HCPCS: Performed by: STUDENT IN AN ORGANIZED HEALTH CARE EDUCATION/TRAINING PROGRAM

## 2025-02-23 PROCEDURE — 85025 COMPLETE CBC W/AUTO DIFF WBC: CPT

## 2025-02-23 PROCEDURE — 97116 GAIT TRAINING THERAPY: CPT

## 2025-02-23 PROCEDURE — 97161 PT EVAL LOW COMPLEX 20 MIN: CPT

## 2025-02-23 RX ADMIN — TAMSULOSIN HYDROCHLORIDE 0.4 MG: 0.4 CAPSULE ORAL at 08:56

## 2025-02-23 RX ADMIN — MEMANTINE 5 MG: 10 TABLET ORAL at 08:56

## 2025-02-23 RX ADMIN — SODIUM CHLORIDE, PRESERVATIVE FREE 10 ML: 5 INJECTION INTRAVENOUS at 21:05

## 2025-02-23 RX ADMIN — DONEPEZIL HYDROCHLORIDE 10 MG: 5 TABLET, FILM COATED ORAL at 21:02

## 2025-02-23 RX ADMIN — ACETAMINOPHEN 650 MG: 325 TABLET ORAL at 05:20

## 2025-02-23 RX ADMIN — APIXABAN 2.5 MG: 2.5 TABLET, FILM COATED ORAL at 21:02

## 2025-02-23 RX ADMIN — SODIUM CHLORIDE, PRESERVATIVE FREE 10 ML: 5 INJECTION INTRAVENOUS at 08:57

## 2025-02-23 RX ADMIN — MEMANTINE 5 MG: 10 TABLET ORAL at 21:02

## 2025-02-23 RX ADMIN — DILTIAZEM HYDROCHLORIDE 120 MG: 120 CAPSULE, COATED, EXTENDED RELEASE ORAL at 08:56

## 2025-02-23 ASSESSMENT — PAIN SCALES - GENERAL
PAINLEVEL_OUTOF10: 0
PAINLEVEL_OUTOF10: 0
PAINLEVEL_OUTOF10: 3
PAINLEVEL_OUTOF10: 0

## 2025-02-23 ASSESSMENT — PAIN DESCRIPTION - LOCATION: LOCATION: LEG

## 2025-02-23 ASSESSMENT — PAIN DESCRIPTION - DESCRIPTORS: DESCRIPTORS: ACHING

## 2025-02-23 ASSESSMENT — PAIN DESCRIPTION - ORIENTATION: ORIENTATION: RIGHT;LEFT

## 2025-02-23 NOTE — PROGRESS NOTES
Hospitalist Progress Note    NAME:   Jt Nielsen   : 1935   MRN: [id number]     Date/Time: 2025 12:04 PM  Patient PCP: Lorrie Krueger MD    Estimated discharge date:   Barriers:  awaiting PT/OT recs       Assessment / Plan:    Acute kidney injury   Presents with JUDY with Cr 1.94 up from prior 1.53   - IVF bolus given in the ED  - Cr 1.4 today   - Suspect for oral intake with dementia  - UA not indicative of UTI  - If Cr not back to baseline, check renal US in AM      Ground level fall PTA   - On anticoagulation at home   - CT head negative  - CT cervical spine negative; right thyroid lobe nodule 1.5cm   - CXR wnl, noted atelectasis on left   - PT/OT evals - pending      Thyroid nodule   - Seen incidentally on imaging today  - Check TSH, nod drawn on admission or with labs today   - Will need follow up with PCP      Dementia  - Continue PTA Aricept and namenda     History of atrial fibrillation  Hypertension   -Continue Eliquis and diltiazem  -Resume lisinopril   -Monitor BP    BPH   -Continue flomax       Medical Decision Making:   I personally reviewed labs: cbc, bmp   I personally reviewed imaging: xr, CT   I personally reviewed EKG:  Toxic drug monitoring:   Discussed case with: RN        Code Status: Full  DVT Prophylaxis: Eliquis  GI Prophylaxis: None    Subjective:     Chief Complaint / Reason for Physician Visit  Patient seen at bedside.  He has a history of dementia and is a very poor historian.  Unable to answer any questions.  No family at the bedside.           Objective:     VITALS:   Last 24hrs VS reviewed since prior progress note. Most recent are:  Patient Vitals for the past 24 hrs:   BP Temp Temp src Pulse Resp SpO2   25 0923 (!) 169/79 -- -- 65 -- --   25 0918 (!) 153/74 -- -- 72 -- --   25 0916 (!) 166/76 -- -- 66 -- --   25 0911 (!) 148/71 -- -- 62 -- --   25 0718 125/70 98.1 °F (36.7 °C) Oral 64 16 93 %   25 0505 (!) 129/54

## 2025-02-23 NOTE — PROGRESS NOTES
End of Shift Note    Bedside shift change report given to JOSÉ MIGUEL Vieyra (oncoming nurse) by ALYSSA MARTINEZ RN (offgoing nurse).  Report included the following information SBAR, Intake/Output, MAR, and Cardiac Rhythm sinus lenora.    Shift worked:  4073-2455     Shift summary and any significant changes:     No significant changes  over night. Pt complaining of painful kegs     Concerns for physician to address:  C/o painful legs     Zone phone for oncoming shift:   [phone]       Activity:     Number times ambulated in hallways past shift: 0  Number of times OOB to chair past shift: 0    Cardiac:   Cardiac Monitoring: Yes           Access:  Current line(s): PIV     Genitourinary:   Urinary Status: Voiding, External catheter    Respiratory:   O2 Device: None (Room air)  Chronic home O2 use?: NO  Incentive spirometer at bedside: NO    GI:     Current diet:  ADULT DIET; Regular  Passing flatus: YES    Pain Management:   Patient states pain is manageable on current regimen: YES    Skin:  Ayden Scale Score: 17  Interventions: Wound Offloading (Prevention Methods): Repositioning, Turning, Elevate heels    Patient Safety:  Fall Risk: Nursing Judgement-Fall Risk High(Add Comments): Yes  Fall Risk Interventions  Nursing Judgement-Fall Risk High(Add Comments): Yes  Toilet Every 2 Hours-In Advance of Need: Yes  Hourly Visual Checks: Awake  Fall Visual Posted: Socks  Room Door Open: Yes  Alarm On: Bed  Patient Moved Closer to Nursing Station: No    Active Consults:   None    Length of Stay:  Expected LOS: 2  Actual LOS: 0    ALYSSA MARTINEZ RN

## 2025-02-24 ENCOUNTER — TELEPHONE (OUTPATIENT)
Age: 89
End: 2025-02-24

## 2025-02-24 PROBLEM — M79.672 BILATERAL FOOT PAIN: Chronic | Status: ACTIVE | Noted: 2025-02-24

## 2025-02-24 PROBLEM — R26.2 AMBULATORY DYSFUNCTION: Status: ACTIVE | Noted: 2025-02-24

## 2025-02-24 PROBLEM — M79.671 BILATERAL FOOT PAIN: Chronic | Status: ACTIVE | Noted: 2025-02-24

## 2025-02-24 LAB
ALBUMIN SERPL-MCNC: 2.5 G/DL (ref 3.5–5)
ALBUMIN/GLOB SERPL: 0.6 (ref 1.1–2.2)
ALP SERPL-CCNC: 64 U/L (ref 45–117)
ALT SERPL-CCNC: 10 U/L (ref 12–78)
ANION GAP SERPL CALC-SCNC: 5 MMOL/L (ref 2–12)
AST SERPL-CCNC: 9 U/L (ref 15–37)
BASOPHILS # BLD: 0 K/UL (ref 0–0.1)
BASOPHILS NFR BLD: 0 % (ref 0–1)
BILIRUB SERPL-MCNC: 0.4 MG/DL (ref 0.2–1)
BUN SERPL-MCNC: 22 MG/DL (ref 6–20)
BUN/CREAT SERPL: 15 (ref 12–20)
CALCIUM SERPL-MCNC: 8.7 MG/DL (ref 8.5–10.1)
CHLORIDE SERPL-SCNC: 108 MMOL/L (ref 97–108)
CO2 SERPL-SCNC: 25 MMOL/L (ref 21–32)
CREAT SERPL-MCNC: 1.51 MG/DL (ref 0.7–1.3)
DIFFERENTIAL METHOD BLD: ABNORMAL
EOSINOPHIL # BLD: 0.32 K/UL (ref 0–0.4)
EOSINOPHIL NFR BLD: 3 % (ref 0–7)
ERYTHROCYTE [DISTWIDTH] IN BLOOD BY AUTOMATED COUNT: 14.8 % (ref 11.5–14.5)
EST. AVERAGE GLUCOSE BLD GHB EST-MCNC: 103 MG/DL
GLOBULIN SER CALC-MCNC: 4.1 G/DL (ref 2–4)
GLUCOSE SERPL-MCNC: 110 MG/DL (ref 65–100)
HBA1C MFR BLD: 5.2 % (ref 4–5.6)
HCT VFR BLD AUTO: 31.9 % (ref 36.6–50.3)
HGB BLD-MCNC: 9.8 G/DL (ref 12.1–17)
IMM GRANULOCYTES # BLD AUTO: 0 K/UL (ref 0–0.04)
IMM GRANULOCYTES NFR BLD AUTO: 0 % (ref 0–0.5)
LYMPHOCYTES # BLD: 5.97 K/UL (ref 0.8–3.5)
LYMPHOCYTES NFR BLD: 57 % (ref 12–49)
MCH RBC QN AUTO: 27.5 PG (ref 26–34)
MCHC RBC AUTO-ENTMCNC: 30.7 G/DL (ref 30–36.5)
MCV RBC AUTO: 89.4 FL (ref 80–99)
MONOCYTES # BLD: 0.32 K/UL (ref 0–1)
MONOCYTES NFR BLD: 3 % (ref 5–13)
NEUTS SEG # BLD: 3.89 K/UL (ref 1.8–8)
NEUTS SEG NFR BLD: 37 % (ref 32–75)
NRBC # BLD: 0 K/UL (ref 0–0.01)
NRBC BLD-RTO: 0 PER 100 WBC
PLATELET # BLD AUTO: 136 K/UL (ref 150–400)
PMV BLD AUTO: 9.2 FL (ref 8.9–12.9)
POTASSIUM SERPL-SCNC: 4.2 MMOL/L (ref 3.5–5.1)
PROT SERPL-MCNC: 6.6 G/DL (ref 6.4–8.2)
RBC # BLD AUTO: 3.57 M/UL (ref 4.1–5.7)
RBC MORPH BLD: ABNORMAL
SODIUM SERPL-SCNC: 138 MMOL/L (ref 136–145)
T4 FREE SERPL-MCNC: 1.1 NG/DL (ref 0.8–1.5)
TSH SERPL DL<=0.05 MIU/L-ACNC: 3.33 UIU/ML (ref 0.36–3.74)
WBC # BLD AUTO: 10.5 K/UL (ref 4.1–11.1)
WBC MORPH BLD: ABNORMAL

## 2025-02-24 PROCEDURE — 84443 ASSAY THYROID STIM HORMONE: CPT

## 2025-02-24 PROCEDURE — 83036 HEMOGLOBIN GLYCOSYLATED A1C: CPT

## 2025-02-24 PROCEDURE — 36415 COLL VENOUS BLD VENIPUNCTURE: CPT

## 2025-02-24 PROCEDURE — 84439 ASSAY OF FREE THYROXINE: CPT

## 2025-02-24 PROCEDURE — G0378 HOSPITAL OBSERVATION PER HR: HCPCS

## 2025-02-24 PROCEDURE — 2500000003 HC RX 250 WO HCPCS: Performed by: STUDENT IN AN ORGANIZED HEALTH CARE EDUCATION/TRAINING PROGRAM

## 2025-02-24 PROCEDURE — 6370000000 HC RX 637 (ALT 250 FOR IP): Performed by: PHYSICIAN ASSISTANT

## 2025-02-24 PROCEDURE — 6370000000 HC RX 637 (ALT 250 FOR IP): Performed by: STUDENT IN AN ORGANIZED HEALTH CARE EDUCATION/TRAINING PROGRAM

## 2025-02-24 PROCEDURE — 80053 COMPREHEN METABOLIC PANEL: CPT

## 2025-02-24 PROCEDURE — 85025 COMPLETE CBC W/AUTO DIFF WBC: CPT

## 2025-02-24 RX ORDER — GABAPENTIN 100 MG/1
100 CAPSULE ORAL 2 TIMES DAILY
Status: DISCONTINUED | OUTPATIENT
Start: 2025-02-24 | End: 2025-02-25 | Stop reason: HOSPADM

## 2025-02-24 RX ADMIN — TAMSULOSIN HYDROCHLORIDE 0.4 MG: 0.4 CAPSULE ORAL at 09:11

## 2025-02-24 RX ADMIN — GABAPENTIN 100 MG: 100 CAPSULE ORAL at 21:55

## 2025-02-24 RX ADMIN — APIXABAN 2.5 MG: 2.5 TABLET, FILM COATED ORAL at 21:55

## 2025-02-24 RX ADMIN — MEMANTINE 5 MG: 10 TABLET ORAL at 09:11

## 2025-02-24 RX ADMIN — MEMANTINE 5 MG: 10 TABLET ORAL at 21:56

## 2025-02-24 RX ADMIN — GABAPENTIN 100 MG: 100 CAPSULE ORAL at 12:53

## 2025-02-24 RX ADMIN — SODIUM CHLORIDE, PRESERVATIVE FREE 10 ML: 5 INJECTION INTRAVENOUS at 09:11

## 2025-02-24 RX ADMIN — DILTIAZEM HYDROCHLORIDE 120 MG: 120 CAPSULE, COATED, EXTENDED RELEASE ORAL at 09:12

## 2025-02-24 RX ADMIN — DONEPEZIL HYDROCHLORIDE 10 MG: 5 TABLET, FILM COATED ORAL at 21:55

## 2025-02-24 RX ADMIN — APIXABAN 2.5 MG: 2.5 TABLET, FILM COATED ORAL at 09:12

## 2025-02-24 RX ADMIN — SODIUM CHLORIDE, PRESERVATIVE FREE 10 ML: 5 INJECTION INTRAVENOUS at 21:56

## 2025-02-24 NOTE — TELEPHONE ENCOUNTER
----- Message from Trevor HAYES sent at 2/24/2025 12:48 PM EST -----  Regarding: ECC Message to Provider  ECC Message to Provider    Relationship to Patient: Covered Entity     Additional Information Received an order from the Hospital on this patient to open him for home health services. Wants to confirm with the provider.  --------------------------------------------------------------------------------------------------------------------------    Call Back Information: OK to leave message on voicemail  Preferred Call Back Number: Phone [phone]

## 2025-02-24 NOTE — CARE COORDINATION
Care Management Initial Assessment       RUR: NA, as patient is in OBS status  Readmission? No  1st IM letter given? No  1st  letter given: No    Chart reviewed. Patient is here in observation status. Patient has a discharge order and request for home health.     CM spoke with patient and then called wife    Blanca Nielsen  [phone]   Patient lying in bed. Slow to answer, but agreeable to go home. CM called wife and was able to answer questions. She requested that patient have home health through the same agency that sees her, [employer].    CM called [email or web address], at [phone]. Referral and orders placed in [facility].     Wife then called back and was questioning discharge. States she has home care and walks with a walker. States she does not feel she is able to care for him, as she was told he needed help walking. This CM spoke with CM following patient. She will contact physician. . Extension is [phone].       02/24/25 6510   Service Assessment   Patient Orientation Alert and Oriented   Cognition Alert   History Provided By Patient;Spouse  (talked to wife by phone, Blanca Nielsen)   Primary Caregiver Other (Comment)  (has paid caregivers in the home for 6 hours per day)   Support Systems Spouse/Significant Other;Home Care Staff   Patient's Healthcare Decision Maker is: Patient Declined (Legal Next of Kin Remains as Decision Maker)   PCP Verified by CM Yes   Prior Functional Level Assistance with the following:;Bathing;Dressing;Housework;Shopping;Cooking   Current Functional Level Assistance with the following:;Bathing;Dressing;Cooking;Housework;Shopping   Can patient return to prior living arrangement Yes   Ability to make needs known: Good   Family able to assist with home care needs: Yes   Would you like for me to discuss the discharge plan with any other family members/significant others, and if so, who? Yes  (wife Blanca Valdes)   Financial Resources Medicare   Community Resources

## 2025-02-24 NOTE — PROGRESS NOTES
End of Shift Note    Bedside shift change report given to JENNIFER Combs (oncoming nurse) by Azalia Barajas LPN (offgoing nurse).  Report included the following information SBAR and MAR    Shift worked:  2376-5942     Shift summary and any significant changes:     Patient in chair resting. Patient complains of pain feet pain. Patients urine still has blood. No bowel movement.      Concerns for physician to address:  None     Zone phone for oncoming shift:   [phone]       Activity:     Number times ambulated in hallways past shift: 0  Number of times OOB to chair past shift: 1    Cardiac:   Cardiac Monitoring: Yes           Access:  Current line(s): PIV     Genitourinary:   Urinary Status: Voiding, External catheter    Respiratory:   O2 Device: None (Room air)  Chronic home O2 use?: NO  Incentive spirometer at bedside: NO    GI:     Current diet:  ADULT DIET; Regular  Passing flatus: YES    Pain Management:   Patient states pain is manageable on current regimen: YES    Skin:  Ayden Scale Score: 17  Interventions: Wound Offloading (Prevention Methods): Turning, Repositioning, Pillows    Patient Safety:  Fall Risk: Nursing Judgement-Fall Risk High(Add Comments): Yes  Fall Risk Interventions  Nursing Judgement-Fall Risk High(Add Comments): Yes  Toilet Every 2 Hours-In Advance of Need: Yes  Hourly Visual Checks: Awake, In bed  Fall Visual Posted: Socks  Room Door Open: Yes  Alarm On: Bed  Patient Moved Closer to Nursing Station: No    Active Consults:   None    Length of Stay:  Expected LOS: 2  Actual LOS: 0    Azalia Barajas LPN

## 2025-02-24 NOTE — PROGRESS NOTES
End of Shift Note    Bedside shift change report given to JENNIFER Boudreaux (oncoming nurse) by Yuliet Bocanegra RN (offgoing nurse).  Report included the following information SBAR, Intake/Output, and MAR    Shift worked:  7a-7p     Shift summary and any significant changes:     P tolerated care fairly well today. Meds given per MAR, no PRNs given. Pt had no complaints of pain. Pt ambulated with this RN to the chair. Pt ate all three meals in the chair. Pt is resting watching tv. Caring rounds completed.      Concerns for physician to address:       Zone phone for oncoming shift:          Activity:  Level of Assistance: Minimal assist, patient does 75% or more  Number times ambulated in hallways past shift: 0  Number of times OOB to chair past shift: 1    Cardiac:   Cardiac Monitoring: No           Access:  Current line(s): PIV     Genitourinary:   Urinary Status: Voiding, External catheter    Respiratory:   O2 Device: None (Room air)  Chronic home O2 use?: NO  Incentive spirometer at bedside: YES    GI:     Current diet:  ADULT DIET; Regular  Passing flatus: YES    Pain Management:   Patient states pain is manageable on current regimen: YES    Skin:  Ayden Scale Score: 16  Interventions: Wound Offloading (Prevention Methods): Bed, pressure reduction mattress, Pillows, Repositioning, Turning    Patient Safety:  Fall Risk: Nursing Judgement-Fall Risk High(Add Comments): Yes  Fall Risk Interventions  Nursing Judgement-Fall Risk High(Add Comments): Yes  Toilet Every 2 Hours-In Advance of Need: Yes  Hourly Visual Checks: Awake, Quiet, In chair  Fall Visual Posted: Socks, Fall sign posted  Room Door Open: Yes  Alarm On: Chair, Bed  Patient Moved Closer to Nursing Station: Yes    Active Consults:   IP CONSULT TO CASE MANAGEMENT  IP CONSULT TO CASE MANAGEMENT  IP CONSULT TO CASE MANAGEMENT    Length of Stay:  Expected LOS: 4  Actual LOS: 0    Yuliet Bocanegra RN

## 2025-02-24 NOTE — PROGRESS NOTES
Hospitalist Progress Note        Demographics    Patient Name  Jt Nielsen   Date of Birth 1935   Medical Record Number  [id number]      Age  89 y.o.   PCP Lorrie Krueger MD   Admit date:  2/21/2025  8:57 PM     Room Number  3232/01  @ [facility]           Admission Diagnoses:  Ambulatory dysfunction   Admission Summary:  \" Jt Nielsen is a 89 y.o.  male with PMHx significant for  has a past medical history of Appendicitis, Arthritis, Asthma, Enlarged prostate, Hyperlipidemia, Hypertension, ANIBAL treated with BiPAP, PAF (paroxysmal atrial fibrillation) ([facility]), Pre-diabetes, and Shingles. who presents with the above chief complaint.      We were asked to admit for work up and further evaluation.      Patient here due to disability.  He is on apixaban.  He states that he does not necessarily remember the fall.  States he currently has no pain.       Patient with a history of dementia and is an unreliable historian.  I discussed the case personally with the emergency department provider.  Discussed with the patient and the family.  Per the ED provider, patient was found on the floor this evening by family after an unwitnessed fall.  They do not think he passed however.  With this.  No clear injuries at this point. \"     Assessment and plan:     Unwitnessed fall/found down on ground at home, PTA  Ambulatory dysfunction, POA  Bilateral foot pain, acute on chronic   XR bilateral feet and pelvis- NAD  CT head and cspine- NAD- does note right thyroid lobe nodule -- outpatient follow up for this  - PT/OT evals - rec HH and use of RW- CM to arrange  - trial low dose of gabapentin BID for symptoms of peripheral neuropathy. A1c in 6/2024 was 5.7%- recheck A1c    Acute renal insufficiency on CKD stage IIIA  - wife mentions he never drinks much- suspect dehydration  - UA not indicative of UTI  - creatinine improved to his baseline     Thyroid nodule   - Seen incidentally on imaging   -

## 2025-02-24 NOTE — PROGRESS NOTES
Physical therapy    Received call from case management requesting documentation for RW. Utilized RW during PT evaluation yesterday, however unaware if patient already had one at home due to patient being poor historian.    Jt Nielsen was assessed today and a DME order was entered for a rolling walker because he requires this to successfully complete daily living tasks of ambulating. The patient has a mobility limitation that significantly impairs their ability to participate in one or more mobility-related activities of daily living in the home. The patient is able to safely use the walker. The functional mobility deficit can be sufficiently resolved by use of a walker. The need for this equipment was discussed with the patient and he understands and is in agreement.      Benita Sommers, PT, DPT

## 2025-02-24 NOTE — PROGRESS NOTES
End of Shift Note    Bedside shift change report given to Yuliet RODRIGUEZ (oncoming nurse) by Garret Gonzalez RN (offgoing nurse).  Report included the following information SBAR, Kardex, Intake/Output, MAR, Recent Results, and Quality Measures    Shift worked: 9537-3083     Shift summary and any significant changes:     Seen patient up in chair, not in distress. Aox2, assisted patient to bed with walker. Due medications given. Hourly rounding done. Patient able to turned himself during the night. Patient needs attended. No complain as of this time     Concerns for physician to address: none     Zone phone for oncoming shift:   [phone]       Activity:  Level of Assistance: Minimal assist, patient does 75% or more  Number times ambulated in hallways past shift: 0  Number of times OOB to chair past shift: 1    Cardiac:   Cardiac Monitoring: Yes           Access:  Current line(s): PIV     Genitourinary:   Urinary Status: Voiding, External catheter    Respiratory:   O2 Device: None (Room air)  Chronic home O2 use?: NO  Incentive spirometer at bedside: NO    GI:     Current diet:  ADULT DIET; Regular  Passing flatus: YES    Pain Management:   Patient states pain is manageable on current regimen: YES    Skin:  Ayden Scale Score: 17  Interventions: Wound Offloading (Prevention Methods): Pillows, Repositioning, Turning    Patient Safety:  Fall Risk: Nursing Judgement-Fall Risk High(Add Comments): Yes  Fall Risk Interventions  Nursing Judgement-Fall Risk High(Add Comments): Yes  Toilet Every 2 Hours-In Advance of Need: Yes  Hourly Visual Checks: In bed, Quiet  Fall Visual Posted: Fall sign posted, Socks  Room Door Open: Deferred to promote rest  Alarm On: Bed  Patient Moved Closer to Nursing Station: No    Active Consults:   None    Length of Stay:  Expected LOS: 2  Actual LOS: 0    Garret Gonzalez, RN

## 2025-02-24 NOTE — PROGRESS NOTES
1056 - PCP hospital follow-up transitional care appointment has been scheduled with Dr. Lorrie Naranjo on 3/4/25 0517. [facility] information on AVS for patient resource. Pending patient discharge.       Attempted to schedule PCP hospital follow up. Sent PCP office a message, awaiting return call from PCP office with appt information. [facility] information on AVS for patient resource.  Pending patient discharge.

## 2025-02-25 VITALS
HEIGHT: 67 IN | TEMPERATURE: 97.7 F | BODY MASS INDEX: 27.02 KG/M2 | WEIGHT: 172.18 LBS | OXYGEN SATURATION: 99 % | SYSTOLIC BLOOD PRESSURE: 120 MMHG | HEART RATE: 74 BPM | DIASTOLIC BLOOD PRESSURE: 96 MMHG | RESPIRATION RATE: 18 BRPM

## 2025-02-25 LAB
ALBUMIN SERPL-MCNC: 2.3 G/DL (ref 3.5–5)
ALBUMIN/GLOB SERPL: 0.6 (ref 1.1–2.2)
ALP SERPL-CCNC: 63 U/L (ref 45–117)
ALT SERPL-CCNC: 10 U/L (ref 12–78)
ANION GAP SERPL CALC-SCNC: 4 MMOL/L (ref 2–12)
AST SERPL-CCNC: 9 U/L (ref 15–37)
BASOPHILS # BLD: 0 K/UL (ref 0–0.1)
BASOPHILS NFR BLD: 0 % (ref 0–1)
BILIRUB SERPL-MCNC: 0.5 MG/DL (ref 0.2–1)
BUN SERPL-MCNC: 20 MG/DL (ref 6–20)
BUN/CREAT SERPL: 13 (ref 12–20)
CALCIUM SERPL-MCNC: 8.2 MG/DL (ref 8.5–10.1)
CHLORIDE SERPL-SCNC: 107 MMOL/L (ref 97–108)
CO2 SERPL-SCNC: 26 MMOL/L (ref 21–32)
CREAT SERPL-MCNC: 1.59 MG/DL (ref 0.7–1.3)
DIFFERENTIAL METHOD BLD: ABNORMAL
EOSINOPHIL # BLD: 0.1 K/UL (ref 0–0.4)
EOSINOPHIL NFR BLD: 1 % (ref 0–7)
ERYTHROCYTE [DISTWIDTH] IN BLOOD BY AUTOMATED COUNT: 14.6 % (ref 11.5–14.5)
GLOBULIN SER CALC-MCNC: 4.1 G/DL (ref 2–4)
GLUCOSE SERPL-MCNC: 113 MG/DL (ref 65–100)
HCT VFR BLD AUTO: 29.3 % (ref 36.6–50.3)
HGB BLD-MCNC: 9.4 G/DL (ref 12.1–17)
IMM GRANULOCYTES # BLD AUTO: 0 K/UL (ref 0–0.04)
IMM GRANULOCYTES NFR BLD AUTO: 0 % (ref 0–0.5)
LYMPHOCYTES # BLD: 4.27 K/UL (ref 0.8–3.5)
LYMPHOCYTES NFR BLD: 44 % (ref 12–49)
MCH RBC QN AUTO: 27.7 PG (ref 26–34)
MCHC RBC AUTO-ENTMCNC: 32.1 G/DL (ref 30–36.5)
MCV RBC AUTO: 86.4 FL (ref 80–99)
MONOCYTES # BLD: 0.39 K/UL (ref 0–1)
MONOCYTES NFR BLD: 4 % (ref 5–13)
NEUTS BAND NFR BLD MANUAL: 2 %
NEUTS SEG # BLD: 4.94 K/UL (ref 1.8–8)
NEUTS SEG NFR BLD: 49 % (ref 32–75)
NRBC # BLD: 0 K/UL (ref 0–0.01)
NRBC BLD-RTO: 0 PER 100 WBC
PLATELET # BLD AUTO: 148 K/UL (ref 150–400)
PMV BLD AUTO: 9.5 FL (ref 8.9–12.9)
POTASSIUM SERPL-SCNC: 4.1 MMOL/L (ref 3.5–5.1)
PROT SERPL-MCNC: 6.4 G/DL (ref 6.4–8.2)
RBC # BLD AUTO: 3.39 M/UL (ref 4.1–5.7)
RBC MORPH BLD: ABNORMAL
SODIUM SERPL-SCNC: 137 MMOL/L (ref 136–145)
WBC # BLD AUTO: 9.7 K/UL (ref 4.1–11.1)
WBC MORPH BLD: ABNORMAL

## 2025-02-25 PROCEDURE — 6370000000 HC RX 637 (ALT 250 FOR IP): Performed by: STUDENT IN AN ORGANIZED HEALTH CARE EDUCATION/TRAINING PROGRAM

## 2025-02-25 PROCEDURE — 6370000000 HC RX 637 (ALT 250 FOR IP): Performed by: PHYSICIAN ASSISTANT

## 2025-02-25 PROCEDURE — G0378 HOSPITAL OBSERVATION PER HR: HCPCS

## 2025-02-25 PROCEDURE — 36415 COLL VENOUS BLD VENIPUNCTURE: CPT

## 2025-02-25 PROCEDURE — 2500000003 HC RX 250 WO HCPCS: Performed by: STUDENT IN AN ORGANIZED HEALTH CARE EDUCATION/TRAINING PROGRAM

## 2025-02-25 PROCEDURE — 85025 COMPLETE CBC W/AUTO DIFF WBC: CPT

## 2025-02-25 PROCEDURE — 80053 COMPREHEN METABOLIC PANEL: CPT

## 2025-02-25 RX ORDER — GABAPENTIN 100 MG/1
100 CAPSULE ORAL 2 TIMES DAILY
Qty: 60 CAPSULE | Refills: 0 | Status: SHIPPED | OUTPATIENT
Start: 2025-02-25 | End: 2025-03-27

## 2025-02-25 RX ORDER — MULTIVITAMIN WITH IRON
500 TABLET ORAL DAILY
Status: DISCONTINUED | OUTPATIENT
Start: 2025-02-25 | End: 2025-02-25 | Stop reason: HOSPADM

## 2025-02-25 RX ADMIN — DILTIAZEM HYDROCHLORIDE 120 MG: 120 CAPSULE, COATED, EXTENDED RELEASE ORAL at 09:56

## 2025-02-25 RX ADMIN — SODIUM CHLORIDE, PRESERVATIVE FREE 10 ML: 5 INJECTION INTRAVENOUS at 09:59

## 2025-02-25 RX ADMIN — TAMSULOSIN HYDROCHLORIDE 0.4 MG: 0.4 CAPSULE ORAL at 09:56

## 2025-02-25 RX ADMIN — CYANOCOBALAMIN TAB 500 MCG 500 MCG: 500 TAB at 09:56

## 2025-02-25 RX ADMIN — GABAPENTIN 100 MG: 100 CAPSULE ORAL at 09:56

## 2025-02-25 RX ADMIN — APIXABAN 2.5 MG: 2.5 TABLET, FILM COATED ORAL at 09:56

## 2025-02-25 RX ADMIN — MEMANTINE 5 MG: 10 TABLET ORAL at 09:57

## 2025-02-25 NOTE — DISCHARGE SUMMARY
Physician Discharge Summary     Pt Name  Jt Nielsen   Admit date:  2/21/2025   Discharge date and time:   2/25/25   Room Number  3232/01    Medical Record Number  [id number] @ [facility]   Age  89 y.o.   Date of Birth 1935   PCP Lorrie Krueger MD   Admission Diagnoses:                        Ambulatory dysfunction   Present on Admission:   Acute kidney injury   Ambulatory dysfunction   Bilateral foot pain     Allergies   Allergen Reactions    Oxycodone Other (See Comments)     Patient states, \"It made me feel unwell.\"         Admission Diagnoses:  Ambulatory dysfunction   Admission Summary:  \" Jt Nielsen is a 89 y.o.  male with PMHx significant for  has a past medical history of Appendicitis, Arthritis, Asthma, Enlarged prostate, Hyperlipidemia, Hypertension, ANIBAL treated with BiPAP, PAF (paroxysmal atrial fibrillation) ([facility]), Pre-diabetes, and Shingles. who presents with the above chief complaint.      We were asked to admit for work up and further evaluation.      Patient here due to disability.  He is on apixaban.  He states that he does not necessarily remember the fall.  States he currently has no pain.       Patient with a history of dementia and is an unreliable historian.  I discussed the case personally with the emergency department provider.  Discussed with the patient and the family.  Per the ED provider, patient was found on the floor this evening by family after an unwitnessed fall.  They do not think he passed however.  With this.  No clear injuries at this point. \"      Assessment and plan:      Unwitnessed fall/found down on ground at home, PTA  Ambulatory dysfunction, POA  Bilateral foot pain, acute on chronic   XR bilateral feet and pelvis- NAD  CT head and cspine- NAD- does note right thyroid lobe nodule -- outpatient follow up for this  - PT/OT evals - rec HH and use of RW- CM to arrange  - trial low dose of gabapentin BID for symptoms of peripheral

## 2025-02-25 NOTE — CARE COORDINATION
Transition of Care Plan:    RUR: N/A  Prior Level of Functioning: needs assistance   Disposition: home with spouse, [facility] [employer]   FRANCISCO: 2/25  If SNF or IPR: Date FOC offered: N/A  Follow up appointments: PCP ,specialists as indicated   DME needed: RW- delivered at the bedside   Transportation at discharge: caregiver, wife at bedside   IM/IMM Medicare/ letter given: N/A  Is patient a  and connected with VA? no   If yes, was [place] transfer form completed and VA notified?   Caregiver Contact: spouse (Keli)  Discharge Caregiver contacted prior to discharge? yes  Care Conference needed? no  Barriers to discharge:  none    Chart reviewed. CM aware of discharge order. Met with pt at the bedside to confirm d/c plan. RW delivered at the bedside and pt signed delivery ticket ([email or web address]). Pt returning home with caregiver support and [facility] services. Caregiver will transport home today, ETA 1:30PM. No further CM needs identified at this time.       02/25/25 1354   Services At/After Discharge   Transition of Care Consult (CM Consult) Discharge Planning   Services At/After Discharge Home Health;DME    Resource Information Provided? No   Mode of Transport at Discharge Other (see comment)  (caregiver at bedside)   Confirm Follow Up Transport Family   Condition of Participation: Discharge Planning   The Plan for Transition of Care is related to the following treatment goals: return to baseline   The Patient and/or Patient Representative was provided with a Choice of Provider? Patient;Patient Representative   Name of the Patient Representative who was provided with the Choice of Provider and agrees with the Discharge Plan?  spouse (Keli)   The Patient and/Or Patient Representative agree with the Discharge Plan? Yes   Freedom of Choice list was provided with basic dialogue that supports the patient's individualized plan of care/goals, treatment preferences, and shares the quality data

## 2025-02-25 NOTE — PLAN OF CARE
Problem: Occupational Therapy - Adult  Goal: By Discharge: Performs self-care activities at highest level of function for planned discharge setting.  See evaluation for individualized goals.  Description: FUNCTIONAL STATUS PRIOR TO ADMISSION:  Patient is a questionable historian with history of dementia. Per report, patient is ambulatory with rollator and family assist x1. Endorses modified independence in ADLs. Positive history of falls.     HOME SUPPORT: Patient lived with wife.    Occupational Therapy Goals:  Initiated 2/23/2025  1.  Patient will perform standing grooming with Supervision using RW prn within 7 day(s).  2.  Patient will perform bathing with Stand by Assist within 7 day(s).  3.  Patient will perform lower body dressing with Supervision using RW prn within 7 day(s).  4.  Patient will perform toilet transfers with Supervision using RW prn within 7 day(s).  5.  Patient will perform all aspects of toileting with Supervision using RW prn within 7 day(s).  6.  Patient will participate in upper extremity therapeutic exercise/activities with Supervision for 7 minutes within 7 day(s).    7.  Patient will utilize fall prevention techniques during functional activities with verbal cues within 7 day(s).      Outcome: Progressing   ,OCCUPATIONAL THERAPY EVALUATION    Patient: Jt Nielsen (89 y.o. male)  Date: 2/23/2025  Primary Diagnosis: Generalized weakness [R53.1]  Acute kidney injury [N17.9]  Fall, initial encounter [W19.XXXA]         Precautions:                    ASSESSMENT :  The patient is limited by decreased functional mobility, independence in ADLs, ROM, strength, activity tolerance, endurance, safety awareness, cognition, command following, attention/concentration, coordination, balance, and increased pain levels in B feet s/p admission for generalized weakness and fall (CT imaging of head and neck negative for acute process). This date, patient presents as pleasantly confused, benefiting 
  Problem: Safety - Adult  Goal: Free from fall injury  2/23/2025 0009 by Milena Mack RN  Outcome: Progressing  2/22/2025 1507 by Azalia Barajas LPN  Outcome: Progressing     Problem: Discharge Planning  Goal: Discharge to home or other facility with appropriate resources  Outcome: Progressing     Problem: Skin/Tissue Integrity  Goal: Skin integrity remains intact  Description: 1.  Monitor for areas of redness and/or skin breakdown  2.  Assess vascular access sites hourly  3.  Every 4-6 hours minimum:  Change oxygen saturation probe site  4.  Every 4-6 hours:  If on nasal continuous positive airway pressure, respiratory therapy assess nares and determine need for appliance change or resting period  Outcome: Progressing     Problem: Pain  Goal: Verbalizes/displays adequate comfort level or baseline comfort level  Outcome: Progressing     
  Problem: Safety - Adult  Goal: Free from fall injury  2/24/2025 0212 by Garret Gonzalez RN  Outcome: Progressing  2/23/2025 1215 by Azalia Barajas LPN  Outcome: Progressing     Problem: Discharge Planning  Goal: Discharge to home or other facility with appropriate resources  Outcome: Progressing     Problem: Skin/Tissue Integrity  Goal: Skin integrity remains intact  Description: 1.  Monitor for areas of redness and/or skin breakdown  2.  Assess vascular access sites hourly  3.  Every 4-6 hours minimum:  Change oxygen saturation probe site  4.  Every 4-6 hours:  If on nasal continuous positive airway pressure, respiratory therapy assess nares and determine need for appliance change or resting period  Outcome: Progressing     Problem: Pain  Goal: Verbalizes/displays adequate comfort level or baseline comfort level  Outcome: Progressing     Problem: Physical Therapy - Adult  Goal: By Discharge: Performs mobility at highest level of function for planned discharge setting.  See evaluation for individualized goals.  Description: FUNCTIONAL STATUS PRIOR TO ADMISSION: Patient is poor historian (only A.O x 2 to name and location). States he walked with rollator all the time.    HOME SUPPORT PRIOR TO ADMISSION: Per patient he lives with his wife in a 2-story townhouse with level entry and stays on the 1st floor.    Physical Therapy Goals  Initiated 2/23/2025  1.  Patient will move from supine to sit and sit to supine in bed with supervision/set-up within 7 day(s).    2.  Patient will perform sit to stand with supervision/set-up within 7 day(s).  3.  Patient will transfer from bed to chair and chair to bed with supervision/set-up using the least restrictive device within 7 day(s).  4.  Patient will ambulate with supervision/set-up for 200 feet with the least restrictive device within 7 day(s).     2/23/2025 1400 by Benita Sommers PT  Outcome: Progressing     Problem: Neurosensory - Adult  Goal: Achieves stable or 
  Problem: Safety - Adult  Goal: Free from fall injury  2/24/2025 1223 by Yuliet Bocanegra RN  Outcome: Progressing  2/24/2025 0212 by Garret Gonzalez RN  Outcome: Progressing     Problem: Discharge Planning  Goal: Discharge to home or other facility with appropriate resources  2/24/2025 1223 by Yuliet Bocanegra RN  Outcome: Progressing  2/24/2025 0212 by Garret Gonzalez RN  Outcome: Progressing     Problem: Skin/Tissue Integrity  Goal: Skin integrity remains intact  Description: 1.  Monitor for areas of redness and/or skin breakdown  2.  Assess vascular access sites hourly  3.  Every 4-6 hours minimum:  Change oxygen saturation probe site  4.  Every 4-6 hours:  If on nasal continuous positive airway pressure, respiratory therapy assess nares and determine need for appliance change or resting period  2/24/2025 1223 by Yuliet Bocanegra RN  Outcome: Progressing  2/24/2025 0212 by Garret Gonzalez RN  Outcome: Progressing     Problem: Pain  Goal: Verbalizes/displays adequate comfort level or baseline comfort level  2/24/2025 1223 by Yuliet Bocanegra RN  Outcome: Progressing  2/24/2025 0212 by Garret Gonzalez RN  Outcome: Progressing     Problem: Neurosensory - Adult  Goal: Achieves stable or improved neurological status  2/24/2025 1223 by Yuliet Bocanegra RN  Outcome: Progressing  2/24/2025 0212 by Garret Gonzalez RN  Outcome: Progressing  Goal: Achieves maximal functionality and self care  2/24/2025 1223 by Yuliet Bocanegra RN  Outcome: Progressing  2/24/2025 0212 by Garret Gonzalez RN  Outcome: Progressing     Problem: Skin/Tissue Integrity - Adult  Goal: Skin integrity remains intact  Description: 1.  Monitor for areas of redness and/or skin breakdown  2.  Assess vascular access sites hourly  3.  Every 4-6 hours minimum:  Change oxygen saturation probe site  4.  Every 4-6 hours:  If on nasal continuous positive airway pressure, respiratory therapy assess nares and determine 
  Problem: Safety - Adult  Goal: Free from fall injury  2/25/2025 1438 by Phyllis Vera RN  Outcome: Adequate for Discharge  2/25/2025 1138 by Nallely Maldonado RN  Outcome: Progressing     Problem: Discharge Planning  Goal: Discharge to home or other facility with appropriate resources  2/25/2025 1438 by Phyllis Vera RN  Outcome: Adequate for Discharge  2/25/2025 1138 by Nallely Maldonado RN  Outcome: Progressing     Problem: Skin/Tissue Integrity  Goal: Skin integrity remains intact  Description: 1.  Monitor for areas of redness and/or skin breakdown  2.  Assess vascular access sites hourly  3.  Every 4-6 hours minimum:  Change oxygen saturation probe site  4.  Every 4-6 hours:  If on nasal continuous positive airway pressure, respiratory therapy assess nares and determine need for appliance change or resting period  2/25/2025 1438 by Phyllis Vera RN  Outcome: Adequate for Discharge  2/25/2025 1138 by Nallely Maldonado RN  Outcome: Progressing     Problem: Pain  Goal: Verbalizes/displays adequate comfort level or baseline comfort level  2/25/2025 1438 by Phyllis Vera RN  Outcome: Adequate for Discharge  2/25/2025 1138 by Nallely Maldonado RN  Outcome: Progressing     Problem: Neurosensory - Adult  Goal: Achieves stable or improved neurological status  2/25/2025 1438 by Phyllis Vera RN  Outcome: Adequate for Discharge  2/25/2025 1138 by Nallely Maldonado RN  Outcome: Progressing  Goal: Achieves maximal functionality and self care  2/25/2025 1438 by Phyllis Vera RN  Outcome: Adequate for Discharge  2/25/2025 1138 by Nallely Maldonado RN  Outcome: Progressing     Problem: Skin/Tissue Integrity - Adult  Goal: Skin integrity remains intact  Description: 1.  Monitor for areas of redness and/or skin breakdown  2.  Assess vascular access sites hourly  3.  Every 4-6 hours minimum:  Change oxygen saturation probe site  4.  Every 4-6 hours:  If on nasal continuous positive airway pressure, respiratory therapy assess nares and 
  Problem: Safety - Adult  Goal: Free from fall injury  Outcome: Progressing     Problem: Discharge Planning  Goal: Discharge to home or other facility with appropriate resources  Outcome: Progressing     Problem: Skin/Tissue Integrity  Goal: Skin integrity remains intact  Description: 1.  Monitor for areas of redness and/or skin breakdown  2.  Assess vascular access sites hourly  3.  Every 4-6 hours minimum:  Change oxygen saturation probe site  4.  Every 4-6 hours:  If on nasal continuous positive airway pressure, respiratory therapy assess nares and determine need for appliance change or resting period  Outcome: Progressing     Problem: Pain  Goal: Verbalizes/displays adequate comfort level or baseline comfort level  Outcome: Progressing     Problem: Neurosensory - Adult  Goal: Achieves stable or improved neurological status  Outcome: Progressing  Goal: Achieves maximal functionality and self care  Outcome: Progressing     Problem: Skin/Tissue Integrity - Adult  Goal: Skin integrity remains intact  Description: 1.  Monitor for areas of redness and/or skin breakdown  2.  Assess vascular access sites hourly  3.  Every 4-6 hours minimum:  Change oxygen saturation probe site  4.  Every 4-6 hours:  If on nasal continuous positive airway pressure, respiratory therapy assess nares and determine need for appliance change or resting period  Outcome: Progressing  Goal: Incisions, wounds, or drain sites healing without S/S of infection  Outcome: Progressing  Goal: Oral mucous membranes remain intact  Outcome: Progressing     Problem: Musculoskeletal - Adult  Goal: Return mobility to safest level of function  Outcome: Progressing  Goal: Maintain proper alignment of affected body part  Outcome: Progressing  Goal: Return ADL status to a safe level of function  Outcome: Progressing     Problem: Genitourinary - Adult  Goal: Absence of urinary retention  Outcome: Progressing     Problem: Metabolic/Fluid and Electrolytes - 
referral.  Benita Sommers, PT  Minutes: 20      Physical Therapy Evaluation Charge Determination   History Examination Presentation Decision-Making   MEDIUM  Complexity : 1-2 comorbidities / personal factors will impact the outcome/ POC  MEDIUM Complexity : 3 Standardized tests and measures addressin body structure, function, activity limitation and / or participation in recreation  LOW Complexity : Stable, uncomplicated  Tinetti Gait and Balance  HIGH    Based on the above components, the patient evaluation is determined to be of the following complexity level: Medium

## 2025-02-25 NOTE — PROGRESS NOTES
End of Shift Note    Bedside shift change report given to Nallely RODRIGUEZ (oncoming nurse) by Janusz Felix RN (offgoing nurse).  Report included the following information SBAR, Kardex, ED Summary, Intake/Output, MAR, and Recent Results    Shift worked:  3406-6229     Shift summary and any significant changes:     Uneventful night. Denies pain. No attempts to get up unassisted. SNF pending.     Concerns for physician to address:       Zone phone for oncoming shift:   [phone]       Activity:  Level of Assistance: Minimal assist, patient does 75% or more  Number times ambulated in hallways past shift: 0  Number of times OOB to chair past shift: 0    Cardiac:   Cardiac Monitoring: No           Access:  Current line(s): PIV     Genitourinary:   Urinary Status: Voiding    Respiratory:   O2 Device: None (Room air)  Chronic home O2 use?: NO  Incentive spirometer at bedside: N/A    GI:     Current diet:  ADULT DIET; Regular  Passing flatus: YES    Pain Management:   Patient states pain is manageable on current regimen: YES    Skin:  Ayden Scale Score: 16  Interventions: Wound Offloading (Prevention Methods): Bed, pressure reduction mattress, Pillows, Repositioning, Turning    Patient Safety:  Fall Risk: Nursing Judgement-Fall Risk High(Add Comments): Yes  Fall Risk Interventions  Nursing Judgement-Fall Risk High(Add Comments): Yes  Toilet Every 2 Hours-In Advance of Need: Yes  Hourly Visual Checks: Awake, In bed, Quiet  Fall Visual Posted: Socks, Fall sign posted  Room Door Open: Yes  Alarm On: Bed  Patient Moved Closer to Nursing Station: Yes    Active Consults:   IP CONSULT TO CASE MANAGEMENT  IP CONSULT TO CASE MANAGEMENT  IP CONSULT TO CASE MANAGEMENT    Length of Stay:  Expected LOS: 4  Actual LOS: 0    Janusz Felix, JENNIFER

## 2025-02-26 NOTE — PROGRESS NOTES
.  Discharge instruction  given to the patient, verbalize understanding, opportunities  to question given ,   Patient left the unit stable with his spouse and caregiver   Pt Ambulated Assessment done with charge nurse   IV removed   No complain   Pt discharge Med. Is given   CM spoke with pt's spouse regarding discharge plan and walker is given

## 2025-02-27 DIAGNOSIS — G30.1 MODERATE LATE ONSET ALZHEIMER'S DEMENTIA WITHOUT BEHAVIORAL DISTURBANCE, PSYCHOTIC DISTURBANCE, MOOD DISTURBANCE, OR ANXIETY (HCC): ICD-10-CM

## 2025-02-27 DIAGNOSIS — F02.B0 MODERATE LATE ONSET ALZHEIMER'S DEMENTIA WITHOUT BEHAVIORAL DISTURBANCE, PSYCHOTIC DISTURBANCE, MOOD DISTURBANCE, OR ANXIETY (HCC): ICD-10-CM

## 2025-02-28 ENCOUNTER — TELEPHONE (OUTPATIENT)
Age: 89
End: 2025-02-28

## 2025-02-28 DIAGNOSIS — I10 PRIMARY HYPERTENSION: ICD-10-CM

## 2025-02-28 RX ORDER — MEMANTINE HYDROCHLORIDE 5 MG/1
5 TABLET ORAL 2 TIMES DAILY
Qty: 60 TABLET | Refills: 1 | Status: SHIPPED | OUTPATIENT
Start: 2025-02-28

## 2025-02-28 RX ORDER — LISINOPRIL 5 MG/1
5 TABLET ORAL DAILY
Qty: 90 TABLET | Refills: 1 | Status: SHIPPED | OUTPATIENT
Start: 2025-02-28

## 2025-02-28 RX ORDER — DONEPEZIL HYDROCHLORIDE 10 MG/1
10 TABLET, FILM COATED ORAL NIGHTLY
Qty: 30 TABLET | Refills: 1 | Status: SHIPPED | OUTPATIENT
Start: 2025-02-28

## 2025-02-28 NOTE — TELEPHONE ENCOUNTER
[facility] states pt OT visit has been moved to next week.     No call back needed unless pcp has further questions.

## 2025-03-04 ENCOUNTER — OFFICE VISIT (OUTPATIENT)
Age: 89
End: 2025-03-04
Payer: MEDICARE

## 2025-03-04 VITALS
DIASTOLIC BLOOD PRESSURE: 70 MMHG | OXYGEN SATURATION: 100 % | RESPIRATION RATE: 20 BRPM | BODY MASS INDEX: 27 KG/M2 | SYSTOLIC BLOOD PRESSURE: 155 MMHG | HEART RATE: 70 BPM | WEIGHT: 172 LBS | TEMPERATURE: 97.2 F | HEIGHT: 67 IN

## 2025-03-04 DIAGNOSIS — G30.1 MODERATE LATE ONSET ALZHEIMER'S DEMENTIA WITHOUT BEHAVIORAL DISTURBANCE, PSYCHOTIC DISTURBANCE, MOOD DISTURBANCE, OR ANXIETY (HCC): ICD-10-CM

## 2025-03-04 DIAGNOSIS — N18.31 STAGE 3A CHRONIC KIDNEY DISEASE (HCC): ICD-10-CM

## 2025-03-04 DIAGNOSIS — I10 PRIMARY HYPERTENSION: Primary | ICD-10-CM

## 2025-03-04 DIAGNOSIS — Z91.81 AT HIGH RISK FOR FALLS: ICD-10-CM

## 2025-03-04 DIAGNOSIS — I10 ESSENTIAL (PRIMARY) HYPERTENSION: ICD-10-CM

## 2025-03-04 DIAGNOSIS — F02.B0 MODERATE LATE ONSET ALZHEIMER'S DEMENTIA WITHOUT BEHAVIORAL DISTURBANCE, PSYCHOTIC DISTURBANCE, MOOD DISTURBANCE, OR ANXIETY (HCC): ICD-10-CM

## 2025-03-04 PROCEDURE — 99214 OFFICE O/P EST MOD 30 MIN: CPT | Performed by: INTERNAL MEDICINE

## 2025-03-04 PROCEDURE — G8428 CUR MEDS NOT DOCUMENT: HCPCS | Performed by: INTERNAL MEDICINE

## 2025-03-04 PROCEDURE — 1124F ACP DISCUSS-NO DSCNMKR DOCD: CPT | Performed by: INTERNAL MEDICINE

## 2025-03-04 PROCEDURE — 1036F TOBACCO NON-USER: CPT | Performed by: INTERNAL MEDICINE

## 2025-03-04 PROCEDURE — G8417 CALC BMI ABV UP PARAM F/U: HCPCS | Performed by: INTERNAL MEDICINE

## 2025-03-04 SDOH — ECONOMIC STABILITY: FOOD INSECURITY
WITHIN THE PAST 12 MONTHS, YOU WORRIED THAT YOUR FOOD WOULD RUN OUT BEFORE YOU GOT MONEY TO BUY MORE.: PATIENT UNABLE TO ANSWER

## 2025-03-04 SDOH — ECONOMIC STABILITY: FOOD INSECURITY
WITHIN THE PAST 12 MONTHS, THE FOOD YOU BOUGHT JUST DIDN'T LAST AND YOU DIDN'T HAVE MONEY TO GET MORE.: PATIENT UNABLE TO ANSWER

## 2025-03-04 ASSESSMENT — PATIENT HEALTH QUESTIONNAIRE - PHQ9: DEPRESSION UNABLE TO ASSESS: FUNCTIONAL CAPACITY MOTIVATION LIMITS ACCURACY

## 2025-03-04 NOTE — PROGRESS NOTES
Jt Nielsen (:  1935) is a 89 y.o. male, Established patient, here for evaluation of the following chief complaint(s):  Follow-Up from Hospital (Multiple trips to [facility])         Assessment & Plan  1. JUDY on Chronic Kidney Disease.  The patient's kidney function has improved but not fully recovered since the hospitalization from  to 2024. He is advised to continue monitoring kidney function and follow up with his nephrologist as needed.    2. Dementia.  The patient's dementia has worsened, and he is currently taking Namenda and Aricept. He is advised to continue these medications. The family is encouraged to consider assisted living facilities for better care and safety, given wife also has many chronic medical issues and falls. They dont have family near by. Currently has a care aid 8 hours a day that has been helping.   B12 was low and given weekly shots repeat  was 531 should be on monthly B12 shots.     3. Plantar Fasciitis.  The patient reports pain in the bottom of his feet, which is consistent with plantar fasciitis. He is advised to use shoe inserts like Dr. Heredia's inserts for relief. Crocs are not recommended as they do not provide adequate support.    4. Hypertension.  The patient is currently taking lisinopril 5 mg for blood pressure management. He is advised to continue this medication as prescribed.    5. Paroxysmal Atrial Fibrillation.  The patient is taking Eliquis 2.5 mg twice a day due to his age and kidney function. He is advised to continue this medication as prescribed.    6. Prostate Issues.  The patient is taking Flomax for his prostate. He is advised to continue this medication and schedule an appointment with his urologist for further evaluation.    7. Obstructive Sleep Apnea.  The patient has a history of obstructive sleep apnea but is not currently on BiPAP. He is advised to follow up with a sleep specialist for further evaluation and

## 2025-03-05 LAB
ANION GAP SERPL CALC-SCNC: 3 MMOL/L (ref 2–12)
BUN SERPL-MCNC: 20 MG/DL (ref 6–20)
BUN/CREAT SERPL: 14 (ref 12–20)
CALCIUM SERPL-MCNC: 9.2 MG/DL (ref 8.5–10.1)
CHLORIDE SERPL-SCNC: 107 MMOL/L (ref 97–108)
CO2 SERPL-SCNC: 28 MMOL/L (ref 21–32)
CREAT SERPL-MCNC: 1.4 MG/DL (ref 0.7–1.3)
GLUCOSE SERPL-MCNC: 85 MG/DL (ref 65–100)
POTASSIUM SERPL-SCNC: 4.3 MMOL/L (ref 3.5–5.1)
SODIUM SERPL-SCNC: 138 MMOL/L (ref 136–145)

## 2025-03-13 DIAGNOSIS — I48.0 PAF (PAROXYSMAL ATRIAL FIBRILLATION) (HCC): ICD-10-CM

## 2025-03-13 NOTE — TELEPHONE ENCOUNTER
Regarding medication:  apixaban (ELIQUIS) 5 MG TABS tablet     Problem:  Usually prescribed by cardiologist per caller.  States pcp saw pt last, and pt had to cancel last cardiologist appt, so wants pcp to fill please.    Needs pcp to send as  5 mg tablet:  Take 2.5 mg in morning and 2.5 mg at night     Suggested options:  [id number] [address] - P [phone] - F [phone]    Call wife if any concerns.              Caller confirms readback of documented phone/fax number(s) as correct.

## 2025-03-13 NOTE — TELEPHONE ENCOUNTER
PCP: Lorrie Krueger MD    Last appt:   3/4/2025    Future Appointments   Date Time Provider Department Center   4/10/2025  1:00 PM Kendra Orellana MD [facility]   4/10/2025  2:00 PM Lorrie Krueger MD [facility]P       Requested Prescriptions     Pending Prescriptions Disp Refills    apixaban (ELIQUIS) 5 MG TABS tablet 60 tablet 5     Sig: Take 0.5 tablets by mouth 2 times daily

## 2025-04-08 DIAGNOSIS — G30.1 MODERATE LATE ONSET ALZHEIMER'S DEMENTIA WITHOUT BEHAVIORAL DISTURBANCE, PSYCHOTIC DISTURBANCE, MOOD DISTURBANCE, OR ANXIETY (HCC): ICD-10-CM

## 2025-04-08 DIAGNOSIS — F02.B0 MODERATE LATE ONSET ALZHEIMER'S DEMENTIA WITHOUT BEHAVIORAL DISTURBANCE, PSYCHOTIC DISTURBANCE, MOOD DISTURBANCE, OR ANXIETY (HCC): ICD-10-CM

## 2025-04-09 RX ORDER — DONEPEZIL HYDROCHLORIDE 10 MG/1
10 TABLET, FILM COATED ORAL NIGHTLY
Qty: 30 TABLET | Refills: 1 | Status: SHIPPED | OUTPATIENT
Start: 2025-04-09

## 2025-04-09 RX ORDER — MEMANTINE HYDROCHLORIDE 5 MG/1
5 TABLET ORAL 2 TIMES DAILY
Qty: 60 TABLET | Refills: 1 | Status: SHIPPED | OUTPATIENT
Start: 2025-04-09

## 2025-04-10 ENCOUNTER — OFFICE VISIT (OUTPATIENT)
Age: 89
End: 2025-04-10
Payer: MEDICARE

## 2025-04-10 VITALS
HEIGHT: 67 IN | SYSTOLIC BLOOD PRESSURE: 189 MMHG | TEMPERATURE: 98 F | RESPIRATION RATE: 18 BRPM | DIASTOLIC BLOOD PRESSURE: 77 MMHG | HEART RATE: 61 BPM | OXYGEN SATURATION: 98 % | WEIGHT: 182.8 LBS | BODY MASS INDEX: 28.69 KG/M2

## 2025-04-10 DIAGNOSIS — Z00.00 MEDICARE ANNUAL WELLNESS VISIT, SUBSEQUENT: Primary | ICD-10-CM

## 2025-04-10 DIAGNOSIS — F02.B0 MODERATE LATE ONSET ALZHEIMER'S DEMENTIA WITHOUT BEHAVIORAL DISTURBANCE, PSYCHOTIC DISTURBANCE, MOOD DISTURBANCE, OR ANXIETY (HCC): ICD-10-CM

## 2025-04-10 DIAGNOSIS — M79.672 BILATERAL FOOT PAIN: Chronic | ICD-10-CM

## 2025-04-10 DIAGNOSIS — E53.8 B12 DEFICIENCY: ICD-10-CM

## 2025-04-10 DIAGNOSIS — G62.9 PERIPHERAL POLYNEUROPATHY: ICD-10-CM

## 2025-04-10 DIAGNOSIS — G30.1 MODERATE LATE ONSET ALZHEIMER'S DEMENTIA WITHOUT BEHAVIORAL DISTURBANCE, PSYCHOTIC DISTURBANCE, MOOD DISTURBANCE, OR ANXIETY (HCC): ICD-10-CM

## 2025-04-10 DIAGNOSIS — I48.0 PAF (PAROXYSMAL ATRIAL FIBRILLATION) (HCC): ICD-10-CM

## 2025-04-10 DIAGNOSIS — M79.671 BILATERAL FOOT PAIN: Chronic | ICD-10-CM

## 2025-04-10 DIAGNOSIS — I10 ESSENTIAL (PRIMARY) HYPERTENSION: ICD-10-CM

## 2025-04-10 PROCEDURE — 1160F RVW MEDS BY RX/DR IN RCRD: CPT | Performed by: INTERNAL MEDICINE

## 2025-04-10 PROCEDURE — 1159F MED LIST DOCD IN RCRD: CPT | Performed by: INTERNAL MEDICINE

## 2025-04-10 PROCEDURE — G0439 PPPS, SUBSEQ VISIT: HCPCS | Performed by: INTERNAL MEDICINE

## 2025-04-10 PROCEDURE — 96372 THER/PROPH/DIAG INJ SC/IM: CPT | Performed by: INTERNAL MEDICINE

## 2025-04-10 PROCEDURE — PBSHW PBB SHADOW CHARGE: Performed by: INTERNAL MEDICINE

## 2025-04-10 PROCEDURE — 1124F ACP DISCUSS-NO DSCNMKR DOCD: CPT | Performed by: INTERNAL MEDICINE

## 2025-04-10 RX ORDER — DILTIAZEM HYDROCHLORIDE 120 MG/1
120 CAPSULE, EXTENDED RELEASE ORAL DAILY
Qty: 90 CAPSULE | Refills: 1 | Status: SHIPPED | OUTPATIENT
Start: 2025-04-10

## 2025-04-10 RX ORDER — GABAPENTIN 100 MG/1
100 CAPSULE ORAL 2 TIMES DAILY
Qty: 60 CAPSULE | Refills: 0 | Status: SHIPPED | OUTPATIENT
Start: 2025-04-10 | End: 2025-05-10

## 2025-04-10 RX ORDER — CYANOCOBALAMIN 1000 UG/ML
1000 INJECTION, SOLUTION INTRAMUSCULAR; SUBCUTANEOUS ONCE
Status: COMPLETED | OUTPATIENT
Start: 2025-04-10 | End: 2025-04-10

## 2025-04-10 RX ADMIN — CYANOCOBALAMIN 1000 MCG: 1000 INJECTION, SOLUTION INTRAMUSCULAR; SUBCUTANEOUS at 15:11

## 2025-04-10 SDOH — ECONOMIC STABILITY: FOOD INSECURITY: WITHIN THE PAST 12 MONTHS, YOU WORRIED THAT YOUR FOOD WOULD RUN OUT BEFORE YOU GOT MONEY TO BUY MORE.: NEVER TRUE

## 2025-04-10 SDOH — ECONOMIC STABILITY: FOOD INSECURITY: WITHIN THE PAST 12 MONTHS, THE FOOD YOU BOUGHT JUST DIDN'T LAST AND YOU DIDN'T HAVE MONEY TO GET MORE.: NEVER TRUE

## 2025-04-10 ASSESSMENT — PATIENT HEALTH QUESTIONNAIRE - PHQ9: DEPRESSION UNABLE TO ASSESS: FUNCTIONAL CAPACITY MOTIVATION LIMITS ACCURACY

## 2025-04-10 NOTE — PATIENT INSTRUCTIONS
of these benefits include a comprehensive review of your medical history including lifestyle, illnesses that may run in your family, and various assessments and screenings as appropriate.  After reviewing your medical record and screening and assessments performed today your provider may have ordered immunizations, labs, imaging, and/or referrals for you.  A list of these orders (if applicable) as well as your Preventive Care list are included within your After Visit Summary for your review.

## 2025-04-10 NOTE — PROGRESS NOTES
\"Have you been to the ER, urgent care clinic since your last visit?  Hospitalized since your last visit?\"    NO    “Have you seen or consulted any other health care providers outside our system since your last visit?”    NO           
including the recording.

## 2025-04-11 LAB
ANION GAP SERPL CALC-SCNC: 4 MMOL/L (ref 2–12)
BASOPHILS # BLD: 0.24 K/UL (ref 0–0.1)
BASOPHILS NFR BLD: 2 % (ref 0–1)
BUN SERPL-MCNC: 17 MG/DL (ref 6–20)
BUN/CREAT SERPL: 13 (ref 12–20)
CALCIUM SERPL-MCNC: 8.7 MG/DL (ref 8.5–10.1)
CHLORIDE SERPL-SCNC: 108 MMOL/L (ref 97–108)
CO2 SERPL-SCNC: 30 MMOL/L (ref 21–32)
CREAT SERPL-MCNC: 1.27 MG/DL (ref 0.7–1.3)
DIFFERENTIAL METHOD BLD: ABNORMAL
EOSINOPHIL # BLD: 0 K/UL (ref 0–0.4)
EOSINOPHIL NFR BLD: 0 % (ref 0–7)
ERYTHROCYTE [DISTWIDTH] IN BLOOD BY AUTOMATED COUNT: 14.9 % (ref 11.5–14.5)
GLUCOSE SERPL-MCNC: 88 MG/DL (ref 65–100)
HCT VFR BLD AUTO: 37 % (ref 36.6–50.3)
HGB BLD-MCNC: 11.6 G/DL (ref 12.1–17)
IMM GRANULOCYTES # BLD AUTO: 0 K/UL
IMM GRANULOCYTES NFR BLD AUTO: 0 %
LYMPHOCYTES # BLD: 7.13 K/UL (ref 0.8–3.5)
LYMPHOCYTES NFR BLD: 60 % (ref 12–49)
MCH RBC QN AUTO: 28.1 PG (ref 26–34)
MCHC RBC AUTO-ENTMCNC: 31.4 G/DL (ref 30–36.5)
MCV RBC AUTO: 89.6 FL (ref 80–99)
MONOCYTES # BLD: 0.48 K/UL (ref 0–1)
MONOCYTES NFR BLD: 4 % (ref 5–13)
NEUTS SEG # BLD: 4.05 K/UL (ref 1.8–8)
NEUTS SEG NFR BLD: 34 % (ref 32–75)
NRBC # BLD: 0 K/UL (ref 0–0.01)
NRBC BLD-RTO: 0 PER 100 WBC
PATH REV BLD -IMP: ABNORMAL
PLATELET # BLD AUTO: 157 K/UL (ref 150–400)
PMV BLD AUTO: 10.8 FL (ref 8.9–12.9)
POTASSIUM SERPL-SCNC: 4 MMOL/L (ref 3.5–5.1)
RBC # BLD AUTO: 4.13 M/UL (ref 4.1–5.7)
RBC MORPH BLD: ABNORMAL
SODIUM SERPL-SCNC: 142 MMOL/L (ref 136–145)
WBC # BLD AUTO: 11.9 K/UL (ref 4.1–11.1)
WBC MORPH BLD: ABNORMAL

## 2025-04-13 ENCOUNTER — RESULTS FOLLOW-UP (OUTPATIENT)
Age: 89
End: 2025-04-13

## 2025-04-13 DIAGNOSIS — C91.10 CLL (CHRONIC LYMPHOCYTIC LEUKEMIA) (HCC): Primary | ICD-10-CM

## 2025-04-14 ENCOUNTER — TELEPHONE (OUTPATIENT)
Age: 89
End: 2025-04-14

## 2025-04-14 LAB — VIT B12 SERPL-MCNC: >2000 PG/ML (ref 232–1245)

## 2025-04-14 NOTE — TELEPHONE ENCOUNTER
Tal, Physical Therapist, [facility]    Phone [phone]      States:  Met all physical therapy goals  Is ready for discharge.    Requesting verbal orders:  Discharge pt from  Physical therapy next week        Appointments:  Last seen at [facility]:   4/10/2025   Future appointment [facility]:  7/11/2025         .            Caller confirms readback of documented phone/fax number(s) as correct.

## 2025-05-07 ENCOUNTER — TELEPHONE (OUTPATIENT)
Age: 89
End: 2025-05-07

## 2025-05-07 DIAGNOSIS — I48.0 PAF (PAROXYSMAL ATRIAL FIBRILLATION) (HCC): ICD-10-CM

## 2025-05-07 NOTE — TELEPHONE ENCOUNTER
Received call from Pts wife Keli Nielsen due to him being out oh his eliquis prescription. She explained that his refill was a partial prescription and it now out of it. She also requesting him to see Dr. Orellana soon. Mid morning or afternoon times preferred. She said to call her at [phone] since he may be asleep.

## 2025-05-09 ENCOUNTER — CLINICAL SUPPORT (OUTPATIENT)
Age: 89
End: 2025-05-09
Payer: MEDICARE

## 2025-05-09 ENCOUNTER — TELEPHONE (OUTPATIENT)
Age: 89
End: 2025-05-09

## 2025-05-09 DIAGNOSIS — E53.8 B12 DEFICIENCY: Primary | ICD-10-CM

## 2025-05-09 DIAGNOSIS — R21 RASH: Primary | ICD-10-CM

## 2025-05-09 PROCEDURE — PBSHW PBB SHADOW CHARGE: Performed by: INTERNAL MEDICINE

## 2025-05-09 PROCEDURE — 96372 THER/PROPH/DIAG INJ SC/IM: CPT | Performed by: INTERNAL MEDICINE

## 2025-05-09 RX ORDER — CYANOCOBALAMIN 1000 UG/ML
1000 INJECTION, SOLUTION INTRAMUSCULAR; SUBCUTANEOUS ONCE
Status: COMPLETED | OUTPATIENT
Start: 2025-05-09 | End: 2025-05-09

## 2025-05-09 RX ADMIN — CYANOCOBALAMIN 1000 MCG: 1000 INJECTION, SOLUTION INTRAMUSCULAR; SUBCUTANEOUS at 15:50

## 2025-05-09 NOTE — PROGRESS NOTES
Identified pt with two pt identifiers(name and ). Reviewed record in preparation for visit and have obtained necessary documentation.  Chief Complaint   Patient presents with    Other     B-12 Injection        There were no vitals filed for this visit.        Conchita Shaw LPN     [facility]  [address], [address]   [place]  [address]  W: [phone]  F: [phone]    Pt. Tolerated B-12  injection in Left Deltoid.

## 2025-05-21 ENCOUNTER — OFFICE VISIT (OUTPATIENT)
Age: 89
End: 2025-05-21
Payer: MEDICARE

## 2025-05-21 VITALS
DIASTOLIC BLOOD PRESSURE: 72 MMHG | OXYGEN SATURATION: 95 % | BODY MASS INDEX: 28.79 KG/M2 | HEART RATE: 57 BPM | WEIGHT: 183.4 LBS | SYSTOLIC BLOOD PRESSURE: 142 MMHG | HEIGHT: 67 IN

## 2025-05-21 DIAGNOSIS — I10 ESSENTIAL (PRIMARY) HYPERTENSION: ICD-10-CM

## 2025-05-21 DIAGNOSIS — I48.0 PAROXYSMAL ATRIAL FIBRILLATION (HCC): Primary | ICD-10-CM

## 2025-05-21 DIAGNOSIS — G47.33 OSA (OBSTRUCTIVE SLEEP APNEA): ICD-10-CM

## 2025-05-21 DIAGNOSIS — E78.00 HYPERCHOLESTEREMIA: ICD-10-CM

## 2025-05-21 PROCEDURE — 1160F RVW MEDS BY RX/DR IN RCRD: CPT | Performed by: NURSE PRACTITIONER

## 2025-05-21 PROCEDURE — 99214 OFFICE O/P EST MOD 30 MIN: CPT | Performed by: NURSE PRACTITIONER

## 2025-05-21 PROCEDURE — 1124F ACP DISCUSS-NO DSCNMKR DOCD: CPT | Performed by: NURSE PRACTITIONER

## 2025-05-21 PROCEDURE — 93005 ELECTROCARDIOGRAM TRACING: CPT | Performed by: NURSE PRACTITIONER

## 2025-05-21 PROCEDURE — 1126F AMNT PAIN NOTED NONE PRSNT: CPT | Performed by: NURSE PRACTITIONER

## 2025-05-21 PROCEDURE — G8417 CALC BMI ABV UP PARAM F/U: HCPCS | Performed by: NURSE PRACTITIONER

## 2025-05-21 PROCEDURE — 1159F MED LIST DOCD IN RCRD: CPT | Performed by: NURSE PRACTITIONER

## 2025-05-21 PROCEDURE — G8427 DOCREV CUR MEDS BY ELIG CLIN: HCPCS | Performed by: NURSE PRACTITIONER

## 2025-05-21 PROCEDURE — 1036F TOBACCO NON-USER: CPT | Performed by: NURSE PRACTITIONER

## 2025-05-21 PROCEDURE — 93010 ELECTROCARDIOGRAM REPORT: CPT | Performed by: NURSE PRACTITIONER

## 2025-05-21 RX ORDER — KETOROLAC TROMETHAMINE 5 MG/ML
SOLUTION OPHTHALMIC
COMMUNITY

## 2025-05-21 RX ORDER — BENZONATATE 200 MG/1
200 CAPSULE ORAL 3 TIMES DAILY PRN
COMMUNITY

## 2025-05-21 RX ORDER — LATANOPROST 50 UG/ML
SOLUTION/ DROPS OPHTHALMIC
COMMUNITY

## 2025-05-21 NOTE — PROGRESS NOTES
Jt Nielsen     1935         \  PCP is Lorrie Krueger MD   Cardiologist: Kendra Orellana MD, [facility]  HPI:  Jt Nielsen is a 88 y.o. male here for annual follow up.     PAF with HTN/HCVD with LVH  Echo 12/12/2023 EF 60 to 65%, mild MR, mild TR, mild AI  On Eliquis for OAC    EKG today showed SB 56 bpm    Today...  Accompanied by his wife; he has developed significant dementia in last year and is unable to relay much history to me.   BP, HR at goal, no ER visits/hospitalizations.   Denies chest pain, shortness of breath    Mild LE edema due to dietary indiscretion  His wife has had multiple falls, hospitalizations and rehab stays - unable to cook for himself and she has been limited as well  Has no palpitations or dizziness  Continues to take Eliquis 2.5 mg BID; full dose limited by copay   Has not followed up with sleep medicine given the challenges of the last year     Assessment/Plan:       1. PAF (paroxysmal atrial fibrillation) (HCC)  No clinical events. Continue Diltiazem CD  First episode of Afib in [place]  Echo showed normal EF, some LVH 12/23  Repeat echo  SB on EKG today  Continue Eliquis 2.5 mg BID for now given financial barriers; will look into patient assistance for him     2. Essential hypertension  BP acceptable given advanced age, fall risk   LVH on last echo, repeat   BP well controlled at home, discussed SBP < 140mmHg  Possible side effects of meds reviewed and patient denies    Key CAD CHF Meds               lisinopriL (PRINIVIL, ZESTRIL) 20 mg tablet (Taking) Take 1 Tablet by mouth daily.    dilTIAZem ER (DILT-XR) 120 mg capsule (Taking) Take 1 Capsule by mouth daily.    apixaban (Eliquis) 2.5 mg tablet (Taking) Take 1 Tablet by mouth every twelve (12) hours.           3. Pure hypercholesterolemia  Last LDL down to 91, management per PCP  No excess muscle aches or new liver issues      4. ANIBAL  Re-referred to Sleep Medicine at last visit

## 2025-05-21 NOTE — PATIENT INSTRUCTIONS
Echocardiogram.      We will look into Eliquis patient assistance for you.     Return for follow up in 6 months.

## 2025-05-27 ENCOUNTER — OFFICE VISIT (OUTPATIENT)
Age: 89
End: 2025-05-27
Payer: MEDICARE

## 2025-05-27 VITALS
DIASTOLIC BLOOD PRESSURE: 70 MMHG | OXYGEN SATURATION: 91 % | RESPIRATION RATE: 14 BRPM | TEMPERATURE: 97.8 F | SYSTOLIC BLOOD PRESSURE: 142 MMHG | HEIGHT: 67 IN | WEIGHT: 182 LBS | HEART RATE: 60 BPM | BODY MASS INDEX: 28.56 KG/M2

## 2025-05-27 DIAGNOSIS — D64.9 NORMOCYTIC ANEMIA: ICD-10-CM

## 2025-05-27 DIAGNOSIS — D72.829 LEUKOCYTOSIS, UNSPECIFIED TYPE: Primary | ICD-10-CM

## 2025-05-27 DIAGNOSIS — D72.829 LEUKOCYTOSIS, UNSPECIFIED TYPE: ICD-10-CM

## 2025-05-27 PROCEDURE — G8427 DOCREV CUR MEDS BY ELIG CLIN: HCPCS | Performed by: STUDENT IN AN ORGANIZED HEALTH CARE EDUCATION/TRAINING PROGRAM

## 2025-05-27 PROCEDURE — 99204 OFFICE O/P NEW MOD 45 MIN: CPT | Performed by: STUDENT IN AN ORGANIZED HEALTH CARE EDUCATION/TRAINING PROGRAM

## 2025-05-27 PROCEDURE — 1124F ACP DISCUSS-NO DSCNMKR DOCD: CPT | Performed by: STUDENT IN AN ORGANIZED HEALTH CARE EDUCATION/TRAINING PROGRAM

## 2025-05-27 PROCEDURE — 1159F MED LIST DOCD IN RCRD: CPT | Performed by: STUDENT IN AN ORGANIZED HEALTH CARE EDUCATION/TRAINING PROGRAM

## 2025-05-27 PROCEDURE — 1126F AMNT PAIN NOTED NONE PRSNT: CPT | Performed by: STUDENT IN AN ORGANIZED HEALTH CARE EDUCATION/TRAINING PROGRAM

## 2025-05-27 PROCEDURE — 1036F TOBACCO NON-USER: CPT | Performed by: STUDENT IN AN ORGANIZED HEALTH CARE EDUCATION/TRAINING PROGRAM

## 2025-05-27 PROCEDURE — G8417 CALC BMI ABV UP PARAM F/U: HCPCS | Performed by: STUDENT IN AN ORGANIZED HEALTH CARE EDUCATION/TRAINING PROGRAM

## 2025-05-27 NOTE — PROGRESS NOTES
Jt Nielsen is a 89 y.o. male who presents for follow up of   Chief Complaint   Patient presents with    New Patient     CLL       The patient is here today for an evaluation. He denies any enlarged or painless lymph nodes, fatigue, pain in the abdomen, weight loss or any fevers.     Medications reviewed with the patient, and chart updated to reflect changes.

## 2025-05-27 NOTE — PROGRESS NOTES
[facility] [place] [facility]  [address] [place] [id number]  W: [phone] F: [phone]    Reason for Visit:   Jt Nielsen is a 89 y.o. male who is seen in consultation at the request of Dr. Guzman Rothman for evaluation of leukocytosis, concern for CLL.      Hematology / Oncology Treatment Information:     Hematological/Oncological Diagnosis: Leukocytosis    Oncology/Hematology Treatment Course:   Treatment course:   1) pending      Pathology and Molecular Testing:       Initial Presentation  (HPI):     The patient (or guardian, if applicable) and other individuals in attendance with the patient were advised that Artificial Intelligence will be utilized during this visit to record, process the conversation to generate a clinical note, and support improvement of the AI technology. The patient (or guardian, if applicable) and other individuals in attendance at the appointment consented to the use of AI, including the recording.        History of Present Illness  The patient is an 89-year-old male with a relevant medical history most significant for hypertension, atrial fibrillation on anticoagulation, ANIBAL on BiPAP, and Alzheimer's dementia. He presents for evaluation and treatment of chronic leukocytosis with concern for an underlying lymphoproliferative disorder. The patient last saw his primary care provider in 04/2025 where a CBC was obtained. Chart review shows that he had an elevated white blood cell count of 11.9 as well as a slightly elevated absolute lymphocyte count of 7.13. There were some atypical lymphocytes present on smear. Hemoglobin was slightly low at 11.6 and on trend, he has had mild normocytic anemia going back for the last 4 months. No other cytopenias detected. Platelet count was normal at 157. B12 was evaluated and was normal. He is on oral supplementation. TSH and free T4 were normal. No recent iron studies were

## 2025-05-28 LAB
ALBUMIN SERPL-MCNC: 3.4 G/DL (ref 3.5–5)
ALBUMIN/GLOB SERPL: 0.9 (ref 1.1–2.2)
ALP SERPL-CCNC: 94 U/L (ref 45–117)
ALT SERPL-CCNC: 19 U/L (ref 12–78)
ANION GAP SERPL CALC-SCNC: 6 MMOL/L (ref 2–12)
AST SERPL-CCNC: 20 U/L (ref 15–37)
BASOPHILS # BLD: 0.06 K/UL (ref 0–0.1)
BASOPHILS NFR BLD: 0.5 % (ref 0–1)
BILIRUB SERPL-MCNC: 0.3 MG/DL (ref 0.2–1)
BUN SERPL-MCNC: 22 MG/DL (ref 6–20)
BUN/CREAT SERPL: 16 (ref 12–20)
CALCIUM SERPL-MCNC: 8.8 MG/DL (ref 8.5–10.1)
CHLORIDE SERPL-SCNC: 109 MMOL/L (ref 97–108)
CO2 SERPL-SCNC: 27 MMOL/L (ref 21–32)
COMMENT:: NORMAL
CREAT SERPL-MCNC: 1.39 MG/DL (ref 0.7–1.3)
CRP SERPL-MCNC: 0.71 MG/DL (ref 0–0.3)
DIFFERENTIAL METHOD BLD: ABNORMAL
EOSINOPHIL # BLD: 0.13 K/UL (ref 0–0.4)
EOSINOPHIL NFR BLD: 1.1 % (ref 0–7)
ERYTHROCYTE [DISTWIDTH] IN BLOOD BY AUTOMATED COUNT: 13.9 % (ref 11.5–14.5)
ERYTHROCYTE [SEDIMENTATION RATE] IN BLOOD: 35 MM/HR (ref 0–20)
FERRITIN SERPL-MCNC: 63 NG/ML (ref 26–388)
GLOBULIN SER CALC-MCNC: 4 G/DL (ref 2–4)
GLUCOSE SERPL-MCNC: 95 MG/DL (ref 65–100)
HCT VFR BLD AUTO: 39.3 % (ref 36.6–50.3)
HGB BLD-MCNC: 12.3 G/DL (ref 12.1–17)
IMM GRANULOCYTES # BLD AUTO: 0.02 K/UL (ref 0–0.04)
IMM GRANULOCYTES NFR BLD AUTO: 0.2 % (ref 0–0.5)
IRON SATN MFR SERPL: 16 % (ref 20–50)
IRON SERPL-MCNC: 47 UG/DL (ref 35–150)
LDH SERPL L TO P-CCNC: 208 U/L (ref 85–241)
LYMPHOCYTES # BLD: 6.28 K/UL (ref 0.8–3.5)
LYMPHOCYTES NFR BLD: 53.7 % (ref 12–49)
MCH RBC QN AUTO: 27.2 PG (ref 26–34)
MCHC RBC AUTO-ENTMCNC: 31.3 G/DL (ref 30–36.5)
MCV RBC AUTO: 86.9 FL (ref 80–99)
MONOCYTES # BLD: 1.05 K/UL (ref 0–1)
MONOCYTES NFR BLD: 9 % (ref 5–13)
NEUTS SEG # BLD: 4.15 K/UL (ref 1.8–8)
NEUTS SEG NFR BLD: 35.5 % (ref 32–75)
NRBC # BLD: 0 K/UL (ref 0–0.01)
NRBC BLD-RTO: 0 PER 100 WBC
PLATELET # BLD AUTO: 170 K/UL (ref 150–400)
PMV BLD AUTO: 10.6 FL (ref 8.9–12.9)
POTASSIUM SERPL-SCNC: 4.1 MMOL/L (ref 3.5–5.1)
PROT SERPL-MCNC: 7.4 G/DL (ref 6.4–8.2)
RBC # BLD AUTO: 4.52 M/UL (ref 4.1–5.7)
RBC MORPH BLD: ABNORMAL
SODIUM SERPL-SCNC: 142 MMOL/L (ref 136–145)
SPECIMEN HOLD: NORMAL
TIBC SERPL-MCNC: 296 UG/DL (ref 250–450)
WBC # BLD AUTO: 11.7 K/UL (ref 4.1–11.1)

## 2025-05-29 LAB — FLOW CYTOMETRY: NORMAL

## 2025-05-30 LAB
ALBUMIN SERPL ELPH-MCNC: 3.3 G/DL (ref 2.9–4.4)
ALBUMIN/GLOB SERPL: 1 (ref 0.7–1.7)
ALPHA1 GLOB SERPL ELPH-MCNC: 0.2 G/DL (ref 0–0.4)
ALPHA2 GLOB SERPL ELPH-MCNC: 0.9 G/DL (ref 0.4–1)
B-GLOBULIN SERPL ELPH-MCNC: 0.9 G/DL (ref 0.7–1.3)
GAMMA GLOB SERPL ELPH-MCNC: 1.5 G/DL (ref 0.4–1.8)
GLOBULIN SER-MCNC: 3.5 G/DL (ref 2.2–3.9)
IGA SERPL-MCNC: 138 MG/DL (ref 61–437)
IGG SERPL-MCNC: 1472 MG/DL (ref 603–1613)
IGM SERPL-MCNC: 178 MG/DL (ref 15–143)
INTERPRETATION SERPL IEP-IMP: ABNORMAL
KAPPA LC FREE SER-MCNC: 99.9 MG/L (ref 3.3–19.4)
KAPPA LC FREE/LAMBDA FREE SER: 4.11 (ref 0.26–1.65)
LAMBDA LC FREE SERPL-MCNC: 24.3 MG/L (ref 5.7–26.3)
M PROTEIN SERPL ELPH-MCNC: 0.5 G/DL
PROT SERPL-MCNC: 6.8 G/DL (ref 6–8.5)

## 2025-06-03 ENCOUNTER — TELEPHONE (OUTPATIENT)
Age: 89
End: 2025-06-03

## 2025-06-06 ENCOUNTER — ANCILLARY PROCEDURE (OUTPATIENT)
Age: 89
End: 2025-06-06
Payer: MEDICARE

## 2025-06-06 VITALS — HEIGHT: 67 IN | WEIGHT: 182 LBS | BODY MASS INDEX: 28.56 KG/M2

## 2025-06-06 DIAGNOSIS — I48.0 PAROXYSMAL ATRIAL FIBRILLATION (HCC): ICD-10-CM

## 2025-06-06 PROCEDURE — 93306 TTE W/DOPPLER COMPLETE: CPT

## 2025-06-08 DIAGNOSIS — M79.671 BILATERAL FOOT PAIN: Chronic | ICD-10-CM

## 2025-06-08 DIAGNOSIS — I48.0 PAF (PAROXYSMAL ATRIAL FIBRILLATION) (HCC): ICD-10-CM

## 2025-06-08 DIAGNOSIS — G62.9 PERIPHERAL POLYNEUROPATHY: ICD-10-CM

## 2025-06-08 DIAGNOSIS — M79.672 BILATERAL FOOT PAIN: Chronic | ICD-10-CM

## 2025-06-09 ENCOUNTER — TELEPHONE (OUTPATIENT)
Age: 89
End: 2025-06-09

## 2025-06-09 RX ORDER — GABAPENTIN 100 MG/1
100 CAPSULE ORAL 2 TIMES DAILY
Qty: 180 CAPSULE | Refills: 1 | Status: SHIPPED | OUTPATIENT
Start: 2025-06-09 | End: 2025-09-07

## 2025-06-09 NOTE — TELEPHONE ENCOUNTER
Per VO by MD.    Future Appointments   Date Time Provider Department Center   6/17/2025  2:15 PM Valerie Tracey MD [facility]   7/11/2025 11:45 AM [facility]   11/24/2025  1:40 PM Kendra Orellana MD [facility] AM[facility]

## 2025-06-17 ENCOUNTER — OFFICE VISIT (OUTPATIENT)
Age: 89
End: 2025-06-17
Payer: MEDICARE

## 2025-06-17 VITALS
HEIGHT: 67 IN | WEIGHT: 187.6 LBS | OXYGEN SATURATION: 97 % | HEART RATE: 55 BPM | SYSTOLIC BLOOD PRESSURE: 158 MMHG | DIASTOLIC BLOOD PRESSURE: 69 MMHG | BODY MASS INDEX: 29.44 KG/M2 | TEMPERATURE: 98.3 F | RESPIRATION RATE: 18 BRPM

## 2025-06-17 DIAGNOSIS — D64.9 NORMOCYTIC ANEMIA: Primary | ICD-10-CM

## 2025-06-17 PROCEDURE — 99214 OFFICE O/P EST MOD 30 MIN: CPT | Performed by: STUDENT IN AN ORGANIZED HEALTH CARE EDUCATION/TRAINING PROGRAM

## 2025-06-17 PROCEDURE — 1036F TOBACCO NON-USER: CPT | Performed by: STUDENT IN AN ORGANIZED HEALTH CARE EDUCATION/TRAINING PROGRAM

## 2025-06-17 PROCEDURE — 1124F ACP DISCUSS-NO DSCNMKR DOCD: CPT | Performed by: STUDENT IN AN ORGANIZED HEALTH CARE EDUCATION/TRAINING PROGRAM

## 2025-06-17 PROCEDURE — G8417 CALC BMI ABV UP PARAM F/U: HCPCS | Performed by: STUDENT IN AN ORGANIZED HEALTH CARE EDUCATION/TRAINING PROGRAM

## 2025-06-17 PROCEDURE — 1159F MED LIST DOCD IN RCRD: CPT | Performed by: STUDENT IN AN ORGANIZED HEALTH CARE EDUCATION/TRAINING PROGRAM

## 2025-06-17 PROCEDURE — G8427 DOCREV CUR MEDS BY ELIG CLIN: HCPCS | Performed by: STUDENT IN AN ORGANIZED HEALTH CARE EDUCATION/TRAINING PROGRAM

## 2025-06-17 PROCEDURE — 1126F AMNT PAIN NOTED NONE PRSNT: CPT | Performed by: STUDENT IN AN ORGANIZED HEALTH CARE EDUCATION/TRAINING PROGRAM

## 2025-06-17 NOTE — PROGRESS NOTES
[facility]  [address] [facility] Office Building [address] [id number]  W: [phone] F: [phone]    Reason for Visit:   Jt Nielsen is a 89 y.o. male who is seen in consultation at the request of Dr. Guzman Rothman for evaluation of leukocytosis, concern for CLL.      Hematology / Oncology Treatment Information:     Hematological/Oncological Diagnosis: Leukocytosis    Oncology/Hematology Treatment Course:   Treatment course:   1) pending      Pathology and Molecular Testing:       Initial Presentation  (HPI):     The patient (or guardian, if applicable) and other individuals in attendance with the patient were advised that Artificial Intelligence will be utilized during this visit to record, process the conversation to generate a clinical note, and support improvement of the AI technology. The patient (or guardian, if applicable) and other individuals in attendance at the appointment consented to the use of AI, including the recording.        History of Present Illness  The patient is an 89-year-old male with a relevant medical history most significant for hypertension, atrial fibrillation on anticoagulation, ANIBAL on BiPAP, and Alzheimer's dementia. He presents for evaluation and treatment of chronic leukocytosis with concern for an underlying lymphoproliferative disorder. The patient last saw his primary care provider in 04/2025 where a CBC was obtained. Chart review shows that he had an elevated white blood cell count of 11.9 as well as a slightly elevated absolute lymphocyte count of 7.13. There were some atypical lymphocytes present on smear. Hemoglobin was slightly low at 11.6 and on trend, he has had mild normocytic anemia going back for the last 4 months. No other cytopenias detected. Platelet count was normal at 157. B12 was evaluated and was normal. He is on oral supplementation. TSH and free T4 were normal. No recent iron studies were

## 2025-06-17 NOTE — PROGRESS NOTES
Chief Complaint   Patient presents with    2 Week Follow-Up     \"Have you been to the ER, urgent care clinic since your last visit?  Hospitalized since your last visit?\"    NO    “Have you seen or consulted any other health care providers outside of [facility] since your last visit?”    NO

## 2025-06-22 LAB
ECHO AO ASC DIAM: 3.6 CM
ECHO AO ASCENDING AORTA INDEX: 1.86 CM/M2
ECHO AO ROOT DIAM: 3.3 CM
ECHO AO ROOT INDEX: 1.7 CM/M2
ECHO AV AREA PEAK VELOCITY: 2.9 CM2
ECHO AV AREA VTI: 3.1 CM2
ECHO AV AREA/BSA PEAK VELOCITY: 1.5 CM2/M2
ECHO AV AREA/BSA VTI: 1.6 CM2/M2
ECHO AV MEAN GRADIENT: 5 MMHG
ECHO AV MEAN VELOCITY: 1.1 M/S
ECHO AV PEAK GRADIENT: 10 MMHG
ECHO AV PEAK VELOCITY: 1.5 M/S
ECHO AV VELOCITY RATIO: 0.87
ECHO AV VTI: 30.9 CM
ECHO BSA: 1.98 M2
ECHO EST RA PRESSURE: 3 MMHG
ECHO LA DIAMETER INDEX: 2.11 CM/M2
ECHO LA DIAMETER: 4.1 CM
ECHO LA TO AORTIC ROOT RATIO: 1.24
ECHO LA VOL A-L A2C: 66 ML (ref 18–58)
ECHO LA VOL A-L A4C: 111 ML (ref 18–58)
ECHO LA VOL BP: 84 ML (ref 18–58)
ECHO LA VOL MOD A2C: 65 ML (ref 18–58)
ECHO LA VOL MOD A4C: 107 ML (ref 18–58)
ECHO LA VOL/BSA BIPLANE: 43 ML/M2 (ref 16–34)
ECHO LA VOLUME AREA LENGTH: 87 ML
ECHO LA VOLUME INDEX A-L A2C: 34 ML/M2 (ref 16–34)
ECHO LA VOLUME INDEX A-L A4C: 57 ML/M2 (ref 16–34)
ECHO LA VOLUME INDEX AREA LENGTH: 45 ML/M2 (ref 16–34)
ECHO LA VOLUME INDEX MOD A2C: 34 ML/M2 (ref 16–34)
ECHO LA VOLUME INDEX MOD A4C: 55 ML/M2 (ref 16–34)
ECHO LV E' LATERAL VELOCITY: 8.82 CM/S
ECHO LV E' SEPTAL VELOCITY: 8.19 CM/S
ECHO LV EDV A2C: 75 ML
ECHO LV EDV A4C: 70 ML
ECHO LV EDV BP: 73 ML (ref 67–155)
ECHO LV EDV INDEX A4C: 36 ML/M2
ECHO LV EDV INDEX BP: 38 ML/M2
ECHO LV EDV NDEX A2C: 39 ML/M2
ECHO LV EF PHYSICIAN: 63 %
ECHO LV EJECTION FRACTION A2C: 63 %
ECHO LV EJECTION FRACTION A4C: 63 %
ECHO LV EJECTION FRACTION BIPLANE: 63 % (ref 55–100)
ECHO LV ESV A2C: 28 ML
ECHO LV ESV A4C: 26 ML
ECHO LV ESV BP: 27 ML (ref 22–58)
ECHO LV ESV INDEX A2C: 14 ML/M2
ECHO LV ESV INDEX A4C: 13 ML/M2
ECHO LV ESV INDEX BP: 14 ML/M2
ECHO LV FRACTIONAL SHORTENING: 37 % (ref 28–44)
ECHO LV INTERNAL DIMENSION DIASTOLE INDEX: 2.11 CM/M2
ECHO LV INTERNAL DIMENSION DIASTOLIC: 4.1 CM (ref 4.2–5.9)
ECHO LV INTERNAL DIMENSION SYSTOLIC INDEX: 1.34 CM/M2
ECHO LV INTERNAL DIMENSION SYSTOLIC: 2.6 CM
ECHO LV IVSD: 1.1 CM (ref 0.6–1)
ECHO LV MASS 2D: 161.4 G (ref 88–224)
ECHO LV MASS INDEX 2D: 83.2 G/M2 (ref 49–115)
ECHO LV POSTERIOR WALL DIASTOLIC: 1.2 CM (ref 0.6–1)
ECHO LV RELATIVE WALL THICKNESS RATIO: 0.59
ECHO LVOT AREA: 3.5 CM2
ECHO LVOT AV VTI INDEX: 0.89
ECHO LVOT DIAM: 2.1 CM
ECHO LVOT MEAN GRADIENT: 4 MMHG
ECHO LVOT PEAK GRADIENT: 7 MMHG
ECHO LVOT PEAK VELOCITY: 1.3 M/S
ECHO LVOT STROKE VOLUME INDEX: 49.3 ML/M2
ECHO LVOT SV: 95.5 ML
ECHO LVOT VTI: 27.6 CM
ECHO MV A VELOCITY: 0.87 M/S
ECHO MV E DECELERATION TIME (DT): 311.9 MS
ECHO MV E VELOCITY: 0.88 M/S
ECHO MV E/A RATIO: 1.01
ECHO MV E/E' LATERAL: 9.98
ECHO MV E/E' RATIO (AVERAGED): 10.36
ECHO MV E/E' SEPTAL: 10.74
ECHO PV MAX VELOCITY: 1.3 M/S
ECHO PV MAX VELOCITY: 1.5 M/S
ECHO PV MEAN GRADIENT: 5 MMHG
ECHO PV MEAN VELOCITY: 1.1 M/S
ECHO PV PEAK GRADIENT: 7 MMHG
ECHO PV PEAK GRADIENT: 9 MMHG
ECHO PV VTI: 32 CM
ECHO RIGHT VENTRICULAR SYSTOLIC PRESSURE (RVSP): 35 MMHG
ECHO RV TAPSE: 2.6 CM (ref 1.7–?)
ECHO RVOT MEAN GRADIENT: 1 MMHG
ECHO RVOT PEAK GRADIENT: 2 MMHG
ECHO RVOT PEAK VELOCITY: 0.8 M/S
ECHO RVOT VTI: 16.3 CM
ECHO TV REGURGITANT MAX VELOCITY: 2.83 M/S
ECHO TV REGURGITANT PEAK GRADIENT: 32 MMHG

## 2025-06-22 PROCEDURE — 93306 TTE W/DOPPLER COMPLETE: CPT | Performed by: SPECIALIST

## 2025-06-23 ENCOUNTER — RESULTS FOLLOW-UP (OUTPATIENT)
Age: 89
End: 2025-06-23

## 2025-07-07 DIAGNOSIS — F02.B0 MODERATE LATE ONSET ALZHEIMER'S DEMENTIA WITHOUT BEHAVIORAL DISTURBANCE, PSYCHOTIC DISTURBANCE, MOOD DISTURBANCE, OR ANXIETY (HCC): ICD-10-CM

## 2025-07-07 DIAGNOSIS — G30.1 MODERATE LATE ONSET ALZHEIMER'S DEMENTIA WITHOUT BEHAVIORAL DISTURBANCE, PSYCHOTIC DISTURBANCE, MOOD DISTURBANCE, OR ANXIETY (HCC): ICD-10-CM

## 2025-07-08 RX ORDER — DONEPEZIL HYDROCHLORIDE 10 MG/1
10 TABLET, FILM COATED ORAL NIGHTLY
Qty: 30 TABLET | Refills: 1 | Status: SHIPPED | OUTPATIENT
Start: 2025-07-08

## 2025-07-08 RX ORDER — MEMANTINE HYDROCHLORIDE 5 MG/1
5 TABLET ORAL 2 TIMES DAILY
Qty: 60 TABLET | Refills: 1 | Status: SHIPPED | OUTPATIENT
Start: 2025-07-08

## 2025-07-11 ENCOUNTER — OFFICE VISIT (OUTPATIENT)
Age: 89
End: 2025-07-11
Payer: MEDICARE

## 2025-07-11 VITALS
OXYGEN SATURATION: 97 % | BODY MASS INDEX: 29.19 KG/M2 | HEIGHT: 67 IN | TEMPERATURE: 97 F | HEART RATE: 55 BPM | WEIGHT: 186 LBS | SYSTOLIC BLOOD PRESSURE: 139 MMHG | RESPIRATION RATE: 20 BRPM | DIASTOLIC BLOOD PRESSURE: 70 MMHG

## 2025-07-11 DIAGNOSIS — I48.0 PAF (PAROXYSMAL ATRIAL FIBRILLATION) (HCC): ICD-10-CM

## 2025-07-11 DIAGNOSIS — F02.B0 MODERATE LATE ONSET ALZHEIMER'S DEMENTIA WITHOUT BEHAVIORAL DISTURBANCE, PSYCHOTIC DISTURBANCE, MOOD DISTURBANCE, OR ANXIETY (HCC): Primary | ICD-10-CM

## 2025-07-11 DIAGNOSIS — M79.672 BILATERAL FOOT PAIN: ICD-10-CM

## 2025-07-11 DIAGNOSIS — M79.671 BILATERAL FOOT PAIN: ICD-10-CM

## 2025-07-11 DIAGNOSIS — E53.8 B12 DEFICIENCY: ICD-10-CM

## 2025-07-11 DIAGNOSIS — C91.10 CLL (CHRONIC LYMPHOCYTIC LEUKEMIA) (HCC): ICD-10-CM

## 2025-07-11 DIAGNOSIS — I10 PRIMARY HYPERTENSION: ICD-10-CM

## 2025-07-11 DIAGNOSIS — I10 ESSENTIAL (PRIMARY) HYPERTENSION: ICD-10-CM

## 2025-07-11 DIAGNOSIS — G30.1 MODERATE LATE ONSET ALZHEIMER'S DEMENTIA WITHOUT BEHAVIORAL DISTURBANCE, PSYCHOTIC DISTURBANCE, MOOD DISTURBANCE, OR ANXIETY (HCC): Primary | ICD-10-CM

## 2025-07-11 PROCEDURE — 1036F TOBACCO NON-USER: CPT | Performed by: INTERNAL MEDICINE

## 2025-07-11 PROCEDURE — 1124F ACP DISCUSS-NO DSCNMKR DOCD: CPT | Performed by: INTERNAL MEDICINE

## 2025-07-11 PROCEDURE — 99214 OFFICE O/P EST MOD 30 MIN: CPT | Performed by: INTERNAL MEDICINE

## 2025-07-11 PROCEDURE — 1160F RVW MEDS BY RX/DR IN RCRD: CPT | Performed by: INTERNAL MEDICINE

## 2025-07-11 PROCEDURE — 1159F MED LIST DOCD IN RCRD: CPT | Performed by: INTERNAL MEDICINE

## 2025-07-11 PROCEDURE — G8427 DOCREV CUR MEDS BY ELIG CLIN: HCPCS | Performed by: INTERNAL MEDICINE

## 2025-07-11 PROCEDURE — G8417 CALC BMI ABV UP PARAM F/U: HCPCS | Performed by: INTERNAL MEDICINE

## 2025-07-11 RX ORDER — CYANOCOBALAMIN 1000 UG/ML
1000 INJECTION, SOLUTION INTRAMUSCULAR; SUBCUTANEOUS ONCE
Status: SHIPPED | OUTPATIENT
Start: 2025-07-11

## 2025-07-11 ASSESSMENT — PATIENT HEALTH QUESTIONNAIRE - PHQ9
1. LITTLE INTEREST OR PLEASURE IN DOING THINGS: NOT AT ALL
2. FEELING DOWN, DEPRESSED OR HOPELESS: NOT AT ALL
SUM OF ALL RESPONSES TO PHQ QUESTIONS 1-9: 0

## 2025-07-11 NOTE — PROGRESS NOTES
Have you been to the ER, urgent care clinic since your last visit?  Hospitalized since your last visit?   NO.    Have you seen or consulted any other health care providers outside our system since your last visit?   Dr. Jessica Hsu

## 2025-07-11 NOTE — PROGRESS NOTES
Mr. Jt Nielsen is presenting to follow up     CC:  Skin Problem (Pt had biopsy on top of head. ) and b12 (Inquiry about b12.)       HPI:    Mr. Jt Nielsen   is a 89 y.o. male with a hx of afib, HTN, dementia and chronic kidney disease stage III presenting to follow up on chronic medical issues    In the interval he saw dermatologist in [place] and had skin lesion from forehead removed waiting on biopsy results      Dementia.  - diagnosed with dementia; MRI in 10/2024 showed mild to moderate temporoparietal cerebral atrophy.  - Donepezil prescribed 10mg tolerating it well Added Namenda. No hallucinations  - Referral to neurologist Rowan Tanner for further evaluation and management  has not made appointment   - today he is not aware of time     B12 deficiency.  - Initial B12 level was 129; improved to 531 in 12/2024 after B12 injections.  - B12 injection will be administered today       Normocytic Anemia with elevated WBC - diagnosed CLL stage 0 watchful waiting with Dr Tracey- appreciate assistance   On B12 shots       Chronic kidney disease.  - Diagnosed with chronic kidney disease; currently on reduced dose of Eliquis (2.5 mg twice a day).  - Advised to follow up with nephrologist       Hypertension.  - Blood pressure is controlled today since resuming diltiazem.   - continue lisinopril       Atrial fibrillation. Sinus today. On diltiazem  - Currently on reduced dose of Eliquis (2.5 mg twice a day) due to chronic kidney disease.  - has easy bruising  - saw cards NP 05/21 no changes to regimen     Benign prostatic hyperplasia.  - Currently on Flomax  and finasteride for enlarged prostate.  -denies issues with urination      Foot pain/ suspected neuropathy   - Currently on gabapentin for foot pain. Low dose 100mg BID          Review of systems:      10 systems reviewed and negative other then HPI     Past Medical History:   Diagnosis Date    Appendicitis     2018 underwent appendectomy    Arthritis

## 2025-08-12 ENCOUNTER — TELEPHONE (OUTPATIENT)
Age: 89
End: 2025-08-12

## 2025-08-26 ENCOUNTER — TELEPHONE (OUTPATIENT)
Age: 89
End: 2025-08-26

## 2025-08-26 DIAGNOSIS — I10 PRIMARY HYPERTENSION: Primary | ICD-10-CM

## 2025-08-27 RX ORDER — LOSARTAN POTASSIUM 25 MG/1
25 TABLET ORAL DAILY
Qty: 30 TABLET | Refills: 5 | Status: SHIPPED | OUTPATIENT
Start: 2025-08-27

## (undated) DEVICE — Device

## (undated) DEVICE — DRAPE,REIN 53X77,STERILE: Brand: MEDLINE

## (undated) DEVICE — BLADELESS OPTICAL TROCAR WITH FIXATION CANNULA: Brand: VERSAPORT

## (undated) DEVICE — 3M™ IOBAN™ 2 ANTIMICROBIAL INCISE DRAPE 6640EZ: Brand: IOBAN™ 2

## (undated) DEVICE — KENDALL DL ECG CABLE AND LEAD WIRE SYSTEM, 3-LEAD, SINGLE PATIENT USE: Brand: KENDALL

## (undated) DEVICE — 4-PORT MANIFOLD: Brand: NEPTUNE 2

## (undated) DEVICE — DERMABOND SKIN ADH 0.7ML -- DERMABOND ADVANCED 12/BX

## (undated) DEVICE — (D)PREP SKN CHLRAPRP APPL 26ML -- CONVERT TO ITEM 371833

## (undated) DEVICE — INFECTION CONTROL KIT SYS

## (undated) DEVICE — PACK,SHOULDER II,DRAPE: Brand: MEDLINE

## (undated) DEVICE — BANDAGE COBAN 4 IN COMPR W4INXL5YD FOAM COHESIVE QUIK STK SELF ADH SFT

## (undated) DEVICE — STERILE POLYISOPRENE POWDER-FREE SURGICAL GLOVES: Brand: PROTEXIS

## (undated) DEVICE — ROCKER SWITCH PENCIL BLADE ELECTRODE, HOLSTER: Brand: EDGE

## (undated) DEVICE — BLUNT TROCAR WITH THREADED ANCHOR: Brand: VERSAONE

## (undated) DEVICE — UNIVERSAL FIXATION CANNULA: Brand: VERSAONE

## (undated) DEVICE — SOLUTION IRRIG 1000ML H2O STRL BLT

## (undated) DEVICE — NEEDLE HYPO 25GA L1.5IN BVL ORIENTED ECLIPSE

## (undated) DEVICE — DEVON™ KNEE AND BODY STRAP 60" X 3" (1.5 M X 7.6 CM): Brand: DEVON

## (undated) DEVICE — SUTURE VCRL SZ 0 L27IN ABSRB UD L36MM CT-1 1/2 CIR J260H

## (undated) DEVICE — REM POLYHESIVE ADULT PATIENT RETURN ELECTRODE: Brand: VALLEYLAB

## (undated) DEVICE — 1200 GUARD II KIT W/5MM TUBE W/O VAC TUBE: Brand: GUARDIAN

## (undated) DEVICE — SYR 10ML LUER LOK 1/5ML GRAD --

## (undated) DEVICE — COVER,MAYO STAND,STERILE: Brand: MEDLINE

## (undated) DEVICE — ZINACTIVE USE 2641837 CLIP LIG M BLU TI HRT SHP WIRE HORZ 600 PER BX

## (undated) DEVICE — GOWN,SIRUS,FABRNF,XL,20/CS: Brand: MEDLINE

## (undated) DEVICE — SURGICAL PROCEDURE PACK BASIN MAJ SET CUST NO CAUT

## (undated) DEVICE — TAPE DSG W4INXL11YD RETEN NONWOVEN LO SENS H2O RESIST

## (undated) DEVICE — GOWN,PREVENTION PLUS,XL,ST,24/CS: Brand: MEDLINE

## (undated) DEVICE — 3M™ TEGADERM™ TRANSPARENT FILM DRESSING FRAME STYLE, 1624W, 2-3/8 IN X 2-3/4 IN (6 CM X 7 CM), 100/CT 4CT/CASE: Brand: 3M™ TEGADERM™

## (undated) DEVICE — OPTIVAC KIT DOUBLE MIX 80GM: Brand: DJO SURGICAL

## (undated) DEVICE — STERILE POLYISOPRENE POWDER-FREE SURGICAL GLOVES WITH EMOLLIENT COATING: Brand: PROTEXIS

## (undated) DEVICE — 2108 SERIES SAGITTAL BLADE (24.9 X 0.64 X 73.8MM)

## (undated) DEVICE — SOLUTION IRRIG 3000ML 0.9% SOD CHL FLX CONT 0797208] ICU MEDICAL INC]

## (undated) DEVICE — NEEDLE HYPO 18GA L1.5IN PNK S STL HUB POLYPR SHLD REG BVL

## (undated) DEVICE — TUBING SUCT 12FR MAL ALUM SHFT FN CAP VENT UNIV CONN W/ OBT

## (undated) DEVICE — SUTURE SZ 0 27IN 5/8 CIR UR-6  TAPER PT VIOLET ABSRB VICRYL J603H

## (undated) DEVICE — SUTURE VCRL SZ 2-0 L36IN ABSRB UD L36MM CT-1 1/2 CIR J945H

## (undated) DEVICE — CONTINU-FLO SOLUTION SET, 2 INJECTION SITES, MALE LUER LOCK ADAPTER WITH RETRACTABLE COLLAR, LARGE BORE STOPCOCK WITH ROTATING MALE LUER LOCK EXTENSION SET, 2 INJECTION SITES, MALE LUER LOCK ADAPTER WITH RETRACTABLE COLLAR: Brand: INTERLINK/CONTINU-FLO

## (undated) DEVICE — 3M™ IOBAN™ 2 ANTIMICROBIAL INCISE DRAPE 6651EZ: Brand: IOBAN™ 2

## (undated) DEVICE — SUTURE FIBERWIRE SZ 2 W/ TAPERED NEEDLE BLUE L38IN NONABSORB BLU L26.5MM 1/2 CIRCLE AR7200

## (undated) DEVICE — SUTURE MCRYL SZ 5 0 L18IN ABSRB UD PC 5 L19MM 1 2 CIR Y822G

## (undated) DEVICE — SPIROMETER INCENT VOL AD 2.5 L --

## (undated) DEVICE — OPTIFOAM GENTLE SA, POSTOP, 4X8: Brand: MEDLINE

## (undated) DEVICE — SUT ETHBND 2 30IN OS4 GRN --

## (undated) DEVICE — NEEDLE HYPO 21GA L1.5IN INTRAMUSCULAR S STL LATCH BVL UP

## (undated) DEVICE — INTENDED FOR TISSUE SEPARATION, AND OTHER PROCEDURES THAT REQUIRE A SHARP SURGICAL BLADE TO PUNCTURE OR CUT.: Brand: BARD-PARKER ® CARBON RIB-BACK BLADES

## (undated) DEVICE — SPONGE LAP 18X18IN STRL -- 5/PK

## (undated) DEVICE — TOWEL SURG W17XL27IN STD BLU COT NONFENESTRATED PREWASHED

## (undated) DEVICE — TRAY CATH 16F DRN BG LTX -- CONVERT TO ITEM 363158

## (undated) DEVICE — SURGICAL PROCEDURE KIT GEN LAPAROSCOPY LF

## (undated) DEVICE — COVER,TABLE,60X90,STERILE: Brand: MEDLINE

## (undated) DEVICE — COVER LT HNDL PLAS RIG 1 PER PK

## (undated) DEVICE — SUT STRATA PGA 3-0 PS-2 30CM -- STRATAFIX PGA PCL

## (undated) DEVICE — HANDPIECE SET WITH COAXIAL HIGH FLOW TIP AND SUCTION TUBE: Brand: INTERPULSE

## (undated) DEVICE — SYR LR LCK 1ML GRAD NSAF 30ML --

## (undated) DEVICE — HANDLE LT SNAP ON ULT DURABLE LENS FOR TRUMPF ALC DISPOSABLE

## (undated) DEVICE — SUT VCRL 2-0 54IN UD --

## (undated) DEVICE — BAG SPEC RETRV 275ML 10ML DISPOSABLE RELIACATCH

## (undated) DEVICE — ARTICULATION RELOAD WITH TRI-STAPLE TECHNOLOGY: Brand: ENDO GIA

## (undated) DEVICE — ANTI-EMBOLISM STOCKINGS,KNEE LENGTH,LARGE,REGULAR,SIZE E-: Brand: T.E.D.

## (undated) DEVICE — SOLUTION LACTATED RINGERS INJECTION USP

## (undated) DEVICE — TIP SUCT CRV REG REDI

## (undated) DEVICE — STOCKINETTE,IMPERVIOUS,12X48,STERILE: Brand: MEDLINE